# Patient Record
Sex: FEMALE | Race: WHITE | NOT HISPANIC OR LATINO | Employment: FULL TIME | URBAN - METROPOLITAN AREA
[De-identification: names, ages, dates, MRNs, and addresses within clinical notes are randomized per-mention and may not be internally consistent; named-entity substitution may affect disease eponyms.]

---

## 2017-02-10 ENCOUNTER — TRANSCRIBE ORDERS (OUTPATIENT)
Dept: ADMINISTRATIVE | Facility: HOSPITAL | Age: 62
End: 2017-02-10

## 2017-02-10 DIAGNOSIS — Z12.31 SCREENING MAMMOGRAM FOR HIGH-RISK PATIENT: Primary | ICD-10-CM

## 2017-03-01 ENCOUNTER — ALLSCRIPTS OFFICE VISIT (OUTPATIENT)
Dept: OTHER | Facility: OTHER | Age: 62
End: 2017-03-01

## 2017-03-01 DIAGNOSIS — E78.5 HYPERLIPIDEMIA: ICD-10-CM

## 2017-03-01 DIAGNOSIS — R73.01 IMPAIRED FASTING GLUCOSE: ICD-10-CM

## 2017-03-01 DIAGNOSIS — Z12.31 ENCOUNTER FOR SCREENING MAMMOGRAM FOR MALIGNANT NEOPLASM OF BREAST: ICD-10-CM

## 2017-03-01 DIAGNOSIS — R14.0 ABDOMINAL DISTENSION (GASEOUS): ICD-10-CM

## 2017-03-01 DIAGNOSIS — D50.9 IRON DEFICIENCY ANEMIA: ICD-10-CM

## 2017-03-03 LAB
HPV 18 (HISTORICAL): NOT DETECTED
HPV HIGH RISK 16/18 (HISTORICAL): NOT DETECTED
HPV16 (HISTORICAL): NOT DETECTED
PAP (HISTORICAL): NORMAL

## 2017-03-08 ENCOUNTER — ALLSCRIPTS OFFICE VISIT (OUTPATIENT)
Dept: OTHER | Facility: OTHER | Age: 62
End: 2017-03-08

## 2017-03-08 ENCOUNTER — GENERIC CONVERSION - ENCOUNTER (OUTPATIENT)
Dept: OTHER | Facility: OTHER | Age: 62
End: 2017-03-08

## 2017-03-27 ENCOUNTER — TRANSCRIBE ORDERS (OUTPATIENT)
Dept: MRI IMAGING | Facility: CLINIC | Age: 62
End: 2017-03-27

## 2017-03-30 ENCOUNTER — HOSPITAL ENCOUNTER (OUTPATIENT)
Dept: ULTRASOUND IMAGING | Facility: CLINIC | Age: 62
Discharge: HOME | End: 2017-03-30
Payer: COMMERCIAL

## 2017-03-30 DIAGNOSIS — R14.0 ABDOMINAL DISTENSION (GASEOUS): ICD-10-CM

## 2017-03-30 PROCEDURE — 76856 US EXAM PELVIC COMPLETE: CPT

## 2017-03-30 PROCEDURE — 76830 TRANSVAGINAL US NON-OB: CPT

## 2017-04-03 ENCOUNTER — ALLSCRIPTS OFFICE VISIT (OUTPATIENT)
Dept: OTHER | Facility: OTHER | Age: 62
End: 2017-04-03

## 2017-04-03 ENCOUNTER — HOSPITAL ENCOUNTER (OUTPATIENT)
Dept: MAMMOGRAPHY | Facility: CLINIC | Age: 62
Discharge: HOME | End: 2017-04-03
Payer: COMMERCIAL

## 2017-04-03 DIAGNOSIS — Z12.31 ENCOUNTER FOR SCREENING MAMMOGRAM FOR MALIGNANT NEOPLASM OF BREAST: ICD-10-CM

## 2017-04-03 PROCEDURE — G0143 SCR C/V CYTO,THINLAYER,RESCR: HCPCS | Performed by: OBSTETRICS & GYNECOLOGY

## 2017-04-03 PROCEDURE — G0202 SCR MAMMO BI INCL CAD: HCPCS

## 2017-04-05 ENCOUNTER — LAB REQUISITION (OUTPATIENT)
Dept: LAB | Facility: HOSPITAL | Age: 62
End: 2017-04-05
Payer: COMMERCIAL

## 2017-04-05 DIAGNOSIS — Z01.411 ENCOUNTER FOR GYNECOLOGICAL EXAMINATION WITH ABNORMAL FINDING: ICD-10-CM

## 2017-04-08 ENCOUNTER — LAB CONVERSION - ENCOUNTER (OUTPATIENT)
Dept: OTHER | Facility: OTHER | Age: 62
End: 2017-04-08

## 2017-04-08 LAB
A/G RATIO (HISTORICAL): 2.1 (CALC) (ref 1–2.5)
ALBUMIN SERPL BCP-MCNC: 4.1 G/DL (ref 3.6–5.1)
ALP SERPL-CCNC: 69 U/L (ref 33–130)
ALT SERPL W P-5'-P-CCNC: 14 U/L (ref 6–29)
AST SERPL W P-5'-P-CCNC: 16 U/L (ref 10–35)
BASOPHILS # BLD AUTO: 0.8 %
BASOPHILS # BLD AUTO: 27 CELLS/UL (ref 0–200)
BILIRUB SERPL-MCNC: 0.6 MG/DL (ref 0.2–1.2)
BUN SERPL-MCNC: 17 MG/DL (ref 7–25)
BUN/CREA RATIO (HISTORICAL): NORMAL (CALC) (ref 6–22)
CALCIUM SERPL-MCNC: 9 MG/DL (ref 8.6–10.4)
CHLORIDE SERPL-SCNC: 108 MMOL/L (ref 98–110)
CHOLEST SERPL-MCNC: 247 MG/DL (ref 125–200)
CHOLEST/HDLC SERPL: 4 (CALC)
CO2 SERPL-SCNC: 27 MMOL/L (ref 20–31)
CREAT SERPL-MCNC: 0.65 MG/DL (ref 0.5–0.99)
DEPRECATED RDW RBC AUTO: 12.8 % (ref 11–15)
EGFR AFRICAN AMERICAN (HISTORICAL): 112 ML/MIN/1.73M2
EGFR-AMERICAN CALC (HISTORICAL): 96 ML/MIN/1.73M2
EOSINOPHIL # BLD AUTO: 102 CELLS/UL (ref 15–500)
EOSINOPHIL # BLD AUTO: 3 %
GAMMA GLOBULIN (HISTORICAL): 2 G/DL (CALC) (ref 1.9–3.7)
GLUCOSE (HISTORICAL): 76 MG/DL (ref 65–99)
HBA1C MFR BLD HPLC: 5.1 % OF TOTAL HGB
HCT VFR BLD AUTO: 41.5 % (ref 35–45)
HDLC SERPL-MCNC: 61 MG/DL
HGB BLD-MCNC: 13.8 G/DL (ref 11.7–15.5)
LDL CHOLESTEROL (HISTORICAL): 166 MG/DL (CALC)
LYMPHOCYTES # BLD AUTO: 1394 CELLS/UL (ref 850–3900)
LYMPHOCYTES # BLD AUTO: 41 %
MCH RBC QN AUTO: 30.1 PG (ref 27–33)
MCHC RBC AUTO-ENTMCNC: 33.2 G/DL (ref 32–36)
MCV RBC AUTO: 90.5 FL (ref 80–100)
MONOCYTES # BLD AUTO: 286 CELLS/UL (ref 200–950)
MONOCYTES (HISTORICAL): 8.4 %
NEUTROPHILS # BLD AUTO: 1591 CELLS/UL (ref 1500–7800)
NEUTROPHILS # BLD AUTO: 46.8 %
NON-HDL-CHOL (CHOL-HDL) (HISTORICAL): 186 MG/DL (CALC)
PLATELET # BLD AUTO: 223 THOUSAND/UL (ref 140–400)
PMV BLD AUTO: 8.6 FL (ref 7.5–12.5)
POTASSIUM SERPL-SCNC: 4.1 MMOL/L (ref 3.5–5.3)
RBC # BLD AUTO: 4.58 MILLION/UL (ref 3.8–5.1)
SODIUM SERPL-SCNC: 142 MMOL/L (ref 135–146)
TOTAL PROTEIN (HISTORICAL): 6.1 G/DL (ref 6.1–8.1)
TRIGL SERPL-MCNC: 99 MG/DL
TSH SERPL DL<=0.05 MIU/L-ACNC: 2.21 MIU/L (ref 0.4–4.5)
WBC # BLD AUTO: 3.4 THOUSAND/UL (ref 3.8–10.8)

## 2017-04-10 LAB
LAB AP GYN PRIMARY INTERPRETATION: NORMAL
Lab: NORMAL

## 2017-04-14 ENCOUNTER — ALLSCRIPTS OFFICE VISIT (OUTPATIENT)
Dept: OTHER | Facility: OTHER | Age: 62
End: 2017-04-14

## 2017-11-20 ENCOUNTER — GENERIC CONVERSION - ENCOUNTER (OUTPATIENT)
Dept: OTHER | Facility: OTHER | Age: 62
End: 2017-11-20

## 2017-11-20 DIAGNOSIS — J45.40 MODERATE PERSISTENT ASTHMA, UNCOMPLICATED: ICD-10-CM

## 2017-12-11 ENCOUNTER — LAB CONVERSION - ENCOUNTER (OUTPATIENT)
Dept: OTHER | Facility: OTHER | Age: 62
End: 2017-12-11

## 2017-12-11 LAB
ALLERGEN CAT EPITHELIUM-DANDER (HISTORICAL): <0.1 KU/L
ALLERGEN LAMB'S QUARTER IGE (HISTORICAL): <0.1 KU/L
ALLERGEN, OAK (HISTORICAL): <0.1 KU/L
ALTERNARIA ALTERNATA (HISTORICAL): <0.1 KU/L
CLADOSPORIUM HERBARUM (HISTORICAL): <0.1 KU/L
CLASS (HISTORICAL): 0
COMMON RAGWEED (HISTORICAL): <0.1 KU/L
D. FARINAE (HISTORICAL): <0.1 KU/L
DOG DANDER (HISTORICAL): <0.1 KU/L
KENTUCKY BLUE GRASS (HISTORICAL): <0.1 KU/L
TIMOTHY GRASS (HISTORICAL): <0.1 KU/L

## 2017-12-19 ENCOUNTER — ALLSCRIPTS OFFICE VISIT (OUTPATIENT)
Dept: OTHER | Facility: OTHER | Age: 62
End: 2017-12-19

## 2017-12-20 NOTE — PROGRESS NOTES
Assessment  1  Seborrheic dermatitis of scalp (690 18) (L21 9)   2  Dermatitis (692 9) (L30 9)    Plan  Dermatitis    · Triamcinolone Acetonide 0 5 % External Cream; Use 1 part with 3 parts Eucerin toaffected areas on arm and legs twice daily  Seborrheic dermatitis of scalp    · Betamethasone Valerate 0 12 % External Foam; APPLY TO AFFECTED AREAS onscalp qd-bid prn    Discussion/Summary    Seborrheic dermatitis of the scalp-this can be exacerbated by stress, try the betamethasone foam once or twice daily, if symptoms fail to improve can treat for tinea capitisRash on arm may be dermatitis similar to axilla behind knees and thigh though also could be ringworm given its rounded appearance, treat with the triamcinolone as discussed, if this fails to improve then tried Terbinafine 1% available over-the-counter, it containing up to 3 weeks for ringworm to improve  Dermatitis is likely related to dry skin and scratching, can be exacerbated by stress, get the home humidifier as discussed goal 45% humidity in the bedroom and sitting room  Copious hypoallergenic moisturize in lotion 2-3 times daily such as Eucerin calming cream, spot treat the areas of dermatitis with triamcinolone 2-3 times daily as this will improve the H and decrease the inflammation, then follow with the lotion  Chief Complaint  rash      History of Present Illness  HPI: notes rashes under arms, behind knees, back of head and R forearm  concerned w/ ringworm      Active Problems  1  Abnormal fasting glucose (790 29) (R73 01)   2  Acute bronchitis (466 0) (J20 9)   3  Acute laryngitis (464 00) (J04 0)   4  Allergic rhinitis (477 9) (J30 9)   5  Asthma (493 90) (J45 909)   6  Benign essential hypertension (401 1) (I10)   7  Bloating (787 3) (R14 0)   8  Bronchitis, asthmatic (493 90) (J45 909)   9  Chronic obstructive pulmonary disease (496) (J44 9)   10  Colon cancer screening (V76 51) (Z12 11)   11  Common cold (460) (J00)   12   Depression (311) (F32 9)   13  Depression with anxiety (300 4) (F41 8)   14  Encounter for genetic counseling (V26 33)   15  Encounter for gynecological examination with abnormal finding (V72 31) (Z01 411)   16  Encounter for routine gynecological examination with Papanicolaou smear of cervix  (V72 31,V76 2) (Z01 419)   17  Encounter for screening mammogram for malignant neoplasm of breast (V76 12)  (Z12 31)   18  Extrinsic asthma (493 00) (J45 909)   19  Fatigue (780 79) (R53 83)   20  Herpes Simplex Generalized (054 9)   21  Hoarseness (784 42) (R49 0)   22  Hyperlipidemia (272 4) (E78 5)   23  Insomnia (780 52) (G47 00)   24  Iron deficiency anemia (280 9) (D50 9)   25  Moderate persistent asthma without complication (435 96) (Z61 16)   26  Pain in joint, lower leg (719 46) (M25 569)   27  Screening for HPV (human papillomavirus) (V73 81) (Z11 51)   28  Temporomandibular dysfunction syndrome (524 60) (M26 609)   29  Unsatisfactory cervical Papanicolaou smear (795 08) (R87 615)   30  Urgency of urination (788 63) (R39 15)   31  Urinary frequency (788 41) (R35 0)    Past Medical History  Active Problems And Past Medical History Reviewed: The active problems and past medical history were reviewed and updated today  Surgical History  Surgical History Reviewed: The surgical history was reviewed and updated today  Social History   · Alcohol Use (History)   · RARELY   · Drinks coffee   · Exercise habits   · Former smoker   · Four children   · Four children   · Marital History -  (V61 03)   · Never Used Drugs  The social history was reviewed and updated today  Family History  Family History Reviewed: The family history was reviewed and updated today  Current Meds   1  Montelukast Sodium 10 MG Oral Tablet; take 1 tablet every day; Therapy: 94Bte4840 to (Evaluate:42Ckj0384)  Requested for: 34Fze6708; Last Rx:60Ywo2600 Ordered   2   Proventil  (90 Base) MCG/ACT Inhalation Aerosol Solution; INHALE 2 PUFFS EVERY 6 HOURS AS NEEDED; Therapy: 68ANA1296 to (Last Rx:20Nov2017)  Requested for: 20Nov2017 Ordered   3  Symbicort 160-4 5 MCG/ACT Inhalation Aerosol; inhale 2 puffs by mouth twice a day Rinse mouth after use; Therapy: 88QFZ2798 to (Junita Pali)  Requested for: 30KNA6575; Last Rx:30Oct2017 Ordered   4  ValACYclovir HCl - 1 GM Oral Tablet; take 2 tablets NOW AND THEN 2 TABLETS IN 12 HOURS; Therapy: 39Ngh3477 to (Evaluate:25Nov2017)  Requested for: 52PQE0322; Last Rx:20Nov2017 Ordered    Allergies  1  Pravastatin Sodium TABS   2  Simvastatin TABS    Vitals   Recorded: 62YLS8320 02:24PM   Heart Rate 68   Respiration 12   Systolic 855   Diastolic 80   Height 5 ft 9 in   Weight 155 lb 2 oz   BMI Calculated 22 91   BSA Calculated 1 85     Physical Exam   Skin  Skin and subcutaneous tissue: Abnormal  -- Sharply circumscribed erythematous macular papular rash behind both knees and under axilla, erythematous scaly rash on back of scalp that is also well-circumscribed scaly throughout the lesion, crescent shaped rash on right forearm with no appreciable scaling only mildly erythematous can be consistent with an early ringworm, dry scaly erythematous rash on predominantly right inner thigh          Future Appointments    Date/Time Provider Specialty Site   02/15/2018 03:30 PM Anthony [facility] Pulmonary Medicine [address] [facility] [place]   03/09/2018 03:00 PM Qian Dobson MD Obstetrics/Gynecology [facility]     Signatures   Electronically signed by : HARSHA Fischer ; Dec 19 2017  2:57PM EST                       (Author)

## 2018-01-11 NOTE — PROGRESS NOTES
Assessment    1  Acute bronchitis (466 0) (J20 9)   2  Asthma (493 90) (J45 909)   3  Chronic obstructive pulmonary disease (496) (J44 9)   4  Hyperlipidemia (272 4) (E78 5)   5  Pain in joint, lower leg (719 46) (M25 569)   6  Iron deficiency anemia (280 9) (D50 9)    Plan  Abnormal fasting glucose, Hyperlipidemia    · (1) HEMOGLOBIN A1C; Status:Active; Requested for:08Mar2017;    · Follow-up as previously scheduled Evaluation and Treatment  Follow-up  Status: Complete  Done:  08DQF3302  Acute bronchitis    · Azithromycin 250 MG Oral Tablet; TAKE 2 TABLETS ON DAY 1 THEN TAKE 1 TABLET A DAY  FOR 4 DAYS   · PredniSONE 20 MG Oral Tablet; 2 TAB FOR 3 DAYS AND ONE TAB FOR 3 DAYS AND THEN  1/2 TAB FOR 3 DAYS  Colon cancer screening    · COLONOSCOPY ; every 5 years; Last 37UUS3926; Next 66LNH5959; Status:Active  Hyperlipidemia    · (1) COMPREHENSIVE METABOLIC PANEL; Status:Active; Requested for:08Mar2017;    · (1) LIPID PANEL, FASTING; Status:Active; Requested for:08Mar2017;    · (1) TSH; Status:Active; Requested for:08Mar2017;    · (1) URINALYSIS w URINE C/S REFLEX (will reflex a microscopy if leukocytes, occult blood, or  nitrites are not within normal limits); Status:Active; Requested for:08Mar2017;   Iron deficiency anemia    · (1) CBC/PLT/DIFF; Status:Active; Requested for:08Mar2017;     Discussion/Summary  Discussion Summary:   Asthmatic bronchitis steroids antibiotics she will use Tylenol or ibuprofen for the cough she has a known history of recurrent bronchitis and asthma but no episodes for a year she will return if she worsens getting screening labs  Chief Complaint  Chief Complaint Free Text Note Form: Severe coughing      History of Present Illness  HPI: She has had severe coughing for a week   She's coughing up thick yellow phlegm no hemoptysis or chest pain no exertional shortness of breath no orthopnea or PND no obvious fever or chills somewhat of a runny nose normally active and wellShe works in a    Bronchitis, Acute, Adult (Brief): The patient is being seen for an initial evaluation of acute bronchitis  Symptoms:  productive cough, non-productive cough and wheezing, but no shortness of breath and no fatigue  The patient is currently experiencing symptoms  Iron Deficiency Anemia (Follow-Up): The patient is being seen for follow-up of iron deficiency anemia  The patient reports doing well  Hyperlipidemia (Follow-Up): The patient states her hyperlipidemia has been under good control since the last visit  She has no significant interval events  Symptoms: Associated symptoms include no focal neurologic deficits  Review of Systems  Complete-Female:   Constitutional: No fever, no chills, feels well, no tiredness, no recent weight gain or weight loss  Eyes: No complaints of eye pain, no red eyes, no eyesight problems, no discharge, no dry eyes, no itching of eyes  ENT: no complaints of earache, no loss of hearing, no nose bleeds, no nasal discharge, no sore throat, no hoarseness  Cardiovascular: No complaints of slow heart rate, no fast heart rate, no chest pain, no palpitations, no leg claudication, no lower extremity edema  Respiratory: as noted in HPI  Gastrointestinal: No complaints of abdominal pain, no constipation, no nausea or vomiting, no diarrhea, no bloody stools  Genitourinary: No complaints of dysuria, no incontinence, no pelvic pain, no dysmenorrhea, no vaginal discharge or bleeding  Musculoskeletal: No complaints of arthralgias, no myalgias, no joint swelling or stiffness, no limb pain or swelling  Integumentary: No complaints of skin rash or lesions, no itching, no skin wounds, no breast pain or lump  Neurological: No complaints of headache, no confusion, no convulsions, no numbness, no dizziness or fainting, no tingling, no limb weakness, no difficulty walking     Psychiatric: Not suicidal, no sleep disturbance, no anxiety or depression, no change in personality, no emotional problems  Endocrine: No complaints of proptosis, no hot flashes, no muscle weakness, no deepening of the voice, no feelings of weakness  Hematologic/Lymphatic: No complaints of swollen glands, no swollen glands in the neck, does not bleed easily, does not bruise easily  ROS Reviewed:   ROS reviewed  Active Problems    1  Acute bronchitis (466 0) (J20 9)   2  Acute laryngitis (464 00) (J04 0)   3  Allergic rhinitis (477 9) (J30 9)   4  Asthma (493 90) (J45 909)   5  Benign essential hypertension (401 1) (I10)   6  Bloating (787 3) (R14 0)   7  Bronchitis, asthmatic (493 90) (J45 909)   8  Chronic obstructive pulmonary disease (496) (J44 9)   9  Colon cancer screening (V76 51) (Z12 11)   10  Common cold (460) (J00)   11  Depression (311) (F32 9)   12  Encounter for genetic counseling (V26 33) (Z31 5)   13  Encounter for gynecological examination with abnormal finding (V72 31) (Z01 411)   14  Encounter for routine gynecological examination with Papanicolaou smear of cervix (V72 31,V76 2)    (Z01 419)   15  Encounter for screening mammogram for malignant neoplasm of breast (V76 12) (Z12 31)   16  Extrinsic asthma (493 00) (J45 909)   17  Herpes Simplex Generalized (054 9)   18  Hyperlipidemia (272 4) (E78 5)   19  Iron deficiency anemia (280 9) (D50 9)   20  Moderate persistent asthma without complication (461 06) (B00 64)   21  Pain in joint, lower leg (719 46) (M25 569)   22  Screening for HPV (human papillomavirus) (V73 81) (Z11 51)   23  Temporomandibular dysfunction syndrome (524 60) (M26 609)   24  Urgency of urination (788 63) (R39 15)   25  Urinary frequency (788 41) (R35 0)    Past Medical History    1  History of A Fall Due To Slipping, Tripping, Or Stumbling (E885 9)   2  Acute sinusitis (461 9) (J01 90)   3  History of Black tarry stools (578 1) (K92 1)   4  Chronic obstructive pulmonary disease (496) (J44 9)   5  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)   6   History of Concussion With Prolonged LOC (Over 24 Hr) Without Return To Pre-existing Conscious   Level (850 4)   7  History of Encounter for counseling (V65 40) (Z71 9)   8  History of Encounter for screening mammogram for malignant neoplasm of breast (V76 12) (Z12 31)   9  History of Facial Injury (959 09)   10  History of Generalized osteoarthritis (715 00) (M15 9)   11  History of  10 (V22 2) (Z33 1)   12  History of allergic rhinitis (V12 69) (Z87 09)   13  History of gastroesophageal reflux (GERD) (V12 79) (Z87 19)   14  History of herpes simplex infection (V12 09) (Z86 19)   15  History of pneumonia (V12 61) (Z87 01)   16  History of Menopausal disorder (627 9) (N95 9)   17  History of Missed abortions (69 849 69 22) (O02 1)   25  History of Morbid or severe obesity due to excess calories (278 01) (E66 01)   19  History of Multiple gestation (651 90) (O30 90)   20  History of Neck Injury (959 09)   21  History of Neuroma (215 9) (D36 10)   22  Normal delivery (650) (O80,Z37 9)   23  History of Pharyngeal disorder (478 20) (J39 2)   24  History of Pneumonia (V12 61)   25  History of Post-menopausal bleeding (627 1) (N95 0)   26  History of Postmenopausal disorder (627 9) (N95 9)   27  History of Pre-operative cardiovascular examination (V72 81) (Z01 810)  Active Problems And Past Medical History Reviewed: The active problems and past medical history were reviewed and updated today  Surgical History    1  History of Biopsy Endometrial, Without Cervical Dilation   2  History of  Section   3  History of Complete Colonoscopy   4  History of Dilation And Curettage   5  History of Knee Arthroscopy (Therapeutic)   6  History of Knee Surgery   7  History of Surgical Treatment Of Missed  In First Trimester   8  History of Surgically Induced  - By Dilation And Evacuation  Surgical History Reviewed: The surgical history was reviewed and updated today  Family History  Mother    1   Family history of Diabetes Mellitus (V18 0)   2  Family history of Head ache   3  Family history of High blood pressure   4  Family history of Hypertension   5  Family history of Migraine   6  Family history of Ovarian cancer   7  Family history of Ovarian cancer   8  Family history of Pancreas carcinoma   9  Family history of Skin disorder   10  Family history of Urinary incontinence   11  Family history of Urinary incontinence  Father    15  Family history of Coronary Artery Disease (V17 49)   13  Family history of Family Health Status Of Father -    15  Family history of Heart disease   15  Family history of High blood pressure  Sister    12  Family history of High blood pressure  Brother    17  Family history of Heart disease   18  Family history of Jaundice   19  Family history of Rectal cancer  Aunt    21  Family history of Osteoporosis  Paternal Aunt    24  Family history of Breast cancer  Family History Reviewed: The family history was reviewed and updated today  Social History    · Alcohol Use (History)   · Drinks coffee   · Exercise habits   · Former smoker   · Four children   · Four children   · Marital History -  (V61 03)   · Never Used Drugs  Social History Reviewed: The social history was reviewed and updated today  Current Meds   1  Montelukast Sodium 10 MG Oral Tablet; take 1 tablet by mouth once daily; Therapy: 62Vrs6063 to (Evaluate:2017)  Requested for: 22CSH1916; Last Rx:42Xsr0964   Ordered   2  Symbicort 160-4 5 MCG/ACT Inhalation Aerosol; inhale 2 puffs by mouth twice a day Rinse mouth   after use; Therapy: 32OBQ9578 to (Priscila Nair)  Requested for: 29Uey7421; Last Rx:20Aro2068   Ordered   3  ValACYclovir HCl - 1 GM Oral Tablet; take 2 tablets NOW AND THEN 2 TABLETS IN 12 HOURS; Therapy: 05Fmt1885 to (Evaluate:79Tor1393)  Requested for: 57YGE9562; Last Rx:83Qbh1286   Ordered   4  Vitamin D 1000 UNIT Oral Tablet Recorded  Medication List Reviewed:    The medication list was reviewed and updated today  Allergies    1  Pravastatin Sodium TABS   2  Simvastatin TABS    Vitals  Vital Signs    Recorded: 34JEQ6394 04:22PM   Temperature 97 8 F   Heart Rate 78   Systolic 298, LUE, Sitting   Diastolic 70, LUE, Sitting   Weight 159 lb 2 0 oz   BMI Calculated 23 5   BSA Calculated 1 87   O2 Saturation 96     Physical Exam    Constitutional   General appearance: No acute distress, well appearing and well nourished  Eyes   Conjunctiva and lids: No swelling, erythema or discharge  Pupils and irises: Equal, round and reactive to light  Ears, Nose, Mouth, and Throat   External inspection of ears and nose: Normal     Otoscopic examination: Tympanic membranes translucent with normal light reflex  Canals patent without erythema  Nasal mucosa, septum, and turbinates: Normal without edema or erythema  Oropharynx: Normal with no erythema, edema, exudate or lesions  Pulmonary   Respiratory effort: No increased work of breathing or signs of respiratory distress  Auscultation of lungs: Abnormal   rhonchi over both bases  wheezing over both bases  Cardiovascular   Palpation of heart: Normal PMI, no thrills  Auscultation of heart: Normal rate and rhythm, normal S1 and S2, without murmurs  Examination of extremities for edema and/or varicosities: Normal     Carotid pulses: Normal     Abdomen   Abdomen: Non-tender, no masses  Liver and spleen: No hepatomegaly or splenomegaly  Lymphatic   Palpation of lymph nodes in neck: No lymphadenopathy  Musculoskeletal   Gait and station: Normal     Digits and nails: Normal without clubbing or cyanosis  Inspection/palpation of joints, bones, and muscles: Normal     Skin   Skin and subcutaneous tissue: Normal without rashes or lesions  Neurologic   Cranial nerves: Cranial nerves 2-12 intact  Reflexes: 2+ and symmetric  Sensation: No sensory loss      Psychiatric   Orientation to person, place, and time: Normal     Mood and affect: Normal          Results/Data  Health Maintenance Flow Sheet 10QUM5929 04:26PM      Test Name Result Flag Reference   Mammography      "going next month in April" per pt       Health Management  Colon cancer screening   COLONOSCOPY; every 5 years; Last 25EIW8432; Next Due: 93DYJ2526; Overdue    Future Appointments    Date/Time Provider Specialty Site   04/14/2017 03:30 PM [facility]   03/09/2018 03:00 PM Quang Diop MD Obstetrics/Gynecology [facility]     Signatures   Electronically signed by : Shahida New, [facility]; Mar  8 2017  7:30PM EST                       (Author)    Electronically signed by : HARSHA Su ; Mar  8 2017  8:28PM EST

## 2018-01-12 VITALS
WEIGHT: 159.13 LBS | DIASTOLIC BLOOD PRESSURE: 70 MMHG | BODY MASS INDEX: 23.5 KG/M2 | OXYGEN SATURATION: 96 % | TEMPERATURE: 97.8 F | HEART RATE: 78 BPM | SYSTOLIC BLOOD PRESSURE: 118 MMHG

## 2018-01-14 VITALS
RESPIRATION RATE: 12 BRPM | HEART RATE: 82 BPM | BODY MASS INDEX: 23.14 KG/M2 | WEIGHT: 156.25 LBS | HEIGHT: 69 IN | DIASTOLIC BLOOD PRESSURE: 74 MMHG | SYSTOLIC BLOOD PRESSURE: 116 MMHG

## 2018-01-14 VITALS
BODY MASS INDEX: 1.63 KG/M2 | WEIGHT: 11 LBS | HEIGHT: 69 IN | DIASTOLIC BLOOD PRESSURE: 64 MMHG | SYSTOLIC BLOOD PRESSURE: 120 MMHG

## 2018-01-14 VITALS
WEIGHT: 159 LBS | BODY MASS INDEX: 23.55 KG/M2 | SYSTOLIC BLOOD PRESSURE: 140 MMHG | HEIGHT: 69 IN | DIASTOLIC BLOOD PRESSURE: 82 MMHG

## 2018-01-16 NOTE — CONSULTS
Assessment    1  Colon cancer screening (V76 51) (Z12 11)    Plan  Colon cancer screening    · ENDOSCOPY - PROCEDURE, RISKS, AND BENEFITS; Status:Complete;   Done:  55NOG4526   Ordered; For:Colon cancer screening; Ordered By:Justin Fraser;   · We recommend a colonoscopy ; Status:Complete;   Done: 94ROF4826   Ordered; For:Colon cancer screening; Ordered By:Justin Fraser;    Discussion/Summary  Discussion Summary:   Patient scheduled for colonoscopy- last colonoscopy was in 2009    [facility]- brother with rectal cancer  Chief Complaint  Chief Complaint Free Text Note Form: screening colonoscopy      History of Present Illness  HPI: 62year old female whose brother is currently being treated for rectal cancer  she reports no UGI or LGI symptoms  Last colonoscopy was in 2009 and normal  Ex-smoker  PMH includes asthma- currently being treated for an URI    History Reviewed: The history was obtained today from the patient and I agree with the documented history  Review of Systems  Complete-Female GI Adult:   Constitutional: not feeling poorly and not feeling tired  ENT: no sore throat and no hoarseness  Cardiovascular: no chest pain  Respiratory: as noted in HPI  Gastrointestinal: as noted in HPI  Hematologic/Lymphatic: no tendency for easy bleeding  Active Problems    1  Acute bronchitis (466 0) (J20 9)   2  Allergic rhinitis (477 9) (J30 9)   3  Arthralgia Of The Knee / Patella / Tibia / Fibula (719 46)   4  Asthma (493 90) (J45 909)   5  Benign essential hypertension (401 1) (I10)   6  Chronic obstructive pulmonary disease (496) (J44 9)   7  Colon cancer screening (V76 51) (Z12 11)   8  Common cold (460) (J00)   9  Depression (311) (F32 9)   10  Encounter for genetic counseling (V26 33) (Z31 5)   11  Encounter for routine gynecological examination with Papanicolaou smear of cervix    (V72 31,V76 2) (Z01 419,Z12 4)   12  Extrinsic asthma (493 00) (J45 909)   13   Herpes Simplex Generalized (054 9)   14  Hyperlipidemia (272 4) (E78 5)   15  Iron deficiency anemia (280 9) (D50 9)   16  Screening for HPV (human papillomavirus) (V73 81) (Z11 51)   17  Temporomandibular dysfunction syndrome (524 60) (M26 60)   18  Urinary frequency (788 41) (R35 0)    Past Medical History    1  History of A Fall Due To Slipping, Tripping, Or Stumbling (E885 9)   2  History of Black tarry stools (578 1) (K92 1)   3  Chronic obstructive pulmonary disease (496) (J44 9)   4  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)   5  History of Concussion With Prolonged LOC (Over 24 Hr) Without Return To Pre-existing   Conscious Level (850 4)   6  History of Encounter for counseling (V65 40) (Z71 9)   7  History of Encounter for screening mammogram for malignant neoplasm of breast   (V76 12) (Z12 31)   8  History of Facial Injury (959 09)   9  History of Generalized osteoarthritis (715 00) (M15 9)   10  History of  10 (V22 2) (Z33 1)   11  History of gastroesophageal reflux (GERD) (V12 79) (Z87 19)   12  History of herpes simplex infection (V12 09) (Z86 19)   13  History of pneumonia (V12 61) (Z87 01)   14  History of Menopausal disorder (627 9) (N95 9)   15  History of Missed abortions (26) (O02 1)   16  History of Morbid or severe obesity due to excess calories (278 01) (E66 01)   17  History of Multiple gestation (651 90) (O30 90)   18  History of Neck Injury (959 09)   19  History of Neuroma (215 9) (D36 10)   20  Normal delivery (650) (O80,Z37 9)   21  History of Pharyngeal disorder (478 20) (J39 2)   22  History of Pneumonia (V12 61)   23  History of Post-menopausal bleeding (627 1) (N95 0)   24  History of Postmenopausal disorder (627 9) (N95 9)   25  History of Pre-operative cardiovascular examination (V72 81) (Z01 810)  Active Problems And Past Medical History Reviewed: The active problems and past medical history were reviewed and updated today  Surgical History    1   History of Biopsy Endometrial, Without Cervical Dilation   2  History of  Section   3  History of Complete Colonoscopy   4  History of Dilation And Curettage   5  History of Knee Arthroscopy   6  History of Knee Surgery   7  History of Surgical Treatment Of Missed  In First Trimester   8  History of Surgically Induced  - By Dilation And Evacuation  Surgical History Reviewed: The surgical history was reviewed and updated today  Family History    1  Family history of Diabetes Mellitus (V18 0)   2  Family history of Head ache   3  Family history of High blood pressure   4  Family history of Hypertension   5  Family history of Migraine   6  Family history of Ovarian cancer   7  Family history of Ovarian cancer   8  Family history of Pancreas carcinoma   9  Family history of Skin disorder   10  Family history of Urinary incontinence   11  Family history of Urinary incontinence    12  Family history of Coronary Artery Disease (V17 49)   13  Family history of Family Health Status Of Father -    15  Family history of Heart disease   15  Family history of High blood pressure    16  Family history of High blood pressure    17  Family history of Heart disease   18  Family history of Jaundice   19  Family history of Rectal cancer    20  Family history of Osteoporosis    21  Family history of Breast cancer  Family History Reviewed: The family history was reviewed and updated today  Social History    · Alcohol Use (History)   · Drinks coffee   · Exercise habits   · Former smoker   · Four children   · Four children   · Marital History -  (V61 03)   · Never Used Drugs  Social History Reviewed: The social history was reviewed and updated today  Current Meds   1  Budesonide 32 MCG/ACT Nasal Suspension; USE 2 SPRAYS IN EACH NOSTRIL ONCE   DAILY; Therapy: 49OUX5441 to (Last Rx:2016)  Requested for: 65CSC7860 Ordered   2   Proventil  (90 Base) MCG/ACT Inhalation Aerosol Solution; inhale 2 puffs by   mouth every 6 hours if needed; Therapy: 32Gxu6777 to (Jordyris Dial)  Requested for: 54Ult6017; Last   Rx:[id number] Ordered   3  Symbicort 160-4 5 MCG/ACT Inhalation Aerosol; inhale 2 puffs by mouth twice a day   Rinse mouth after use; Therapy: 64JYO1193 to (Evaluate:26Jan2016)  Requested for: 69ZCI3683; Last   Rx:00Ixv6772 Ordered   4  Triamcinolone Acetonide 0 5 % External Ointment; APPLY 2-3 TIMES DAILY TO   AFFECTED AREA(S)  Requested for: 47HJY7221; Last NQ:27PLW0961 Ordered   5  ValACYclovir HCl - 1 GM Oral Tablet; take 2 tablets NOW AND THEN 2 TABLETS IN 12   HOURS; Therapy: 62Mxz4810 to (Evaluate:72Rsw0273)  Requested for: 62YJC4265; Last   Rx:97Eqp2523 Ordered  Medication List Reviewed: The medication list was reviewed and updated today  Allergies    1  Pravastatin Sodium TABS   2  Simvastatin TABS    Vitals  Vital Signs [Data Includes: Current Encounter]    Recorded: 90QOT0728 66:16MX   Systolic 413   Diastolic 82   Height 5 ft 9 in   Weight 160 lb    BMI Calculated 23 63   BSA Calculated 1 88     Physical Exam    Constitutional   General appearance: No acute distress, well appearing and well nourished  Eyes anicteric  Pupils and irises: Equal, round and reactive to light  Ears, Nose, Mouth, and Throat   External inspection of ears and nose: Normal     Nasal mucosa, septum, and turbinates: Normal without edema or erythema  Oropharynx: Normal with no erythema, edema, exudate or lesions  Pulmonary   Respiratory effort: No increased work of breathing or signs of respiratory distress  Auscultation of lungs: Clear to auscultation  Cardiovascular   Auscultation of heart: Normal rate and rhythm, normal S1 and S2, without murmurs  Examination of extremities for edema and/or varicosities: Normal     Carotid pulses: Normal     Abdomen   Abdomen: Non-tender, no masses  Liver and spleen: No hepatomegaly or splenomegaly      Lymphatic   Palpation of lymph nodes in neck: No lymphadenopathy  Musculoskeletal   Digits and nails: Normal without clubbing or cyanosis  Inspection/palpation of joints, bones, and muscles: Normal     Skin   Skin and subcutaneous tissue: Normal without rashes or lesions  Neurologic no nystagmus or asterixis  Reflexes: 2+ and symmetric  Sensation: No sensory loss  Psychiatric   Orientation to person, place, and time: Normal     Mood and affect: Normal          Future Appointments    Date/Time Provider Specialty Site   02/05/2016 04:00 PM HARSHA Gilliland   Pulmonary Medicine [facility] [place]     Signatures   Electronically signed by : Rosa Maria Rockwell [address] [place] [address]; Feb 2 2016  4:43PM EST                       (Author)    Electronically signed by : HARSHA Marinelli ; Feb 2 2016  5:05PM EST                       (Author)

## 2018-01-22 VITALS
DIASTOLIC BLOOD PRESSURE: 80 MMHG | BODY MASS INDEX: 22.96 KG/M2 | HEIGHT: 69 IN | WEIGHT: 155 LBS | SYSTOLIC BLOOD PRESSURE: 130 MMHG | HEART RATE: 74 BPM | OXYGEN SATURATION: 98 %

## 2018-01-23 VITALS
BODY MASS INDEX: 22.98 KG/M2 | HEIGHT: 69 IN | RESPIRATION RATE: 12 BRPM | HEART RATE: 68 BPM | DIASTOLIC BLOOD PRESSURE: 80 MMHG | SYSTOLIC BLOOD PRESSURE: 134 MMHG | WEIGHT: 155.13 LBS

## 2018-02-15 ENCOUNTER — OFFICE VISIT (OUTPATIENT)
Dept: PULMONOLOGY | Facility: CLINIC | Age: 63
End: 2018-02-15
Payer: COMMERCIAL

## 2018-02-15 VITALS
SYSTOLIC BLOOD PRESSURE: 140 MMHG | DIASTOLIC BLOOD PRESSURE: 82 MMHG | HEART RATE: 72 BPM | HEIGHT: 69 IN | OXYGEN SATURATION: 99 % | BODY MASS INDEX: 22.96 KG/M2 | WEIGHT: 155 LBS

## 2018-02-15 DIAGNOSIS — J45.40 MODERATE PERSISTENT ASTHMATIC BRONCHITIS WITHOUT COMPLICATION: Primary | ICD-10-CM

## 2018-02-15 PROCEDURE — 99213 OFFICE O/P EST LOW 20 MIN: CPT | Performed by: PHYSICIAN ASSISTANT

## 2018-02-15 RX ORDER — ALBUTEROL SULFATE 90 MCG
2 HFA AEROSOL WITH ADAPTER (GRAM) INHALATION EVERY 6 HOURS PRN
Refills: 0 | COMMUNITY
Start: 2017-11-20 | End: 2020-04-07 | Stop reason: SDUPTHER

## 2018-02-15 RX ORDER — BUDESONIDE AND FORMOTEROL FUMARATE DIHYDRATE 160; 4.5 UG/1; UG/1
AEROSOL RESPIRATORY (INHALATION)
Refills: 0 | COMMUNITY
Start: 2018-01-26 | End: 2018-06-05 | Stop reason: SDUPTHER

## 2018-02-15 RX ORDER — TRIAMCINOLONE ACETONIDE 5 MG/G
CREAM TOPICAL
Refills: 0 | COMMUNITY
Start: 2017-12-19 | End: 2018-07-09

## 2018-02-15 RX ORDER — LORATADINE 10 MG/1
10 TABLET ORAL DAILY
COMMUNITY

## 2018-02-15 RX ORDER — BETAMETHASONE VALERATE 1.2 MG/G
1 AEROSOL, FOAM TOPICAL AS NEEDED
Refills: 0 | COMMUNITY
Start: 2017-12-20 | End: 2020-01-16

## 2018-02-15 RX ORDER — VALACYCLOVIR HYDROCHLORIDE 1 G/1
1000 TABLET, FILM COATED ORAL SEE ADMIN INSTRUCTIONS
COMMUNITY
Start: 2014-02-20 | End: 2018-07-19 | Stop reason: SDUPTHER

## 2018-02-15 RX ORDER — MONTELUKAST SODIUM 10 MG/1
10 TABLET ORAL DAILY
Refills: 3 | COMMUNITY
Start: 2017-12-26 | End: 2018-09-23 | Stop reason: SDUPTHER

## 2018-02-15 NOTE — PROGRESS NOTES
Assessment:    1  Moderate persistent asthmatic bronchitis without complication           Plan:     Patient has been doing well, no episodes of bronchitis since her last visit in November  She has rarely had the need to use her rescue medication  She will continue with symbicort BID, singulair and claritin  Proventil 4 times per day as needed  Follow up with us in 6 months or sooner if necessary  Subjective:     Patient ID: Sonia Menchaca is a 65 y o  female  Chief Complaint:  Patient is a 65year old female, nonsmoker, with history of asthma, recurrent bronchitis  She is currently on symbicort  PFTs in 2013 showed moderate obstructive ventilatory limitation with appreciable response to the bronchodilator  She is here today for follow up  She has been doing well this winter  No episodes of bronchitis since her last visit in November  She is doing well with her breathing, has rarely had the need to use her rescue inhaler  Her hoarseness has resolved  Review of Systems   Constitutional: Negative  HENT: Negative  Respiratory: Negative  Cardiovascular: Negative  Gastrointestinal: Negative  Genitourinary: Negative  Musculoskeletal: Negative  Skin: Negative  Allergic/Immunologic: Negative  Neurological: Negative  Psychiatric/Behavioral: Negative  Objective:    Physical Exam   Constitutional: She is oriented to person, place, and time  She appears well-developed and well-nourished  No distress  HENT:   Mouth/Throat: Oropharynx is clear and moist    Eyes: Pupils are equal, round, and reactive to light  Cardiovascular: Normal rate, regular rhythm and normal heart sounds  No murmur heard  Pulmonary/Chest: Effort normal and breath sounds normal  No respiratory distress  She has no wheezes  She has no rales  Musculoskeletal: Normal range of motion  Neurological: She is alert and oriented to person, place, and time  Skin: Skin is warm and dry  Psychiatric: She has a normal mood and affect   Her behavior is normal  Judgment and thought content normal

## 2018-04-01 DIAGNOSIS — E78.5 HYPERLIPIDEMIA: ICD-10-CM

## 2018-06-05 DIAGNOSIS — J45.909 UNCOMPLICATED ASTHMA, UNSPECIFIED ASTHMA SEVERITY, UNSPECIFIED WHETHER PERSISTENT: Primary | ICD-10-CM

## 2018-06-05 RX ORDER — BUDESONIDE AND FORMOTEROL FUMARATE DIHYDRATE 160; 4.5 UG/1; UG/1
AEROSOL RESPIRATORY (INHALATION)
Qty: 10.2 G | Refills: 3 | Status: SHIPPED | OUTPATIENT
Start: 2018-06-05 | End: 2018-12-29 | Stop reason: SDUPTHER

## 2018-06-15 ENCOUNTER — TELEPHONE (OUTPATIENT)
Dept: INTERNAL MEDICINE CLINIC | Facility: CLINIC | Age: 63
End: 2018-06-15

## 2018-06-15 ENCOUNTER — OFFICE VISIT (OUTPATIENT)
Dept: INTERNAL MEDICINE CLINIC | Facility: CLINIC | Age: 63
End: 2018-06-15
Payer: COMMERCIAL

## 2018-06-15 VITALS
BODY MASS INDEX: 22.73 KG/M2 | SYSTOLIC BLOOD PRESSURE: 104 MMHG | DIASTOLIC BLOOD PRESSURE: 74 MMHG | HEART RATE: 92 BPM | TEMPERATURE: 98.3 F | OXYGEN SATURATION: 98 % | HEIGHT: 68 IN | WEIGHT: 150 LBS

## 2018-06-15 DIAGNOSIS — J32.9 SINUSITIS, UNSPECIFIED CHRONICITY, UNSPECIFIED LOCATION: Primary | ICD-10-CM

## 2018-06-15 PROCEDURE — 99214 OFFICE O/P EST MOD 30 MIN: CPT | Performed by: PHYSICIAN ASSISTANT

## 2018-06-15 RX ORDER — AMOXICILLIN AND CLAVULANATE POTASSIUM 875; 125 MG/1; MG/1
1 TABLET, FILM COATED ORAL EVERY 12 HOURS SCHEDULED
Qty: 20 TABLET | Refills: 0 | Status: SHIPPED | OUTPATIENT
Start: 2018-06-15 | End: 2018-06-15 | Stop reason: SDUPTHER

## 2018-06-15 RX ORDER — PROMETHAZINE HYDROCHLORIDE AND CODEINE PHOSPHATE 6.25; 1 MG/5ML; MG/5ML
5 SYRUP ORAL EVERY 4 HOURS PRN
Qty: 240 ML | Refills: 0 | Status: SHIPPED | OUTPATIENT
Start: 2018-06-15 | End: 2018-06-15 | Stop reason: SDUPTHER

## 2018-06-15 RX ORDER — AMOXICILLIN AND CLAVULANATE POTASSIUM 875; 125 MG/1; MG/1
1 TABLET, FILM COATED ORAL EVERY 12 HOURS SCHEDULED
Qty: 20 TABLET | Refills: 0 | Status: SHIPPED | OUTPATIENT
Start: 2018-06-15 | End: 2018-06-25

## 2018-06-15 RX ORDER — PROMETHAZINE HYDROCHLORIDE AND CODEINE PHOSPHATE 6.25; 1 MG/5ML; MG/5ML
5 SYRUP ORAL EVERY 4 HOURS PRN
Qty: 240 ML | Refills: 0 | Status: SHIPPED | OUTPATIENT
Start: 2018-06-15 | End: 2018-07-09

## 2018-06-15 NOTE — PROGRESS NOTES
Assessment/Plan:     sinusitis -Augmentin, Phenergan with codeine, patient declined offer for Tessalon Perles  She will use over-the-counter cough medications  Allergic rhinitis -the patient has been on Claritin for over a year  She will switch to a new antihistamine for better efficacy  No problem-specific Assessment & Plan notes found for this encounter  Diagnoses and all orders for this visit:    Sinusitis, unspecified chronicity, unspecified location  -     Discontinue: amoxicillin-clavulanate (AUGMENTIN) 875-125 mg per tablet; Take 1 tablet by mouth every 12 (twelve) hours for 10 days  -     Discontinue: promethazine-codeine (PHENERGAN WITH CODEINE) 6 25-10 mg/5 mL syrup; Take 5 mL by mouth every 4 (four) hours as needed for cough  -     amoxicillin-clavulanate (AUGMENTIN) 875-125 mg per tablet; Take 1 tablet by mouth every 12 (twelve) hours for 10 days  -     promethazine-codeine (PHENERGAN WITH CODEINE) 6 25-10 mg/5 mL syrup; Take 5 mL by mouth every 4 (four) hours as needed for cough          Subjective:      Patient ID: Cindy Dominguez is a 65 y o  female  Patient has been having symptoms for 3 weeks  It started as allergy symptoms with a runny nose runny eyes and sneezing  She then developed a terrible cough that was only resolve by using her rescue inhaler  She had bright yellow thick nasal mucus and coughing up green mucus  She does not feel very much better since the symptoms began  She did not take her temperature but she has felt feverish  Some chills  No shortness of breath  No chest pain, sore throat or ear pain     she tried a Neti pot without relief        The following portions of the patient's history were reviewed and updated as appropriate: allergies, current medications, past family history, past medical history, past social history, past surgical history and problem list     Review of Systems   Constitutional: Positive for chills, fatigue and fever     HENT: Positive for congestion, postnasal drip, rhinorrhea and sinus pressure  Negative for ear pain and sore throat  Respiratory: Positive for cough  Negative for shortness of breath and wheezing  Cardiovascular: Negative for chest pain and palpitations  Gastrointestinal: Negative for abdominal pain, diarrhea and nausea  Objective:      /74 (BP Location: Left arm, Patient Position: Sitting)   Pulse 92   Temp 98 3 °F (36 8 °C)   Ht 5' 8" (1 727 m)   Wt 68 kg (150 lb)   SpO2 98%   BMI 22 81 kg/m²          Physical Exam   Constitutional: She appears well-developed and well-nourished  HENT:   Head: Normocephalic and atraumatic  Right Ear: External ear normal    Left Ear: External ear normal    Mouth/Throat: Oropharynx is clear and moist  No oropharyngeal exudate  Neck: Normal range of motion  Cardiovascular: Normal rate, regular rhythm and normal heart sounds  Pulmonary/Chest: Effort normal and breath sounds normal  No respiratory distress  She has no wheezes  Abdominal: Soft  Bowel sounds are normal  There is no tenderness

## 2018-06-29 ENCOUNTER — APPOINTMENT (EMERGENCY)
Dept: RADIOLOGY | Facility: HOSPITAL | Age: 63
End: 2018-06-29
Payer: COMMERCIAL

## 2018-06-29 ENCOUNTER — HOSPITAL ENCOUNTER (EMERGENCY)
Facility: HOSPITAL | Age: 63
Discharge: HOME | End: 2018-06-30
Attending: EMERGENCY MEDICINE
Payer: COMMERCIAL

## 2018-06-29 DIAGNOSIS — S42.433A: Primary | ICD-10-CM

## 2018-06-29 PROCEDURE — 73080 X-RAY EXAM OF ELBOW: CPT

## 2018-06-29 RX ORDER — OXYCODONE HYDROCHLORIDE AND ACETAMINOPHEN 5; 325 MG/1; MG/1
1 TABLET ORAL ONCE
Status: COMPLETED | OUTPATIENT
Start: 2018-06-29 | End: 2018-06-29

## 2018-06-29 RX ADMIN — OXYCODONE HYDROCHLORIDE AND ACETAMINOPHEN 1 TABLET: 5; 325 TABLET ORAL at 23:15

## 2018-06-30 ENCOUNTER — APPOINTMENT (EMERGENCY)
Dept: RADIOLOGY | Facility: HOSPITAL | Age: 63
End: 2018-06-30
Payer: COMMERCIAL

## 2018-06-30 VITALS
HEART RATE: 80 BPM | TEMPERATURE: 98.9 F | DIASTOLIC BLOOD PRESSURE: 77 MMHG | HEIGHT: 68 IN | RESPIRATION RATE: 20 BRPM | WEIGHT: 164.9 LBS | SYSTOLIC BLOOD PRESSURE: 150 MMHG | OXYGEN SATURATION: 99 % | BODY MASS INDEX: 24.99 KG/M2

## 2018-06-30 PROCEDURE — 99284 EMERGENCY DEPT VISIT MOD MDM: CPT

## 2018-06-30 PROCEDURE — 73070 X-RAY EXAM OF ELBOW: CPT

## 2018-06-30 RX ORDER — OXYCODONE HYDROCHLORIDE AND ACETAMINOPHEN 5; 325 MG/1; MG/1
1 TABLET ORAL EVERY 4 HOURS PRN
Qty: 20 TABLET | Refills: 0 | Status: SHIPPED | OUTPATIENT
Start: 2018-06-30 | End: 2018-07-05 | Stop reason: SDUPTHER

## 2018-06-30 RX ORDER — OXYCODONE HYDROCHLORIDE AND ACETAMINOPHEN 5; 325 MG/1; MG/1
1 TABLET ORAL ONCE
Status: COMPLETED | OUTPATIENT
Start: 2018-06-30 | End: 2018-06-30

## 2018-06-30 RX ADMIN — OXYCODONE HYDROCHLORIDE AND ACETAMINOPHEN 1 TABLET: 5; 325 TABLET ORAL at 00:45

## 2018-06-30 NOTE — ED PROVIDER NOTES
History  Chief Complaint   Patient presents with    Elbow Injury - Major     matient comes to ed for left elbbow injusry after mechanical fall  Pt was walking og slipped and fell  Pt denies loc or head injury      45-year-old female presents after a fall while walking her dog  She stated that the whole fall having quite fast so she is unsure exactly how she fell  She believes that she may fall onto outstretched left arm  She did not hit her head or neck, did not lose consciousness  Presenting with isolated left elbow pain  No other injuries from the fall  She believes her tetanus shot is updated because 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 Taking?    PROVENTIL  (90 Base) MCG/ACT inhaler   Yes No   Sig: Inhale 2 puffs every 6 (six) hours as needed   SYMBICORT 160-4 5 MCG/ACT inhaler   No No   Sig: inhale 2 puffs by mouth twice a day Rinse mouth after use   betamethasone valerate (LUXIQ) 0 12 % foam   Yes No   loratadine (CLARITIN) 10 mg tablet   Yes No   Sig: Take 10 mg by mouth daily   montelukast (SINGULAIR) 10 mg tablet   Yes No   Sig: Take 10 mg by mouth daily   promethazine-codeine (PHENERGAN WITH CODEINE) 6 25-10 mg/5 mL syrup   No No   Sig: Take 5 mL by mouth every 4 (four) hours as needed for cough   triamcinolone (KENALOG) 0 5 % cream   Yes No   Sig: USE 1 PART WITH 3 PARTS EUCERIN to affected area ON ARM AND LEGS twice a day   valACYclovir (VALTREX) 1,000 mg tablet   Yes No   Sig: Take by mouth      Facility-Administered Medications: None       Past Medical History:   Diagnosis Date    Allergic rhinitis     Last Assessed: 6/9/2016    Black tarry stools     Concussion with prolonged loss of consciousness without return to pre-existing conscious level     COPD (chronic obstructive pulmonary disease) ([facility])     Last Assessed: 3/8/2017    Fall     slipping, tripping or stumbling    Generalized osteoarthritis     GERD (gastroesophageal reflux disease)     Herpes simplex infection     Menopausal disorder     Morbid obesity due to excess calories (HCC)     Neuroma     right foot    Pharyngeal disorder     Pneumonia     Last Assessed: 5/15/2014    Post-menopausal bleeding     Last Assessed: 2015    Postmenopausal disorder        Past Surgical History:   Procedure Laterality Date     SECTION      COLONOSCOPY      Complete    DILATION AND CURETTAGE OF UTERUS      3 times    DILATION AND EVACUATION      Surgical treatment of missed  in first trimester - x 5    DILATION AND EVACUATION  ,     Surgically Induced     ENDOMETRIAL BIOPSY  2015    PMB/EB    KNEE ARTHROSCOPY Right 2012    KNEE SURGERY Right     74261 for right fractured patella       Family History   Problem Relation Age of Onset    Diabetes Mother    [employer] Mother     Hypertension Mother     Ovarian cancer Mother     Pancreatic cancer Mother     Other Mother         Skin disorder, Urinary Incontinence    Coronary artery disease Father     Heart disease Father     Hypertension Father     Hypertension Sister     Heart disease Brother     Jaundice Brother     Rectal cancer Brother     Osteoporosis Other     Breast cancer Paternal Aunt      I have reviewed and agree with the history as documented  Social History   Substance Use Topics    Smoking status: Former Smoker     Packs/day: 1 00     Types: Cigarettes     Quit date: 6/15/1984    Smokeless tobacco: Never Used    Alcohol use No      Comment: rarely        Review of Systems   Constitutional: Negative for chills, fatigue and fever  Eyes: Negative for photophobia and visual disturbance  Respiratory: Negative for cough, chest tightness and shortness of breath  Cardiovascular: Negative for chest pain and palpitations     Gastrointestinal: Negative for abdominal pain, nausea and vomiting  Musculoskeletal: Negative for back pain and neck pain  Isolated left elbow pain and swelling   Skin: Positive for wound (Abrasion to the elbow, from earlier in the day)  Negative for color change ( no ecchymosis to the affected elbow ) and rash  Allergic/Immunologic: Negative for immunocompromised state  Neurological: Negative for dizziness, syncope, weakness and headaches  Physical Exam  Physical Exam   Constitutional: She is oriented to person, place, and time  She appears well-developed and well-nourished  She appears distressed (Secondary to pain in elbow )  HENT:   Head: Normocephalic and atraumatic  Mouth/Throat: Oropharynx is clear and moist    Eyes: Conjunctivae and EOM are normal    Neck: Normal range of motion  Pulmonary/Chest: Effort normal  No respiratory distress  Musculoskeletal: She exhibits tenderness ( Left elbow) and deformity ( swelling and tenderness to left elbow)  Decreased range of motion left elbow secondary to pain   Neurological: She is alert and oriented to person, place, and time  No cranial nerve deficit or sensory deficit  Pulse, motor, sensation are intact distal to the injury  Skin: Skin is warm and dry  Capillary refill takes less than 2 seconds  No rash noted  She is not diaphoretic  No pallor  No ecchymosis or open wound to the affected area  Abrasion to the contralateral elbow  Psychiatric: She has a normal mood and affect  Her behavior is normal    Vitals reviewed        Vital Signs  ED Triage Vitals   Temperature Pulse Respirations Blood Pressure SpO2   06/29/18 2240 06/29/18 2234 06/29/18 2234 06/29/18 2234 06/29/18 2234   98 9 °F (37 2 °C) 77 18 (!) 193/93 100 %      Temp Source Heart Rate Source Patient Position - Orthostatic VS BP Location FiO2 (%)   06/29/18 2240 06/29/18 2234 06/29/18 2234 06/29/18 2234 --   Oral Monitor Sitting Right arm       Pain Score       06/29/18 2315       8           Vitals:    06/29/18 2234 06/30/18 0057   BP: (!) 193/93 150/77   Pulse: 77 80   Patient Position - Orthostatic VS: Sitting Sitting       Visual Acuity      ED Medications  Medications   oxyCODONE-acetaminophen (PERCOCET) 5-325 mg per tablet 1 tablet (1 tablet Oral Given 6/29/18 2315)   oxyCODONE-acetaminophen (PERCOCET) 5-325 mg per tablet 1 tablet (1 tablet Oral Given 6/30/18 0045)       Diagnostic Studies  Results Reviewed     None                 XR elbow 2 vw left   ED Interpretation by Luis Eduardo Price DO (06/30 0102)   Splinted in appropriate position  XR elbow 3+ views LEFT   ED Interpretation by Luis Eduardo Price DO (06/29 2323)   Humerus fracture, nondisplaced  Procedures  Orthopedic Injury  Date/Time: 6/30/2018 12:20 AM  Performed by: Anival Chisholm  Authorized by: Anival Chisholm     Patient Location:  ED  Verbal consent obtained?: Yes    Risks and benefits: Risks, benefits and alternatives were discussed    Consent given by:  Patient  Patient states understanding of procedure being performed: Yes    Relevant documents present and verified: Yes    Radiology Images displayed and confirmed  If images not available, report reviewed: Yes    Patient identity confirmed:  Verbally with patient  Injury location:  Elbow  Location details:  Left elbow  Injury type:  Fracture (Distal humerus)  Neurovascular status: Neurovascularly intact    Distal perfusion: normal    Neurological function: normal    Range of motion: reduced    Immobilization:  Splint  Splint type: Long arm    Supplies used:  Cotton padding and Ortho-Glass  Neurovascular status: Neurovascularly intact    Distal perfusion: normal    Neurological function: normal    Range of motion: normal    Range of motion comment:  Splinted  Patient tolerance:  Patient tolerated the procedure well with no immediate complications           Phone Contacts  ED Phone Contact    ED Course  ED Course as of Jun 30 0128 Fri Jun 29, 2018   2321 Discussed with patient her humerus fracture and that she will likely require surgery  Will splint her, recheck circulation, send her to follow up with Orthopedics  Advised her of signs of neurovascular compromise and advised immediate return to ED if this occurs    Sat Jun 30, 2018   0034 Patient feels improved but still having some pain  She will be given Percocet for pain control  Repeating x-ray for evaluation of post splint placement  0100 Post splint x-ray is confirming that it continues to be in good position, no displacement  Patient will be discharged to follow up outpatient with Orthopedics  Given good return precautions  MDM  Number of Diagnoses or Management Options  Diagnosis management comments: Elbow injury with fracture of distal humerus  Patient will be splinted in position of function and advised outpatient follow-up with Orthopedics on Monday  Neurovascularly intact after the splint is applied  She will be given pain control  Advised to return if any evidence of neurovascular compromise       Amount and/or Complexity of Data Reviewed  Tests in the radiology section of CPT®: ordered and reviewed      CritCare Time    Disposition  Final diagnoses:   Closed fracture distal humerus, lateral epicondyle     Time reflects when diagnosis was documented in both MDM as applicable and the Disposition within this note     Time User Action Codes Description Comment    6/30/2018 12:22 AM Chau Juarez Add [[id number]] Closed fracture distal humerus, lateral epicondyle       ED Disposition     ED Disposition Condition Comment    Discharge  Cris Ni discharge to home/self care      Condition at discharge: Good        Follow-up Information     Follow up With Specialties Details Why Contact Info Additional [address] [facility] Emergency Medicine  If symptoms worsen:  Color change, loss of sensation, severe pain, loss of pulse, etc  [address] [facility]  [phone] [place] ED, [address] [facility], [place], [place], [address] Allyson Caruso MD Orthopedic Surgery Schedule an appointment as soon as possible for a visit For follow-up after elbow fracture [address] [facility]  [address]  [facility] [address] [facility]       Nickolas Valentin MD Orthopedic Surgery Schedule an appointment as soon as possible for a visit for ortho follow up if you prefer this ortho doctor Jacob NICK Heath Chavira [address] [facility]  [phone]             Discharge Medication List as of 6/30/2018 12:26 AM      START taking these medications    Details   oxyCODONE-acetaminophen (PERCOCET) 5-325 mg per tablet Take 1 tablet by mouth every 4 (four) hours as needed for severe pain for up to 5 days Max Daily Amount: 6 tablets, Starting Sat 6/30/2018, Until Thu 7/5/2018, Print         CONTINUE these medications which have NOT CHANGED    Details   betamethasone valerate (LUXIQ) 0 12 % foam Starting Wed 12/20/2017, Historical Med      loratadine (CLARITIN) 10 mg tablet Take 10 mg by mouth daily, Historical Med      montelukast (SINGULAIR) 10 mg tablet Take 10 mg by mouth daily, Starting Tue 12/26/2017, Historical Med      promethazine-codeine (PHENERGAN WITH CODEINE) 6 25-10 mg/5 mL syrup Take 5 mL by mouth every 4 (four) hours as needed for cough, Starting Fri 6/15/2018, Normal      PROVENTIL  (90 Base) MCG/ACT inhaler Inhale 2 puffs every 6 (six) hours as needed, Starting Mon 11/20/2017, Historical Med      SYMBICORT 160-4 5 MCG/ACT inhaler inhale 2 puffs by mouth twice a day Rinse mouth after use, Normal      triamcinolone (KENALOG) 0 5 % cream USE 1 PART WITH 3 PARTS EUCERIN to affected area ON ARM AND LEGS twice a day, Historical Med      valACYclovir (VALTREX) 1,000 mg tablet Take by mouth, Starting Thu 2/20/2014, Historical Med           No discharge procedures on file      ED Provider  Electronically Signed by Machelle Macias, DO  06/30/18 0128

## 2018-06-30 NOTE — DISCHARGE INSTRUCTIONS
Arm Fracture in Adults   WHAT YOU NEED TO KNOW:   What is an arm fracture? An arm fracture is a crack or break in one or more of the bones in your arm  An arm fracture may be caused by a fall onto an outstretched hand  It may also be caused by trauma from a car accident or a sports injury  Osteoporosis (brittle bones) can increase your risk for a fracture  What are the different types of arm fractures? · Nondisplaced  means the bone cracked or broke but stayed in place  · Displaced  means the 2 ends of the broken bone   · Comminuted  means the bone cracked or broke into several pieces  · Open  means the broken bone went through your skin  What are the signs and symptoms of an arm fracture? · Arm and shoulder pain    · Swelling and bruising    · Abnormal arm position or shape    · Severe pain when you move your arm    · Weakness or numbness in your arm, hand, or fingers  How is an arm fracture diagnosed? Your healthcare provider will ask about your injury and examine you  An x-ray may show the type of fracture you have  You may need more than 1 x-ray, or another x-ray after several days have passed  How is an arm fracture treated? Treatment will depend on what kind of fracture you have, and how bad it is  You may need any of the following:  · A support device , such as a brace, cast, or splint may be needed to hold your broken bones in place  It will decrease your arm movement and allow the bones to heal      · NSAIDs , such as ibuprofen, help decrease swelling and pain  This medicine is available with or without a doctor's order  NSAIDs can cause stomach bleeding or kidney problems in certain people  If you take blood thinner medicine, always ask your healthcare provider if NSAIDs are safe for you  Always read the medicine label and follow directions  · Acetaminophen  decreases pain  It is available without a doctor's order  Ask how much to take and how often to take it   Follow directions  Acetaminophen can cause liver damage if not taken correctly  · Prescription pain medicine  may be given  Ask how to take this medicine safely  · Closed reduction  may be done to put your bones back into the correct position without surgery  · Open reduction surgery  may be needed to put your bones back into the correct position  An incision is made and the bones are put back in the correct position  This may include the use of special wires, pins, plates or screws  How can I manage my symptoms? · Elevate  your arm above the level of your heart as often as you can  This will help decrease swelling and pain  Prop your arm on pillows or blankets to keep it elevated comfortably  · Rest   You should rest your arm as much as possible  Ask your healthcare provider when you can put pressure or weight on your arm  Also ask when you can return to sports or vigorous exercises  · Apply ice  on your arm for 15 to 20 minutes every hour or as directed  Use an ice pack, or put crushed ice in a plastic bag  Cover it with a towel  Ice helps prevent tissue damage and decreases swelling and pain  · Go to physical therapy as directed  A physical therapist teaches you exercises to help improve movement and strength, and to decrease pain  When should I seek immediate care? · The pain in your injured arm does not get better or gets worse, even after you rest and take medicine  · Your injured arm, hand, or fingers feel numb  · Your arm is swollen, red, and feels warm  · Your skin over the arm fracture is swollen, cold, or pale  · You cannot move your arm, hand, or fingers  When should I contact my healthcare provider? · You have a fever  · Your brace or splint becomes wet, damaged, or falls off  · You have questions or concerns about your injury, treatment, or care  CARE AGREEMENT:   You have the right to help plan your care   Learn about your health condition and how it may be treated  Discuss treatment options with your caregivers to decide what care you want to receive  You always have the right to refuse treatment  The above information is an  only  It is not intended as medical advice for individual conditions or treatments  Talk to your doctor, nurse or pharmacist before following any medical regimen to see if it is safe and effective for you  © 2017 [address] [employer] Information is for End User's use only and may not be sold, redistributed or otherwise used for commercial purposes  All illustrations and images included in CareNotes® are the copyrighted property of [employer]  or [employer]  Elbow Fracture   WHAT YOU NEED TO KNOW:   What is an elbow fracture? An elbow fracture is a break in one or more of the 3 bones that form your elbow joint  An elbow fracture is often caused by an injury  An example is a fall onto an outstretched hand with a bent elbow  Osteoporosis (brittle bones) can increase your risk for an elbow fracture  What are the types of elbow fracture? · Nondisplaced  means the bone cracked or broke but stayed in place  · Displaced  means the 2 ends of the broken bone   · Comminuted  means the bone cracked or broke into many pieces  · Open  means the broken bone went through your skin  What are the signs and symptoms of an elbow fracture? · Pain and tenderness    · Swelling and bruising    · Trouble moving your arm or not being able to move your arm at all    · Weakness or numbness in your elbow, arm, or hand    · Deformity (your arm is shaped differently than normal)  How is an elbow fracture diagnosed? Your healthcare provider will examine your injured elbow and arm  Your provider may check for areas where you have less feeling or problems moving your arm  You may need any of the following:  · X-rays  are used to check for broken bones      · A CT scan or MRI  may show where the bone is broken and if other tissues are involved  You may be given contrast liquid to help healthcare providers see the bones better  Tell the healthcare provider if you have ever had an allergic reaction to contrast liquid  Do not enter the MRI room with anything metal  Metal can cause serious injury  Tell the healthcare provider if you have any metal in or on your body  How is an elbow fracture treated? · Prescription pain medicine  may be given  Ask your healthcare provider how to take this medicine safely  Some prescription pain medicines contain acetaminophen  Do not take other medicines that contain acetaminophen without talking to your healthcare provider  Too much acetaminophen may cause liver damage  Prescription pain medicine may cause constipation  Ask your healthcare provider how to prevent or treat constipation  · NSAIDs , such as ibuprofen, help decrease swelling, pain, and fever  This medicine is available with or without a doctor's order  NSAIDs can cause stomach bleeding or kidney problems in certain people  If you take blood thinner medicine, always ask your healthcare provider if NSAIDs are safe for you  Always read the medicine label and follow directions  · A device  such as a brace, cast, sling, or splint may be put on your elbow to limit your arm movement  The device will hold the broken bones in place while they heal, help decrease pain, and prevent more damage  · Ultrasound therapy  directs sound waves into your elbow  The sound waves help the bones heal     · Surgery  may be needed to hold bones in their normal position with pins, wires, or screws  Surgery may also be done if you have other injuries, such as nerve or blood vessel damage  What can I do to manage my symptoms? · Elevate your elbow  above the level of your heart as often as you can  This will help decrease swelling and pain  Prop your elbow on pillows or blankets to keep it elevated comfortably   While your elbow is elevated, wiggle your fingers and open and close them to prevent hand stiffness  · Apply ice  on your elbow on your elbow for 15 to 20 minutes every hour or as directed  Use an ice pack, or put crushed ice in a plastic bag  Cover it with a towel  Ice helps prevent tissue damage and decreases swelling and pain  · Go to physical therapy as directed  A physical therapist can teach you exercises to help improve movement and strength and to decrease pain  When should I seek immediate care? · Your elbow, arm, or fingers are numb  · Your skin is swollen, cold, or pale  When should I contact my healthcare provider? · You have a fever  · The pain gets worse, even after you rest and take your pain medicine  · You have new or more trouble moving your arm  · You have new sores around the area of your brace, splint, or cast     · Your brace, splint, or cast becomes damaged  · You have questions or concerns about your condition or care  CARE AGREEMENT:   You have the right to help plan your care  Learn about your health condition and how it may be treated  Discuss treatment options with your caregivers to decide what care you want to receive  You always have the right to refuse treatment  The above information is an  only  It is not intended as medical advice for individual conditions or treatments  Talk to your doctor, nurse or pharmacist before following any medical regimen to see if it is safe and effective for you  © 2017 [address] [employer] Information is for End User's use only and may not be sold, redistributed or otherwise used for commercial purposes  All illustrations and images included in CareNotes® are the copyrighted property of [employer]  or Donovan Negrete  Splint Care   WHAT YOU NEED TO KNOW:   Splint care is important to help protect your splint until it comes off   Some splints are made of fiberglass or plaster that will need to dry and harden  Splint care will help the splint dry and harden correctly  Even after your splint hardens, it can be damaged  DISCHARGE INSTRUCTIONS:   Seek care immediately if:   · You have increased pain  · Your fingers or toes are numb or tingling  · You feel burning or stinging around your injury  · Your nails, fingers, or toes turn pale, blue, or gray, and feel cold  · You have new or increased trouble moving your fingers or toes  · Your swelling gets worse  · The skin under your splint is bleeding or leaking pus  Contact your healthcare provider if:   · Your hard splint gets wet or is damaged  · You have a fever  · Your splint feels tighter  · You have itchy, dry skin under your splint that is getting worse  · The skin under your splint is red, or you have a new sore  · You notice a bad smell coming from your splint  · You have questions or concerns about your condition or care  How to care for your splint:   · Wait for your hard splint to harden completely  You may have to wait up to 3 days before you can walk on a plaster splint  · Check your splint and the skin around it each day  Check your splint for damage, such as cracks and breaks  Check your skin for redness, increased swelling, and sores  Loosen the elastic bandage around your splint if it feels too tight  · Keep your splint clean and dry  Keep dirt out of your splint  Before you bathe, wrap your hard splint with 2 layers of plastic  Then put a plastic bag over it  Keep the plastic bag tightly sealed  You can also ask your healthcare provider about waterproof shields  Do not put your hard splint in the water , even with a plastic bag over it  A wet splint can make your skin itchy, and may lead to infection  · Do not put powders or deodorants inside your splint  These can dry your skin and increase itching  · Do not try to scratch the skin inside your hard splint with sharp objects    Sharp objects can break off inside your splint or hurt your skin  · Do not pull the padding out of your splint  The padding inside your splint protects your skin  You may develop a sore on your skin if you take out the padding  Follow up with your healthcare provider as directed:  Write down your questions so you remember to ask them during your visits  © 2017 [address] [employer]  Information is for End User's use only and may not be sold, redistributed or otherwise used for commercial purposes  All illustrations and images included in CareNotes® are the copyrighted property of [employer]  or Donovan Negrete  The above information is an  only  It is not intended as medical advice for individual conditions or treatments  Talk to your doctor, nurse or pharmacist before following any medical regimen to see if it is safe and effective for you

## 2018-07-01 ENCOUNTER — HOSPITAL ENCOUNTER (EMERGENCY)
Facility: HOSPITAL | Age: 63
Discharge: HOME | End: 2018-07-01
Attending: EMERGENCY MEDICINE | Admitting: EMERGENCY MEDICINE
Payer: COMMERCIAL

## 2018-07-01 VITALS
SYSTOLIC BLOOD PRESSURE: 146 MMHG | WEIGHT: 159.83 LBS | HEART RATE: 78 BPM | HEIGHT: 67 IN | BODY MASS INDEX: 25.09 KG/M2 | RESPIRATION RATE: 16 BRPM | TEMPERATURE: 99.3 F | DIASTOLIC BLOOD PRESSURE: 67 MMHG | OXYGEN SATURATION: 96 %

## 2018-07-01 DIAGNOSIS — S42.402A ELBOW FRACTURE, LEFT: ICD-10-CM

## 2018-07-01 DIAGNOSIS — M79.89 SWELLING OF UPPER EXTREMITY: Primary | ICD-10-CM

## 2018-07-01 PROCEDURE — 99283 EMERGENCY DEPT VISIT LOW MDM: CPT

## 2018-07-01 NOTE — DISCHARGE INSTRUCTIONS
Elbow Fracture   WHAT YOU NEED TO KNOW:   An elbow fracture is a break in one or more of the 3 bones that form your elbow joint  An elbow fracture is often caused by an injury  An example is a fall onto an outstretched hand with a bent elbow  Osteoporosis (brittle bones) can increase your risk for an elbow fracture  DISCHARGE INSTRUCTIONS:   Return to the emergency department if:   · Your skin becomes swollen, cold, or pale  · Your elbow, hand, or fingers are numb  Contact your healthcare provider if:   · You have a fever  · The pain gets worse, even after you rest and take your medicine  · You have new or more trouble moving your arm  · You have new sores around the area of your brace, splint, or cast     · Your brace, splint, or cast becomes damaged  · You have questions or concerns about your condition or care  Medicines: You may need any of the following:  · Prescription pain medicine  may be given  Ask your healthcare provider how to take this medicine safely  Some prescription pain medicines contain acetaminophen  Do not take other medicines that contain acetaminophen without talking to your healthcare provider  Too much acetaminophen may cause liver damage  Prescription pain medicine may cause constipation  Ask your healthcare provider how to prevent or treat constipation  · NSAIDs , such as ibuprofen, help decrease swelling, pain, and fever  This medicine is available with or without a doctor's order  NSAIDs can cause stomach bleeding or kidney problems in certain people  If you take blood thinner medicine, always ask your healthcare provider if NSAIDs are safe for you  Always read the medicine label and follow directions  · Take your medicine as directed  Contact your healthcare provider if you think your medicine is not helping or if you have side effects  Tell him or her if you are allergic to any medicine  Keep a list of the medicines, vitamins, and herbs you take   Include the amounts, and when and why you take them  Bring the list or the pill bottles to follow-up visits  Carry your medicine list with you in case of an emergency  Self-care:   · Elevate your elbow  above the level of your heart as often as you can  This will help decrease swelling and pain  Prop your elbow on pillows or blankets to keep it elevated comfortably  While your elbow is elevated, wiggle your fingers and open and close them to prevent hand stiffness  · Apply ice  on your elbow on your elbow for 15 to 20 minutes every hour or as directed  Use an ice pack, or put crushed ice in a plastic bag  Cover it with a towel  Ice helps prevent tissue damage and decreases swelling and pain  · Go to physical therapy as directed  A physical therapist can teach you exercises to help improve movement and strength and to decrease pain  Care for your brace, cast, or splint:  Follow instructions about when you may take a bath or shower  It is important not to get your brace, cast, or splint wet  Cover your device with a plastic bag before you bathe  Tape the bag to your skin above the device to help keep out water  Hold your elbow away from the water in case the bag breaks  · Check the skin around your cast, brace, or splint daily for any redness or open skin  · Do not use a sharp or pointed object to scratch your skin under the cast, brace, or splint  · Do not remove your brace or splint unless directed  Follow up with your healthcare provider as directed: You may need to see a specialist  Emeli Henriquez may need more x-rays and treatment  Write down your questions so you remember to ask them during your visits  © 2017 [address] [employer] Information is for End User's use only and may not be sold, redistributed or otherwise used for commercial purposes  All illustrations and images included in CareNotes® are the copyrighted property of [employer]  or [address]    The above information is an  only  It is not intended as medical advice for individual conditions or treatments  Talk to your doctor, nurse or pharmacist before following any medical regimen to see if it is safe and effective for you  Splint Care   AMBULATORY CARE:   Splint care  is important to help protect your splint until it comes off  Some splints are made of fiberglass or plaster that will need to dry and harden  Splint care will help the splint dry and harden correctly  Even after your splint hardens, it can be damaged  Seek care immediately if:   · You have increased pain  · Your fingers or toes are numb or tingling  · You feel burning or stinging around your injury  · Your nails, fingers, or toes turn pale, blue, or gray, and feel cold  · You have new or increased trouble moving your fingers or toes  · Your swelling gets worse  · The skin under your splint is bleeding or leaking pus  Contact your healthcare provider if:   · Your hard splint gets wet or is damaged  · You have a fever  · Your splint feels tighter  · You have itchy, dry skin under your splint that is getting worse  · The skin under your splint is red, or you have a new sore  · You notice a bad smell coming from your splint  · You have questions or concerns about your condition or care  How to care for your splint:   · Wait for your hard splint to harden completely  You may have to wait up to 3 days before you can walk on a plaster splint  · Check your splint and the skin around it each day  Check your splint for damage, such as cracks and breaks  Check your skin for redness, increased swelling, and sores  Loosen the elastic bandage around your splint if it feels too tight  · Keep your splint clean and dry  Keep dirt out of your splint  Before you bathe, wrap your hard splint with 2 layers of plastic  Then put a plastic bag over it  Keep the plastic bag tightly sealed   You can also ask your healthcare provider about waterproof shields  Do not put your hard splint in the water , even with a plastic bag over it  A wet splint can make your skin itchy, and may lead to infection  · Do not put powders or deodorants inside your splint  These can dry your skin and increase itching  · Do not try to scratch the skin inside your hard splint with sharp objects  Sharp objects can break off inside your splint or hurt your skin  · Do not pull the padding out of your splint  The padding inside your splint protects your skin  You may develop a sore on your skin if you take out the padding  Follow up with your healthcare provider as directed within 1 to 2 weeks:  Write down your questions so you remember to ask them during your visits  © 2017 [address] Information is for End User's use only and may not be sold, redistributed or otherwise used for commercial purposes  All illustrations and images included in CareNotes® are the copyrighted property of A [employer]  or Donovan Negrete  The above information is an  only  It is not intended as medical advice for individual conditions or treatments  Talk to your doctor, nurse or pharmacist before following any medical regimen to see if it is safe and effective for you

## 2018-07-01 NOTE — ED PROVIDER NOTES
History  Chief Complaint   Patient presents with    Hand Swelling     Pt c/o left hand swelling noticed x 1 hour ago  Patient has splint on left arm from fall on Friday night  Patient instructed to come back with any swelling  [de-identified] year female coming in for re-evaluation of her left arm for swelling  Patient had a fall 2 days ago and sustained a left elbow fracture  She was seen here and treated and had a splint placed  She has been taking Percocet intermittently for pain which has been controlling it  They came in mostly for evaluation because she started getting some swelling down into her hand  She has no actual numbness or tingling to her hand  Sometimes with certain positions her finger seemed to be a little blue but they are not at present  The swelling seems to be worse when she is standing for period of time  It seems to have improved or resolved she elevates her arm and hand while she is lying down  They wanted to make sure that she did not have any compartment syndrome  Patient now has good brisk cap refill to her hand with some mild swelling to the hand and fingers, motor and sensation are intact to the fingers  Splint appears in good position and intact so would not replace at present discussed with him following up with Orthopedics  They originally given the number for Orthopedics and neuro, but she is now currently staying with her daughter down in the [place] area and her family is taking her to and from the appointments so may actually prefer follow-up in the [facility] or [place]          History provided by:  Patient  Arm Pain   Location:  Left arm  Quality:  Swelling  Severity:  Mild  Onset quality:  Gradual  Duration:  1 day  Timing:  Constant  Progression:  Waxing and waning  Chronicity:  New  Context:  Pt here 2 days ago for an elbow fracture and splinted  Relieved by:  Elevating her arm  Worsened by:  Position  Ineffective treatments:  Nothing  Associated symptoms: no abdominal pain, no chest pain, no congestion, no fatigue, no fever, no headaches, no loss of consciousness, no rash, no rhinorrhea, no shortness of breath and no vomiting        Prior to Admission Medications   Prescriptions Last Dose Informant Patient Reported? Taking?    PROVENTIL  (90 Base) MCG/ACT inhaler   Yes No   Sig: Inhale 2 puffs every 6 (six) hours as needed   SYMBICORT 160-4 5 MCG/ACT inhaler   No No   Sig: inhale 2 puffs by mouth twice a day Rinse mouth after use   betamethasone valerate (LUXIQ) 0 12 % foam   Yes No   loratadine (CLARITIN) 10 mg tablet   Yes No   Sig: Take 10 mg by mouth daily   montelukast (SINGULAIR) 10 mg tablet   Yes No   Sig: Take 10 mg by mouth daily   oxyCODONE-acetaminophen (PERCOCET) 5-325 mg per tablet   No No   Sig: Take 1 tablet by mouth every 4 (four) hours as needed for severe pain for up to 5 days Max Daily Amount: 6 tablets   promethazine-codeine (PHENERGAN WITH CODEINE) 6 25-10 mg/5 mL syrup   No No   Sig: Take 5 mL by mouth every 4 (four) hours as needed for cough   triamcinolone (KENALOG) 0 5 % cream   Yes No   Sig: USE 1 PART WITH 3 PARTS EUCERIN to affected area ON ARM AND LEGS twice a day   valACYclovir (VALTREX) 1,000 mg tablet   Yes No   Sig: Take by mouth      Facility-Administered Medications: None       Past Medical History:   Diagnosis Date    Allergic rhinitis     Last Assessed: 6/9/2016    Black tarry stools     Concussion with prolonged loss of consciousness without return to pre-existing conscious level     COPD (chronic obstructive pulmonary disease) (HCC)     Last Assessed: 3/8/2017    Fall     slipping, tripping or stumbling    Generalized osteoarthritis     GERD (gastroesophageal reflux disease)     Herpes simplex infection     Menopausal disorder     Morbid obesity due to excess calories ([facility] [address] )     Neuroma 1975    right foot    Pharyngeal disorder     Pneumonia     Last Assessed: 5/15/2014    Post-menopausal bleeding     Last Assessed: 2015    Postmenopausal disorder        Past Surgical History:   Procedure Laterality Date     SECTION      COLONOSCOPY      Complete    DILATION AND CURETTAGE OF UTERUS      3 times    DILATION AND EVACUATION      Surgical treatment of missed  in first trimester - x 5    DILATION AND EVACUATION  ,     Surgically Induced     ENDOMETRIAL BIOPSY  2015    PMB/EB    [facility]ROSCOPY [facility] 2012    [facility]NEE SURGERY Right     [address] for right fractured patella       Family History   Problem Relation Age of Onset    Diabetes Mother    Levora Walker Migraines Mother     Hypertension Mother     Ovarian cancer Mother     Pancreatic cancer Mother     Other Mother         Skin disorder, Urinary Incontinence    Coronary artery disease Father     Heart disease Father     Hypertension Father     Hypertension Sister     Heart disease Brother     Jaundice Brother     Rectal cancer Brother     Osteoporosis Other     Breast cancer Paternal Aunt      I have reviewed and agree with the history as documented  Social History   Substance Use Topics    Smoking status: Former Smoker     Packs/day: 1 00     Types: Cigarettes     Quit date: 6/15/1984    Smokeless tobacco: Never Used    Alcohol use No      Comment: rarely        Review of Systems   Constitutional: Negative for fatigue and fever  HENT: Negative for congestion and rhinorrhea  Respiratory: Negative for shortness of breath  Cardiovascular: Negative for chest pain  Gastrointestinal: Negative for abdominal pain and vomiting  Skin: Negative for rash  Neurological: Negative for loss of consciousness and headaches  All other systems reviewed and are negative  Physical Exam  Physical Exam   Constitutional: She appears well-developed and well-nourished  Cardiovascular: Normal rate  Pulmonary/Chest: Effort normal    Musculoskeletal:        Left elbow: She exhibits decreased range of motion  Left hand: She exhibits swelling  She exhibits normal capillary refill and no deformity  Normal sensation noted  Normal strength noted  Left elbow and arm with splint from humerus to hand, in sling, wrapped with padding and ace, pt has sensation and motor intact to distal fingers, has brisk cap refill, no pain or swelling to shoulder   Neurological: She is alert  Vitals reviewed  Vital Signs  ED Triage Vitals [07/01/18 1444]   Temperature Pulse Respirations Blood Pressure SpO2   99 3 °F (37 4 °C) 78 16 146/67 96 %      Temp Source Heart Rate Source Patient Position - Orthostatic VS BP Location FiO2 (%)   Oral Monitor Lying Right arm --      Pain Score       --           Vitals:    07/01/18 1444   BP: 146/67   Pulse: 78   Patient Position - Orthostatic VS: Lying       Visual Acuity      ED Medications  Medications - No data to display    Diagnostic Studies  Results Reviewed     None                 No orders to display              Procedures  Procedures       Phone Contacts  ED Phone Contact    ED Course                               MDM  Number of Diagnoses or Management Options  Elbow fracture, left: established and improving  Swelling of upper extremity: established and improving    CritCare Time    Disposition  Final diagnoses:   Swelling of upper extremity   Elbow fracture, left     Time reflects when diagnosis was documented in both MDM as applicable and the Disposition within this note     Time User Action Codes Description Comment    7/1/2018  3:08 PM Melody Mensah [[id number]] Swelling of upper extremity     7/1/2018  3:08 PM Maxwell Mcginnis 83 Elbow fracture, left       ED Disposition     ED Disposition Condition Comment    Discharge  Estanislado Nissen discharge to home/self care      Condition at discharge: Good        Follow-up Information     Follow up With Specialties Details Why Kimberlee Acharya MD Internal Medicine, [address] [place] [address] Level, Suite [facility] [id number] [place] [address] Specialists [facility]opedic Surgery Call in 1 day  [facility] [address]  Gustavo Fontana [place] [address] Orthopedic Surgery   [address] [id number]  [phone]          Patient's Medications   Discharge Prescriptions    No medications on file     No discharge procedures on file      ED Provider  Electronically Signed by           Saleem Champion MD  07/01/18 3479

## 2018-07-02 ENCOUNTER — OFFICE VISIT (OUTPATIENT)
Dept: OBGYN CLINIC | Facility: CLINIC | Age: 63
End: 2018-07-02
Payer: COMMERCIAL

## 2018-07-02 VITALS
DIASTOLIC BLOOD PRESSURE: 71 MMHG | BODY MASS INDEX: 23.19 KG/M2 | HEIGHT: 68 IN | HEART RATE: 91 BPM | WEIGHT: 153 LBS | SYSTOLIC BLOOD PRESSURE: 139 MMHG

## 2018-07-02 DIAGNOSIS — S42.402A ELBOW FRACTURE, LEFT, CLOSED, INITIAL ENCOUNTER: Primary | ICD-10-CM

## 2018-07-02 PROCEDURE — 99204 OFFICE O/P NEW MOD 45 MIN: CPT | Performed by: ORTHOPAEDIC SURGERY

## 2018-07-02 NOTE — PROGRESS NOTES
Assessment:  1  Elbow fracture, left, closed, initial encounter       Patient Active Problem List   Diagnosis    Moderate persistent asthma without complication    Bronchitis, asthmatic    Elbow fracture, left, closed, initial encounter           Plan    Kevyn with in one week  patient has 12 locks ease from the ER left  She will call in if she needs more before she sees Dr Bagley            Subjective:     Patient ID:    Chief Larry Ruff 65 y o  female      HPI    Patient comes in today with regards to Left elbow  The patient reports that the pain is in the  Left elbow and has been going on for  3 days  The pain is rated at2 at its best and8 at its worst   The pain is described as  Throbbing burning tight  It is worsened with  movement, and is made better with  Splint oxycodone  The patient has taken  oxycodone for treatment  Patient was attempting to walk the dog when the dog went crazy and spun her around subsequently having a fall  Joseph Loge on outstretched hand and had pain medially in the left elbow  She went to the ER where x-rays were taken she was placed in a long-arm splint and told to follow   Up with Orthopedics      The following portions of the patient's history were reviewed and updated as appropriate: allergies, current medications, past family history, past social history, past surgical history and problem list         Social History     Social History    Marital status:      Spouse name: N/A    Number of children: 4    Years of education: N/A     Occupational History    Not on file       Social History Main Topics    Smoking status: Former Smoker     Packs/day: 1 00     Types: Cigarettes     Quit date: 6/15/1984    Smokeless tobacco: Never Used    Alcohol use No      Comment: rarely    Drug use: Yes     Types: MDMA (ecstacy)      Comment: never used drugs per [employer]    Sexual activity: Not on file     Other Topics Concern    Not on file     Social History Narrative    Drinks coffee    Exercise habits         Past Medical History:   Diagnosis Date    Allergic rhinitis     Last Assessed: 2016    Black tarry stools     Concussion with prolonged loss of consciousness without return to pre-existing conscious level     COPD (chronic obstructive pulmonary disease) ([facility])     Last Assessed: 3/8/2017    Fall     slipping, tripping or stumbling    Generalized osteoarthritis     GERD (gastroesophageal reflux disease)     Herpes simplex infection     Menopausal disorder     Morbid obesity due to excess calories ([facility] [address] )     Neuroma 1975    right foot    Pharyngeal disorder     Pneumonia     Last Assessed: 5/15/2014    Post-menopausal bleeding     Last Assessed: 2015    Postmenopausal disorder      Past Surgical History:   Procedure Laterality Date     SECTION      COLONOSCOPY      Complete    DILATION AND CURETTAGE OF UTERUS      3 times    DILATION AND EVACUATION      Surgical treatment of missed  in first trimester - x 5   [facility], [facility]    Surgically Induced     ENDOMETRIAL BIOPSY  2015    PMB/EB    [facility]     [address] for right fractured patella     Allergies   Allergen Reactions    Pravastatin Myalgia    Simvastatin Myalgia     Current Outpatient Prescriptions on File Prior to Visit   Medication Sig Dispense Refill    betamethasone valerate (LUXIQ) 0 12 % foam   0    loratadine (CLARITIN) 10 mg tablet Take 10 mg by mouth daily      montelukast (SINGULAIR) 10 mg tablet Take 10 mg by mouth daily  3    oxyCODONE-acetaminophen (PERCOCET) 5-325 mg per tablet Take 1 tablet by mouth every 4 (four) hours as needed for severe pain for up to 5 days Max Daily Amount: 6 tablets 20 tablet 0    promethazine-codeine (PHENERGAN WITH CODEINE) 6 25-10 mg/5 mL syrup Take 5 mL by mouth every 4 (four) hours as needed for cough 240 mL 0    PROVENTIL  (90 Base) MCG/ACT inhaler Inhale 2 puffs every 6 (six) hours as needed  0    SYMBICORT 160-4 5 MCG/ACT inhaler inhale 2 puffs by mouth twice a day Rinse mouth after use 10 2 g 3    triamcinolone (KENALOG) 0 5 % cream USE 1 PART WITH 3 PARTS EUCERIN to affected area ON ARM AND LEGS twice a day  0    valACYclovir (VALTREX) 1,000 mg tablet Take by mouth       No current facility-administered medications on file prior to visit  Objective:    Review of Systems   Constitutional: Negative  HENT: Negative  Eyes: Negative  Respiratory: Negative  Cardiovascular: Negative  Gastrointestinal: Negative  Endocrine: Positive for polyuria  Genitourinary: Negative  Musculoskeletal:        See HPI   Skin: Negative  Allergic/Immunologic: Negative  Neurological: Negative  Hematological: Negative  Psychiatric/Behavioral: Negative  Right Elbow Exam   Right elbow exam is normal     Range of Motion   The patient has normal right elbow ROM  Left Elbow Exam     Range of Motion   Extension: abnormal   Flexion: abnormal   Pronation: abnormal   Supination: abnormal     Other   Erythema: absent  Sensation: normal  Pulse: present    Comments:   I range of motion strength and special tests were not assessed due to the fact that she has a fracture of the capitellum  Patient is able to wiggle all fingers sensations intact in all nerve distributions  Physical Exam   Constitutional: She is oriented to person, place, and time  She appears well-developed  HENT:   Head: Normocephalic  Eyes: Pupils are equal, round, and reactive to light  Neck: Normal range of motion  Cardiovascular: Normal rate  Pulmonary/Chest: Effort normal    Abdominal: She exhibits no distension  Neurological: She is alert and oriented to person, place, and time  Skin: Skin is warm  Psychiatric: She has a normal mood and affect  Nursing note and vitals reviewed        Procedures  No Procedures performed today    I have personally reviewed pertinent films in PACS  x-ray shows comminuted intra-articular distal humerus of back fracture involving the capitellum      Portions of the record may have been created with voice recognition software   Occasional wrong word or "sound a like" substitutions may have occurred due to the inherent limitations of voice recognition software   Read the chart carefully and recognize, using context, where substitutions have occurred

## 2018-07-03 ENCOUNTER — HOSPITAL ENCOUNTER (OUTPATIENT)
Dept: CT IMAGING | Facility: HOSPITAL | Age: 63
Discharge: HOME | End: 2018-07-03
Attending: ORTHOPAEDIC SURGERY
Payer: COMMERCIAL

## 2018-07-03 DIAGNOSIS — S42.402A ELBOW FRACTURE, LEFT, CLOSED, INITIAL ENCOUNTER: ICD-10-CM

## 2018-07-03 PROCEDURE — 73200 CT UPPER EXTREMITY W/O DYE: CPT

## 2018-07-05 ENCOUNTER — TELEPHONE (OUTPATIENT)
Dept: OBGYN CLINIC | Facility: HOSPITAL | Age: 63
End: 2018-07-05

## 2018-07-05 DIAGNOSIS — S42.402D LEFT ELBOW FRACTURE, WITH ROUTINE HEALING, SUBSEQUENT ENCOUNTER: ICD-10-CM

## 2018-07-05 DIAGNOSIS — S42.402D: Primary | ICD-10-CM

## 2018-07-05 DIAGNOSIS — S42.402A ELBOW FRACTURE, LEFT, CLOSED, INITIAL ENCOUNTER: Primary | ICD-10-CM

## 2018-07-05 RX ORDER — OXYCODONE HYDROCHLORIDE AND ACETAMINOPHEN 5; 325 MG/1; MG/1
1 TABLET ORAL EVERY 4 HOURS PRN
Qty: 10 TABLET | Refills: 0 | Status: SHIPPED | OUTPATIENT
Start: 2018-07-05 | End: 2018-07-05 | Stop reason: SDUPTHER

## 2018-07-05 RX ORDER — OXYCODONE HYDROCHLORIDE AND ACETAMINOPHEN 5; 325 MG/1; MG/1
1 TABLET ORAL EVERY 4 HOURS PRN
Qty: 10 TABLET | Refills: 0 | Status: SHIPPED | OUTPATIENT
Start: 2018-07-05 | End: 2018-07-11 | Stop reason: HOSPADM

## 2018-07-05 RX ORDER — OXYCODONE HYDROCHLORIDE AND ACETAMINOPHEN 5; 325 MG/1; MG/1
1 TABLET ORAL EVERY 4 HOURS PRN
Qty: 10 TABLET | Refills: 0 | Status: SHIPPED | OUTPATIENT
Start: 2018-07-05 | End: 2018-07-05

## 2018-07-05 NOTE — TELEPHONE ENCOUNTER
Please contact pt and let her know a refill was sent to her [facility] pharmacy on file in [facility]  Thanks

## 2018-07-05 NOTE — TELEPHONE ENCOUNTER
Caller: patient  Call back number: [phone]  Patient's doctor: Dr Yulia Bergeron    Patient is asking for a refill of oxycodone  She takes 1 pill every 4 hours as needed  [facility] uses [facility] [address] in [place]

## 2018-07-05 NOTE — TELEPHONE ENCOUNTER
Prescription was originally sent to the preferred pharmacy listed in the patient's chart, and now has just been resent to the requested [facility] in [facility] [place]

## 2018-07-05 NOTE — TELEPHONE ENCOUNTER
Pt needs her med sent to [facility] [address] in [facility] because she is staying with her daughter   [facility]  The original encounter [facility] the [facility]

## 2018-07-06 ENCOUNTER — OFFICE VISIT (OUTPATIENT)
Dept: OBGYN CLINIC | Facility: HOSPITAL | Age: 63
End: 2018-07-06
Payer: COMMERCIAL

## 2018-07-06 VITALS
HEART RATE: 71 BPM | DIASTOLIC BLOOD PRESSURE: 81 MMHG | SYSTOLIC BLOOD PRESSURE: 144 MMHG | HEIGHT: 68 IN | WEIGHT: 153.4 LBS | BODY MASS INDEX: 23.25 KG/M2

## 2018-07-06 DIAGNOSIS — S42.402A ELBOW FRACTURE, LEFT, CLOSED, INITIAL ENCOUNTER: ICD-10-CM

## 2018-07-06 PROCEDURE — 99243 OFF/OP CNSLTJ NEW/EST LOW 30: CPT | Performed by: ORTHOPAEDIC SURGERY

## 2018-07-06 NOTE — LETTER
July 6, 2018     Patient: Alexa Velazquez   YOB: 1955   Date of Visit: 7/6/2018       To Whom it May Concern:    Zo Hargrove is under my professional care  She was seen in my office on 7/6/2018  She may not return to work  Until next office visit   If you have any questions or concerns, please don't hesitate to call           Sincerely,          Eric Pate MD        CC: No Recipients

## 2018-07-06 NOTE — PROGRESS NOTES
ASSESSMENT/PLAN:    Diagnoses and all orders for this visit:    Elbow fracture, left, closed, initial encounter  -     Ambulatory referral to Orthopedic Surgery        Assessment:   Left elbow capitellar shear fracture with significant displacement and angulation    Plan:   Plan for open reduction internal fixation left elbow capitellar shear fracture  Patient aware of significant risk of stiffness localized into the eft elbow he may require advanced therapy or secondary surgical procedures  Follow Up: After Surgery    To Do Next Visit:   X-rays  And sutures/staples removed    General Discussions:      Operative Discussions:     Bony Consent: The risks and benefits of the procedure were explained to the patient, which include, but are not limited to: Bleeding, infection, recurrence, pain, scar, malunion, nonunion, damage to tendons, damage to nerves, and damage to blood vessels, and complications related to anesthesia, failure to give desire result, need for more surgery  These risks, along with alternative conservative treatment options, and postoperative protocols were voiced back and understood by the patient  All questions were answered to the patient's satisfaction  The patient agrees to comply with a standard postoperative protocol, and is willing to proceed  Education was provided via written and auditory forms  There were no barriers to learning  Written handouts regarding wound care, incision and scar care, and general preoperative information was provided to the patient  Prior to surgery, the patient may be requested to stop all anti-inflammatory medications  Prophylactic aspirin, Plavix, and Coumadin may be allowed to be continued  Medications including vitamin E , ginkgo, and fish oil are requested to be stopped approximately one week prior to surgery  Hypertensive medications and beta blockers, if taken, should be continued        _____________________________________________________  CHIEF COMPLAINT:  Chief Complaint   Patient presents with    Left Elbow - Fracture         SUBJECTIVE:  Mar Georges is a 65y o  year old female who presents with Stiffness/LROM to the   [employer] This started  4 day(s) ago as Due to a personal injury was walking her  Dog and was pulled forward and fell on her Left elbow  She notes she went ER and was placed in splint    Radiation: Perviousus Treatments: bracing with only partial relief  Associated symptoms: Stiffness/LROM    PAST MEDICAL HISTORY:  Past Medical History:   Diagnosis Date    Allergic rhinitis     Last Assessed: 2016    Black tarry stools     Concussion with prolonged loss of consciousness without return to pre-existing conscious level     COPD [facility]ch[facility] disease[facility])     Last Assessed: 3/8/2017    Fall     slipping, tripping or stumbling    Generalized osteoarthritis     GERD (gastroesophageal reflux disease)     Herpes simplex infection     Menopausal disorder     Morbid obesity due to excess calories ([facility] [address] )     Neuroma     right foot    Pharyngeal disorder     Pneumonia     Last Assessed: 5/15/2014    Post-menopausal bleeding     Last Assessed: 2015    Postmenopausal disorder        PAST SURGICAL HISTORY:  Past Surgical History:   Procedure Laterality Date     SECTION      COLONOSCOPY      Complete    DILATION AND CURETTAGE OF UTERUS      3 times    DILATION AND EVACUATION      Surgical treatment of missed  in first trimester - x 5    DILATION AND EVACUATION  ,     Surgically Induced     ENDOMETRIAL BIOPSY  2015    PMB/EB    KNEE ARTHROSCOPY Right     KNEE SURGERY Right     21704 for right fractured patella       FAMILY HISTORY:  Family History   Problem Relation Age of Onset    Diabetes Mother    [facility] Migraines Mother     Hypertension Mother     Ovarian cancer Mother     Pancreatic cancer Mother     Other Mother         Skin disorder, Urinary Incontinence    Coronary artery disease Father     Heart disease Father     Hypertension Father     Hypertension Sister     Heart disease Brother     Jaundice Brother     Rectal cancer Brother     Osteoporosis Other     Breast cancer Paternal Aunt        SOCIAL HISTORY:  Social History   Substance Use Topics    Smoking status: Former Smoker     Packs/day: 1 00     Types: Cigarettes     Quit date: 6/15/1984    Smokeless tobacco: Never Used    Alcohol use No      Comment: rarely       MEDICATIONS:    Current Outpatient Prescriptions:     loratadine (CLARITIN) 10 mg tablet, Take 10 mg by mouth daily, Disp: , Rfl:     montelukast (SINGULAIR) 10 mg tablet, Take 10 mg by mouth daily, Disp: , Rfl: 3    oxyCODONE-acetaminophen (PERCOCET) 5-325 mg per tablet, Take 1 tablet by mouth every 4 (four) hours as needed for moderate pain Max Daily Amount: 6 tablets, Disp: 10 tablet, Rfl: 0    promethazine-codeine (PHENERGAN WITH CODEINE) 6 25-10 mg/5 mL syrup, Take 5 mL by mouth every 4 (four) hours as needed for cough, Disp: 240 mL, Rfl: 0    PROVENTIL  (90 Base) MCG/ACT inhaler, Inhale 2 puffs every 6 (six) hours as needed, Disp: , Rfl: 0    SYMBICORT 160-4 5 MCG/ACT inhaler, inhale 2 puffs by mouth twice a day Rinse mouth after use, Disp: 10 2 g, Rfl: 3    triamcinolone (KENALOG) 0 5 % cream, USE 1 PART WITH 3 PARTS EUCERIN to affected area ON ARM AND LEGS twice a day, Disp: , Rfl: 0    valACYclovir (VALTREX) 1,000 mg tablet, Take by mouth, Disp: , Rfl:     betamethasone valerate (LUXIQ) 0 12 % foam, , Disp: , Rfl: 0    ALLERGIES:  Allergies   Allergen Reactions    Pravastatin Myalgia    Simvastatin Myalgia       REVIEW OF SYSTEMS:  Pertinent items are noted in HPI  A comprehensive review of systems was negative      LABS:  HgA1c:   Lab Results   Component Value Date    HGBA1C 5 1 04/07/2017     BMP:   Lab Results   Component Value Date    CALCIUM 9 0 04/07/2017     04/07/2017    K 4 1 04/07/2017    CO2 27 04/07/2017     04/07/2017    BUN 17 04/07/2017    CREATININE 0 65 04/07/2017         _____________________________________________________  PHYSICAL EXAMINATION:  General: well developed and well nourished, alert, oriented times 3 and appears comfortable  Psychiatric: Normal  HEENT: Trachea Midline, No torticollis  Cardiovascular: No discernable arrhythmia  Pulmonary: No wheezing or stridor  Skin: No masses, erthema, lacerations, fluctation, ulcerations, positive swelling noted circumferentially with positive ecchymosis  Neurovascular: Sensation Intact to the Median, Ulnar, Radial Nerve, Motor Intact to the Median, Ulnar, Radial Nerve and Pulses Intact    MUSCULOSKELETAL EXAMINATION:  LEFT SIDE:  Elbow:  Neurological intact, good pulse, swelling elbow down to fingers, bursing down to the fingers  Tenderness to palpation of the left elbow  No tenderness to the left shoulder, wrist, anatomic snuffbox remaining of the fingers  _____________________________________________________  STUDIES REVIEWED: Studies to review and Images were reviewd including a CT scan as well as radiographs of the elbow by myself today which demonstrates a capitellar shear fracture with extension into the trochlea with rotatory and superior displacement      PROCEDURES PERFORMED:  Procedures  No Procedures performed today   Scribe Attestation    I,:   Graham Garcia am acting as a scribe while in the presence of the attending physician :        I,:   Brynn Rust MD personally performed the services described in this documentation    as scribed in my presence :

## 2018-07-06 NOTE — H&P
ASSESSMENT/PLAN:    Diagnoses and all orders for this visit:    Elbow fracture, left, closed, initial encounter  -     Ambulatory referral to Orthopedic Surgery        Assessment:   Left elbow capitellar shear fracture with significant displacement and angulation    Plan:   Plan for open reduction internal fixation left elbow capitellar shear fracture  Patient aware of significant risk of stiffness localized into the eft elbow he may require advanced therapy or secondary surgical procedures  Follow Up: After Surgery    To Do Next Visit:   X-rays  And sutures/staples removed    General Discussions:      Operative Discussions:     Bony Consent: The risks and benefits of the procedure were explained to the patient, which include, but are not limited to: Bleeding, infection, recurrence, pain, scar, malunion, nonunion, damage to tendons, damage to nerves, and damage to blood vessels, and complications related to anesthesia, failure to give desire result, need for more surgery  These risks, along with alternative conservative treatment options, and postoperative protocols were voiced back and understood by the patient  All questions were answered to the patient's satisfaction  The patient agrees to comply with a standard postoperative protocol, and is willing to proceed  Education was provided via written and auditory forms  There were no barriers to learning  Written handouts regarding wound care, incision and scar care, and general preoperative information was provided to the patient  Prior to surgery, the patient may be requested to stop all anti-inflammatory medications  Prophylactic aspirin, Plavix, and Coumadin may be allowed to be continued  Medications including vitamin E , ginkgo, and fish oil are requested to be stopped approximately one week prior to surgery  Hypertensive medications and beta blockers, if taken, should be continued        _____________________________________________________  CHIEF COMPLAINT:  Chief Complaint   Patient presents with    Left Elbow - Fracture         SUBJECTIVE:  Fransisco Purdy is a 65y o  year old female who [facility]   [facility] This started  4 day(s) ago as Due to a personal injury was walking her  Dog and was pulled forward and fell on her Left elbow  She notes she went ER and was placed in splint    Radiation: Perviousus Treatments: bracing with only partial relief  Associated symptoms: Stiffness/LROM    PAST MEDICAL HISTORY:  Past Medical History:   Diagnosis Date    Allergic rhinitis     Last Assessed: 2016    Black tarry stools     Concussion with prolonged loss of consciousness without return to pre-existing conscious level     COPD [facility]ch[facility] obstructive [facility] disease) ([facility])     Last Assessed: 3/8/2017    Fall     slipping, tripping or stumbling    Generalized osteoarthritis     GERD (gastroesophageal reflux disease)     Herpes simplex infection     Menopausal disorder     Morbid obesity due to excess calories ([facility] [address] )     Neuroma     right foot    Pharyngeal disorder     Pneumonia     Last Assessed: 5/15/2014    Post-menopausal bleeding     Last Assessed: 2015    Postmenopausal disorder        PAST SURGICAL HISTORY:  Past Surgical History:   Procedure Laterality Date     SECTION      COLONOSCOPY      Complete    DILATION AND CURETTAGE OF UTERUS      3 times    DILATION AND EVACUATION      Surgical treatment of missed  in first trimester - x 5    DILATION AND EVACUATION  ,     Surgically Induced     ENDOMETRIAL BIOPSY  2015    PMB/EB    KNEE ARTHROSCOPY Right     KNEE SURGERY Right     57191 for right fractured patella       FAMILY HISTORY:  Family History   Problem Relation Age of Onset    Diabetes Mother    [facility] Migraines Mother     Hypertension Mother     Ovarian cancer Mother     Pancreatic cancer Mother     Other Mother         Skin disorder, Urinary Incontinence    Coronary artery disease Father     Heart disease Father     Hypertension Father     Hypertension Sister     Heart disease Brother     Jaundice Brother     Rectal cancer Brother     Osteoporosis Other     Breast cancer Paternal Aunt        SOCIAL HISTORY:  Social History   Substance Use Topics    Smoking status: Former Smoker     Packs/day: 1 00     Types: Cigarettes     Quit date: 6/15/1984    Smokeless tobacco: Never Used    Alcohol use No      Comment: rarely       MEDICATIONS:    Current Outpatient Prescriptions:     loratadine (CLARITIN) 10 mg tablet, Take 10 mg by mouth daily, Disp: , Rfl:     montelukast (SINGULAIR) 10 mg tablet, Take 10 mg by mouth daily, Disp: , Rfl: 3    oxyCODONE-acetaminophen (PERCOCET) 5-325 mg per tablet, Take 1 tablet by mouth every 4 (four) hours as needed for moderate pain Max Daily Amount: 6 tablets, Disp: 10 tablet, Rfl: 0    promethazine-codeine (PHENERGAN WITH CODEINE) 6 25-10 mg/5 mL syrup, Take 5 mL by mouth every 4 (four) hours as needed for cough, Disp: 240 mL, Rfl: 0    PROVENTIL  (90 Base) MCG/ACT inhaler, Inhale 2 puffs every 6 (six) hours as needed, Disp: , Rfl: 0    SYMBICORT 160-4 5 MCG/ACT inhaler, inhale 2 puffs by mouth twice a day Rinse mouth after use, Disp: 10 2 g, Rfl: 3    triamcinolone (KENALOG) 0 5 % cream, USE 1 PART WITH 3 PARTS EUCERIN to affected area ON ARM AND LEGS twice a day, Disp: , Rfl: 0    valACYclovir (VALTREX) 1,000 mg tablet, Take by mouth, Disp: , Rfl:     betamethasone valerate (LUXIQ) 0 12 % foam, , Disp: , Rfl: 0    ALLERGIES:  Allergies   Allergen Reactions    Pravastatin Myalgia    Simvastatin Myalgia       REVIEW OF SYSTEMS:  Pertinent items are noted in HPI  A comprehensive review of systems was negative      LABS:  HgA1c:   Lab Results   Component Value Date    HGBA1C 5 1 04/07/2017     BMP:   Lab Results   Component Value Date    CALCIUM 9 0 04/07/2017     04/07/2017    K 4 1 04/07/2017    CO2 27 04/07/2017     04/07/2017    BUN 17 04/07/2017    CREATININE 0 65 04/07/2017         _____________________________________________________  PHYSICAL EXAMINATION:  General: well developed and well nourished, alert, oriented times 3 and appears comfortable  Psychiatric: Normal  HEENT: Trachea Midline, No torticollis  Cardiovascular: No discernable arrhythmia  Pulmonary: No wheezing or stridor  Skin: No masses, erthema, lacerations, fluctation, ulcerations, positive swelling noted circumferentially with positive ecchymosis  Neurovascular: Sensation Intact to the Median, Ulnar, Radial Nerve, Motor Intact to the Median, Ulnar, Radial Nerve and Pulses Intact    MUSCULOSKELETAL EXAMINATION:  LEFT SIDE:  Elbow:  Neurological intact, good pulse, swelling elbow down to fingers, bursing down to the fingers  Tenderness to palpation of the left elbow  No tenderness to the left shoulder, wrist, anatomic snuffbox remaining of the fingers  _____________________________________________________  STUDIES REVIEWED: Studies to review and Images were reviewd including a CT scan as well as radiographs of the elbow by myself today which demonstrates a capitellar shear fracture with extension into the trochlea with rotatory and superior displacement      PROCEDURES PERFORMED:  Procedures  No Procedures performed today   Scribe Attestation    I,:   Damari Finn am acting as a scribe while in the presence of the attending physician :        I,:   Bony Matias MD personally performed the services described in this documentation    as scribed in my presence :

## 2018-07-09 ENCOUNTER — ANESTHESIA EVENT (OUTPATIENT)
Dept: PERIOP | Facility: HOSPITAL | Age: 63
End: 2018-07-09
Payer: COMMERCIAL

## 2018-07-09 NOTE — PRE-PROCEDURE INSTRUCTIONS
Pre-Surgery Instructions:   Medication Instructions    loratadine (CLARITIN) 10 mg tablet Instructed patient per Anesthesia Guidelines   Misc Natural Products (OSTEO BI-FLEX JOINT SHIELD PO) Instructed patient per Anesthesia Guidelines   montelukast (SINGULAIR) 10 mg tablet Instructed patient per Anesthesia Guidelines   oxyCODONE-acetaminophen (PERCOCET) 5-325 mg per tablet Instructed patient per Anesthesia Guidelines   PROVENTIL  (90 Base) MCG/ACT inhaler Instructed patient per Anesthesia Guidelines  Pre op instructions reviewed; verbalized understanding  Acetaminophen Med Class     Continue to take this medication on your normal schedule  If this is an oral medication and you take it in the morning, then you may take this medicine with a sip of water  Acyclovir Med Class     Continue to take this medication on your normal schedule  If this is an oral medication and you take it in the morning, then you may take this medicine with a sip of water  Inhalational Med Class     Continue to take these inhaler medications on your normal schedule up to and including the day of surgery  Leukotriene Inhibitor Med Class     Continue to take this medication on your normal schedule  If this is an oral medication and you take it in the morning, then you may take this medicine with a sip of water  Opioid Med Class     Continue to take this medication on your normal schedule  If this is an oral medication and you take it in the morning, then you may take this medicine with a sip of water

## 2018-07-10 ENCOUNTER — ANESTHESIA (OUTPATIENT)
Dept: PERIOP | Facility: HOSPITAL | Age: 63
End: 2018-07-10
Payer: COMMERCIAL

## 2018-07-10 ENCOUNTER — HOSPITAL ENCOUNTER (OUTPATIENT)
Dept: RADIOLOGY | Facility: HOSPITAL | Age: 63
Setting detail: OUTPATIENT SURGERY
Discharge: HOME | End: 2018-07-10
Payer: COMMERCIAL

## 2018-07-10 ENCOUNTER — HOSPITAL ENCOUNTER (OUTPATIENT)
Facility: HOSPITAL | Age: 63
Setting detail: OUTPATIENT SURGERY
Discharge: HOME | End: 2018-07-11
Attending: ORTHOPAEDIC SURGERY | Admitting: ORTHOPAEDIC SURGERY
Payer: COMMERCIAL

## 2018-07-10 DIAGNOSIS — S42.402A ELBOW FRACTURE, LEFT, CLOSED, INITIAL ENCOUNTER: ICD-10-CM

## 2018-07-10 DIAGNOSIS — S42.402A ELBOW FRACTURE, LEFT, CLOSED, INITIAL ENCOUNTER: Primary | ICD-10-CM

## 2018-07-10 PROCEDURE — C1769 GUIDE WIRE: HCPCS | Performed by: ORTHOPAEDIC SURGERY

## 2018-07-10 PROCEDURE — 24579 OPTX HUMRL CNDYLR FRACTURE: CPT | Performed by: ORTHOPAEDIC SURGERY

## 2018-07-10 PROCEDURE — C1713 ANCHOR/SCREW BN/BN,TIS/BN: HCPCS | Performed by: ORTHOPAEDIC SURGERY

## 2018-07-10 PROCEDURE — 73080 X-RAY EXAM OF ELBOW: CPT

## 2018-07-10 PROCEDURE — 24343 REPR ELBOW LAT LIGMNT W/TISS: CPT | Performed by: ORTHOPAEDIC SURGERY

## 2018-07-10 DEVICE — 3.0MM HEADLESS COMPRESSION SCREW-LONG THREAD 30MM: Type: IMPLANTABLE DEVICE | Site: ELBOW | Status: FUNCTIONAL

## 2018-07-10 DEVICE — 3.0MM HEADLESS COMPRESSION SCREW-SHORT THREAD 18MM: Type: IMPLANTABLE DEVICE | Site: ELBOW | Status: FUNCTIONAL

## 2018-07-10 DEVICE — 3.0MM HEADLESS COMPRESSION SCREW-SHORT THREAD 32MM: Type: IMPLANTABLE DEVICE | Site: ELBOW | Status: FUNCTIONAL

## 2018-07-10 RX ORDER — MAGNESIUM HYDROXIDE 1200 MG/15ML
LIQUID ORAL AS NEEDED
Status: DISCONTINUED | OUTPATIENT
Start: 2018-07-10 | End: 2018-07-10 | Stop reason: HOSPADM

## 2018-07-10 RX ORDER — FENTANYL CITRATE 50 UG/ML
INJECTION, SOLUTION INTRAMUSCULAR; INTRAVENOUS AS NEEDED
Status: DISCONTINUED | OUTPATIENT
Start: 2018-07-10 | End: 2018-07-10 | Stop reason: SURG

## 2018-07-10 RX ORDER — ONDANSETRON 2 MG/ML
INJECTION INTRAMUSCULAR; INTRAVENOUS AS NEEDED
Status: DISCONTINUED | OUTPATIENT
Start: 2018-07-10 | End: 2018-07-10 | Stop reason: SURG

## 2018-07-10 RX ORDER — OXYCODONE HYDROCHLORIDE 5 MG/1
5 TABLET ORAL EVERY 4 HOURS PRN
Status: DISCONTINUED | OUTPATIENT
Start: 2018-07-10 | End: 2018-07-11 | Stop reason: HOSPADM

## 2018-07-10 RX ORDER — HYDROCODONE BITARTRATE AND ACETAMINOPHEN 5; 325 MG/1; MG/1
2 TABLET ORAL EVERY 6 HOURS PRN
Status: DISCONTINUED | OUTPATIENT
Start: 2018-07-10 | End: 2018-07-10

## 2018-07-10 RX ORDER — ACETAMINOPHEN 325 MG/1
975 TABLET ORAL EVERY 8 HOURS SCHEDULED
Status: DISCONTINUED | OUTPATIENT
Start: 2018-07-10 | End: 2018-07-11 | Stop reason: HOSPADM

## 2018-07-10 RX ORDER — ONDANSETRON 2 MG/ML
4 INJECTION INTRAMUSCULAR; INTRAVENOUS EVERY 6 HOURS PRN
Status: DISCONTINUED | OUTPATIENT
Start: 2018-07-10 | End: 2018-07-11 | Stop reason: HOSPADM

## 2018-07-10 RX ORDER — SENNOSIDES 8.6 MG
650 CAPSULE ORAL EVERY 8 HOURS PRN
Qty: 15 TABLET | Refills: 0 | Status: SHIPPED | OUTPATIENT
Start: 2018-07-10 | End: 2018-07-15

## 2018-07-10 RX ORDER — SODIUM CHLORIDE, SODIUM LACTATE, POTASSIUM CHLORIDE, CALCIUM CHLORIDE 600; 310; 30; 20 MG/100ML; MG/100ML; MG/100ML; MG/100ML
125 INJECTION, SOLUTION INTRAVENOUS CONTINUOUS
Status: DISCONTINUED | OUTPATIENT
Start: 2018-07-10 | End: 2018-07-11 | Stop reason: HOSPADM

## 2018-07-10 RX ORDER — PROPOFOL 10 MG/ML
INJECTION, EMULSION INTRAVENOUS AS NEEDED
Status: DISCONTINUED | OUTPATIENT
Start: 2018-07-10 | End: 2018-07-10 | Stop reason: SURG

## 2018-07-10 RX ORDER — ONDANSETRON 2 MG/ML
4 INJECTION INTRAMUSCULAR; INTRAVENOUS ONCE AS NEEDED
Status: DISCONTINUED | OUTPATIENT
Start: 2018-07-10 | End: 2018-07-10 | Stop reason: HOSPADM

## 2018-07-10 RX ORDER — KETOROLAC TROMETHAMINE 30 MG/ML
INJECTION, SOLUTION INTRAMUSCULAR; INTRAVENOUS AS NEEDED
Status: DISCONTINUED | OUTPATIENT
Start: 2018-07-10 | End: 2018-07-10 | Stop reason: SURG

## 2018-07-10 RX ORDER — OXYCODONE HYDROCHLORIDE AND ACETAMINOPHEN 5; 325 MG/1; MG/1
1 TABLET ORAL ONCE
Status: COMPLETED | OUTPATIENT
Start: 2018-07-10 | End: 2018-07-10

## 2018-07-10 RX ORDER — BUPIVACAINE HYDROCHLORIDE 5 MG/ML
INJECTION, SOLUTION PERINEURAL AS NEEDED
Status: DISCONTINUED | OUTPATIENT
Start: 2018-07-10 | End: 2018-07-10 | Stop reason: HOSPADM

## 2018-07-10 RX ORDER — HYDROCODONE BITARTRATE AND ACETAMINOPHEN 5; 325 MG/1; MG/1
1 TABLET ORAL EVERY 6 HOURS PRN
Qty: 20 TABLET | Refills: 0 | Status: SHIPPED | OUTPATIENT
Start: 2018-07-10 | End: 2018-09-17 | Stop reason: ALTCHOICE

## 2018-07-10 RX ORDER — LIDOCAINE HYDROCHLORIDE 10 MG/ML
INJECTION, SOLUTION INFILTRATION; PERINEURAL AS NEEDED
Status: DISCONTINUED | OUTPATIENT
Start: 2018-07-10 | End: 2018-07-10 | Stop reason: SURG

## 2018-07-10 RX ORDER — OXYCODONE HYDROCHLORIDE 10 MG/1
10 TABLET ORAL EVERY 4 HOURS PRN
Status: DISCONTINUED | OUTPATIENT
Start: 2018-07-10 | End: 2018-07-11 | Stop reason: HOSPADM

## 2018-07-10 RX ORDER — NAPROXEN 500 MG/1
500 TABLET ORAL 2 TIMES DAILY WITH MEALS
Qty: 10 TABLET | Refills: 0 | Status: SHIPPED | OUTPATIENT
Start: 2018-07-10 | End: 2018-07-23 | Stop reason: SDUPTHER

## 2018-07-10 RX ORDER — FENTANYL CITRATE/PF 50 MCG/ML
25 SYRINGE (ML) INJECTION
Status: DISCONTINUED | OUTPATIENT
Start: 2018-07-10 | End: 2018-07-10 | Stop reason: HOSPADM

## 2018-07-10 RX ORDER — METOPROLOL TARTRATE 5 MG/5ML
INJECTION INTRAVENOUS AS NEEDED
Status: DISCONTINUED | OUTPATIENT
Start: 2018-07-10 | End: 2018-07-10 | Stop reason: SURG

## 2018-07-10 RX ORDER — MIDAZOLAM HYDROCHLORIDE 1 MG/ML
INJECTION INTRAMUSCULAR; INTRAVENOUS AS NEEDED
Status: DISCONTINUED | OUTPATIENT
Start: 2018-07-10 | End: 2018-07-10 | Stop reason: SURG

## 2018-07-10 RX ADMIN — ONDANSETRON 4 MG: 2 INJECTION, SOLUTION INTRAMUSCULAR; INTRAVENOUS at 21:59

## 2018-07-10 RX ADMIN — FENTANYL CITRATE 50 MCG: 50 INJECTION, SOLUTION INTRAMUSCULAR; INTRAVENOUS at 15:55

## 2018-07-10 RX ADMIN — METOPROLOL TARTRATE 2 MG: 1 INJECTION, SOLUTION INTRAVENOUS at 17:05

## 2018-07-10 RX ADMIN — FENTANYL CITRATE 100 MCG: 50 INJECTION, SOLUTION INTRAMUSCULAR; INTRAVENOUS at 15:21

## 2018-07-10 RX ADMIN — METOPROLOL TARTRATE 1 MG: 1 INJECTION, SOLUTION INTRAVENOUS at 17:09

## 2018-07-10 RX ADMIN — HYDROMORPHONE HYDROCHLORIDE 0.4 MG: 1 INJECTION, SOLUTION INTRAMUSCULAR; INTRAVENOUS; SUBCUTANEOUS at 19:35

## 2018-07-10 RX ADMIN — HYDROMORPHONE HYDROCHLORIDE 0.4 MG: 1 INJECTION, SOLUTION INTRAMUSCULAR; INTRAVENOUS; SUBCUTANEOUS at 19:11

## 2018-07-10 RX ADMIN — LIDOCAINE HYDROCHLORIDE 50 MG: 10 INJECTION, SOLUTION INFILTRATION; PERINEURAL at 15:19

## 2018-07-10 RX ADMIN — ONDANSETRON 4 MG: 2 INJECTION INTRAMUSCULAR; INTRAVENOUS at 15:30

## 2018-07-10 RX ADMIN — PROPOFOL 200 MG: 10 INJECTION, EMULSION INTRAVENOUS at 15:19

## 2018-07-10 RX ADMIN — FENTANYL CITRATE 25 MCG: 50 INJECTION, SOLUTION INTRAMUSCULAR; INTRAVENOUS at 18:45

## 2018-07-10 RX ADMIN — HYDROMORPHONE HYDROCHLORIDE 0.4 MG: 1 INJECTION, SOLUTION INTRAMUSCULAR; INTRAVENOUS; SUBCUTANEOUS at 19:17

## 2018-07-10 RX ADMIN — MIDAZOLAM 2 MG: 1 INJECTION INTRAMUSCULAR; INTRAVENOUS at 15:15

## 2018-07-10 RX ADMIN — KETOROLAC TROMETHAMINE 30 MG: 30 INJECTION, SOLUTION INTRAMUSCULAR at 18:08

## 2018-07-10 RX ADMIN — FENTANYL CITRATE 25 MCG: 50 INJECTION, SOLUTION INTRAMUSCULAR; INTRAVENOUS at 18:54

## 2018-07-10 RX ADMIN — FENTANYL CITRATE 50 MCG: 50 INJECTION, SOLUTION INTRAMUSCULAR; INTRAVENOUS at 15:39

## 2018-07-10 RX ADMIN — DEXAMETHASONE SODIUM PHOSPHATE 10 MG: 10 INJECTION INTRAMUSCULAR; INTRAVENOUS at 15:30

## 2018-07-10 RX ADMIN — CEFAZOLIN SODIUM 2000 MG: 2 SOLUTION INTRAVENOUS at 15:20

## 2018-07-10 RX ADMIN — HYDROMORPHONE HYDROCHLORIDE 0.5 MG: 1 INJECTION, SOLUTION INTRAMUSCULAR; INTRAVENOUS; SUBCUTANEOUS at 16:00

## 2018-07-10 RX ADMIN — SODIUM CHLORIDE, SODIUM LACTATE, POTASSIUM CHLORIDE, AND CALCIUM CHLORIDE: .6; .31; .03; .02 INJECTION, SOLUTION INTRAVENOUS at 14:00

## 2018-07-10 RX ADMIN — OXYCODONE HYDROCHLORIDE AND ACETAMINOPHEN 1 TABLET: 5; 325 TABLET ORAL at 13:53

## 2018-07-10 RX ADMIN — HYDROMORPHONE HYDROCHLORIDE 0.5 MG: 1 INJECTION, SOLUTION INTRAMUSCULAR; INTRAVENOUS; SUBCUTANEOUS at 16:35

## 2018-07-10 RX ADMIN — HYDROMORPHONE HYDROCHLORIDE 0.4 MG: 1 INJECTION, SOLUTION INTRAMUSCULAR; INTRAVENOUS; SUBCUTANEOUS at 19:26

## 2018-07-10 RX ADMIN — SODIUM CHLORIDE, SODIUM LACTATE, POTASSIUM CHLORIDE, AND CALCIUM CHLORIDE: .6; .31; .03; .02 INJECTION, SOLUTION INTRAVENOUS at 17:15

## 2018-07-10 RX ADMIN — HYDROMORPHONE HYDROCHLORIDE 0.4 MG: 1 INJECTION, SOLUTION INTRAMUSCULAR; INTRAVENOUS; SUBCUTANEOUS at 19:02

## 2018-07-10 RX ADMIN — HYDROMORPHONE HYDROCHLORIDE 0.4 MG: 1 INJECTION, SOLUTION INTRAMUSCULAR; INTRAVENOUS; SUBCUTANEOUS at 18:56

## 2018-07-10 RX ADMIN — FENTANYL CITRATE 25 MCG: 50 INJECTION, SOLUTION INTRAMUSCULAR; INTRAVENOUS at 18:50

## 2018-07-10 NOTE — ANESTHESIA PREPROCEDURE EVALUATION
Review of Systems/Medical History  Patient summary reviewed  Chart reviewed  History of anesthetic complications (PONV with colonoscopy) PONV    Cardiovascular  Negative cardio ROS Exercise tolerance (METS): >4,     Pulmonary  COPD (chronic cough) ,        GI/Hepatic    No GERD ,        Negative  ROS        Endo/Other  Negative endo/other ROS      GYN  Negative gynecology ROS          Hematology  Negative hematology ROS      Musculoskeletal    Comment: Left distal humerus fracture Arthritis     Neurology  Negative neurology ROS      Psychology   Negative psychology ROS            Physical Exam    Airway    Mallampati score: II  TM Distance: >3 FB  Neck ROM: full     Dental   No notable dental hx     Cardiovascular  Comment: Negative ROS,     Pulmonary      Other Findings      No recent labs    Anesthesia Plan  ASA Score- 2     Anesthesia Type- general with ASA Monitors  Additional Monitors:   Airway Plan: LMA  Comment: PO percocet given preop for pain since significant delay anticipated before surgery  Possible ETT if required for positioning  Plan Factors-    Induction-     Postoperative Plan-     Informed Consent- Anesthetic plan and risks discussed with patient and daughter  I personally reviewed this patient with the CRNA  Discussed and agreed on the Anesthesia Plan with the CRNA  Lenny Montejo

## 2018-07-10 NOTE — H&P (VIEW-ONLY)
ASSESSMENT/PLAN:    Diagnoses and all orders for this visit:    Elbow fracture, left, closed, initial encounter  -     Ambulatory referral to Orthopedic Surgery        Assessment:   Left elbow capitellar shear fracture with significant displacement and angulation    Plan:   Plan for open reduction internal fixation left elbow capitellar shear fracture  Patient aware of significant risk of stiffness localized into the eft elbow he may require advanced therapy or secondary surgical procedures  Follow Up: After Surgery    To Do Next Visit:   X-rays  And sutures/staples removed    General Discussions:      Operative Discussions:     Bony Consent: The risks and benefits of the procedure were explained to the patient, which include, but are not limited to: Bleeding, infection, recurrence, pain, scar, malunion, nonunion, damage to tendons, damage to nerves, and damage to blood vessels, and complications related to anesthesia, failure to give desire result, need for more surgery  These risks, along with alternative conservative treatment options, and postoperative protocols were voiced back and understood by the patient  All questions were answered to the patient's satisfaction  The patient agrees to comply with a standard postoperative protocol, and is willing to proceed  Education was provided via written and auditory forms  There were no barriers to learning  Written handouts regarding wound care, incision and scar care, and general preoperative information was provided to the patient  Prior to surgery, the patient may be requested to stop all anti-inflammatory medications  Prophylactic aspirin, Plavix, and Coumadin may be allowed to be continued  Medications including vitamin E , ginkgo, and fish oil are requested to be stopped approximately one week prior to surgery  Hypertensive medications and beta blockers, if taken, should be continued        _____________________________________________________  CHIEF COMPLAINT:  Chief Complaint   Patient presents with    Left Elbow - Fracture         SUBJECTIVE:  Disha Davis is a 65y o  year old female who presents with Stiffness/LROM to the   [employer] This started  4 day(s) ago as Due to a personal injury was walking her  Dog and was pulled forward and fell on her Left elbow  She notes she went ER and was placed in splint    Radiation: Perviousus Treatments: bracing with only partial relief  Associated symptoms: Stiffness/LROM    PAST MEDICAL HISTORY:  Past Medical History:   Diagnosis Date    Allergic rhinitis     Last Assessed: 2016    Black tarry stools     Concussion with prolonged loss of consciousness without return to pre-existing conscious level     COPD (chronic obstructive pulmonary disease) ([facility])     Last Assessed: 3/8/2017    Fall     slipping, tripping or stumbling    Generalized osteoarthritis     GERD (gastroesophageal reflux disease)     Herpes simplex infection     Menopausal disorder     Morbid obesity due to excess calories ([facility] [address] )     Neuroma     right foot    Pharyngeal disorder     Pneumonia     Last Assessed: 5/15/2014    Post-menopausal bleeding     Last Assessed: 2015    Postmenopausal disorder        PAST SURGICAL HISTORY:  Past Surgical History:   Procedure Laterality Date     SECTION      COLONOSCOPY      Complete    DILATION AND CURETTAGE OF UTERUS      3 times    DILATION AND EVACUATION      Surgical treatment of missed  in first trimester - x 5    DILATION AND EVACUATION  ,     Surgically Induced     ENDOMETRIAL BIOPSY  2015    PMB/EB    KNEE ARTHROSCOPY Right 2012    KNEE SURGERY Right     [id number] for right fractured patella       FAMILY HISTORY:  Family History   Problem Relation Age of Onset    Diabetes Mother    Edie Villafuerte Migraines Mother     Hypertension Mother     Ovarian cancer Mother     Pancreatic cancer Mother     Other Mother         Skin disorder, Urinary Incontinence    Coronary artery disease Father     Heart disease Father     Hypertension Father     Hypertension Sister     Heart disease Brother     Jaundice Brother     Rectal cancer Brother     Osteoporosis Other     Breast cancer Paternal Aunt        SOCIAL HISTORY:  Social History   Substance Use Topics    Smoking status: Former Smoker     Packs/day: 1 00     Types: Cigarettes     Quit date: 6/15/1984    Smokeless tobacco: Never Used    Alcohol use No      Comment: rarely       MEDICATIONS:    Current Outpatient Prescriptions:     loratadine (CLARITIN) 10 mg tablet, Take 10 mg by mouth daily, Disp: , Rfl:     montelukast (SINGULAIR) 10 mg tablet, Take 10 mg by mouth daily, Disp: , Rfl: 3    oxyCODONE-acetaminophen (PERCOCET) 5-325 mg per tablet, Take 1 tablet by mouth every 4 (four) hours as needed for moderate pain Max Daily Amount: 6 tablets, Disp: 10 tablet, Rfl: 0    promethazine-codeine (PHENERGAN WITH CODEINE) 6 25-10 mg/5 mL syrup, Take 5 mL by mouth every 4 (four) hours as needed for cough, Disp: 240 mL, Rfl: 0    PROVENTIL  (90 Base) MCG/ACT inhaler, Inhale 2 puffs every 6 (six) hours as needed, Disp: , Rfl: 0    SYMBICORT 160-4 5 MCG/ACT inhaler, inhale 2 puffs by mouth twice a day Rinse mouth after use, Disp: 10 2 g, Rfl: 3    triamcinolone (KENALOG) 0 5 % cream, USE 1 PART WITH 3 PARTS EUCERIN to affected area ON ARM AND LEGS twice a day, Disp: , Rfl: 0    valACYclovir (VALTREX) 1,000 mg tablet, Take by mouth, Disp: , Rfl:     betamethasone valerate (LUXIQ) 0 12 % foam, , Disp: , Rfl: 0    ALLERGIES:  Allergies   Allergen Reactions    Pravastatin Myalgia    Simvastatin Myalgia       REVIEW OF SYSTEMS:  Pertinent items are noted in HPI  A comprehensive review of systems was negative      LABS:  HgA1c:   Lab Results   Component Value Date    HGBA1C 5 1 04/07/2017     BMP:   Lab Results   Component Value Date    CALCIUM 9 0 04/07/2017     04/07/2017    K 4 1 04/07/2017    CO2 27 04/07/2017     04/07/2017    BUN 17 04/07/2017    CREATININE 0 65 04/07/2017         _____________________________________________________  PHYSICAL EXAMINATION:  General: well developed and well nourished, alert, oriented times 3 and appears comfortable  Psychiatric: Normal  HEENT: Trachea Midline, No torticollis  Cardiovascular: No discernable arrhythmia  Pulmonary: No wheezing or stridor  Skin: No masses, erthema, lacerations, fluctation, ulcerations, positive swelling noted circumferentially with positive ecchymosis  Neurovascular: Sensation Intact to the Median, Ulnar, Radial Nerve, Motor Intact to the Median, Ulnar, Radial Nerve and Pulses Intact    MUSCULOSKELETAL EXAMINATION:  LEFT SIDE:  Elbow:  Neurological intact, good pulse, swelling elbow down to fingers, bursing down to the fingers  Tenderness to palpation of the left elbow  No tenderness to the left shoulder, wrist, anatomic snuffbox remaining of the fingers  _____________________________________________________  STUDIES REVIEWED: Studies to review and Images were reviewd including a CT scan as well as radiographs of the elbow by myself today which demonstrates a capitellar shear fracture with extension into the trochlea with rotatory and superior displacement      PROCEDURES PERFORMED:  Procedures  No Procedures performed today   Scribe Attestation    I,:   Alexis Medley am acting as a scribe while in the presence of the attending physician :        I,:   Mulu Choe MD personally performed the services described in this documentation    as scribed in my presence :

## 2018-07-10 NOTE — ANESTHESIA POSTPROCEDURE EVALUATION
Post-Op Assessment Note      CV Status:  Stable    Mental Status:  Awake    Hydration Status:  Stable    PONV Controlled:  Controlled    Airway Patency:  Patent    Post Op Vitals Reviewed: Yes          Staff: AnesthesiologistKAYLEIGH           BP   134/72   Temp   97 6   Pulse  67   Resp   14   SpO2   100

## 2018-07-10 NOTE — OP NOTE
OPERATIVE REPORT  PATIENT NAME: Sunshine Harrison  :  1955  MRN: [id number]  Pt Location: [facility]    SURGERY DATE: 07/10/18    Surgeon(s) and Role:     * Dora St MD - Primary     * Rodney Saeed MD - Assisting    Pre-Op Diagnosis:  Elbow fracture, left, closed, initial encounter [S42 402A]    Post-Op Diagnosis Codes:     * Elbow fracture, left, closed, initial encounter [S42 402A]    Procedure(s):  OPEN REDUCTION INTERNAL FIXATION LEFT DISTAL HUMERUS CAPITELLAR AND TROCHLEAR SHEAR FRACTURE WITH LATERAL EPICONDYLE FRACTURE; REPAIR LATERAL ULNAR COLLATERAL LIGAMENT COMPLEX OF THE ELBOW; SPLINT APPLICATION (Left)    Specimen(s):  * No orders in the log *    Estimated Blood Loss:   Minimal      Anesthesia Type:   General    Operative Indications: The patient has a history of A LEFT CAPITELLAR AND TROCHLEAR SHEAR FRACTURE WITH SUBLUXATION OF THE ELBOW AND EPICONDYLAR FRACTURE ON THE LATERAL SIDE that was Closed, Angulated, Displaced, Comminuted and Intra-articular  The decision was made to bring the patient to the operating room for open reduction and internal fixation  Risks of the procedure were explained which include, but are not limited to bleeding; infection; damage to nerves, arteries,veins, tendons; scar; pain; need for reoperation; failure to give desired result; delayed union, malunion, nonunion; and risks of anaesthesia  All questions were answered to satisfaction and they were willing to proceed  Operative Findings:  Comminuted intra-articular fracture of the left distal humerus with capitellar and trochlear shear component, left epicondylar fracture, disruption of the lateral ulnar collateral ligament complex  Complications:   None    Procedure and Technique:  After the patient, site, and procedure were identified, the patient was brought into the operating room in a supine position  General anaesthesia and local medication were provided    A well padded tourniquet was applied to the extremity, set at 250 mmHg  The  left upper extremity was then prepped and drapped in a normal, sterile, orthopedic fashion  After the patient, site, and procedure were identified attention was turned towards the left elbow  An Esmarch bandage was used to exsanguinate the limb and the tourniquet was inflated to 250 mm of mercury  Curvilinear incision along the posterior lateral aspect of the elbow was then made  We dissected down through the skin and subcutaneous tissues while maintaining hemostasis  We immediately discovered subluxation of the elbow at this point time with disruption of both the medial and lateral ulnar collateral ligament complexes  We opened up the interval on the lateral aspect of the humerus releasing the muscles off the anterior supracondylar ridge  We then opened up a [facility] approach and extended this  We identified 2 epicondylar fractures 1 was significantly comminuted and could not be repaired, 1 involve the insertion portion of the lateral ulnar collateral ligament however, this piece was not large enough to hold a screw  The origin of the lateral collateral ligament complex was completely disrupted  There was a large intra-articular shear component of the capitellum extending partially through the trochlea  There was also comminuted pieces along the posterior aspect number ring approximately 6 that were too small to allow for screw repair reconstruction and there were free-floating each measuring approximately 2-3 mm x 2-3 mm  This involved the posterior aspect of the central portion of the spool of the elbow  These were removed, and the elbow was meticulously debrided  This was once again irrigated, and the capitellar and trochlear component was position  There was some centralized joint impaction which need to be free to allow for proper articular fitting  Intraoperative fluoroscopic imaging was utilized and confirmed good overall alignment    K-wire fixation was then placed and 3 headless compression screws were then placed to hold the capitellum and trochlear into a reduced position  At this point time, we were satisfied with the overall reduction  As we are unable to primarily repair the lateral ulnar collateral ligament complex with the bony avulsion, drill tunnels were then made to allow for bony attachment  [place] sutures were then placed to the lateral collateral ligament complex to secure this to the lateral condylar ridge to allow for repair of the process  We were unable to provide anchors within the capitellum as this was the location of the shear fracture  At this point we were able to hold the elbow reduced  The elbow was held in a reduced position in a supinated position with the elbow in approximately 45-90 degrees of flexion  We were overall satisfied with the alignment and the stability at this point  The decision was made not to place external fixation  This point  Wounds were copiously irrigated with sterile saline solution the capsular layer was then closed in an interrupted fashion utilizing 2-0 Ethibond  Once again the elbow was brought through range of motion confirming good stability to from 45-90 degrees and full supination  Fluoroscopic images confirmed this  The tourniquet was then deflated  The wounds were copiously irrigated with sterile saline solution and closed in a layered an interrupted fashion  Sterile dressings were applied including a posterior elbow splint in supination and 90° of flexion  Fluoroscopic images were taken which confirmed good reduction  Patient was then woken from anesthesia without complication and transferred to recovery in stable condition having tolerated the procedure well  Please note that all sponge, needle, and instrument counts were correct prior to closure  The entire procedure was done under 3 5 times loupe magnification    As the attending physician was present and performed the entirety of the procedure  Postoperatively, we did discuss with the daughter the concerns about stability of the elbow in the potential need for external fixation of the stability is not maintain as well as the potential for reconstruction of the medial collateral ligament complex of the stability is not maintain  We also discussed the significant nature of the intra-articular fracture as well as the fracture components and the articular small pieces that needed to be removed  She voiced understanding         I was present for the entire procedure    Patient Disposition:  PACU , hemodynamically stable and extubated and stable    SIGNATURE: Mulu Choe MD  DATE: 07/10/18  TIME: 6:06 PM

## 2018-07-10 NOTE — DISCHARGE INSTRUCTIONS
Post Operative Instructions    You have had surgery on your arm today, please read and follow the information below:  · Elevate your hand above your elbow during the next 24-48 hours to help with swelling  · Place your hand and arm over your head with motion at your shoulder three times a day  · Do not apply any cream/ointment/oil to your incisions including antibiotics  · Do not soak your hands in standing water (dishwater, tubs, Jacuzzi's, pools, etc ) until given permission (typically 2-3 weeks after injury)    Call the office if you notice any:  · Increased numbness or tingling of your hand or fingers that is not relieved with elevation  · Increasing pain that is not controlled with medication  · Difficulty chewing, breathing, swallowing  · Chest pains or shortness of breath  · Fever over 101 4 degrees  Bandage: Do NOT remove bandage until follow-up appointment  Motion: Move fingers into a fist 5 times a day, DO NOT move any splinted fingers  Weight bearing status: The operated extremity should be non-weight bearing until further notice  Ice: Ice for 10 minutes every hour as needed for swelling x 24 hours  Sling: Sling at all times  Encouraged elevation with fingertips above elbow and wrist above heart level  Encourage finger motion to prevent swelling and stiffness    Pain medication:   Naproxen 500 mg two times a day   Tylenol Extended Release 650 mg every 8 hours  Norco/Hydrocodone one tab every 6 hours AS NEEDED for pain     Follow-up Appointment: 7-10 days  Please call the office if you have any questions or concerns regarding your post-operative care

## 2018-07-11 VITALS
TEMPERATURE: 98.9 F | BODY MASS INDEX: 22.73 KG/M2 | WEIGHT: 150 LBS | DIASTOLIC BLOOD PRESSURE: 62 MMHG | HEIGHT: 68 IN | HEART RATE: 71 BPM | SYSTOLIC BLOOD PRESSURE: 115 MMHG | OXYGEN SATURATION: 98 % | RESPIRATION RATE: 18 BRPM

## 2018-07-11 PROCEDURE — 99024 POSTOP FOLLOW-UP VISIT: CPT | Performed by: PHYSICIAN ASSISTANT

## 2018-07-11 RX ADMIN — ACETAMINOPHEN 975 MG: 325 TABLET, FILM COATED ORAL at 05:23

## 2018-07-11 RX ADMIN — OXYCODONE HYDROCHLORIDE 10 MG: 10 TABLET ORAL at 01:18

## 2018-07-11 NOTE — PROGRESS NOTES
Progress Note - Orthopedics   Guerline Penaloza 65 y o  female MRN: [id number]  Unit/Bed#: [facility] 913-01      Subjective:    65 y  o female POD #1 ORIF Left distal humerus capitellar and trochlear shear fx with lateral epicondyle fx, repair lateral ulnar collateral ligament complex  Patient admitted secondary to not feeling well after surgery yesterday (nausea/weakness)  Patient states she feels much better today  Denies pain currently in the arm  Denies n/t  Labs:    0  Lab Value Date/Time   HCT 41 5 04/07/2017 0721   HGB 13 8 04/07/2017 0721   WBC 3 4 (L) 04/07/2017 0721       Meds:    Current Facility-Administered Medications:     acetaminophen (TYLENOL) tablet 975 mg, 975 mg, Oral, Q8H [facility], Marta Brito MD, 975 mg at 07/11/18 0523    enoxaparin (LOVENOX) subcutaneous injection 40 mg, 40 mg, Subcutaneous, Daily, Marta Brito MD    lactated ringers infusion, 125 mL/hr, Intravenous, Continuous, Kishan Riojas MD    ondansetron [facility]) injection 4 mg, 4 mg, Intravenous, Q6H PRN, Fransisca Jaeger PA-C, 4 mg at 07/10/18 2159    oxyCODONE (ROXICODONE) immediate release tablet 10 mg, 10 mg, Oral, Q4H PRN, Marta Brito MD, 10 mg at 07/11/18 0118    oxyCODONE (ROXICODONE) IR tablet 5 mg, 5 mg, Oral, Q4H PRN, Marta Brito MD    Blood Culture:   No results found for: BLOODCX    Wound Culture:   No results found for: WOUNDCULT    Ins and Outs:  I/O last 24 hours: In: 1520 [P O :120; I V :1400]  Out: 5 [Blood:5]          Physical:  Vitals:    07/11/18 0310   BP: 111/57   Pulse: 77   Resp: 18   Temp: 98 °F (36 7 °C)   SpO2: 98%     Musculoskeletal: left Upper Extremity  · Skin with some ecchymosis upper arm  · Dressing/Splint c/d/i, patient in sling  · SILT m/r/u    · Motor intact ain/pin/m/r/u   · 2+ rad pulse    _*_*_*_*_*_*_*_*_*_*_*_*_*_*_*_*_*_*_*_*_*_*_*_*_*_*_*_*_*_*_*_*_*_*_*_*_*_*_*_*_*    Assessment:    65 y  o female POD #1 ORIF Left distal humerus capitellar and trochlear shear fx with lateral epicondyle fx, repair lateral ulnar collateral ligament complex  Doing well      Plan:  · NWB LUE in sling  · Proper shoulder ROM and elevation techniques discussed with patient again today  · Patient encouraged to move fingers  · Pain control  · Dispo: Sarahi Villar for discharge from ortho perspective as long as patient continues to feel well    Chip Aguillon PA-C

## 2018-07-11 NOTE — DISCHARGE SUMMARY
Discharge Summary - Reji Bryan 65 y o  female MRN: [id number]  Unit/Bed#: [facility] 913-01    Attending Physician: Lola Tay    Admitting diagnosis: Elbow fracture, left, closed, initial encounter [S42 402A]    Discharge diagnosis: Elbow fracture, left, closed, initial encounter [S42 402A]    Date of admission: 7/10/2018    Date of discharge: 07/11/18    Procedure: ORIF L distal humerus capitellar and trochlear shear fx with lateral epicondyle fx, repair of ulnar collateral ligament complex     HPI: 65 y o  female with a history of  L elbow fracture who has been seen by Dr Lola Tay in clinic  Pt has failed previous non operative therapies and was scheduled for ORIF L distal humerus capitellar and trochlear shear fx with lateral epicondyle fx, repair of ulnar collateral ligament complex  Prior to surgery the risks and benefits of surgery were explained and informed consent was obtained  Hospital course: Pt was taken to the OR on 7/10/2018  Surgery went without complications and pt was discharged to the PACU in a stable condition and was transferred to the floor  On discharge date pt was cleared by PT and the medicine team and determined to be safe for discharge  Daily discussion was had with the patient, nursing staff, orthopaedic team, and family members if present  All questions were answered to the patients satisifaction  0  Lab Value Date/Time   HGB 13 8 04/07/2017 0721       Vital signs remained stable and pt was resuscitated with IVF as needed  Discharge Instructions:   · NWB L UE  · Keep dressings clean and dry at all times   · Complete DVT prophylaxis as prescribed   · Physical therapy  · Follow-up as scheduled, otherwise call for appt  Discharge Medications: For the complete list of discharge medications, please refer to the patient's medication reconciliation

## 2018-07-19 DIAGNOSIS — B00.9 HERPES: Primary | ICD-10-CM

## 2018-07-19 RX ORDER — VALACYCLOVIR HYDROCHLORIDE 1 G/1
TABLET, FILM COATED ORAL
Qty: 4 TABLET | Refills: 4 | Status: SHIPPED | OUTPATIENT
Start: 2018-07-19 | End: 2019-06-04 | Stop reason: SDUPTHER

## 2018-07-20 ENCOUNTER — HOSPITAL ENCOUNTER (OUTPATIENT)
Dept: RADIOLOGY | Facility: HOSPITAL | Age: 63
Discharge: HOME | End: 2018-07-20
Attending: ORTHOPAEDIC SURGERY
Payer: COMMERCIAL

## 2018-07-20 ENCOUNTER — OFFICE VISIT (OUTPATIENT)
Dept: OBGYN CLINIC | Facility: HOSPITAL | Age: 63
End: 2018-07-20

## 2018-07-20 VITALS
DIASTOLIC BLOOD PRESSURE: 69 MMHG | BODY MASS INDEX: 22.76 KG/M2 | WEIGHT: 150.2 LBS | HEIGHT: 68 IN | SYSTOLIC BLOOD PRESSURE: 125 MMHG | HEART RATE: 78 BPM

## 2018-07-20 DIAGNOSIS — Z48.89 AFTERCARE FOLLOWING SURGERY: ICD-10-CM

## 2018-07-20 DIAGNOSIS — S42.402A ELBOW FRACTURE, LEFT, CLOSED, INITIAL ENCOUNTER: ICD-10-CM

## 2018-07-20 DIAGNOSIS — Z48.89 AFTERCARE FOLLOWING SURGERY: Primary | ICD-10-CM

## 2018-07-20 PROCEDURE — 73080 X-RAY EXAM OF ELBOW: CPT

## 2018-07-20 PROCEDURE — 99024 POSTOP FOLLOW-UP VISIT: CPT | Performed by: ORTHOPAEDIC SURGERY

## 2018-07-20 NOTE — PROGRESS NOTES
ASSESSMENT/PLAN:    Assessment:    78-year-old female 10 days since ORIF left capitellum fracture    Plan:   -NWB LUE in Hinged elbow brace locked at 90deg  -f/u 2 wks    To Do Next Visit:  xrays L elbow AP+Lateral  _____________________________________________________  CHIEF COMPLAINT:  Chief Complaint   Patient presents with    Left Elbow - Post-op         SUBJECTIVE:  Falguni Wilkinson is a 65y o  year old female who presents  10 days postoperatively from ORIF left capitellum fracture  Patient has been splinted, has been compliant with her splint, denies numbness and tingling, does note some pain in her elbow but this has been resolving since the operation       PAST MEDICAL HISTORY:  Past Medical History:   Diagnosis Date    Allergic rhinitis     Last Assessed: 2016    Black tarry stools     Concussion with prolonged loss of consciousness without return to pre-existing conscious level     COPD (chronic obstructive pulmonary disease) ([facility])     Last Assessed: 3/8/2017    Fall     slipping, tripping or stumbling    Generalized osteoarthritis     GERD (gastroesophageal reflux disease)     Herpes simplex infection     Menopausal disorder     Morbid obesity due to excess calories ([facility] [address] )     Neuroma 1975    right foot    Pharyngeal disorder     Pneumonia     Last Assessed: 5/15/2014    Post-menopausal bleeding     Last Assessed: 2015    Postmenopausal disorder        PAST SURGICAL HISTORY:  Past Surgical History:   Procedure Laterality Date     SECTION      COLONOSCOPY      Complete    DILATION AND CURETTAGE OF UTERUS      3 times    DILATION AND EVACUATION      Surgical treatment of missed  in first trimester - x 5   [facility]    Surgically Induced     ENDOMETRIAL BIOPSY  2015    PMB/EB    KNEE ARTHROSCOPY Right     KNEE SURGERY Right 1994 for right fractured patella    CO OPEN TX RADIAL HEAD/NECK FRACTURE Left 7/10/2018 Procedure: OPEN REDUCTION INTERNAL FIXATION LEFT DISTAL HUMERUS CAPITELLAR AND TROCHLEAR SHEAR FRACTURE WITH LATERAL EPICONDYLE FRACTURE; REPAIR LATERAL ULNAR COLLATERAL LIGAMENT COMPLEX OF THE ELBOW; SPLINT APPLICATION;  Surgeon: Jt Sanchez MD;  Location: [facility];  Service: Orthopedics       FAMILY HISTORY:  Family History   Problem Relation Age of Onset    Diabetes Mother    [place] Migraines Mother     Hypertension Mother     Ovarian cancer Mother     Pancreatic cancer Mother     Other Mother         Skin disorder, Urinary Incontinence    Coronary artery disease Father     Heart disease Father     Hypertension Father     Hypertension Sister     Heart disease Brother     Jaundice Brother     Rectal cancer Brother     Osteoporosis Other     Breast cancer Paternal Aunt        SOCIAL HISTORY:  Social History   Substance Use Topics    Smoking status: Former Smoker     Packs/day: 1 00     Types: Cigarettes     Quit date: 6/15/1984    Smokeless tobacco: Never Used    Alcohol use No      Comment: rarely       MEDICATIONS:    Current Outpatient Prescriptions:     betamethasone valerate (LUXIQ) 0 12 % foam, Apply 1 application topically as needed  , Disp: , Rfl: 0    HYDROcodone-acetaminophen (NORCO) 5-325 mg per tablet, Take 1 tablet by mouth every 6 (six) hours as needed for pain for up to 20 doses Max Daily Amount: 4 tablets, Disp: 20 tablet, Rfl: 0    loratadine (CLARITIN) 10 mg tablet, Take 10 mg by mouth daily, Disp: , Rfl:     Misc Natural Products (OSTEO BI-FLEX JOINT SHIELD PO), Take 1 tablet by mouth every morning, Disp: , Rfl:     montelukast (SINGULAIR) 10 mg tablet, Take 10 mg by mouth daily, Disp: , Rfl: 3    PROVENTIL  (90 Base) MCG/ACT inhaler, Inhale 2 puffs every 6 (six) hours as needed, Disp: , Rfl: 0    SYMBICORT 160-4 5 MCG/ACT inhaler, inhale 2 puffs by mouth twice a day Rinse mouth after use, Disp: 10 2 g, Rfl: 3    valACYclovir (VALTREX) 1,000 mg tablet, take 2 tablets NOW AND THEN 2 TABLETS IN 12 HOURS, Disp: 4 tablet, Rfl: 4    naproxen (NAPROSYN) 500 mg tablet, Take 1 tablet (500 mg total) by mouth 2 (two) times a day with meals for 5 days, Disp: 10 tablet, Rfl: 0    ALLERGIES:  Allergies   Allergen Reactions    Pravastatin Myalgia    Simvastatin Myalgia       REVIEW OF SYSTEMS:  Pertinent items are noted in HPI  A comprehensive review of systems was negative  LABS:  HgA1c:   Lab Results   Component Value Date    HGBA1C 5 1 04/07/2017     BMP:   Lab Results   Component Value Date    CALCIUM 9 0 04/07/2017     04/07/2017    K 4 1 04/07/2017    CO2 27 04/07/2017     04/07/2017    BUN 17 04/07/2017    CREATININE 0 65 04/07/2017       _____________________________________________________  PHYSICAL EXAMINATION:  General: well developed and well nourished, alert, oriented times 3 and appears comfortable  Psychiatric: Normal  HEENT: Trachea Midline, No torticollis  Cardiovascular: No discernable arrhythmia  Pulmonary: No wheezing or stridor  Skin: No masses, erthema, lacerations, fluctation, ulcerations  Neurovascular: Sensation Intact to the Median, Ulnar, Radial Nerve, Motor Intact to the Median, Ulnar, Radial Nerve and Pulses Intact    MUSCULOSKELETAL EXAMINATION:  Extremities:    Incisions clean dry and intact, sensation intact to light touch radial ulnar and median distributions, ain /PIN/ulnar motor intact distally    Elbow motion as expected      _____________________________________________________  STUDIES REVIEWED:  L elbow today:  Stable fixation of capitellar headless compression screws compared with intraoperative x-rays      PROCEDURES PERFORMED:  Procedures   sutures removed from left elbow, patient tolerated procedure well and was given a soft dressing    I interviewed, took the history and examined the patient  I discuss the case with the resident and reviewed the resident's note    I supervised the resident and I agree with the resident management plan as it was presented to me  I was present in the clinic and examined the patient

## 2018-07-20 NOTE — PATIENT INSTRUCTIONS
Nonweightbearing left upper extremity in hinged elbow brace locked at 90 degrees     OK to move wrist and shoulder     1800 mg of calcium with vitamin-D daily

## 2018-07-23 DIAGNOSIS — S42.402A ELBOW FRACTURE, LEFT, CLOSED, INITIAL ENCOUNTER: ICD-10-CM

## 2018-07-23 RX ORDER — NAPROXEN 500 MG/1
500 TABLET ORAL 2 TIMES DAILY WITH MEALS
Qty: 30 TABLET | Refills: 0 | Status: SHIPPED | OUTPATIENT
Start: 2018-07-23 | End: 2019-01-09

## 2018-07-23 RX ORDER — NAPROXEN 500 MG/1
500 TABLET ORAL 2 TIMES DAILY WITH MEALS
Qty: 30 TABLET | Refills: 0 | Status: CANCELLED | OUTPATIENT
Start: 2018-07-23 | End: 2018-08-07

## 2018-07-23 NOTE — TELEPHONE ENCOUNTER
Spoke to pt and she stated that she is still taking the Naproxen and it is helping with the discomfort she is having  She stated that she took her last one last night  She would like a refill please

## 2018-07-23 NOTE — TELEPHONE ENCOUNTER
Getting an auto refill for patient's post op naproxen - this only needs to be refilled at this point if she is still taking it  Can we see if she is?  Thanks

## 2018-07-30 ENCOUNTER — TELEPHONE (OUTPATIENT)
Dept: OBGYN CLINIC | Facility: HOSPITAL | Age: 63
End: 2018-07-30

## 2018-07-30 NOTE — TELEPHONE ENCOUNTER
Patient is calling stating that her hands are swelling and that she is elevating and icing but it is not bringing all of the swelling down   Patient is wondering if that is normal

## 2018-07-30 NOTE — TELEPHONE ENCOUNTER
Patient was advised to be vigilant with the ice 20 min on 20 min off, elevate extremity above the heart  Flex and extend the fingers several times per hour, and take naproxen 500mg BID with food for help with swelling  Patient instructed to check cap refill and this was WNL  Patient should call office if above remedies do not help with the swelling

## 2018-08-06 ENCOUNTER — OFFICE VISIT (OUTPATIENT)
Dept: OBGYN CLINIC | Facility: CLINIC | Age: 63
End: 2018-08-06

## 2018-08-06 ENCOUNTER — APPOINTMENT (OUTPATIENT)
Dept: RADIOLOGY | Facility: CLINIC | Age: 63
End: 2018-08-06
Payer: COMMERCIAL

## 2018-08-06 VITALS
BODY MASS INDEX: 23.31 KG/M2 | WEIGHT: 153.8 LBS | HEIGHT: 68 IN | HEART RATE: 79 BPM | SYSTOLIC BLOOD PRESSURE: 134 MMHG | DIASTOLIC BLOOD PRESSURE: 85 MMHG

## 2018-08-06 DIAGNOSIS — Z48.89 AFTERCARE FOLLOWING SURGERY: ICD-10-CM

## 2018-08-06 DIAGNOSIS — S42.402D CLOSED FRACTURE OF LEFT ELBOW WITH ROUTINE HEALING, SUBSEQUENT ENCOUNTER: Primary | ICD-10-CM

## 2018-08-06 PROCEDURE — 73070 X-RAY EXAM OF ELBOW: CPT

## 2018-08-06 PROCEDURE — 99024 POSTOP FOLLOW-UP VISIT: CPT | Performed by: ORTHOPAEDIC SURGERY

## 2018-08-06 NOTE — PROGRESS NOTES
Assessment:   S/p ORIF L elbow capitellum fx on 7/11/18    Plan: We will adjust the patient hinged elbow brace to ROM 15-full flexion  Pt may remove her brace to shower at this point in time  We will give the patient a script for therapy for left elbow, wrist and finger ROM as well as edema control  Follow Up:  6  week(s)    To Do Next Visit:  X-rays of the  left  elbow      CHIEF COMPLAINT:  Chief Complaint   Patient presents with    Left Elbow - Post-op         SUBJECTIVE:  Aimee Lincoln is a 65y o  year old female who presents for follow up after S/p ORIF L elbow capitellum fx on 7/11/18   Today patient has Stiffness/LROM and swelling into her LUE  Pt states that she is taking naproxen to manage her discomfort prn  Pt c/o numbness and tingling to her left hand and forearm  Pt has been wearing her hinged elbow braces continuously which has been locked at 90  PHYSICAL EXAMINATION:  General: well developed and well nourished, alert, oriented times 3 and appears comfortable  Psychiatric: Normal    MUSCULOSKELETAL EXAMINATION:  Incision: scabbing present  no signs of infection     Range of Motion: limitied finger flexion, full extension, supination 60 degrees, pronation 20 degrees, extension 60 degrees, flexion 90 degrees  Neurovascular status: radial pulse 2+  Activity Restrictions: Cast/splint restrictions         STUDIES REVIEWED:  Images were reviewd in PACS: stable fixation compared to previous xray      PROCEDURES PERFORMED:  Procedures  No Procedures performed today     Scribe Attestation    I,:   Tigist Zeng am acting as a scribe while in the presence of the attending physician :        I,:   Richardson Smith MD personally performed the services described in this documentation    as scribed in my presence :            Scribe Attestation    I,:   Tigist Zeng am acting as a scribe while in the presence of the attending physician :        I,:   Richardson Smith MD personally performed the services described in this documentation    as scribed in my presence :

## 2018-08-15 ENCOUNTER — EVALUATION (OUTPATIENT)
Dept: OCCUPATIONAL THERAPY | Facility: CLINIC | Age: 63
End: 2018-08-15
Payer: COMMERCIAL

## 2018-08-15 DIAGNOSIS — S42.402D CLOSED FRACTURE OF LEFT ELBOW WITH ROUTINE HEALING, SUBSEQUENT ENCOUNTER: ICD-10-CM

## 2018-08-15 PROCEDURE — G8984 CARRY CURRENT STATUS: HCPCS | Performed by: OCCUPATIONAL THERAPIST

## 2018-08-15 PROCEDURE — G8985 CARRY GOAL STATUS: HCPCS | Performed by: OCCUPATIONAL THERAPIST

## 2018-08-15 PROCEDURE — 97010 HOT OR COLD PACKS THERAPY: CPT | Performed by: OCCUPATIONAL THERAPIST

## 2018-08-15 PROCEDURE — 97166 OT EVAL MOD COMPLEX 45 MIN: CPT | Performed by: OCCUPATIONAL THERAPIST

## 2018-08-15 PROCEDURE — 97110 THERAPEUTIC EXERCISES: CPT | Performed by: OCCUPATIONAL THERAPIST

## 2018-08-15 NOTE — PROGRESS NOTES
OT Evaluation     Today's date: 8/15/2018  Patient name: Dwight Sellers  : 1955  MRN: [id number]  Referring provider: Loli Bear MD  Dx:   Encounter Diagnosis     ICD-10-CM    1  Closed fracture of left elbow with routine healing, subsequent encounter S42 402D Ambulatory referral to PT/OT hand therapy                  Assessment  Impairments: abnormal or restricted ROM, activity intolerance, impaired physical strength and pain with function  Patient presents with symptom irritability yes  Assessment details: S/p L ORIF  distal humerus capitellar and trochlear fx with lateral epicondyle fx, and repair of ulnar collateral ligament complex on 7/10/18  Compliant with hinged elbow brace, locked to -20 extension  She presents with decreased elbow, forearm, and wrist ROM due to soft tissue tightness, edema, and joint stiffness  Her shoulder is also moderately restricted, and painful through active motion in abduction and ER  Edema is widespread in the UE, and pitting surrounding the elbow incision  Incision is moderately raised and adhered to the surrounding tissue  No complaints of numbness or tingling, but does complain of some hyposensitivity in the area of the elbow most affected by edema  See below for further assessment  Understanding of Dx/Px/POC: good   Prognosis: good    Goals  STG: Patient will be compliant with post operative precautions (if applicable) and home exercise program in 2 weeks  STG: Pain will be reduced by two subjective levels in 2 weeks  STG: Inflammation/edema/joint effusion will be reduced by 25% and patient will be independent with self management techniques in 4 weeks  STG: Range of motion of wrist will be improved by 50% in 2 weeks  STG: Range of motion of forearm and elbow will be improved by 25% in 4 weeks  STG: Range of motion of hand will be improved by 75% in 2 weeks  LTG: Pain will be reduced by 4 subjective levels, or resolved in 6-8 weeks     LTG: Performance in ADLs and IADLS will be improved to prior level of function with the affected extremity within 8 weeks  LTG: Performance in work/school activity will be improved to prior level of function with the affected extremity within 8 weeks  LTG: FOTO score increase by 20 points within 8 weeks  Plan  Patient would benefit from: skilled OT, OT eval and custom splinting  Planned modality interventions: thermotherapy: hydrocollator packs, fluidotherapy and unattended electrical stimulation  Planned therapy interventions: functional ROM exercises, home exercise program, joint mobilization, manual therapy, therapeutic exercise, patient education, massage and muscle pump exercises  Frequency: 2x week  Duration in weeks: 6  Treatment plan discussed with: patient  Plan details: Treatment to include modalities, manual therapy, PRE's, HEP, and orthotics as appropriate  Subjective Evaluation    History of Present Illness  Date of surgery: 7/10/2018  Mechanism of injury: surgery  Mechanism of injury: S/p L ORIF  distal humerus capitellar and trochlear fx with lateral epicondyle fx, and repair of ulnar collateral ligament complex on 7/10/18  Injury from a [facility] one week prior  She is currently wearing a hinged elbow brace allowing full flexion and extension to -20 degrees  Not a recurrent problem   Quality of life: good    Pain  Current pain ratin  At best pain ratin  At worst pain ratin  Quality: discomfort  Relieving factors: support and rest    Social Support    Employment status: working ( for infants)  Hand dominance: right    Treatments  Previous treatment: immobilization  Current treatment: occupational therapy  Patient Goals  Patient goals for therapy: decreased edema, decreased pain, increased motion, increased strength, independence with ADLs/IADLs and return to work          Objective     Observations     Left Elbow   Postive for adhesive scar and edema       Additional Observation Details  Moderate scar tissue adhesion to surrounding tissue  Pitting edema  Tenderness     Left Elbow   Tenderness in the lateral epicondyle  Left Wrist/Hand   Tenderness in the lateral epicondyle  Active Range of Motion     Left Elbow   Flexion: 105 degrees with pain  Extension: 40 degrees with pain  Forearm supination: 55 degrees   Forearm pronation: 20 degrees with pain    Right Elbow   Flexion: 140 degrees   Extension: 0 degrees     Left Wrist   Wrist flexion: 45 degrees   Wrist extension: 40 degrees   Radial deviation: 12 degrees   Ulnar deviation: 20 degrees     Additional Active Range of Motion Details  75% composite fist    Tests     Left Wrist/Hand   Positive extrinsic extensor tightness, extrinsic flexor tightness and intrinsic muscle tightness       Swelling   Left Elbow Girth Measurements   Joint line: 28 cm    Right Elbow Girth Measurements   Joint line: 25 cm    Left Wrist/Hand   Circumference wrist: 17 3 cm    Right Wrist/Hand   Circumference wrist: 16 cm

## 2018-08-15 NOTE — PROGRESS NOTES
Daily Note     Today's date: 8/15/2018  Patient name: Pooja Wilks  : 1955  MRN: [id number]  Referring provider: Linda Alexandre MD  Dx:   Encounter Diagnosis     ICD-10-CM    1  Closed fracture of left elbow with routine healing, subsequent encounter S42 402D Ambulatory referral to PT/OT hand therapy     OT Plan of Care Cert/Re-cert                  Subjective: See eval      Objective: See treatment diary below  Assessment: Tolerated treatment well  Patient would benefit from continued OT  Able to form composite fist post heat  Plan: Progress treament per protocol  Focus on wrist ROM, Elbow flexion  Precautions: Surgical   DX: L ORIF  distal humerus capitellar and trochlear fx with lateral epicondyle fx, and repair of ulnar collateral ligament complex     DOS: 7/10/18  Next MD visit: 18    Specialty Daily Treatment Diary     Manual         MEM        Wrist/Hand ROM        Elbow AAROM        Scar mgt                    Exercise Diary  8/15       Shoulder pulleys        Shoulder ladder        Wrist maze                                                                                                                                        HEP: AROM (wrist, forearm, elbow, shoulder), TG Completed           Modalities 8/15       [facility] 10'

## 2018-08-16 ENCOUNTER — OFFICE VISIT (OUTPATIENT)
Dept: OCCUPATIONAL THERAPY | Facility: CLINIC | Age: 63
End: 2018-08-16
Payer: COMMERCIAL

## 2018-08-16 DIAGNOSIS — S42.402D CLOSED FRACTURE OF LEFT ELBOW WITH ROUTINE HEALING, SUBSEQUENT ENCOUNTER: Primary | ICD-10-CM

## 2018-08-16 PROCEDURE — 97010 HOT OR COLD PACKS THERAPY: CPT | Performed by: OCCUPATIONAL THERAPIST

## 2018-08-16 PROCEDURE — 97110 THERAPEUTIC EXERCISES: CPT | Performed by: OCCUPATIONAL THERAPIST

## 2018-08-16 NOTE — PROGRESS NOTES
Daily Note     Today's date: 2018  Patient name: Alexa Velazquez  : 1955  MRN: [id number]  Referring provider: Marcial Bustillos MD  Dx:   Encounter Diagnosis     ICD-10-CM    1  Closed fracture of left elbow with routine healing, subsequent encounter S42 402D                   Subjective: "It's sore"    Objective: See treatment diary below  Assessment: Did well overall, pushing AROM to tolerance, 3/10 pain  Plan: Progress treament per protocol  Focus on wrist ROM, Elbow flexion  Precautions: Surgical   DX: L ORIF  distal humerus capitellar and trochlear fx with lateral epicondyle fx, and repair of ulnar collateral ligament complex     DOS: 7/10/18  Next MD visit: 18    Specialty Daily Treatment Diary     Manual         MEM        Wrist/Hand ROM        Elbow AAROM        Scar mgt                    Exercise Diary         Shoulder pulleys 5 min       Shoulder ladder        Wrist maze        Seated table slide X 50 x2       Supine AROM elbow X 50                                                                                                                       HEP: AROM (wrist, forearm, elbow, shoulder), TG Completed           Modalities 8/15 8/16      P 10'  15

## 2018-08-20 ENCOUNTER — OFFICE VISIT (OUTPATIENT)
Dept: OCCUPATIONAL THERAPY | Facility: CLINIC | Age: 63
End: 2018-08-20
Payer: COMMERCIAL

## 2018-08-20 DIAGNOSIS — S42.402D CLOSED FRACTURE OF LEFT ELBOW WITH ROUTINE HEALING, SUBSEQUENT ENCOUNTER: Primary | ICD-10-CM

## 2018-08-20 PROCEDURE — 97140 MANUAL THERAPY 1/> REGIONS: CPT | Performed by: OCCUPATIONAL THERAPIST

## 2018-08-20 PROCEDURE — 97110 THERAPEUTIC EXERCISES: CPT | Performed by: OCCUPATIONAL THERAPIST

## 2018-08-20 NOTE — PROGRESS NOTES
Daily Note     Today's date: 2018  Patient name: Guerline Penaloza  : 1955  MRN: [id number]  Referring provider: Myrlene Bumpers, MD  Dx:   Encounter Diagnosis     ICD-10-CM    1  Closed fracture of left elbow with routine healing, subsequent encounter S42 402D                   Subjective: "It's sore"    Objective: See treatment diary below  Assessment: Digits to palm, wrist motion progressing well  Elbow is very impaired, especially in flexion  95 degrees today  EE improved after bicep stretching  -039 by Peg Cancer  Plan: Progress treament per protocol  Focus on wrist ROM, Elbow flexion  Precautions: Surgical   DX: L ORIF  distal humerus capitellar and trochlear fx with lateral epicondyle fx, and repair of ulnar collateral ligament complex     DOS: 7/10/18  Next MD visit: 18    Specialty Daily Treatment Diary     Manual         MEM        Wrist/Hand ROM 10'       Elbow AAROM 15'       Scar mgt                    Exercise Diary        Shoulder pulleys 5 min 5 min      Shoulder ladder        Wrist maze  10x      Seated table slide X 50 x2       Supine AROM elbow X 50 x50 with shoulder flexion       Counter bicep stretch  30 sec x 2                                                                                                              HEP: AROM (wrist, forearm, elbow, shoulder), TG Completed           Modalities 8/15 8/16 8/20     [facility] 10'  15 10'

## 2018-08-22 ENCOUNTER — OFFICE VISIT (OUTPATIENT)
Dept: OCCUPATIONAL THERAPY | Facility: CLINIC | Age: 63
End: 2018-08-22
Payer: COMMERCIAL

## 2018-08-22 DIAGNOSIS — S42.402D CLOSED FRACTURE OF LEFT ELBOW WITH ROUTINE HEALING, SUBSEQUENT ENCOUNTER: Primary | ICD-10-CM

## 2018-08-22 PROCEDURE — 97140 MANUAL THERAPY 1/> REGIONS: CPT | Performed by: OCCUPATIONAL THERAPIST

## 2018-08-22 PROCEDURE — 97010 HOT OR COLD PACKS THERAPY: CPT | Performed by: OCCUPATIONAL THERAPIST

## 2018-08-22 NOTE — PROGRESS NOTES
Daily Note     Today's date: 2018  Patient name: Earnestine Schaumann  : 1955  MRN: [id number]  Referring provider: Nahun Murry MD  Dx:   Encounter Diagnosis     ICD-10-CM    1  Closed fracture of left elbow with routine healing, subsequent encounter S42 402D                   Subjective: "It's sore"    Objective: See treatment diary below  Assessment: Fibrotic edema in the elbow  Likely contributing to EF limitations  Applied KT over scar and to address localized swelling  Good response to contract/relax  Plan: Progress treament per protocol  Focus on wrist ROM, Elbow flexion  Precautions: Surgical   DX: L ORIF  distal humerus capitellar and trochlear fx with lateral epicondyle fx, and repair of ulnar collateral ligament complex     DOS: 7/10/18  Next MD visit: 18    Specialty Daily Treatment Diary     Manual        MEM        Wrist/Hand ROM 10' 10'      Elbow AAROM 15' 15'      Scar mgt        Contract/relax elbow flexion   5'          Exercise Diary       Shoulder pulleys 5 min 5 min 6 mins     Shoulder ladder        Wrist maze  10x 10x     Seated table slide X 50 x2       Supine AROM elbow X 50 x50 with shoulder flexion       Counter bicep stretch  30 sec x 2 30 sec x3     Keypegs   1x                                                                                                     HEP: AROM (wrist, forearm, elbow, shoulder), TG Completed           Modalities 8/15 8/16 8/20 8/22    [facility] 10'  15 10' 10'

## 2018-08-24 ENCOUNTER — OFFICE VISIT (OUTPATIENT)
Dept: OCCUPATIONAL THERAPY | Facility: CLINIC | Age: 63
End: 2018-08-24
Payer: COMMERCIAL

## 2018-08-24 DIAGNOSIS — S42.402D CLOSED FRACTURE OF LEFT ELBOW WITH ROUTINE HEALING, SUBSEQUENT ENCOUNTER: Primary | ICD-10-CM

## 2018-08-24 PROCEDURE — 97140 MANUAL THERAPY 1/> REGIONS: CPT | Performed by: OCCUPATIONAL THERAPIST

## 2018-08-24 PROCEDURE — 97110 THERAPEUTIC EXERCISES: CPT | Performed by: OCCUPATIONAL THERAPIST

## 2018-08-24 NOTE — PROGRESS NOTES
Daily Note     Today's date: 2018  Patient name: Falguni Wilkinson  : 1955  MRN: [id number]  Referring provider: Aviva Gordon MD  Dx:   Encounter Diagnosis     ICD-10-CM    1  Closed fracture of left elbow with routine healing, subsequent encounter S42 402D                   Subjective: "I am getting lazy"    Objective: See treatment diary below  Assessment: Demonstrating good gains for EE  Supination responds well to PROM  EF continues with capsular restriction  Plan: Progress treament per protocol  Focus on wrist ROM, Elbow flexion  Static progressive EF orthotic  Precautions: Surgical   DX: L ORIF  distal humerus capitellar and trochlear fx with lateral epicondyle fx, and repair of ulnar collateral ligament complex     DOS: 7/10/18  Next MD visit: 18    Specialty Daily Treatment Diary     Manual       MEM        Wrist/Hand ROM 10' 10' 10'     Elbow AAROM 15' 15' 10'     Scar mgt        Contract/relax elbow flexion   5' 5'     Elbow joint mobs   5'                 Exercise Diary      Shoulder pulleys 5 min 5 min 6 mins     Shoulder ladder        Wrist maze  10x 10x     Seated table slide X 50 x2       Supine AROM elbow X 50 x50 with shoulder flexion       Counter bicep stretch  30 sec x 2 30 sec x3     Keypegs   1x     EF and SF jewels into container    1x                                                                                            HEP: AROM (wrist, forearm, elbow, shoulder), TG Completed           Modalities 8/15 8/16 8/20 8/22 8/24   [facility] 10'  15 10' 10' 15'

## 2018-08-27 ENCOUNTER — OFFICE VISIT (OUTPATIENT)
Dept: OCCUPATIONAL THERAPY | Facility: CLINIC | Age: 63
End: 2018-08-27
Payer: COMMERCIAL

## 2018-08-27 DIAGNOSIS — S42.402D CLOSED FRACTURE OF LEFT ELBOW WITH ROUTINE HEALING, SUBSEQUENT ENCOUNTER: Primary | ICD-10-CM

## 2018-08-27 PROCEDURE — 97110 THERAPEUTIC EXERCISES: CPT | Performed by: OCCUPATIONAL THERAPIST

## 2018-08-27 PROCEDURE — 97140 MANUAL THERAPY 1/> REGIONS: CPT | Performed by: OCCUPATIONAL THERAPIST

## 2018-08-27 NOTE — PROGRESS NOTES
Daily Note     Today's date: 2018  Patient name: Dwight Sellers  : 1955  MRN: [id number]  Referring provider: Loli Bear MD  Dx:   Encounter Diagnosis     ICD-10-CM    1  Closed fracture of left elbow with routine healing, subsequent encounter S42 402D                   Subjective: "how long do I have to wear that?"    Objective: See treatment diary below  Assessment: Complains of shoulder pain during most PRE's  Added ER/IR stretching today  Also fabricated a static progressive EF orthotic to be worn 30 mins 3x a day  Wrist and digit stiffness is resolving, as is elbow fibrotic edema, since using the tubigrip  Plan: Progress treament per protocol  Focus on wrist ROM, Elbow flexion  Precautions: Surgical   DX: L ORIF  distal humerus capitellar and trochlear fx with lateral epicondyle fx, and repair of ulnar collateral ligament complex     DOS: 7/10/18  Next MD visit: 18    Specialty Daily Treatment Diary     Manual      MEM        Wrist/Hand ROM 10' 10' 10'     Elbow AAROM 15' 15' 10' 10'    Scar mgt        Contract/relax elbow flexion   5' 5' 5'    Elbow joint mobs   5' 5'    Orthotic     15'        Exercise Diary     Shoulder pulleys 5 min 5 min 6 mins     Shoulder ladder        Wrist maze  10x 10x  x10   Seated table slide X 50 x2       Supine AROM elbow X 50 x50 with shoulder flexion       Counter bicep stretch  30 sec x 2 30 sec x3  30 sec, x3   Keypegs   1x     EF and SF jewels into container    1x    IR stretch with strap, ER door stretch     30 sec, 3 times    Shoulder ladder                                                                                HEP: AROM (wrist, forearm, elbow, shoulder), TG Completed           Modalities    [facility] 10'  15 10' 10' 15'

## 2018-08-29 ENCOUNTER — OFFICE VISIT (OUTPATIENT)
Dept: OCCUPATIONAL THERAPY | Facility: CLINIC | Age: 63
End: 2018-08-29
Payer: COMMERCIAL

## 2018-08-29 DIAGNOSIS — S42.402D CLOSED FRACTURE OF LEFT ELBOW WITH ROUTINE HEALING, SUBSEQUENT ENCOUNTER: Primary | ICD-10-CM

## 2018-08-29 PROCEDURE — 97140 MANUAL THERAPY 1/> REGIONS: CPT | Performed by: OCCUPATIONAL THERAPIST

## 2018-08-29 PROCEDURE — 97110 THERAPEUTIC EXERCISES: CPT | Performed by: OCCUPATIONAL THERAPIST

## 2018-08-29 NOTE — PROGRESS NOTES
Daily Note     Today's date: 2018  Patient name: Cindy Dominguez  : 1955  MRN: [id number]  Referring provider: Fabrice Anthony MD  Dx:   Encounter Diagnosis     ICD-10-CM    1  Closed fracture of left elbow with routine healing, subsequent encounter S42 402D                   Subjective: States 4/10 pain with EF overpressure     Objective: See treatment diary below  AROM EF=94, PROM EF=98, AROM EE=-47  Assessment: Performed EF in supine with shoulder flexed to 90 today, which was a better position to isolate bicep and tricep for active extension/flexion of the elbow, and to minimize shoulder compensation  She is having difficulty recruiting the prime elbow movers in addition to the capsular restriction that she is experiencing  Plan: Progress treament per protocol  Focus on wrist ROM, Elbow flexion  Precautions: Surgical   DX: L ORIF  distal humerus capitellar and trochlear fx with lateral epicondyle fx, and repair of lateral collateral ligament complex     DOS: 7/10/18  Next MD visit: 18    Specialty Daily Treatment Diary     Manual     MEM        Wrist/Hand ROM 10' 10' 10'     Elbow AAROM 15' 15' 10' 10' 15'   Scar mgt        Contract/relax elbow flexion   5' 5' 5'    Elbow joint mobs   5' 5' 10'   Orthotic     15'    KT for digit edema     X                       Exercise Diary     Shoulder pulleys  5 min 6 mins     Shoulder ladder        Wrist maze 10x 10x 10x  x10   Seated table slide        Supine AROM elbow  x50 with shoulder flexion       Counter bicep stretch  30 sec x 2 30 sec x3  30 sec, x3   Keypegs   1x     EF and SF jewels into container    1x    IR stretch with strap, ER door stretch     30 sec, 3 times    Shoulder ladder 5x with shoulder abduction        Pro/sup at side  20x       Wall slides  15x                                                               HEP: AROM (wrist, forearm, elbow, shoulder), TG Modalities 8/27 8/29 8/20 8/22 8/24   MHP 10'   10' 10' 15'   MHP with elbow in flexion with RTB  10'

## 2018-08-31 ENCOUNTER — OFFICE VISIT (OUTPATIENT)
Dept: OCCUPATIONAL THERAPY | Facility: CLINIC | Age: 63
End: 2018-08-31
Payer: COMMERCIAL

## 2018-08-31 DIAGNOSIS — S42.402D CLOSED FRACTURE OF LEFT ELBOW WITH ROUTINE HEALING, SUBSEQUENT ENCOUNTER: Primary | ICD-10-CM

## 2018-08-31 PROCEDURE — 97110 THERAPEUTIC EXERCISES: CPT

## 2018-08-31 PROCEDURE — 97010 HOT OR COLD PACKS THERAPY: CPT

## 2018-08-31 PROCEDURE — 97140 MANUAL THERAPY 1/> REGIONS: CPT

## 2018-08-31 NOTE — PROGRESS NOTES
Daily Note     Today's date: 2018  Patient name: Earnestine Schaumann  : 1955  MRN: [id number]  Referring provider: Nahun Murry MD  Dx:   Encounter Diagnosis     ICD-10-CM    1  Closed fracture of left elbow with routine healing, subsequent encounter S42 402D                   Subjective: "It hurts "     Objective: See treatment diary below  AROM JD=239, PROM MV=547, AROM EE=-43  Assessment: Patient requires frequent verbal and tactile cues to avoid shoulder compensation during elbow A/AAROM  Capsular tightness in left shoulder limits AROM also  Plan: Progress treament per protocol  Focus on wrist ROM, Elbow flexion  Precautions: Surgical   DX: L ORIF  distal humerus capitellar and trochlear fx with lateral epicondyle fx, and repair of lateral collateral ligament complex     DOS: 7/10/18  Next MD visit: 18    Specialty Daily Treatment Diary     Manual     MEM         Wrist/Hand ROM 10' 10' 10'   10'   Elbow AAROM 15' 15' 10' 10' 15' 15'   Scar mgt         Contract/relax elbow flexion   5' 5' 5'     Elbow joint mobs   5' 5' 10' 5'   Orthotic     15'     KT for digit edema     X x                         Exercise Diary     Shoulder pulleys   5 min 6 mins     Shoulder ladder         Wrist maze 10x 10x 10x 10x  x10   Seated table slide         Supine AROM elbow   x50 with shoulder flexion       Counter bicep stretch   30 sec x 2 30 sec x3  30 sec, x3   Keypegs    1x     EF and SF jewels into container     1x    IR stretch with strap, ER door stretch      30 sec, 3 times    Shoulder ladder 5x with shoulder abduction  10x       Pro/sup at side  20x 20x       Wall slides  15x 15x       Seated scap retraction  15x                                                             HEP: AROM (wrist, forearm, elbow, shoulder), TG             Modalities    MHP 10'   10' elbow & wrist 10' 10' 15'   MHP with elbow in flexion with RTB  10'

## 2018-09-04 ENCOUNTER — APPOINTMENT (OUTPATIENT)
Dept: OCCUPATIONAL THERAPY | Facility: CLINIC | Age: 63
End: 2018-09-04
Payer: COMMERCIAL

## 2018-09-06 ENCOUNTER — OFFICE VISIT (OUTPATIENT)
Dept: OCCUPATIONAL THERAPY | Facility: CLINIC | Age: 63
End: 2018-09-06
Payer: COMMERCIAL

## 2018-09-06 DIAGNOSIS — S42.402D CLOSED FRACTURE OF LEFT ELBOW WITH ROUTINE HEALING, SUBSEQUENT ENCOUNTER: Primary | ICD-10-CM

## 2018-09-06 PROCEDURE — 97140 MANUAL THERAPY 1/> REGIONS: CPT

## 2018-09-06 PROCEDURE — 97010 HOT OR COLD PACKS THERAPY: CPT

## 2018-09-06 PROCEDURE — 97110 THERAPEUTIC EXERCISES: CPT

## 2018-09-06 NOTE — PROGRESS NOTES
Daily Note     Today's date: 2018  Patient name: aFlguni Wilkinson  : 1955  MRN: [id number]  Referring provider: Aviva Gordon MD  Dx:   Encounter Diagnosis     ICD-10-CM    1  Closed fracture of left elbow with routine healing, subsequent encounter S42 402D                   Subjective: "I haven't done my exercises today "     Objective: See treatment diary below  AROM YT=858, PROM MP=212, AROM EE=-40  Assessment: Patient tolerated tx well  Continues to c/o pain at end range of elbow and wrist flexion/extension  Plan: Progress treament per protocol  Focus on wrist ROM, Elbow flexion  Precautions: Surgical   DX: L ORIF  distal humerus capitellar and trochlear fx with lateral epicondyle fx, and repair of lateral collateral ligament complex     DOS: 7/10/18  Next MD visit: 18    Specialty Daily Treatment Diary     Manual     MEM          Wrist/Hand ROM 10' 10' 10'   10' 10'   Elbow AAROM 15' 15' 10' 10' 15' 15' 10'   Scar mgt          Contract/relax elbow flexion   5' 5' 5'      Elbow joint mobs   5' 5' 10' 5' 5'   Orthotic     15'      KT for digit edema     X x applied   Supine Shoulder PROM       5'                 Exercise Diary   9   Shoulder pulleys    5 min 6 mins     Shoulder ladder          Wrist maze 10x 10x 10x 10x 10x  x10   Seated table slide          Supine AROM elbow   AAROM for EF/EE 2x10 with dowel x50 with shoulder flexion       Counter bicep stretch    30 sec x 2 30 sec x3  30 sec, x3   Keypegs     1x     EF and SF jewels into container      1x    IR stretch with strap, ER door stretch       30 sec, 3 times    Shoulder ladder 5x with shoulder abduction  10x 10x (step 19)       Pro/sup at side  20x 20x 20x       Wall slides  15x 15x        Seated scap retraction  15x 15x                                                                   HEP: AROM (wrist, forearm, elbow, shoulder), TG              Modalities 8/27 8/29 8/31 9/6 8/20 8/22 8/24   MHP 10'   10' elbow & wrist 10' elbow/wrist 10' 10' 15'   MHP with elbow in flexion with RTB  10'

## 2018-09-07 ENCOUNTER — OFFICE VISIT (OUTPATIENT)
Dept: OCCUPATIONAL THERAPY | Facility: CLINIC | Age: 63
End: 2018-09-07
Payer: COMMERCIAL

## 2018-09-07 DIAGNOSIS — S42.402D CLOSED FRACTURE OF LEFT ELBOW WITH ROUTINE HEALING, SUBSEQUENT ENCOUNTER: Primary | ICD-10-CM

## 2018-09-07 PROCEDURE — 97140 MANUAL THERAPY 1/> REGIONS: CPT

## 2018-09-07 PROCEDURE — 97110 THERAPEUTIC EXERCISES: CPT

## 2018-09-07 NOTE — PROGRESS NOTES
Daily Note     Today's date: 2018  Patient name: Venita Paulson  : 1955  MRN: [id number]  Referring provider: Meri Pena MD  Dx:   Encounter Diagnosis   Name Primary?  Closed fracture of left elbow with routine healing, subsequent encounter Yes                  Subjective: "I'm so tight in the shoulder "      Objective: See treatment diary below     Manual     MEM                 Wrist/Hand ROM  10' 10'     10' 10'   Elbow AAROM 15' 15' 10' 10' 15' 15' 10'   Scar mgt                 Contract/relax elbow flexion    5' 5' 5'         Elbow joint mobs     5' 5' 10' 5' 5'   Orthotic        15'         KT for digit edema Applied         X x applied   Supine Shoulder PROM  10'           5'                           Exercise Diary     Shoulder pulleys       5 min 6 mins       Shoulder ladder                 Wrist maze  10x 10x 10x 10x   x10   Seated table slide                 Supine AROM elbow     AAROM for EF/EE 2x10 with dowel x50 with shoulder flexion          Counter bicep stretch       30 sec x 2 30 sec x3   30 sec, x3   Keypegs         1x       EF and SF jewels into container           1x     IR stretch with strap, ER door stretch             30 sec, 3 times    Shoulder ladder 5x with shoulder abduction  10x 10x (step 19)           Pro/sup at side  20x 20x 20x           Wall slides  15x 15x             Seated scap retraction   15x 15x                                                                                                                       HEP: AROM (wrist, forearm, elbow, shoulder), TG                       Modalities    MHP 10' elbow/wrist   10' elbow & wrist 10' elbow/wrist 10' 10' 15'   MHP with elbow in flexion with RTB   10'                                     Assessment: Tolerated treatment well   Patient noted with compensation during wall slides and LAZO noted patient to present with tightness and tone in left upper trap and scapular boarder  In sidelying  Provided manual depression of trap and scapular retraction, as well as manual soft tissue massageto increase pts rom and decrease compensation  Plan: Continued skilled OT per POC      INTERVENTION COMMENTS:  Diagnosis: Closed fracture of left elbow with routine healing, subsequent encounter [S42 402D]  Precautions: Surgical   FOTO:  Visit 10

## 2018-09-10 ENCOUNTER — OFFICE VISIT (OUTPATIENT)
Dept: OCCUPATIONAL THERAPY | Facility: CLINIC | Age: 63
End: 2018-09-10
Payer: COMMERCIAL

## 2018-09-10 DIAGNOSIS — S42.402D CLOSED FRACTURE OF LEFT ELBOW WITH ROUTINE HEALING, SUBSEQUENT ENCOUNTER: Primary | ICD-10-CM

## 2018-09-10 PROCEDURE — 97110 THERAPEUTIC EXERCISES: CPT | Performed by: OCCUPATIONAL THERAPIST

## 2018-09-10 PROCEDURE — 97140 MANUAL THERAPY 1/> REGIONS: CPT | Performed by: OCCUPATIONAL THERAPIST

## 2018-09-10 NOTE — PROGRESS NOTES
Daily Note     Today's date: 9/10/2018  Patient name: Melissa Velazquez  : 1955  MRN: [id number]  Referring provider: Gerrianne Crigler, MD  Dx:   Encounter Diagnosis   Name Primary?  Closed fracture of left elbow with routine healing, subsequent encounter Yes                  Subjective: "I'm so tight in the shoulder "      Objective: See treatment diary below     Manual  9/7 9/10 8/24 8/27 8/29 8/31 9/6   MEM                 Wrist/Hand ROM   10'     10' 10'   Elbow AAROM 15' 5' 10' 10' 15' 15' 10'   Scar mgt                 Contract/relax elbow flexion     5' 5'         Elbow joint mobs    10' 5' 5' 10' 5' 5'   Orthotic        15'         KT for digit edema Applied         X x applied   Supine Shoulder PROM  10'  5'         5'                           Exercise Diary  9/7 8/31 9/6 9/10 8/22 8/24 8/27   Shoulder pulleys        6 mins       Shoulder ladder                 Wrist maze  10x 10x  10x   x10   Seated table slide                 Supine AROM elbow     AAROM for EF/EE 2x10 with dowel          Counter bicep stretch        30 sec x3   30 sec, x3   Keypegs         1x       EF and SF jewels into container           1x     IR stretch with strap, ER door stretch             30 sec, 3 times    Shoulder ladder 5x with shoulder abduction  10x 10x (step 19)           Pro/sup at side  20x 20x 20x  Blue 20x, hammer 20x         Wall slides  15x 15x             Seated scap retraction   15x 15x            IR/ER stretch         3 each, 30 sec         [place] stretch green ball        20 each           Wall walking       5x maze                                                               HEP: AROM (wrist, forearm, elbow, shoulder), TG                       Modalities 9/7 9/10 8/31 9/6 8/20 8/22 8/24   MHP 10' elbow/wrist  10' 10' elbow & wrist 10' elbow/wrist 10' 10' 15'   MHP with elbow in flexion with RTB                                        Assessment: Tolerated treatment well   Focusing on elbow as well as shoulder restriction  Encouraged to perform ER/IR stretching at home  Discontinuing the manual therapy performed on the hand and wrist unless indicated in the future  Plan: Continued skilled OT per POC  Re-eval next session

## 2018-09-12 ENCOUNTER — OFFICE VISIT (OUTPATIENT)
Dept: OCCUPATIONAL THERAPY | Facility: CLINIC | Age: 63
End: 2018-09-12
Payer: COMMERCIAL

## 2018-09-12 DIAGNOSIS — S42.402D CLOSED FRACTURE OF LEFT ELBOW WITH ROUTINE HEALING, SUBSEQUENT ENCOUNTER: Primary | ICD-10-CM

## 2018-09-12 PROCEDURE — G8984 CARRY CURRENT STATUS: HCPCS | Performed by: OCCUPATIONAL THERAPIST

## 2018-09-12 PROCEDURE — 97110 THERAPEUTIC EXERCISES: CPT | Performed by: OCCUPATIONAL THERAPIST

## 2018-09-12 PROCEDURE — 97140 MANUAL THERAPY 1/> REGIONS: CPT | Performed by: OCCUPATIONAL THERAPIST

## 2018-09-12 PROCEDURE — G8985 CARRY GOAL STATUS: HCPCS | Performed by: OCCUPATIONAL THERAPIST

## 2018-09-12 NOTE — PROGRESS NOTES
OT Re-Evaluation     Today's date: 2018  Patient name: Pooja Wilks  : 1955  MRN: [id number]  Referring provider: Linda Alexandre MD  Dx:   Encounter Diagnosis     ICD-10-CM    1  Closed fracture of left elbow with routine healing, subsequent encounter S42 402D                   Assessment  Impairments: abnormal or restricted ROM, activity intolerance, impaired physical strength and pain with function  Patient presents with symptom irritability yes  Assessment details: S/p L ORIF  distal humerus capitellar and trochlear fx with lateral epicondyle fx, and repair of lateral collateral ligament complex on 7/10/18  Debbie Hedrick continues with multiple impairments relating to her injury  The LUE is affected globally due to non-use  Elbow flexion has not improved since the initial evaluation despite concentrated efforts during therapy and the use of a static progressive elbow flexion orthotic at home  There is a hard end feel at the joint and she also complains of medial sided pain during stretching  Elbow extension has improved modestly, and has a greater likelihood for further improvement  She has a tight bicep as well as capsular adhesion restricting this joint  Wrist ROM and digit ROM are less impaired, althugh require consistent stretching to maintain gains  Edema is greatly resolved, and is no longer pitting in the elbow  The shoulder continues with soft tissue restriction, mostly in ER and IR, and she has been addressing this at home  See below for a detailed assessment as well as progress towards goals  Understanding of Dx/Px/POC: good   Prognosis: good    Goals  STG: Patient will be compliant with post operative precautions (if applicable) and home exercise program in 2 weeks  - MET  STG: Pain will be reduced by two subjective levels in 2 weeks  - PARTIALLY MET  STG: Inflammation/edema/joint effusion will be reduced by 25% and patient will be independent with self management techniques in 4 weeks  - MET  STG: Range of motion of wrist will be improved by 50% in 2 weeks  - PARTIALLY MET  STG: Range of motion of forearm and elbow will be improved by 25% in 4 weeks  - PARTIALLY MET  STG: Range of motion of hand will be improved by 75% in 2 weeks  - MET    LTG: Pain will be reduced by 4 subjective levels, or resolved in 6-8 weeks  - PARTIALLY MET  LTG: Performance in ADLs and IADLS will be improved to prior level of function with the affected extremity within 8 weeks  - IN PROGRESS  LTG: Performance in work/school activity will be improved to prior level of function with the affected extremity within 8 weeks  - IN PROGRESS  LTG: FOTO score increase by 20 points within 8 weeks  - PARTIALLY MET    NEW GOALS:  Strength of UE will improve to 4/5 in 4 weeks  EF will improve to 115 in 4 weeks  EE will improve to 15 in 4 weeks  Plan  Patient would benefit from: skilled OT, OT eval and custom splinting  Planned modality interventions: thermotherapy: hydrocollator packs and unattended electrical stimulation  Planned therapy interventions: functional ROM exercises, home exercise program, joint mobilization, manual therapy, therapeutic exercise, patient education and graded activity  Frequency: 3x week  Duration in weeks: 6  Treatment plan discussed with: patient  Plan details: Treatment to include modalities, manual therapy, PRE's, HEP, and orthotics as appropriate  Subjective Evaluation    History of Present Illness  Date of surgery: 7/10/2018  Mechanism of injury: surgery  Mechanism of injury: S/p L ORIF  distal humerus capitellar and trochlear fx with lateral epicondyle fx, and repair of ulnar collateral ligament complex on 7/10/18  Injury from a [address] one week prior  She is currently wearing a hinged elbow brace allowing full flexion and extension but is removing it for exercising and therapy     Not a recurrent problem   Quality of life: good    Pain  Current pain ratin  At best pain ratin  At worst pain rating: 3  Quality: discomfort  Relieving factors: support and rest    Social Support    Employment status: working ( for infants)  Hand dominance: right    Treatments  Previous treatment: immobilization  Current treatment: occupational therapy  Patient Goals  Patient goals for therapy: decreased edema, decreased pain, increased motion, increased strength, independence with ADLs/IADLs and return to work          Objective     Observations     Left Elbow   Postive for adhesive scar and edema  Additional Observation Details  Moderate scar tissue adhesion to surrounding tissue  Palpation     Additional Palpation Details  Bicep and tricep atrophy  Active Range of Motion     Left Elbow   Flexion: 105 degrees with pain  Extension: -33 degrees   Forearm supination: 55 degrees   Forearm pronation: 45 degrees with pain    Right Elbow   Flexion: 140 degrees   Extension: 0 degrees     Left Wrist   Wrist flexion: 57 degrees   Wrist extension: 55 degrees     Passive Range of Motion     Left Elbow   Flexion: 110 degrees     Joint Play     Left Elbow   Hypomobile in the humeroulnar joint, humeroradial joint, proximal radioulnar joint and distal radioulnar joint  Strength/Myotome Testing     Left Wrist/Hand      (2nd hand position)     Trial 1: 22    Right Wrist/Hand      (2nd hand position)     Trial 1: 65    Tests     Left Wrist/Hand   Positive extrinsic extensor tightness, extrinsic flexor tightness and intrinsic muscle tightness       Swelling   Left Elbow Girth Measurements   Joint line: 26 cm    Right Elbow Girth Measurements   Joint line: 25 cm    Left Wrist/Hand   Circumference wrist: 16 8 cm    Right Wrist/Hand   Circumference wrist: 16 cm

## 2018-09-12 NOTE — PROGRESS NOTES
Daily Note     Today's date: 2018  Patient name: Jen Tubbs  : 1955  MRN: [id number]  Referring provider: Yosef Snow MD  Dx:   Encounter Diagnosis   Name Primary?  Closed fracture of left elbow with routine healing, subsequent encounter Yes                  Subjective: "I'm very sedentary"      Objective: See treatment diary below     Manual  9/7 9/10 9/12 8/27 8/29 8/31 9/6   MEM                 Wrist/Hand ROM        10' 10'   Elbow AAROM 15' 5'  10' 15' 15' 10'   Scar mgt                 Contract/relax elbow flexion     5' 5'         Elbow joint mobs    10' 10' 5' 10' 5' 5'   Orthotic        15'         KT for digit edema Applied         X x applied   Supine Shoulder PROM  10'  5'  5'       5'                           Exercise Diary  9/7 8/31 9/6 9/10 9/12 8/24 8/27   Shoulder pulleys               Shoulder ladder                 Wrist maze  10x 10x     x10   Seated table slide                 Supine AROM elbow     AAROM for EF/EE 2x10 with dowel          Counter bicep stretch           30 sec, x3   Keypegs                EF and SF jewels into container           1x     IR stretch with strap, ER door stretch             30 sec, 3 times    Shoulder ladder 5x with shoulder abduction  10x 10x (step 19)           Pro/sup at side  20x 20x 20x  Blue 20x, hammer 20x         Wall slides  15x 15x             Seated scap retraction   15x 15x            IR/ER stretch         3 each, 30 sec         [place] stretch green ball        20 each           Wall walking       5x maze  5x maze green ball         UBE         No resistance, 5 min        RTB         Rows, lats 3x10                          HEP: AROM (wrist, forearm, elbow, shoulder), TG                       Modalities 9/7 9/10 9/12 9/6 8/20 8/22 8/24   MHP 10' elbow/wrist  10' 10' elbow 10' elbow/wrist 10' 10' 15'   MHP with elbow in flexion with RTB                                        Assessment: Tolerated treatment well   See re-eval  Plan: Continued skilled OT per POC  Re-eval next session

## 2018-09-14 ENCOUNTER — OFFICE VISIT (OUTPATIENT)
Dept: OCCUPATIONAL THERAPY | Facility: CLINIC | Age: 63
End: 2018-09-14
Payer: COMMERCIAL

## 2018-09-14 DIAGNOSIS — S42.402D CLOSED FRACTURE OF LEFT ELBOW WITH ROUTINE HEALING, SUBSEQUENT ENCOUNTER: Primary | ICD-10-CM

## 2018-09-14 PROCEDURE — 97110 THERAPEUTIC EXERCISES: CPT | Performed by: OCCUPATIONAL THERAPIST

## 2018-09-14 PROCEDURE — 97140 MANUAL THERAPY 1/> REGIONS: CPT | Performed by: OCCUPATIONAL THERAPIST

## 2018-09-14 PROCEDURE — 97010 HOT OR COLD PACKS THERAPY: CPT | Performed by: OCCUPATIONAL THERAPIST

## 2018-09-14 NOTE — PROGRESS NOTES
Daily Note     Today's date: 2018  Patient name: Darwin Frederick  : 1955  MRN: [id number]  Referring provider: Ghislaine Whittaker MD  Dx:   Encounter Diagnosis   Name Primary?     Closed fracture of left elbow with routine healing, subsequent encounter Yes                  Subjective: "It just hurts"      Objective: See treatment diary below     Manual  9/7 9/10 9/12 9/14 8/29 8/31 9/6   MEM                 Wrist/Hand ROM        10' 10'   Elbow AAROM 15' 5'  10' 15' 15' 10'   Scar mgt                 Contract/relax elbow flexion     5' 5'         Elbow joint mobs    10' 10' 5' 10' 5' 5'   Orthotic                 KT for digit edema Applied         X x applied   Supine Shoulder PROM  10'  5'  5'       5'                           Exercise Diary  9/7 8/31 9/6 9/10 9/12 9/14 8/27   Shoulder pulleys               Shoulder ladder                 Wrist maze  10x 10x     x10   Seated table slide                 Supine AROM elbow     AAROM for EF/EE 2x10 with dowel          Counter bicep stretch           30 sec, x3   Keypegs                EF and SF jewels into container                IR stretch with strap, ER door stretch             30 sec, 3 times    Shoulder ladder 5x with shoulder abduction  10x 10x (step 19)           Pro/sup at side  20x 20x 20x  Blue 20x, hammer 20x         Wall slides  15x 15x             Seated scap retraction   15x 15x            IR/ER stretch         3 each, 30 sec         [place] stretch green ball        20 each           Wall walking       5x maze  5x maze green ball   6x g Straight      UBE         No resistance, 5 min       RTB         Rows, lats 3x10   Rows  Lats 3x19                       HEP: AROM (wrist, forearm, elbow, shoulder), TG                       Modalities 9/7 9/10 9/12 9/6 8/20 8/22 8/24   MHP 10' elbow/wrist  10' 10' elbow 10' elbow/wrist 10' 10' 15'   MHP with elbow in flexion with RTB                                        Assessment: Still very stiff, ~100 degrees EF with THAI  Per primary therapist, awaiting report from MD regarding brace use and activity tolerance  Plan: Continued skilled OT per POC  Re-eval next session

## 2018-09-17 ENCOUNTER — OFFICE VISIT (OUTPATIENT)
Dept: OCCUPATIONAL THERAPY | Facility: CLINIC | Age: 63
End: 2018-09-17
Payer: COMMERCIAL

## 2018-09-17 ENCOUNTER — APPOINTMENT (OUTPATIENT)
Dept: RADIOLOGY | Facility: CLINIC | Age: 63
End: 2018-09-17
Payer: COMMERCIAL

## 2018-09-17 ENCOUNTER — OFFICE VISIT (OUTPATIENT)
Dept: OBGYN CLINIC | Facility: CLINIC | Age: 63
End: 2018-09-17

## 2018-09-17 VITALS
HEART RATE: 65 BPM | SYSTOLIC BLOOD PRESSURE: 136 MMHG | BODY MASS INDEX: 23.55 KG/M2 | WEIGHT: 155.4 LBS | DIASTOLIC BLOOD PRESSURE: 76 MMHG | HEIGHT: 68 IN

## 2018-09-17 DIAGNOSIS — Z48.89 AFTERCARE FOLLOWING SURGERY: Primary | ICD-10-CM

## 2018-09-17 DIAGNOSIS — Z48.89 AFTERCARE FOLLOWING SURGERY: ICD-10-CM

## 2018-09-17 DIAGNOSIS — S42.402D CLOSED FRACTURE OF LEFT ELBOW WITH ROUTINE HEALING, SUBSEQUENT ENCOUNTER: Primary | ICD-10-CM

## 2018-09-17 PROCEDURE — 73080 X-RAY EXAM OF ELBOW: CPT

## 2018-09-17 PROCEDURE — 99024 POSTOP FOLLOW-UP VISIT: CPT | Performed by: ORTHOPAEDIC SURGERY

## 2018-09-17 PROCEDURE — 97140 MANUAL THERAPY 1/> REGIONS: CPT | Performed by: OCCUPATIONAL THERAPIST

## 2018-09-17 PROCEDURE — 97110 THERAPEUTIC EXERCISES: CPT | Performed by: OCCUPATIONAL THERAPIST

## 2018-09-17 NOTE — LETTER
September 17, 2018     Patient: Ciro Mata   YOB: 1955   Date of Visit: 9/17/2018       To Whom it May Concern:    Florentino Alcaraz is under my professional care  She was seen in my office on 9/17/2018  She can return to work on October 4th with a 10lb lifting restriction  If you have any questions or concerns, please don't hesitate to call           Sincerely,          Desirae Chow MD        CC: Ciro Mata

## 2018-09-17 NOTE — PROGRESS NOTES
Daily Note     Today's date: 2018  Patient name: Dwight Sellers  : 1955  MRN: [id number]  Referring provider: Loli Bear MD  Dx:   Encounter Diagnosis   Name Primary?  Closed fracture of left elbow with routine healing, subsequent encounter Yes                  Subjective: States that she saw the MD and no longer needs to wear protected elbow brace  Also able to return to work light duty as able  Objective: See treatment diary below  SA=483, EE=-30       Precautions: 10# lifting limit, no motion restrictions     Manual  9/7 9/10 9/12 9/14 9/17 8/31 9/6                    Wrist/Hand ROM        10' 10'   Elbow AAROM 15' 5'  10'  15' 10'   Elbow PROM/stretching as tolerated         10'       Contract/relax elbow flexion     5' 5'         Elbow joint mobs    10' 10' 5'  5' 5'   PRUJ/DRUJ mobs for pronation         10'       KT for digit edema Applied          x applied   Supine Shoulder PROM  10'  5'  5'       5'    MWM PRUJ distraction with belt during EF          10'             Exercise Diary  9/7 8/31 9/6 9/10 9/12 9/14 9/17   Shoulder pulleys               Shoulder ladder                 Wrist maze  10x 10x        Seated table slide                 Supine AROM elbow     AAROM for EF/EE 2x10 with dowel          Counter bicep stretch              Keypegs                EF and SF jewels into container                IR stretch with strap, ER door stretch                Shoulder ladder 5x with shoulder abduction  10x 10x (step 19)           Pro/sup at side  20x 20x 20x  Blue 20x, hammer 20x         Wall slides  15x 15x             Seated scap retraction   15x 15x            IR/ER stretch         3 each, 30 sec         [place] stretch green ball        20 each           Wall walking       5x maze  5x maze green ball   6x g Straight      UBE         No resistance, 5 min   Backwards, 2 5 resistance, 5 mins    RTB         Rows, lats 3x10   Rows  Lats 3x19      Red theraband bicep curls, tricep extension             3x10 and issued for HEP   HEP: AROM (wrist, forearm, elbow, shoulder), TG                       Modalities 9/7 9/10 9/12 9/17 8/20 8/22 8/24   MHP 10' elbow/wrist  10' 10' elbow 10' elbow  10' 10' 15'   MHP with elbow in flexion with RTB                                        Assessment: Modest progression in elbow ROM  Compensatory shoulder flexion for tricep/elbow extension  Requires motor re-ed  Plan: Continued skilled OT per POC

## 2018-09-17 NOTE — PROGRESS NOTES
Assessment:   S/P: ORIF L  Capitellum and trochlear fracture    Plan:   -OT without restrictions with regards to the elbow  Work on pronation, extension, and flexion  -work note provided  10 lb lifting restriction localized to left hand  -f/u in 8 wks    To Do Next Visit:  L elbow xrays      CHIEF COMPLAINT:  Chief Complaint   Patient presents with    Left Elbow - Post-op         SUBJECTIVE:  Vickie Ruffin is a 65y o  year old female who presents for follow up after ORIF L capitellum and trochlear fracture  The patient is doing range-of-motion exercises with occupational therapy, has minimal pain in her left elbow, feels like her range of motion continues to improve    PHYSICAL EXAMINATION:  General: well developed and well nourished, alert, oriented times 3 and appears comfortable  Psychiatric: Normal    MUSCULOSKELETAL EXAMINATION:  Incision: healed  Range of Motion: As expected  Neurovascular status: Neuro intact, good cap refill      LUE:  Healed lateral incision, pronation 45, supination 75, flexion 100, extension 30  No tenderness to palpation, no instability to varus or valgus stress testing  STUDIES REVIEWED:   left elbow today:  Obscured fracture lines, stable fixation compared to previous x-rays      PROCEDURES PERFORMED:  Procedures  No Procedures performed today     I interviewed, took the history and examined the patient  I discuss the case with the resident and reviewed the resident's note  I supervised the resident and I agree with the resident management plan as it was presented to me  I was present in the clinic and examined the patient

## 2018-09-20 ENCOUNTER — OFFICE VISIT (OUTPATIENT)
Dept: OCCUPATIONAL THERAPY | Facility: CLINIC | Age: 63
End: 2018-09-20
Payer: COMMERCIAL

## 2018-09-20 DIAGNOSIS — S42.402D CLOSED FRACTURE OF LEFT ELBOW WITH ROUTINE HEALING, SUBSEQUENT ENCOUNTER: Primary | ICD-10-CM

## 2018-09-20 PROCEDURE — 97140 MANUAL THERAPY 1/> REGIONS: CPT | Performed by: OCCUPATIONAL THERAPIST

## 2018-09-20 PROCEDURE — 97110 THERAPEUTIC EXERCISES: CPT | Performed by: OCCUPATIONAL THERAPIST

## 2018-09-20 NOTE — PROGRESS NOTES
Daily Note     Today's date: 2018  Patient name: Carlin Hameed  : 1955  MRN: [id number]  Referring provider: Dionne Block MD  Dx:   Encounter Diagnosis   Name Primary?  Closed fracture of left elbow with routine healing, subsequent encounter Yes                  Subjective: "I can touch my mouth with my hand!"- able to achieve after elbow mobs today    Objective: See treatment diary below       Precautions: 10# lifting limit, no motion restrictions     Manual  9/7 9/10 9/12 9/14 9/17 9/20 9/6                    Wrist/Hand ROM         10'   Elbow AAROM 15' 5'  10'   10'   Elbow PROM/stretching as tolerated         10'       Contract/relax elbow flexion     5' 5'    5'     Elbow joint mobs    10' 10' 5'   5'   PRUJ/DRUJ mobs for pronation         10'  5'     KT for digit edema Applied           applied   Supine Shoulder PROM  10'  5'  5'       5'    MWM PRUJ distraction with belt during EF          10'  10'           Exercise Diary  9/20 8/31 9/6 9/10 9/12 9/14 9/17   Shoulder pulleys               Shoulder ladder                 Wrist maze  10x 10x        Seated table slide                 Supine AROM elbow     AAROM for EF/EE 2x10 with dowel          Counter bicep stretch              Keypegs                EF and SF jewels into container                IR stretch with strap, ER door stretch                Shoulder ladder abduction  10x (up to step 23) 10x 10x (step 19)           Pro/sup at side   20x 20x  Blue 20x, hammer 20x         Wall slides   15x             Seated scap retraction   15x 15x            IR/ER stretch         3 each, 30 sec         [place] stretch green ball        20 each           Wall walking       5x maze  5x maze green ball   6x g Straight      UBE         No resistance, 5 min   Backwards, 2 5 resistance, 5 mins    RTB         Rows, lats 3x10   Rows  Lats 3x19     Red theraband bicep curls, tricep extension  Now HEP           3x10 and issued for HEP   2# tricep extension 2x10               Weight bearing on wall through hands On side and forward, 5 each, 10 second holds                                   Modalities 9/7 9/10 9/12 9/17 9/20 8/22 8/24   MHP 10' elbow/wrist  10' 10' elbow 10' elbow  15' 10' 15'   MHP with elbow in flexion with RTB                                        Assessment:  Continues with a significant limitation  Pronation also impaired, but improving  She demonstrates considerable compensatory strategies during tricep recruitment, and relies on shoulder flexion to engage the tricep  Requires motor re-ed to correct this  Some DRSN numbness remains, but expect this to resolve with time now that the brace is discontinued         Plan: Continued skilled OT per POC (at separate facility)

## 2018-09-23 DIAGNOSIS — T78.40XS ALLERGIC DISORDER, SEQUELA: Primary | ICD-10-CM

## 2018-09-24 RX ORDER — MONTELUKAST SODIUM 10 MG/1
TABLET ORAL
Qty: 90 TABLET | Refills: 3 | Status: SHIPPED | OUTPATIENT
Start: 2018-09-24 | End: 2019-09-29 | Stop reason: SDUPTHER

## 2018-09-25 ENCOUNTER — OFFICE VISIT (OUTPATIENT)
Dept: OCCUPATIONAL THERAPY | Facility: CLINIC | Age: 63
End: 2018-09-25
Payer: COMMERCIAL

## 2018-09-25 ENCOUNTER — TELEPHONE (OUTPATIENT)
Dept: OBGYN CLINIC | Facility: HOSPITAL | Age: 63
End: 2018-09-25

## 2018-09-25 DIAGNOSIS — S42.402D CLOSED FRACTURE OF LEFT ELBOW WITH ROUTINE HEALING, SUBSEQUENT ENCOUNTER: Primary | ICD-10-CM

## 2018-09-25 PROCEDURE — 97140 MANUAL THERAPY 1/> REGIONS: CPT | Performed by: OCCUPATIONAL THERAPIST

## 2018-09-25 PROCEDURE — 97530 THERAPEUTIC ACTIVITIES: CPT | Performed by: OCCUPATIONAL THERAPIST

## 2018-09-25 NOTE — PROGRESS NOTES
Manual  9/7 9/10 9/12 9/14 9/17 9/20 9/6                    Wrist/Hand ROM         10'   Elbow AAROM 15' 5'  10'   10'   Elbow PROM/stretching as tolerated         10'       Contract/relax elbow flexion     5' 5'    5'     Elbow joint mobs    10' 10' 5'   5'   PRUJ/DRUJ mobs for pronation         10'  5'     KT for digit edema Applied           applied   Supine Shoulder PROM  10'  5'  5'       5'    MWM PRUJ distraction with belt during EF          10'  10'           Exercise Diary  9/20 8/31 9/6 9/10 9/12 9/14 9/17   Shoulder pulleys               Shoulder ladder                 Wrist maze  10x 10x        Seated table slide                 Supine AROM elbow     AAROM for EF/EE 2x10 with dowel          Counter bicep stretch              Keypegs                EF and SF jewels into container                IR stretch with strap, ER door stretch                Shoulder ladder abduction  10x (up to step 23) 10x 10x (step 19)           Pro/sup at side   20x 20x  Blue 20x, hammer 20x         Wall slides   15x             Seated scap retraction   15x 15x            IR/ER stretch         3 each, 30 sec         [place] stretch green ball        20 each           Wall walking       5x maze  5x maze green ball   6x g Straight      UBE         No resistance, 5 min   Backwards, 2 5 resistance, 5 mins    RTB         Rows, lats 3x10   Rows  Lats 3x19     Red theraband bicep curls, tricep extension  Now HEP           3x10 and issued for HEP   2# tricep extension  2x10               Weight bearing on wall through hands On side and forward, 5 each, 10 second holds                                   Modalities 9/7 9/10 9/12 9/17 9/20 8/22 8/24   MHP 10' elbow/wrist  10' 10' elbow 10' elbow  15' 10' 15'   MHP with elbow in flexion with RTB

## 2018-09-25 NOTE — TELEPHONE ENCOUNTER
Caller: patient  Callback# [phone]  Dr Sin Hatch          Patient is requesting a work note standing when she will be off lifting restriction  Patient will call back with fax#  [id number]

## 2018-09-25 NOTE — PROGRESS NOTES
Daily Note     Today's date: 2018  Patient name: Chandra Patel  : 1955  MRN: [id number]  Referring provider: Mi Garber MD  Dx:   Encounter Diagnosis     ICD-10-CM    1  Closed fracture of left elbow with routine healing, subsequent encounter S42 402D                   Subjective:   " I have been able to apply deoderant with some pressure applied when I do it "        Objective: See treatment diary below  Manual  9/7 9/10 9/12 9/14 9/17 9/20 9/25                    Wrist/Hand ROM            Elbow AAROM 15' 5'  10'      Elbow PROM/stretching as tolerated         10'       Contract/relax elbow flexion     5' 5'    5'  5'   Elbow joint mobs    10' 10' 5'      PRUJ/DRUJ mobs for pronation         10'  5'  10'   KT for digit edema Applied              Supine Shoulder PROM  10'  5'  5'           MWM PRUJ distraction with belt during EF          10'  10'           Exercise Diary  9/20 9/25 9/6 9/10 9/12 9/14 9/17   Shoulder pulleys               Shoulder ladder                 Wrist maze   10x        Seated table slide                 Supine AROM elbow     AAROM for EF/EE 2x10 with dowel          Counter bicep stretch              Keypegs                EF and SF jewels into container                IR stretch with strap, ER door stretch                Shoulder ladder abduction  10x (up to step 23) 10x 10x (step 19)           Pro/sup at side   GPB 1# weight  10 x 5 sec hold 20x  Blue 20x, hammer 20x         Wall slides                Seated scap retraction    15x            IR/ER stretch         3 each, 30 sec         [place] stretch green ball        20 each           Wall walking       5x maze  5x maze green ball   6x g Straight      UBE    Level4 3' f/b     No resistance, 5 min   Backwards, 2 5 resistance, 5 mins    RTB         Rows, lats 3x10   Rows  Lats 3x19     Red theraband bicep curls, tricep extension  Now HEP           3x10 and issued for HEP   2# tricep extension  2x10               Weight bearing on wall through hands On side and forward, 5 each, 10 second holds                                   Modalities 9/7 9/10 9/12 9/17 9/20 9/25 8/24   MHP 10' elbow/wrist  10' 10' elbow 10' elbow  15' 10' 15'   MHP with elbow in flexion with RTB                                      Assessment: Tolerated treatment well  Patient would benefit from continued OT  Added static  with tomato paste can for forearm strengthening as part of HEP  Patient notes she hasn't been dong HEP secondary to moving back home and daughter having surgery  Patient uses compensatory movement patterns with her shoulder and trunk when performing elbow ROM  Plan: Continue per plan of care

## 2018-09-27 ENCOUNTER — OFFICE VISIT (OUTPATIENT)
Dept: OCCUPATIONAL THERAPY | Facility: CLINIC | Age: 63
End: 2018-09-27
Payer: COMMERCIAL

## 2018-09-27 DIAGNOSIS — S42.402D CLOSED FRACTURE OF LEFT ELBOW WITH ROUTINE HEALING, SUBSEQUENT ENCOUNTER: Primary | ICD-10-CM

## 2018-09-27 PROCEDURE — G8985 CARRY GOAL STATUS: HCPCS | Performed by: OCCUPATIONAL THERAPIST

## 2018-09-27 PROCEDURE — 97530 THERAPEUTIC ACTIVITIES: CPT | Performed by: OCCUPATIONAL THERAPIST

## 2018-09-27 PROCEDURE — 97140 MANUAL THERAPY 1/> REGIONS: CPT | Performed by: OCCUPATIONAL THERAPIST

## 2018-09-27 PROCEDURE — G8984 CARRY CURRENT STATUS: HCPCS | Performed by: OCCUPATIONAL THERAPIST

## 2018-09-27 NOTE — PROGRESS NOTES
Daily Note     Today's date: 2018  Patient name: Leeann Kirk  : 1955  MRN: [id number]  Referring provider: Robin Chan MD  Dx:   Encounter Diagnosis     ICD-10-CM    1  Closed fracture of left elbow with routine healing, subsequent encounter S42 402D                   Subjective:  " I've been getting my house in order  It's been basically abandoned for 3 months and I haven't really been doing all my exercises     Today, I swept the leaves off of my deck "      Objective: See treatment diary below  Manual  9/27 9/10 9/12 9/14 9/17 9/20 9/25                    Wrist/Hand ROM 10x           Elbow AAROM 15' 5'  10'      Elbow PROM/stretching as tolerated         10'       Contract/relax elbow flexion     5' 5'    5'  5'   Elbow joint mobs    10' 10' 5'      PRUJ/DRUJ mobs for pronation         10'  5'  10'   KT for digit edema Applied              Supine Shoulder PROM  10'  5'  5'           MWM PRUJ distraction with belt during EF          10'  10'           Exercise Diary  9/20 9/25 9/27 9/10 9/12 9/14 9/17   Shoulder pulleys               Shoulder ladder                 Wrist maze           Seated table slide                 Supine AROM elbow              Counter bicep stretch              Keypegs                EF and SF jewels into container                IR stretch with strap, ER door stretch                Shoulder ladder abduction  10x (up to step 23) 10x 10xs up to step 23           Pro/sup at side   GPB 1# weight  10 x 5 sec hold GPB 1# weight  10 x 5 sec hold  Blue 20x, hammer 20x         Wall slides                Seated scap retraction                IR/ER stretch         3 each, 30 sec         [place] stretch green ball        20 each           Wall walking       5x maze  5x maze green ball   6x g Straight      UBE    Level4 3' f/b  level 2 3'   f/b   No resistance, 5 min   Backwards, 2 5 resistance, 5 mins    RTB         Rows, lats 3x10   Rows  Lats 3x19     Red theraband bicep curls, tricep extension  Now HEP           3x10 and issued for HEP   2# tricep extension  2x10               Weight bearing on wall through hands On side and forward, 5 each, 10 second holds                                    Modalities 9/7 9/10 9/12 9/17 9/20 9/25 8/24   MHP 10' elbow/wrist  10' 10' elbow 10' elbow  15' 10' 15'   MHP with elbow in flexion with RTB                                          Assessment: Tolerated treatment well  Patient would benefit from continued OT  Applied K tape to scar and will assess response next visit  Assessed splint toleration for LLPS for elbow flexion  Patient able to tolerate for 10' but did have a red line in her elbow crease   Educated to watch for increased redness following splint usage  Plan: Continue per plan of care

## 2018-10-02 ENCOUNTER — OFFICE VISIT (OUTPATIENT)
Dept: OCCUPATIONAL THERAPY | Facility: CLINIC | Age: 63
End: 2018-10-02
Payer: COMMERCIAL

## 2018-10-02 DIAGNOSIS — S42.402D CLOSED FRACTURE OF LEFT ELBOW WITH ROUTINE HEALING, SUBSEQUENT ENCOUNTER: Primary | ICD-10-CM

## 2018-10-02 PROCEDURE — 97530 THERAPEUTIC ACTIVITIES: CPT | Performed by: OCCUPATIONAL THERAPIST

## 2018-10-02 PROCEDURE — 97140 MANUAL THERAPY 1/> REGIONS: CPT | Performed by: OCCUPATIONAL THERAPIST

## 2018-10-02 NOTE — PROGRESS NOTES
Daily Note     Today's date: 10/2/2018  Patient name: Cuate Carter  : 1955  MRN: [id number]  Referring provider: Lurdes Swain MD  Dx:   Encounter Diagnosis     ICD-10-CM    1  Closed fracture of left elbow with routine healing, subsequent encounter S42 402D                   Subjective:   " I only did my exercises once this week end"  Objective: See treatment diary below    Manual  9/27 10/1 9/12 9/14 9/17 9/20 9/25                    Wrist/Hand ROM 10x           Elbow AAROM 15' 5'  10'      Elbow PROM/stretching as tolerated         10'       Contract/relax elbow flexion     5' 5'    5'  5'   Elbow joint mobs    10' 10' 5'      PRUJ/DRUJ mobs for pronation         10'  5'  10'   KT for digit edema Applied              Supine Shoulder PROM  10'   5'           MWM PRUJ distraction with belt during EF          10'  10'           Exercise Diary  9/20 9/25 9/27 9/10 9/12 9/14 9/17   Shoulder pulleys               Shoulder ladder                 Wrist maze           Seated table slide                 Supine AROM elbow              Counter bicep stretch              Keypegs                EF and SF jewels into container                IR stretch with strap, ER door stretch                Shoulder ladder abduction  10x (up to step 23) 10x 10xs up to step 23           Pro/sup at side   GPB 1# weight  10 x 5 sec hold GPB 1# weight  10 x 5 sec hold  Blue 20x, hammer 20x         Wall slides                Seated scap retraction                IR/ER stretch         3 each, 30 sec         [place] stretch green ball        20 each           Wall walking       5x maze  5x maze green ball   6x g Straight      UBE    Level 1 4' f/b  level 2 3'   f/b   No resistance, 5 min   Backwards, 2 5 resistance, 5 mins    RTB         Rows, lats 3x10   Rows  Lats 3x19     Red theraband bicep curls, tricep extension  Now HEP           3x10 and issued for HEP   2# tricep extension  2x10              Weight bearing on wall through hands On side and forward, 5 each, 10 second holds forward 3xs 30 seconds                                   Modalities 9/7 9/10 9/12 9/17 9/20 9/25 8/24   MHP 10' elbow/wrist  10' 10' elbow 10' elbow  15' 10' 15'   MHP with elbow in flexion with RTB                                            Assessment: Tolerated treatment well  Patient would benefit from continued OT  Trialed LLPS with 4# weight for EE with EE improving from -35 to -29  Continues to use compensatory movement patterns to perform TA  Pronation 60 degrees     Added AROM pronation /supination to HEP 10xs 3 sec hold      Plan: Continue per plan of care

## 2018-10-04 ENCOUNTER — OFFICE VISIT (OUTPATIENT)
Dept: OCCUPATIONAL THERAPY | Facility: CLINIC | Age: 63
End: 2018-10-04
Payer: COMMERCIAL

## 2018-10-04 DIAGNOSIS — S42.402D CLOSED FRACTURE OF LEFT ELBOW WITH ROUTINE HEALING, SUBSEQUENT ENCOUNTER: Primary | ICD-10-CM

## 2018-10-04 PROCEDURE — 97140 MANUAL THERAPY 1/> REGIONS: CPT | Performed by: OCCUPATIONAL THERAPIST

## 2018-10-04 PROCEDURE — 97530 THERAPEUTIC ACTIVITIES: CPT | Performed by: OCCUPATIONAL THERAPIST

## 2018-10-04 NOTE — PROGRESS NOTES
Daily Note     Today's date: 10/4/2018  Patient name: Keyshawn Allen  : 1955  MRN: [id number]  Referring provider: Jennifer Holt MD  Dx:   Encounter Diagnosis     ICD-10-CM    1  Closed fracture of left elbow with routine healing, subsequent encounter S42 402D                   Subjective:   " Driving my car is easier with my left arm and I was able to use my left arm to take off my bra "      Objective: See treatment diary below  Manual  9/27 10/1 9/12 9/14 9/17 9/20 9/25                    Wrist/Hand ROM 10x           Elbow AAROM 15' 5'  10'      Elbow PROM/stretching as tolerated         10'       Contract/relax elbow flexion     5' 5'    5'  5'   Elbow joint mobs    10' 10' 5'      PRUJ/DRUJ mobs for pronation         10'  5'  10'   KT for digit edema Applied              Supine Shoulder PROM  10'   5'           MWM PRUJ distraction with belt during EF          10'  10'           Exercise Diary  9/20 9/25 9/27 9/10 9/12 9/14 9/17   Shoulder pulleys               Shoulder ladder                 Wrist maze           Seated table slide                 Supine AROM elbow              Counter bicep stretch              Keypegs                EF and SF jewels into container                IR stretch with strap, ER door stretch                Shoulder ladder abduction  10x (up to step 23) 10x 10xs up to step 23           Pro/sup at side   GPB 1# weight  10 x 5 sec hold GPB 1# weight  10 x 5 sec hold  Blue 20x, hammer 20x         Wall slides                Seated scap retraction                IR/ER stretch         3 each, 30 sec         [place] stretch green ball        20 each           Wall walking       5x maze  5x maze green ball   6x g Straight      UBE    Level 1 4' f/b  level 2 3'   f/b   No resistance, 5 min   Backwards, 2 5 resistance, 5 mins    RTB         Rows, lats 3x10   Rows  Lats 3x19     Red theraband bicep curls, tricep extension  Now HEP           3x10 and issued for HEP   2# tricep extension  2x10              Weight bearing on wall through hands On side and forward, 5 each, 10 second holds forward 3xs 30 seconds                                   Modalities 9/7 9/10 9/12 9/17 9/20 9/25 8/24   MHP 10' elbow/wrist  10' 10' elbow 10' elbow  15' 10' 15'   MHP with elbow in flexion with RTB                                      Pronation improved from 60 to 65 degrees following extensive PROM this date  Increased HEP from 3xs per day to 7xs per day  EE increased from -40 to -30 after 5' LLPS with 5# weight  Educated to perform LLPS with 2#weight longer at home  Assessment: Tolerated treatment well  Patient would benefit from continued OT  Patient needs to be more diligent in her HEP in order to sustain her gains  Discussed with her and she verbalized understanding of same  Plan: Continue per plan of care

## 2018-10-09 ENCOUNTER — OFFICE VISIT (OUTPATIENT)
Dept: OCCUPATIONAL THERAPY | Facility: CLINIC | Age: 63
End: 2018-10-09
Payer: COMMERCIAL

## 2018-10-09 DIAGNOSIS — S42.402D CLOSED FRACTURE OF LEFT ELBOW WITH ROUTINE HEALING, SUBSEQUENT ENCOUNTER: Primary | ICD-10-CM

## 2018-10-09 PROCEDURE — 97140 MANUAL THERAPY 1/> REGIONS: CPT | Performed by: OCCUPATIONAL THERAPIST

## 2018-10-09 PROCEDURE — 97530 THERAPEUTIC ACTIVITIES: CPT | Performed by: OCCUPATIONAL THERAPIST

## 2018-10-09 NOTE — PROGRESS NOTES
Daily Note     Today's date: 10/9/2018  Patient name: Yvette Hi  : 1955  MRN: [id number]  Referring provider: Charlie Rolon MD  Dx:   Encounter Diagnosis     ICD-10-CM    1  Closed fracture of left elbow with routine healing, subsequent encounter S42 402D                   Subjective:  " I can open the car door  I can put my hand behind my head  I can't cut a bagel or push down with an apple slicer with my left hand  Patient also notes that she can now reach behind back and doff her bra        Objective: See treatment diary below    Manual  9/27 10/1 10/9 9/14 9/17 9/20 9/25                    Wrist/Hand ROM 10x           Elbow AAROM 15' 5'  10'      Elbow PROM/stretching as tolerated      15'   10'       Contract/relax elbow flexion      5'    5'  5'   Elbow joint mobs    10' 5' 5'      PRUJ/DRUJ mobs for pronation         10'  5'  10'   KT for digit edema Applied              Supine Shoulder PROM  10'             MWM PRUJ distraction with belt during EF          10'  10'           Exercise Diary  10/9 9/25 9/27 9/10 9/12 9/14 9/17   Green bar up/down 2 x 10             Static  5 sec x 10               tp push down  Yellow   Push down                           ube f/b Level 3  3'                                                           I                Shoulder ladder abduction  10x (up to step 23) 10x 10xs up to step 23              GPB 1# weight  10 x 5 sec hold GPB 1# weight  10 x 5 sec hold  Blue 20x, hammer 20x         Wall slides                                IR/ER stretch         3 each, 30 sec         [place] stretch green ball        20 each           Wall walking       5x maze  5x maze green ball   6x g Straight      UBE  level 4   4' f/b   Level 1 4' f/b  level 2 3'   f/b   No resistance, 5 min   Backwards, 2 5 resistance, 5 mins   LLPS 5' 5#  ee       Rows, lats 3x10   Rows  Lats 3x19     Red theraband bicep curls, tricep extension  Now HEP           3x10 and issued for HEP   2# tricep extension                Weight bearing on wall through hands   forward 3xs 30 seconds                                   Modalities 9/7 9/10 9/12 9/17 9/20 9/25 8/24   MHP 10' elbow/wrist  10' 10' elbow 10' elbow  15' 10' 15'   MHP with elbow in flexion with RTB                                      Pronation improved from 60 to 65 degrees following extensive PROM this date  Increased HEP from 3xs per day to 7xs per day  EE increased from -40 to -30 after 5' LLPS with 5# weight  Educated to perform LLPS with 2#weight longer at home  Assessment: Tolerated treatment well  Patient would benefit from continued OT  Patient making good gains functionally despite slowed progress in AROM of eblow f/e and pronation  Educated re:  Consistency regarding stretching and HEP and that it is normal for arm to stiffen up during the course of rehab  Plan: Continue per plan of care

## 2018-10-11 ENCOUNTER — OFFICE VISIT (OUTPATIENT)
Dept: OCCUPATIONAL THERAPY | Facility: CLINIC | Age: 63
End: 2018-10-11
Payer: COMMERCIAL

## 2018-10-11 DIAGNOSIS — S42.402D CLOSED FRACTURE OF LEFT ELBOW WITH ROUTINE HEALING, SUBSEQUENT ENCOUNTER: Primary | ICD-10-CM

## 2018-10-11 PROCEDURE — 97530 THERAPEUTIC ACTIVITIES: CPT | Performed by: OCCUPATIONAL THERAPIST

## 2018-10-11 PROCEDURE — 97140 MANUAL THERAPY 1/> REGIONS: CPT | Performed by: OCCUPATIONAL THERAPIST

## 2018-10-11 NOTE — PROGRESS NOTES
Daily Note     Today's date: 10/11/2018  Patient name: Lee Ribeiro  : 1955  MRN: [id number]  Referring provider: Mona Rodriguez MD  Dx:   Encounter Diagnosis     ICD-10-CM    1  Closed fracture of left elbow with routine healing, subsequent encounter S42 402D        Start Time: 1200  Stop Time: 1388  Total time in clinic (min): 45 minutes    Subjective:  " I'm going back to work tomorrow    I'm only doing 4 hours per day  "  I was able to hold onto the microwave dishes this am "  " I was able to flush the toilet with only my left hand and open my car door "  " I opened a snapple jar yesterday"      Objective: See treatment diary below  Manual  9/27 10/1 10/9 10/11 9/17 9/20 9/25                    Wrist/Hand ROM 10x           Elbow AAROM 15' 5'  10'      Elbow PROM/stretching as tolerated      15'   10'       Contract/relax elbow flexion      5'    5'  5'   Elbow joint mobs    10' 5' 5'      PRUJ/DRUJ mobs for pronation         10'  5'  10'   KT for digit edema Applied              Supine Shoulder PROM  10'             MWM PRUJ distraction with belt during EF          10'  10'           Exercise Diary  10/9 10/11 9/27 9/10 9/12 9/14 9/17   Green bar up/down 2 x 10             Static  5 sec x 10               tp push down  Yellow   Push down                           ube f/b Level 3  3'                                                           I                Shoulder ladder abduction  10x (up to step 23) 10x 10xs up to step 23              GPB 1# weight  10 x 5 sec hold GPB 1# weight  10 x 5 sec hold  Blue 20x, hammer 20x         Wall slides                                IR/ER stretch         3 each, 30 sec         [place] stretch green ball        20 each           Wall walking       5x maze  5x maze green ball   6x g Straight      UBE  level 4   4' f/b   Level 1 4' f/b  level 2 3'   f/b   No resistance, 5 min   Backwards, 2 5 resistance, 5 mins   LLPS 5' 5#  ee       Rows, lats 3x10   Rows  Lats 3x19     Red theraband bicep curls, tricep extension  Now HEP           3x10 and issued for HEP   2# tricep extension                Weight bearing on wall through hands   forward 3xs 30 seconds                                   Modalities 9/7 9/10 9/12 9/17 9/20 9/25 8/24   MHP 10' elbow/wrist  10' 10' elbow 10' elbow  15' 10' 15'   MHP with elbow in flexion with RTB                                          Assessment: Tolerated treatment well  Patient would benefit from continued OT  Patient is making good functional gains with her Left UE  Patient is more compliant with HEP which I believe is improving her functional gains  Plan: Continue per plan of care

## 2018-10-16 ENCOUNTER — TELEPHONE (OUTPATIENT)
Dept: OBGYN CLINIC | Facility: HOSPITAL | Age: 63
End: 2018-10-16

## 2018-10-16 ENCOUNTER — OFFICE VISIT (OUTPATIENT)
Dept: OCCUPATIONAL THERAPY | Facility: CLINIC | Age: 63
End: 2018-10-16
Payer: COMMERCIAL

## 2018-10-16 DIAGNOSIS — S42.402D CLOSED FRACTURE OF LEFT ELBOW WITH ROUTINE HEALING, SUBSEQUENT ENCOUNTER: Primary | ICD-10-CM

## 2018-10-16 PROCEDURE — G8985 CARRY GOAL STATUS: HCPCS

## 2018-10-16 PROCEDURE — 97140 MANUAL THERAPY 1/> REGIONS: CPT

## 2018-10-16 PROCEDURE — G8984 CARRY CURRENT STATUS: HCPCS

## 2018-10-16 PROCEDURE — 97110 THERAPEUTIC EXERCISES: CPT

## 2018-10-16 NOTE — TELEPHONE ENCOUNTER
Patient allowed to work up to 40 hours a week with 10 lb lifting restriction  However, patient must have time for therapy  5 minutes break should be provided every hour  An updated note was placed in the computer

## 2018-10-16 NOTE — PROGRESS NOTES
OT Re-Evaluation     Today's date: 10/16/2018  Patient name: Jorge Lee  : 1955  MRN: [id number]  Referring provider: Reyna Villa MD  Dx:   Encounter Diagnosis     ICD-10-CM    1  Closed fracture of left elbow with routine healing, subsequent encounter S42 402D                   Assessment  Impairments: abnormal or restricted ROM, activity intolerance, impaired physical strength, pain with function and weight-bearing intolerance  Functional limitations: 10 lb lifting restriction  Assessment details: S/p L ORIF  distal humerus capitellar and trochlear fx with lateral epicondyle fx, and repair of lateral collateral ligament complex on 7/10/18  Barrington Moran continues with multiple impairments relating to her injury  The LUE is affected globally due to non-use  Elbow flexion has not improved since the initial evaluation despite concentrated efforts during therapy and the use of a static progressive elbow flexion orthotic at home  There is a hard end feel at the joint and she also complains of medial sided pain during stretching  Elbow extension has improved modestly, and has a greater likelihood for further improvement  She has a tight bicep as well as capsular adhesion restricting this joint  Wrist ROM and digit ROM are less impaired, althugh require consistent stretching to maintain gains  Edema is greatly resolved, and is no longer pitting in the elbow  The shoulder continues with soft tissue restriction, mostly in ER and IR, and she has been addressing this at home  See below for a detailed assessment as well as progress towards goals  10/16/18:  Patient demonstrates improved passive elbow flexion and improved active forearm and wrist ROM  No change in active elbow ROM this date   strength improved  Patient is using the LUE more for ADL tasks and has returned to light duty work    Recommend we continue with OT x 4 more weeks  Understanding of Dx/Px/POC: good   Prognosis: good    Goals  STG: Patient will be compliant with post operative precautions (if applicable) and home exercise program in 2 weeks  - MET  STG: Pain will be reduced by two subjective levels in 2 weeks  - PARTIALLY MET  STG: Inflammation/edema/joint effusion will be reduced by 25% and patient will be independent with self management techniques in 4 weeks  - MET  STG: Range of motion of wrist will be improved by 50% in 2 weeks  - MET  STG: Range of motion of forearm and elbow will be improved by 25% in 4 weeks  - PARTIALLY MET  STG: Range of motion of hand will be improved by 75% in 2 weeks  - MET    LTG: Pain will be reduced by 4 subjective levels, or resolved in 6-8 weeks  - PARTIALLY MET  LTG: Performance in ADLs and IADLS will be improved to prior level of function with the affected extremity within 8 weeks  - IN PROGRESS  LTG: Performance in work/school activity will be improved to prior level of function with the affected extremity within 8 weeks  - IN PROGRESS  LTG: FOTO score increase by 20 points within 8 weeks  - PARTIALLY MET    NEW GOALS:  Strength of UE will improve to 4/5 in 4 weeks  EF will improve to 115 in 4 weeks  EE will improve to 15 in 4 weeks  Plan  Patient would benefit from: skilled OT, OT eval and custom splinting  Planned modality interventions: thermotherapy: hydrocollator packs and unattended electrical stimulation  Planned therapy interventions: functional ROM exercises, home exercise program, joint mobilization, manual therapy, therapeutic exercise, patient education, graded activity, therapeutic activities and strengthening  Frequency: 2x week  Duration in weeks: 6  Plan of Care beginning date: 10/16/2018  Plan of Care expiration date: 11/27/2018  Treatment plan discussed with: patient  Plan details: Treatment to include modalities, manual therapy, PRE's, HEP, and orthotics as appropriate           Subjective Evaluation    History of Present Illness  Date of surgery: 7/10/2018  Mechanism of injury: surgery  Mechanism of injury: S/p L ORIF  distal humerus capitellar and trochlear fx with lateral epicondyle fx, and repair of ulnar collateral ligament complex on 7/10/18  Injury from a [address] one week prior  She is currently wearing a hinged elbow brace allowing full flexion and extension but is removing it for exercising and therapy  10/16/18: I have trouble lifting some things  I can get dressed okay  I'm getting better doing my hair  Patient reports doing light house work  Has returned to light duty work at [employer]  Has a 10 lb lifting restriction  Patient reports difficulty getting up from the floor  Not a recurrent problem   Quality of life: good    Pain  Current pain ratin  At best pain ratin  At worst pain rating: 3  Location: Dorsal left forearm  Quality: dull ache and discomfort  Relieving factors: rest and medications  Progression: no change    Social Support    Employment status: working ( for infants)  Hand dominance: right    Treatments  Previous treatment: immobilization  Current treatment: occupational therapy  Patient Goals  Patient goals for therapy: decreased edema, decreased pain, increased motion, increased strength, independence with ADLs/IADLs and return to work  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        Objective     Observations     Left Elbow   Postive for adhesive scar and edema  Additional Observation Details  Mild scar tissue adhesion to surrounding tissue  Palpation     Additional Palpation Details  Bicep and tricep atrophy       Active Range of Motion   Left Shoulder   Flexion: 140 degrees   Abduction: 130 degrees     Left Elbow   Flexion: 105 degrees with pain  Extension: -35 degrees   Forearm supination: 80 degrees   Forearm pronation: 45 degrees with pain    Right Elbow   Flexion: 140 degrees   Extension: 0 degrees     Left Wrist   Wrist flexion: 65 degrees   Wrist extension: 70 degrees     Passive Range of Motion     Left Elbow   Flexion: 120 degrees     Joint Play     Left Elbow   Hypomobile in the humeroulnar joint, humeroradial joint, proximal radioulnar joint and distal radioulnar joint  Strength/Myotome Testing     Left Wrist/Hand      (2nd hand position)     Trial 1: 35    Right Wrist/Hand      (2nd hand position)     Trial 1: 60    Tests     Left Wrist/Hand   Positive extrinsic extensor tightness, extrinsic flexor tightness and intrinsic muscle tightness       Swelling   Left Elbow Girth Measurements   Joint line: 26 cm    Right Elbow Girth Measurements   Joint line: 25 cm    Left Wrist/Hand   Circumference wrist: 16 cm    Right Wrist/Hand   Circumference wrist: 16 cm       Objective: See treatment diary below  Manual  9/27 10/1 10/9 10/11 10/16 9/20 9/25                     Wrist/Hand ROM 10x               Elbow AAROM 15' 5'   10'  5'       Elbow PROM/stretching as tolerated      15'   10       Contract/relax elbow flexion        5'  5'  5'  5'   Elbow joint mobs    10' 5' 5'  3'       PRUJ/DRUJ mobs for pronation          3''  5'  10'   KT for digit edema Applied                 Supine Shoulder PROM  10'                MWM PRUJ distraction with belt during EF          10'  10'           Exercise Diary  10/9 10/11  10/16 9/10 9/12 9/14 9/17   Green bar up/down 2 x 10               Static  5 sec x 10               tp push down  Yellow   Push down                                 ube f/b Level 3  3'     Level3, 4'+-                                                                 I                 Shoulder ladder abduction  10x (up to step 23) 10x                GPB 1# weight  10 x 5 sec hold    Blue 20x, hammer 20x         Wall slides                                     IR/ER stretch         3 each, 30 sec         [place] stretch green ball        20 each           Wall walking      x5 5x maze  5x maze green ball   6x g Straight      UBE  level 4   4' f/b   Level 1 4' f/b  level 2 3'   f/b   No resistance, 5 min    Backwards, 2 5 resistance, 5 mins   LLPS 5' 5#  ee       Rows, lats 3x10   Rows  Lats 3x19     Red theraband bicep curls, tricep extension  Now HEP           3x10 and issued for HEP   2# tricep extension                  Weight bearing on wall through hands   forward 3xs 30 seconds                                                       Modalities 9/7 9/10 9/12 9/17 9/20 9/25 8/24   MHP 10' elbow/wrist  10' 10' elbow 10' elbow  15' 10' 15'   MHP with elbow in flexion with RTB

## 2018-10-16 NOTE — TELEPHONE ENCOUNTER
Caller: patient  Call back number: [phone]  Patient's doctor: Dr Dania Ascencio    Patient called asking how many hours a week she should work   Please advise

## 2018-10-18 ENCOUNTER — OFFICE VISIT (OUTPATIENT)
Dept: OCCUPATIONAL THERAPY | Facility: CLINIC | Age: 63
End: 2018-10-18
Payer: COMMERCIAL

## 2018-10-18 DIAGNOSIS — S42.402D CLOSED FRACTURE OF LEFT ELBOW WITH ROUTINE HEALING, SUBSEQUENT ENCOUNTER: Primary | ICD-10-CM

## 2018-10-18 PROCEDURE — 97140 MANUAL THERAPY 1/> REGIONS: CPT

## 2018-10-18 PROCEDURE — 97110 THERAPEUTIC EXERCISES: CPT

## 2018-10-18 NOTE — PROGRESS NOTES
Daily Note     Today's date: 10/18/2018  Patient name: Merary Mayo  : 1955  MRN: [id number]  Referring provider: Bi Lomeli MD  Dx:   Encounter Diagnosis     ICD-10-CM    1  Closed fracture of left elbow with routine healing, subsequent encounter S42 402D                   Subjective: "I'm very frustrated "      Objective: See treatment diary below    Manual  9/27 10/1 10/9 10/11 10/16 10/18 9/20 9/25                      Wrist/Hand ROM 10x                Elbow AAROM 15' 5'   10'  5' 5'       Elbow PROM/stretching as tolerated      15'   10 8''       Contract/relax elbow flexion        5'  5' 5'  5'  5'   Elbow joint mobs    10' 5' 5'  3' 3'       PRUJ/DRUJ mobs for pronation          3''   5'  10'   KT for digit edema Applied                  Supine Shoulder PROM  10'                 MWM PRUJ distraction with belt during EF          10'   10'           Exercise Diary  10/9 10/11  10/16 10/18 9/10 9/12 9/14 9/17   Green bar up/down 2 x 10     2x10           Static  5 sec x 10     10x           tp push down  Yellow   Push down                                   ube f/b Level 3  3'     Level3, 4'+- Level 3 4'/4'                                                                    I                  Shoulder ladder abduction  10x (up to step 23) 10x                  GPB 1# weight  10 x 5 sec hold     Blue 20x, hammer 20x         Wall slides                                       IR/ER stretch          3 each, 30 sec         [place] stretch green ball         20 each           Wall walking      x5 10x 5x maze  5x maze green ball   6x g Straight      UBE  level 4   4' f/b   Level 1 4' f/b  level 2 3'   f/b    No resistance, 5 min    Backwards, 2 5 resistance, 5 mins   LLPS 5' 5#  ee        Rows, lats 3x10   Rows  Lats 3x19     Red theraband bicep curls, tricep extension  Now HEP            3x10 and issued for HEP   2# tricep extension        Supine with dowel x10           Weight bearing on wall through hands   forward 3xs 30 seconds                                                          Modalities 9/7 9/10 9/12 9/17 9/20 9/25 8/24   MHP 10' elbow/wrist  10' 10' elbow 10' elbow  15' 10' 15'   MHP with elbow in flexion with RTB                                     PROM: elbow flexion/ext = 128/-30    Assessment: Tolerated treatment well  PROM>AROM  Pain and joint stiffness at end range  Patient would benefit from continued OT      Plan: Continue per plan of care

## 2018-10-23 ENCOUNTER — OFFICE VISIT (OUTPATIENT)
Dept: OCCUPATIONAL THERAPY | Facility: CLINIC | Age: 63
End: 2018-10-23
Payer: COMMERCIAL

## 2018-10-23 ENCOUNTER — APPOINTMENT (OUTPATIENT)
Dept: OCCUPATIONAL THERAPY | Facility: CLINIC | Age: 63
End: 2018-10-23
Payer: COMMERCIAL

## 2018-10-23 DIAGNOSIS — S42.402D CLOSED FRACTURE OF LEFT ELBOW WITH ROUTINE HEALING, SUBSEQUENT ENCOUNTER: Primary | ICD-10-CM

## 2018-10-23 PROCEDURE — 97530 THERAPEUTIC ACTIVITIES: CPT | Performed by: OCCUPATIONAL THERAPIST

## 2018-10-23 PROCEDURE — 97140 MANUAL THERAPY 1/> REGIONS: CPT | Performed by: OCCUPATIONAL THERAPIST

## 2018-10-23 NOTE — PROGRESS NOTES
Daily Note     Today's date: 10/23/2018  Patient name: Sal Shore  : 1955  MRN: [id number]  Referring provider: Pinkey Canavan, MD  Dx:   Encounter Diagnosis     ICD-10-CM    1  Closed fracture of left elbow with routine healing, subsequent encounter S42 402D                   Subjective:  " I'm at work and no real problems but I am tired, I didn't realize how tired I would be  "  Patient voiced no problems with light duty work with her left UE        Objective: See treatment diary below  Manual  9/27 10/1 10/9 10/11 10/16 10/18 10/22 9/25    IASTM            X 5'     Wrist/Hand ROM 10x                Elbow AAROM 15' 5'   10'  5' 5'       Elbow PROM/stretching as tolerated      15'   10 8''       Contract/relax elbow flexion        5'  5' 5'  5'  5'   Elbow joint mobs    10' 5' 5'  3' 3'       PRUJ/DRUJ mobs for pronation          3''   5'  10'   KT for digit edema Applied                  Supine Shoulder PROM  10'                 MWM PRUJ distraction with belt during EF          10'             Exercise Diary  10/9 10/11  10/16 10/18 10/22 9/12 9/14 9/17   Green bar up/down 2 x 10     2x10  2 x 10         Static  5 sec x 10     10x  w         tp push down  Yellow   Push down                                   ube f/b Level 3  3'     Level3, 4'+- Level 3 4'/4'  level 3 4/4                                                                  I                  Shoulder ladder abduction  10x (up to step 23) 10x                  GPB 1# weight  10 x 5 sec hold     Blue 20x, hammer 20x         Wall slides                                       IR/ER stretch          3 each, 30 sec         [place] stretch green ball                   Wall walking      x5 10x   5x maze green ball   6x g Straight      UBE  level 4   4' f/b   Level 1 4' f/b  level 2 3'   f/b    No resistance, 5 min    Backwards, 2 5 resistance, 5 mins   LLPS 5' 5#  ee       1' x 3 and 1' 30 sec x 2  10# kettle ball Rows, lats 3x10   Rows  Lats 3x19     Red theraband bicep curls, tricep extension  Now HEP            3x10 and issued for HEP   2# tricep extension        Supine with dowel x10           Weight bearing on wall through hands   forward 3xs 30 seconds     weigth bearing 2 sec x 30 reps through arms on plint          rebounder          red ball 30x                                  Modalities 9/7 9/10 9/12 9/17 9/20 9/25 8/24   MHP 10' elbow/wrist  10' 10' elbow 10' elbow  15' 10' 15'   MHP with elbow in flexion with RTB                                     PROM: elbow flexion/ext = 128/-30    Assessment: Tolerated treatment well  Patient would benefit from continued OT  -25 degrees following llps for ee; -20 ee with PROM by OT following LLPS  Patient had decreased pain with WB over elbow following wb actiivty  Advised to perform as part of HEP  Performed IASTM over elbow secondary to increased popping  OT felt increased fascia when performing IASTM but it decreased following IASTM  Plan: Continue per plan of care

## 2018-10-25 ENCOUNTER — APPOINTMENT (OUTPATIENT)
Dept: OCCUPATIONAL THERAPY | Facility: CLINIC | Age: 63
End: 2018-10-25
Payer: COMMERCIAL

## 2018-10-25 ENCOUNTER — OFFICE VISIT (OUTPATIENT)
Dept: OCCUPATIONAL THERAPY | Facility: CLINIC | Age: 63
End: 2018-10-25
Payer: COMMERCIAL

## 2018-10-25 DIAGNOSIS — S42.402D CLOSED FRACTURE OF LEFT ELBOW WITH ROUTINE HEALING, SUBSEQUENT ENCOUNTER: Primary | ICD-10-CM

## 2018-10-25 PROCEDURE — 97530 THERAPEUTIC ACTIVITIES: CPT | Performed by: OCCUPATIONAL THERAPIST

## 2018-10-25 PROCEDURE — 97140 MANUAL THERAPY 1/> REGIONS: CPT | Performed by: OCCUPATIONAL THERAPIST

## 2018-10-25 NOTE — PROGRESS NOTES
Daily Note     Today's date: 10/25/2018  Patient name: Ally Astorga  : 1955  MRN: [id number]  Referring provider: Alexa Lamar MD  Dx:   Encounter Diagnosis     ICD-10-CM    1  Closed fracture of left elbow with routine healing, subsequent encounter S42 402D                   Subjective:  " I didn't have a chance to wear my splint yesterday but I did do some stretches at home "  "Work is tiring but I'm doing ok      Objective: See treatment diary below  Manual  9/27 10/1 10/9 10/11 10/16 10/18 10/22 10/25    IASTM            X 5'     Wrist/Hand ROM 10x                Elbow AAROM 15' 5'   10'  5' 5'       Elbow PROM/stretching as tolerated      15'   10 8''       Contract/relax elbow flexion        5'  5' 5'  5'  5'/HEP against wall   Elbow joint mobs    10' 5' 5'  3' 3'       PRUJ/DRUJ mobs for pronation          3''   5' 5'   KT for digit edema Applied                  Supine Shoulder PROM  10'                 MWM PRUJ distraction with belt during EF          10'             Exercise Diary  10/9 10/11  10/16 10/18 10/22 10/24 9/14 9/17   Green bar up/down 2 x 10     2x10  2 x 10    2  x10     Static  5 sec x 10     10x  w         tp push down  Yellow   Push down                                   ube f/b Level 3  3'     Level3, 4'+- Level 3 4'/4'  level 3 4/4  level 3 4'/4'                                                                I                  Shoulder ladder abduction  10x (up to step 23) 10x                  GPB 1# weight  10 x 5 sec hold     Blue 20x, hammer 20x         Wall slides                                       IR/ER stretch          3 each, 30 sec         [place] stretch green ball                   Wall walking      x5 10x    6x g Straight      UBE  level 4   4' f/b   Level 1 4' f/b  level 2 3'   f/b        Backwards, 2 5 resistance, 5 mins   LLPS 5' 5#  ee       1' x 3 and 1' 30 sec x 2  10# kettle ball       Red theraband bicep curls, tricep extension  Now HEP            3x10 and issued for HEP   2# tricep extension        Supine with dowel x10           Weight bearing on wall through hands   forward 3xs 30 seconds     weigth bearing 2 sec x 30 reps through arms on plint  same        rebounder          red ball 30x  red ball 30x                                Modalities 9/7 9/10 9/12 9/17 9/20 9/25 8/24   MHP 10' elbow/wrist  10' 10' elbow 10' elbow  15' 10' 15'   MHP with elbow in flexion with RTB                                       Assessment: Tolerated treatment well  Patient would benefit from continued OT Patient reports some achiness following Tuesdays session but new activities were started  Plan: Continue per plan of care

## 2018-10-30 ENCOUNTER — OFFICE VISIT (OUTPATIENT)
Dept: OCCUPATIONAL THERAPY | Facility: CLINIC | Age: 63
End: 2018-10-30
Payer: COMMERCIAL

## 2018-10-30 ENCOUNTER — APPOINTMENT (OUTPATIENT)
Dept: OCCUPATIONAL THERAPY | Facility: CLINIC | Age: 63
End: 2018-10-30
Payer: COMMERCIAL

## 2018-10-30 DIAGNOSIS — S42.402D CLOSED FRACTURE OF LEFT ELBOW WITH ROUTINE HEALING, SUBSEQUENT ENCOUNTER: Primary | ICD-10-CM

## 2018-10-30 PROCEDURE — 97140 MANUAL THERAPY 1/> REGIONS: CPT | Performed by: OCCUPATIONAL THERAPIST

## 2018-10-30 PROCEDURE — 97530 THERAPEUTIC ACTIVITIES: CPT | Performed by: OCCUPATIONAL THERAPIST

## 2018-10-30 NOTE — PROGRESS NOTES
Daily Note     Today's date: 10/30/2018  Patient name: Clemente Colon  : 1955  MRN: [id number]  Referring provider: Tony Hand MD  Dx:   Encounter Diagnosis     ICD-10-CM    1  Closed fracture of left elbow with routine healing, subsequent encounter S42 402D                   Subjective:   " My arm moves better and it feels more normal"      Objective: See treatment diary below  Manual  10/30 10/1 10/9 10/11 10/16 10/18 10/22 10/25    IASTM            X 5'     Wrist/Hand ROM                 Elbow AAROM 15' 5'   10'  5' 5'       Elbow PROM/stretching as tolerated      15'   10 8''       Contract/relax elbow flexion        5'  5' 5'  5'  5'/HEP against wall   Elbow joint mobs 5'  10' 5' 5'  3' 3'       PRUJ/DRUJ mobs for pronation          3''   5' 5'   KT for digit edema                 Supine Shoulder PROM                   MWM PRUJ distraction with belt during EF          10'             Exercise Diary  10/30 10/11  10/16 10/18 10/22 10/24 9/14 9/17   Green bar up/down      2x10  2 x 10    2  x10     Static       10x  w         tp push down                                     ube f/b Level 4  3'     Level3, 4'+- Level 3 4'/4'  level 3 4/4  level 3 4'/4'        rebounder 40 tosses red ball                 weight bearing quadreped x5'                 seated on floor to stand  3 xs                I                  Shoulder ladder abduction   10x                  GPB 1# weight  10 x 5 sec hold     Blue 20x, hammer 20x         Wall slides                                       IR/ER stretch          3 each, 30 sec         [place] stretch green ball                   Wall walking      x5 10x    6x g Straight      UBE    Level 1 4' f/b  level 2 3'   f/b        Backwards, 2 5 resistance, 5 mins   LLPS 2' x 3 ' 10#  ee       1' x 3 and 1' 30 sec x 2  10# kettle ball       Red theraband bicep curls, tricep extension  Now HEP            3x10 and issued for HEP   2# tricep extension        Supine with dowel x10           Weight bearing on wall through hands   forward 3xs 30 seconds     weigth bearing 2 sec x 30 reps through arms on plint  same        rebounder          red ball 30x  red ball 30x                                Modalities 9/7 9/10 9/12 9/17 9/20 9/25 8/24   MHP 10' elbow/wrist  10' 10' elbow 10' elbow  15' 10' 15'   MHP with elbow in flexion with RTB                                         Assessment: Tolerated treatment well  Patient would benefit from continued OT  Plan is to focus on weightbearing through hand, practice getting off the ground and focus on ee per patient request    Patient did very well seated on the floor and going into standing, was given increased verbal praise  Discussed anxiety related to getting of the floor, weight bearing and falling  Plan: Continue per plan of care

## 2018-11-01 ENCOUNTER — OFFICE VISIT (OUTPATIENT)
Dept: OCCUPATIONAL THERAPY | Facility: CLINIC | Age: 63
End: 2018-11-01
Payer: COMMERCIAL

## 2018-11-01 DIAGNOSIS — S42.402D CLOSED FRACTURE OF LEFT ELBOW WITH ROUTINE HEALING, SUBSEQUENT ENCOUNTER: Primary | ICD-10-CM

## 2018-11-01 PROCEDURE — 97140 MANUAL THERAPY 1/> REGIONS: CPT | Performed by: OCCUPATIONAL THERAPIST

## 2018-11-01 PROCEDURE — 97530 THERAPEUTIC ACTIVITIES: CPT | Performed by: OCCUPATIONAL THERAPIST

## 2018-11-01 PROCEDURE — 97110 THERAPEUTIC EXERCISES: CPT | Performed by: OCCUPATIONAL THERAPIST

## 2018-11-01 NOTE — PROGRESS NOTES
Daily Note     Today's date: 2018  Patient name: Sonia Menchaca  : 1955  MRN: [id number]  Referring provider: Remington Moore MD  Dx:   Encounter Diagnosis     ICD-10-CM    1  Closed fracture of left elbow with routine healing, subsequent encounter S42 402D                   Subjective:  " I was able to hold and squeeze my toothpaste  When I'm in the shower I can wash my hair better         Objective: See treatment diary below  Manual  10/30 11/1 10/9 10/11 10/16 10/18 10/22 10/25    IASTM            X 5'     Wrist/Hand ROM                 Elbow AAROM 15' 5'   10'  5' 5'       Elbow PROM/stretching as tolerated      15'   10 8''       Contract/relax elbow flexion        5'  5' 5'  5'  5'/HEP against wall   Elbow joint mobs 5'  10' 5' 5'  3' 3'       PRUJ/DRUJ mobs for pronation          3''   5' 5'   KT for digit edema                 Supine Shoulder PROM                   MWM PRUJ distraction with belt during EF          10'             Exercise Diary  10/30 11/1  10/16 10/18 10/22 10/24 9/14 9/17   Green bar up/down      2x10  2 x 10    2  x10     Static       10x  w         tp push down                                     ube f/b Level 4  3'   level  3  4'  Level3, 4'+- Level 3 4'/4'  level 3 4/4  level 3 4'/4'        rebounder 40 tosses red ball  50 red ball tosses               weight bearing quadreped x5'                 seated on floor to stand  3 xs                I                  Shoulder ladder abduction   10x                  GPB 1# weight  10 x 5 sec hold     Blue 20x, hammer 20x         Wall slides                                       IR/ER stretch          3 each, 30 sec         [place] stretch green ball                   Wall walking      x5 10x    6x g Straight      UBE     level 2 3'   f/b        Backwards, 2 5 resistance, 5 mins   LLPS 2' x 3 ' 10#  ee  2' x 3 reps 10# ee     1' x 3 and 1' 30 sec x 2  10# kettle ball       Red theraband bicep curls, tricep extension  Now HEP            3x10 and issued for HEP   2# tricep extension        Supine with dowel x10           Weight bearing on wall through hands   quadreped and   getting off of the floor x 4 times     weigth bearing 2 sec x 30 reps through arms on plint  same        rebounder          red ball 30x  red ball 30x        wall push ups     2 x10x                    Modalities 9/7 9/10 9/12 9/17 9/20 9/25 8/24   MHP 10' elbow/wrist  10' 10' elbow 10' elbow  15' 10' 15'   MHP with elbow in flexion with RTB                                         Assessment: Tolerated treatment well  Patient would benefit from continued OT  Patient voiced belief that her elbow flexion/extension was more fluid  Patient was encouraged to trial wall push ups and quadreped at home  Plan: Continue per plan of care

## 2018-11-06 ENCOUNTER — OFFICE VISIT (OUTPATIENT)
Dept: OCCUPATIONAL THERAPY | Facility: CLINIC | Age: 63
End: 2018-11-06
Payer: COMMERCIAL

## 2018-11-06 DIAGNOSIS — S42.402D CLOSED FRACTURE OF LEFT ELBOW WITH ROUTINE HEALING, SUBSEQUENT ENCOUNTER: Primary | ICD-10-CM

## 2018-11-06 PROCEDURE — 97140 MANUAL THERAPY 1/> REGIONS: CPT | Performed by: OCCUPATIONAL THERAPIST

## 2018-11-06 PROCEDURE — 97530 THERAPEUTIC ACTIVITIES: CPT | Performed by: OCCUPATIONAL THERAPIST

## 2018-11-06 NOTE — PROGRESS NOTES
Daily Note     Today's date: 2018  Patient name: Izzy Harris  : 1955  MRN: [id number]  Referring provider: Javier Jordan MD  Dx:   Encounter Diagnosis     ICD-10-CM    1  Closed fracture of left elbow with routine healing, subsequent encounter S42 402D                   Subjective:  " My elbow got very achey last night but I'm not sure what I did that was differently "      Objective: See treatment diary below  Manual  10/30 11/1 11/6 10/11 10/16 10/18 10/22 10/25    IASTM            X 5'     Wrist/Hand ROM                 Elbow AAROM 15' 5'   10'  5' 5'       Elbow PROM/stretching as tolerated      10'   10 8''       Contract/relax elbow flexion        5'  5' 5'  5'  5'/HEP against wall   Elbow joint mobs 5'  10' 3' 5'  3' 3'       PRUJ/DRUJ mobs for pronation          3''   5' 5'   KT for digit edema                 Supine Shoulder PROM                   MWM PRUJ distraction with belt during EF          10'             Exercise Diary  10/30 11/1  11/6 10/18 10/22 10/24 9/14 9/17   Green bar up/down      2x10  2 x 10    2  x10     Static       10x  w         tp push down                                     ube f/b Level 4  3'   level  3  4'  Level  3 5, 4'/4- Level 3 4'/4'  level 3 4/4  level 3 4'/4'        rebounder 40 tosses red ball  50 red ball tosses  50 red ball tosess             weight bearing quadreped x5'    5 reps/  requested to stop             seated on floor to stand  3 xs                I                  Shoulder ladder abduction   10x                  GPB 1# weight  10 x 5 sec hold     Blue 20x, hammer 20x         Wall slides                                       IR/ER stretch          3 each, 30 sec         Paoli stretch green ball                   Wall walking      10x    6x g Straight      UBE            Backwards, 2 5 resistance, 5 mins   LLPS 2' x 3 ' 10#  ee  2' x 3 reps 10# ee  2   1' x 3 and 1' 30 sec x 2  10# kettle ball       Red theraband bicep curls, tricep extension  Now HEP            3x10 and issued for HEP   2# tricep extension        Supine with dowel x10           Weight bearing on wall through hands   quadreped and   getting off of the floor x 4 times     weigth bearing 2 sec x 30 reps through arms on plint  same        rebounder          red ball 30x  red ball 30x        wall push ups     2 x10x  2 x15                  Modalities 9/7 9/10 9/12 9/17 9/20 9/25 8/24   MHP 10' elbow/wrist  10' 10' elbow 10' elbow  15' 10' 15'   MHP with elbow in flexion with RTB                                             Assessment: Tolerated treatment fair  Patient would benefit from continued OT  Patient notes "pinching" sensation when performing quadraped  Advised to ask MD re:  quadraped weightbearing  Pt does not fully lean into weight when in quadraped  Plan: Continue per plan of care

## 2018-11-08 ENCOUNTER — OFFICE VISIT (OUTPATIENT)
Dept: OCCUPATIONAL THERAPY | Facility: CLINIC | Age: 63
End: 2018-11-08
Payer: COMMERCIAL

## 2018-11-08 DIAGNOSIS — S42.402D CLOSED FRACTURE OF LEFT ELBOW WITH ROUTINE HEALING, SUBSEQUENT ENCOUNTER: Primary | ICD-10-CM

## 2018-11-08 PROCEDURE — 97140 MANUAL THERAPY 1/> REGIONS: CPT | Performed by: OCCUPATIONAL THERAPIST

## 2018-11-08 PROCEDURE — G8984 CARRY CURRENT STATUS: HCPCS | Performed by: OCCUPATIONAL THERAPIST

## 2018-11-08 PROCEDURE — 97530 THERAPEUTIC ACTIVITIES: CPT | Performed by: OCCUPATIONAL THERAPIST

## 2018-11-08 PROCEDURE — G8985 CARRY GOAL STATUS: HCPCS | Performed by: OCCUPATIONAL THERAPIST

## 2018-11-08 NOTE — PROGRESS NOTES
OT Re-Evaluation     Today's date: 2018  Patient name: Sonia Menchaca  : 1955  MRN: [id number]  Referring provider: Remington Moore MD  Dx:   Encounter Diagnosis     ICD-10-CM    1  Closed fracture of left elbow with routine healing, subsequent encounter S42 402D        Start Time: 1400  Stop Time: 8279  Total time in clinic (min): 45 minutes    Assessment  Impairments: abnormal or restricted ROM, activity intolerance, impaired physical strength, pain with function and weight-bearing intolerance  Functional limitations: 10 lb lifting restriction  Assessment details: S/p L ORIF  distal humerus capitellar and trochlear fx with lateral epicondyle fx, and repair of lateral collateral ligament complex on 7/10/18  Ottoniel Meier continues with multiple impairments relating to her injury  The LUE is affected globally due to non-use  Elbow flexion has not improved since the initial evaluation despite concentrated efforts during therapy and the use of a static progressive elbow flexion orthotic at home  There is a hard end feel at the joint and she also complains of medial sided pain during stretching  Elbow extension has improved modestly, and has a greater likelihood for further improvement  She has a tight bicep as well as capsular adhesion restricting this joint  Wrist ROM and digit ROM are less impaired, althugh require consistent stretching to maintain gains  Edema is greatly resolved, and is no longer pitting in the elbow  The shoulder continues with soft tissue restriction, mostly in ER and IR, and she has been addressing this at home  See below for a detailed assessment as well as progress towards goals  10/16/18:  Patient demonstrates improved passive elbow flexion and improved active forearm and wrist ROM  No change in active elbow ROM this date   strength improved  Patient is using the LUE more for ADL tasks and has returned to light duty work    Recommend we continue with OT x 4 more weeks    11/8/18:  Patient demonstrates good increase in AROM  For elbow flexion/extension supination/pronation   strength is increased but does have decreased endurance  Initiated weight bearing over the past month  She is able to tolerate weight through right elbow in a standing position  In quadraped , she compensates and avoids bearing full weight through left UE  Pt is fearful that she will hurt elbow if she bears too much weight through her elbow  She is now working 4 hours per day with a 10# weight restriction  She is hopeful to increase work hours following next MD visit  Patient continues to make gains functionally and physically over the past month with more gains anticipated  Patient would benefit from skilled OT in order to attain goals and resume normal activities with left UE  Understanding of Dx/Px/POC: good   Prognosis: good    Goals  STG: Patient will be compliant with post operative precautions (if applicable) and home exercise program in 2 weeks  - MET  STG: Pain will be reduced by two subjective levels in 2 weeks  MET  STG: Inflammation/edema/joint effusion will be reduced by 25% and patient will be independent with self management techniques in 4 weeks  - MET  STG: Range of motion of wrist will be improved by 50% in 2 weeks  - MET  STG: Range of motion of forearm and elbow will be improved by 25% in 4 weeks  - MET  STG: Range of motion of hand will be improved by 75% in 2 weeks  - MET    LTG: Pain will be reduced by 4 subjective levels, or resolved in 6-8 weeks  - PARTIALLY MET  LTG: Performance in ADLs and IADLS will be improved to prior level of function with the affected extremity within 8 weeks  - IN PROGRESS  LTG: Performance in work/school activity will be improved to prior level of function with the affected extremity within 8 weeks  - IN PROGRESS  LTG: FOTO score increase by 20 points within 8 weeks  - PARTIALLY MET    NEW GOALS:  Strength of UE will improve to 4/5 in 4 weeks  Partially met  EF will improve to 115 in 4 weeks  EE will improve to 15 in 4 weeks  Patient will bear weight 75% through L UE in order to rise easily from the floor  Patient will work full time with min restricitons  Plan  Patient would benefit from: skilled OT, OT eval and custom splinting  Planned modality interventions: thermotherapy: hydrocollator packs and unattended electrical stimulation  Planned therapy interventions: functional ROM exercises, home exercise program, joint mobilization, manual therapy, therapeutic exercise, patient education, graded activity, therapeutic activities and strengthening  Frequency: 2x week  Duration in weeks: 6  Plan of Care beginning date: 10/16/2018  Plan of Care expiration date: 2018  Treatment plan discussed with: patient  Plan details: Treatment to include modalities, manual therapy, PRE's, HEP, and orthotics as appropriate  Subjective Evaluation    History of Present Illness  Date of surgery: 7/10/2018  Mechanism of injury: surgery  Mechanism of injury: S/p L ORIF  distal humerus capitellar and trochlear fx with lateral epicondyle fx, and repair of ulnar collateral ligament complex on 7/10/18  Injury from a [facility] one week prior  She is currently wearing a hinged elbow brace allowing full flexion and extension but is removing it for exercising and therapy  10/16/18: I have trouble lifting some things  I can get dressed okay  I'm getting better doing my hair  Patient reports doing light house work  Has returned to light duty work at [employer]  Has a 10 lb lifting restriction    Patient reports difficulty getting up from the floor  Not a recurrent problem   Quality of life: good    Pain  Current pain ratin  At best pain ratin  At worst pain rating: 3  Location: Dorsal left forearm  Quality: dull ache and discomfort  Relieving factors: rest and medications  Progression: no change    Social Support    Employment status: working ( for infants)  Hand dominance: right    Treatments  Previous treatment: immobilization  Current treatment: occupational therapy  Patient Goals  Patient goals for therapy: decreased edema, decreased pain, increased motion, increased strength, independence with ADLs/IADLs and return to work  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        Objective     Observations     Left Elbow   Postive for adhesive scar and edema  Additional Observation Details  Mild scar tissue adhesion to surrounding tissue  Palpation     Additional Palpation Details  Bicep and tricep atrophy  Active Range of Motion   Left Shoulder   Flexion: 140 degrees   Abduction: 130 degrees     Left Elbow   Flexion: 120 degrees   Extension: -25 degrees   Forearm supination: 80 degrees   Forearm pronation: 45 degrees     Right Elbow   Flexion: 140 degrees   Extension: 0 degrees     Left Wrist   Wrist flexion: 65 degrees   Wrist extension: 70 degrees     Additional Active Range of Motion Details  Increased from 105 elbow flexion     Increased from-35 to -25 following LLPS with a 10# kettle bell (  2 two minutes LLPS)    Passive Range of Motion     Left Elbow   Flexion: 135 degrees   Extension: -20 degrees   Forearm supination: 90 degrees   Forearm pronation: 65 degrees     Additional Passive Range of Motion Details  Increased from 120 PROM elbow flexion  Increased supination from 80 to 90 degrees and pronation increased from 45 to 65 degrees  Joint Play     Left Elbow   Hypomobile in the humeroulnar joint, humeroradial joint, proximal radioulnar joint and distal radioulnar joint      Strength/Myotome Testing     Left Wrist/Hand      (2nd hand position)     Trial 1: 40    Trial 2: 30    Trial 3: 30    Right Wrist/Hand      (2nd hand position)     Trial 1: 50    Trial 2: 51    Trial 3: 52    Tests     Left Wrist/Hand   Positive extrinsic extensor tightness, extrinsic flexor tightness and intrinsic muscle tightness  Swelling   Left Elbow Girth Measurements   Joint line: 26 cm    Right Elbow Girth Measurements   Joint line: 25 cm    Left Wrist/Hand   Circumference wrist: 16 cm    Right Wrist/Hand   Circumference wrist: 16 cm       Objective: See treatment diary below  Manual  10/30 11/1 11/6 10/11 10/16 10/18 10/22 10/25    IASTM            X 5'     Wrist/Hand ROM                 Elbow AAROM 15' 5'   10'  5' 5'       Elbow PROM/stretching as tolerated      10'   10 8''       Contract/relax elbow flexion        5'  5' 5'  5'  5'/HEP against wall   Elbow joint mobs 5'  10' 3' 5'  3' 3'       PRUJ/DRUJ mobs for pronation          3''   5' 5'   KT for digit edema                 Supine Shoulder PROM                   MWM PRUJ distraction with belt during EF          10'             Exercise Diary  10/30 11/1  11/6 11/8 10/22 10/24 9/14 9/17   Green bar up/down        2 x 10    2  x10     Static         w         tp push down                                     ube f/b Level 4  3'   level  3  4'  Level  3 5, 4'/4- Level 3 5 4'/4'  level 3 4/4  level 3 4'/4'        rebounder 40 tosses red ball  50 red ball tosses  50 red ball tosess 60 red ball tosses            weight bearing quadreped x5'    5 reps/  requested to stop deferred            seated on floor to stand  3 xs                I                  Shoulder ladder abduction   10x                  GPB 1# weight  10 x 5 sec hold     Blue 20x, hammer 20x         Wall slides                                       IR/ER stretch          3 each, 30 sec         [place] stretch green ball                   Wall walking      10x    6x g Straight      UBE            Backwards, 2 5 resistance, 5 mins   LLPS 2' x 3 ' 10#  ee  2' x 3 reps 10# ee  2 2' x 2 reps 10#  ee  1' x 3 and 1' 30 sec x 2  10# kettle ball       Red theraband bicep curls, tricep extension  Now HEP            3x10 and issued for HEP   2# tricep extension                   Weight bearing on wall through hands   quadreped and   getting off of the floor x 4 times     weigth bearing 2 sec x 30 reps through arms on plint  same        rebounder          red ball 30x  red ball 30x        wall push ups     2 x10x  2 x15 2 x 15                 Modalities 9/7 9/10 9/12 9/17 9/20 9/25 8/24   MHP 10' elbow/wrist  10' 10' elbow 10' elbow  15' 10' 15'   MHP with elbow in flexion with RTB

## 2018-11-13 ENCOUNTER — OFFICE VISIT (OUTPATIENT)
Dept: OCCUPATIONAL THERAPY | Facility: CLINIC | Age: 63
End: 2018-11-13
Payer: COMMERCIAL

## 2018-11-13 DIAGNOSIS — S42.402D CLOSED FRACTURE OF LEFT ELBOW WITH ROUTINE HEALING, SUBSEQUENT ENCOUNTER: Primary | ICD-10-CM

## 2018-11-13 PROCEDURE — 97110 THERAPEUTIC EXERCISES: CPT | Performed by: OCCUPATIONAL THERAPIST

## 2018-11-13 PROCEDURE — 97530 THERAPEUTIC ACTIVITIES: CPT | Performed by: OCCUPATIONAL THERAPIST

## 2018-11-13 PROCEDURE — 97140 MANUAL THERAPY 1/> REGIONS: CPT | Performed by: OCCUPATIONAL THERAPIST

## 2018-11-13 NOTE — PROGRESS NOTES
Daily Note     Today's date: 2018  Patient name: Spring Lopez  : 1955  MRN: [id number]  Referring provider: Darrion Riley MD  Dx:   Encounter Diagnosis     ICD-10-CM    1  Closed fracture of left elbow with routine healing, subsequent encounter S42 402D        Start Time: 7814  Stop Time: 1440  Total time in clinic (min): 45 minutes    Subjective:  " I feel like I'm at a plateau "   OT suggested trial of static progressive ee splint  Objective: See treatment diary below  Manual  10/30 11/1 11/6 10/11 10/16 10/18 10/22 10/25    IASTM            X 5'     Wrist/Hand ROM                 Elbow AAROM 15' 5'   10'  5' 5'       Elbow PROM/stretching as tolerated      10'   10 8''       Contract/relax elbow flexion        5'  5' 5'  5'  5'/HEP against wall   Elbow joint mobs 5'  10' 3' 5'  3' 3'       PRUJ/DRUJ mobs for pronation          3''   5' 5'   KT for digit edema                 Supine Shoulder PROM                   MWM PRUJ distraction with belt during EF          10'             Exercise Diary  10/30 11/1  11/6 11/8 10/22 10/24 9/14 9/17   Green bar up/down        2 x 10    2  x10     Static         w         tp push down                                     ube f/b Level 4  3'   level  3  4'  Level  3 5, 4'/4- Level 3 5 4'/4'  level 3 4/4  level 3 4'/4'        rebounder 40 tosses red ball  50 red ball tosses  50 red ball tosess 60 red ball tosses            weight bearing quadreped x5'    5 reps/  requested to stop deferred            seated on floor to stand  3 xs                I                  Shoulder ladder abduction   10x                  GPB 1# weight  10 x 5 sec hold     Blue 20x, hammer 20x         Wall slides                                       IR/ER stretch          3 each, 30 sec         Sublimity stretch green ball                   Wall walking      10x    6x g Straight      UBE            Backwards, 2 5 resistance, 5 mins   LLPS 2' x 3 ' 10#  ee  2' x 3 reps 10# ee  2 2' x 2 reps 10#  ee  1' x 3 and 1' 30 sec x 2  10# kettle ball       Red theraband bicep curls, tricep extension  Now HEP            3x10 and issued for HEP   2# tricep extension                   Weight bearing on wall through hands   quadreped and   getting off of the floor x 4 times     weigth bearing 2 sec x 30 reps through arms on plint  same        rebounder          red ball 30x  red ball 30x        wall push ups     2 x10x  2 x15 2 x 15                 Modalities 9/7 9/10 9/12 9/17 9/20 9/25 8/24   MHP 10' elbow/wrist  10' 10' elbow 10' elbow  15' 10' 15'   MHP with elbow in flexion with RTB                                       Fabricated EE splint with negative space at the anticubtial area   Patient to wear 10-15' 2-3 times per day and will measure next visit  Assessment: Tolerated treatment well  Patient would benefit from continued OT      Plan: Continue per plan of care

## 2018-11-14 ENCOUNTER — HOSPITAL ENCOUNTER (OUTPATIENT)
Dept: RADIOLOGY | Facility: HOSPITAL | Age: 63
Discharge: HOME | End: 2018-11-14
Payer: COMMERCIAL

## 2018-11-14 ENCOUNTER — OFFICE VISIT (OUTPATIENT)
Dept: OBGYN CLINIC | Facility: HOSPITAL | Age: 63
End: 2018-11-14
Payer: COMMERCIAL

## 2018-11-14 VITALS
HEIGHT: 68 IN | SYSTOLIC BLOOD PRESSURE: 154 MMHG | HEART RATE: 72 BPM | DIASTOLIC BLOOD PRESSURE: 70 MMHG | WEIGHT: 155 LBS | BODY MASS INDEX: 23.49 KG/M2

## 2018-11-14 DIAGNOSIS — Z48.89 AFTERCARE FOLLOWING SURGERY: Primary | ICD-10-CM

## 2018-11-14 DIAGNOSIS — Z48.89 AFTERCARE FOLLOWING SURGERY: ICD-10-CM

## 2018-11-14 PROCEDURE — 73080 X-RAY EXAM OF ELBOW: CPT

## 2018-11-14 PROCEDURE — 99213 OFFICE O/P EST LOW 20 MIN: CPT | Performed by: PHYSICIAN ASSISTANT

## 2018-11-14 NOTE — LETTER
November 14, 2018     Patient: Cherilyn Sever   YOB: 1955   Date of Visit: 11/14/2018       To Whom it May Concern:    Maria Del Carmen Hanson is under my professional care  She was seen in my office on 11/14/2018  She should continue with light duty until her follow up appointment in 6-8 weeks  If you have any questions or concerns, please don't hesitate to call           Sincerely,          Kathia Noe PA-C        CC: No Recipients

## 2018-11-14 NOTE — PROGRESS NOTES
ASSESSMENT/PLAN:    Assessment:   S/P ORIF Left trochlear shear fx and lateral epicondylar fx with UCL repair 7/10/18    Plan:   I will discuss patient's case with Dr Julisa Mmis tomorrow  At this time, we will have the patient increase activity with therapy to continue to work on ROM and strengthening  "Soup can" exercise discussed with patient today to help with elbow extension and "hammer" exercise for pronation  We will continue to have patient on light duty at work as she has a 40lb lifting requirement for full duty    Follow Up:  6  week(s)    _____________________________________________________  CHIEF COMPLAINT:  Chief Complaint   Patient presents with    Left Elbow - Follow-up         SUBJECTIVE:  Kurt Bernardo is a 59y o  year old female who presents for follow up S/P ORIF Left trcohlear shear fx and lateral epicondylar with UCL repair 7/10/18  Patient states she feels like she is hitting a plateau with therapy, but her therapist tells her she is still making gains  Elbow extension and pronation are her bigger limits  She also describes feeling uncertain when putting weight on this arm such as leaning on this elbow when getting up  She has decreased sensation around her incision only  She states she now can bend her fingers fully      PAST MEDICAL HISTORY:  Past Medical History:   Diagnosis Date    Allergic rhinitis     Last Assessed: 6/9/2016    Black tarry stools     Concussion with prolonged loss of consciousness without return to pre-existing conscious level     COPD (chronic obstructive pulmonary disease) ([facility])     Last Assessed: 3/8/2017    Fall     slipping, tripping or stumbling    Generalized osteoarthritis     GERD (gastroesophageal reflux disease)     Herpes simplex infection     Menopausal disorder     Morbid obesity due to excess calories ([facility] [address] )     Neuroma 1975    right foot    Pharyngeal disorder     Pneumonia     Last Assessed: 5/15/2014    Post-menopausal bleeding     Last Assessed: 2015    Postmenopausal disorder        PAST SURGICAL HISTORY:  Past Surgical History:   Procedure Laterality Date     SECTION      COLONOSCOPY      Complete    DILATION AND CURETTAGE OF UTERUS      3 times    DILATION AND EVACUATION      Surgical treatment of missed  in first trimester - x 5    DILATION AND EVACUATION  ,     Surgically Induced     ENDOMETRIAL BIOPSY  2015    PMB/EB    KNEE ARTHROSCOPY Right     KNEE SURGERY Right     82790 for right fractured patella    CO OPEN TX RADIAL HEAD/NECK FRACTURE Left 7/10/2018    Procedure: OPEN REDUCTION INTERNAL FIXATION LEFT DISTAL HUMERUS CAPITELLAR AND TROCHLEAR SHEAR FRACTURE WITH LATERAL EPICONDYLE FRACTURE; REPAIR LATERAL ULNAR COLLATERAL LIGAMENT COMPLEX OF THE ELBOW; SPLINT APPLICATION;  Surgeon: Ronald Frederick MD;  Location: [facility];  Service: Orthopedics       FAMILY HISTORY:  Family History   Problem Relation Age of Onset    Diabetes Mother    [facility] Migraines Mother     Hypertension Mother     Ovarian cancer Mother     Pancreatic cancer Mother     Other Mother         Skin disorder, Urinary Incontinence    Coronary artery disease Father     Heart disease Father     Hypertension Father     Hypertension Sister     Heart disease Brother     Jaundice Brother     Rectal cancer Brother     Osteoporosis Other     Breast cancer Paternal Aunt        SOCIAL HISTORY:  Social History   Substance Use Topics    Smoking status: Former Smoker     Packs/day: 1 00     Types: Cigarettes     Quit date: 6/15/1984    Smokeless tobacco: Never Used    Alcohol use No      Comment: rarely       MEDICATIONS:    Current Outpatient Prescriptions:     betamethasone valerate (LUXIQ) 0 12 % foam, Apply 1 application topically as needed  , Disp: , Rfl: 0    loratadine (CLARITIN) 10 mg tablet, Take 10 mg by mouth daily, Disp: , Rfl:     Misc Natural Products (OSTEO BI-FLEX JOINT SHIELD PO), Take 1 tablet by mouth every morning, Disp: , Rfl:     montelukast (SINGULAIR) 10 mg tablet, TAKE 1 TABLET EVERY DAY, Disp: 90 tablet, Rfl: 3    PROVENTIL  (90 Base) MCG/ACT inhaler, Inhale 2 puffs every 6 (six) hours as needed, Disp: , Rfl: 0    SYMBICORT 160-4 5 MCG/ACT inhaler, inhale 2 puffs by mouth twice a day Rinse mouth after use, Disp: 10 2 g, Rfl: 3    naproxen (NAPROSYN) 500 mg tablet, Take 1 tablet (500 mg total) by mouth 2 (two) times a day with meals for 30 doses, Disp: 30 tablet, Rfl: 0    valACYclovir (VALTREX) 1,000 mg tablet, take 2 tablets NOW AND THEN 2 TABLETS IN 12 HOURS, Disp: 4 tablet, Rfl: 4    ALLERGIES:  Allergies   Allergen Reactions    Pravastatin Myalgia    Simvastatin Myalgia       REVIEW OF SYSTEMS:  Pertinent items are noted in HPI  A comprehensive review of systems was negative  LABS:  HgA1c:   Lab Results   Component Value Date    HGBA1C 5 1 04/07/2017     BMP:   Lab Results   Component Value Date    CALCIUM 9 0 04/07/2017     04/07/2017    K 4 1 04/07/2017    CO2 27 04/07/2017     04/07/2017    BUN 17 04/07/2017    CREATININE 0 65 04/07/2017           _____________________________________________________  PHYSICAL EXAMINATION:  General: well developed and well nourished, alert, oriented times 3 and appears comfortable  Psychiatric: Normal  HEENT: Trachea Midline, No torticollis  Cardiovascular: No discernable arrhythmia  Pulmonary: No wheezing or stridor  Skin: No masses, erthema, lacerations, fluctation, ulcerations  Neurovascular: Sensation Intact to the Median, Ulnar, Radial Nerve, Motor Intact to the Median, Ulnar, Radial Nerve and Pulses Intact - decreased sensation around elbow incision only, SILT throughout hand    MUSCULOSKELETAL EXAMINATION:  LEFT SIDE:  Elbow:  Incision well-healed   Motion is as follows: Flexion 110, Extension 30, supination full, pronation 55    _____________________________________________________  STUDIES REVIEWED:  Images were reviewd in PACS: Images show continued healing s/p operative fixation  No evidence of hardware malfunction   Some heterotopic ossification present      PROCEDURES PERFORMED:  Procedures  No Procedures performed today

## 2018-11-15 ENCOUNTER — APPOINTMENT (OUTPATIENT)
Dept: OCCUPATIONAL THERAPY | Facility: CLINIC | Age: 63
End: 2018-11-15
Payer: COMMERCIAL

## 2018-11-16 DIAGNOSIS — Z47.89 AFTERCARE FOLLOWING SURGERY OF THE MUSCULOSKELETAL SYSTEM: Primary | ICD-10-CM

## 2018-11-20 ENCOUNTER — OFFICE VISIT (OUTPATIENT)
Dept: OCCUPATIONAL THERAPY | Facility: CLINIC | Age: 63
End: 2018-11-20
Payer: COMMERCIAL

## 2018-11-20 DIAGNOSIS — S42.402D CLOSED FRACTURE OF LEFT ELBOW WITH ROUTINE HEALING, SUBSEQUENT ENCOUNTER: Primary | ICD-10-CM

## 2018-11-20 PROCEDURE — 97140 MANUAL THERAPY 1/> REGIONS: CPT | Performed by: OCCUPATIONAL THERAPIST

## 2018-11-20 PROCEDURE — 97110 THERAPEUTIC EXERCISES: CPT | Performed by: OCCUPATIONAL THERAPIST

## 2018-11-20 PROCEDURE — 97530 THERAPEUTIC ACTIVITIES: CPT | Performed by: OCCUPATIONAL THERAPIST

## 2018-11-20 NOTE — PROGRESS NOTES
Daily Note     Today's date: 2018  Patient name: Fe Medrano  : 1955  MRN: [id number]  Referring provider: Vicente Mack MD  Dx:   Encounter Diagnosis     ICD-10-CM    1  Closed fracture of left elbow with routine healing, subsequent encounter S42 402D                   Subjective:   " I get a pain when I put weight through my arm  "      Objective: See treatment diary below  Manual  10/30 11/1 11/6 11/20 10/16 10/18 10/22 10/25    IASTM            X 5'     Wrist/Hand ROM                 Elbow AAROM 15' 5'   10'  5' 5'       Elbow PROM/stretching as tolerated      10'   10 8''       Contract/relax elbow flexion          5' 5'  5'  5'/HEP against wall   Elbow joint mobs 5'  10' 3' 2'  3' 3'       PRUJ/DRUJ mobs for pronation          3''   5' 5'   KT for digit edema                 Supine Shoulder PROM                   MWM PRUJ distraction with belt during EF          10'             Exercise Diary  10/30 11/1  11/6 11/8 11/20 10/24 9/14 9/17   Green bar up/down        2 x 10    2  x10     Static         w         tp push down                                     ube f/b Level 4  3'   level  3  4'  Level  3 5, 4'/4- Level 3 5 4'/4'  level 4 4/4  level 3 4'/4'        rebounder 40 tosses red ball  50 red ball tosses  50 red ball tosess 60 red ball tosses  yellow ball50 times          weight bearing quadreped x5'    5 reps/  requested to stop deferred  deferred          seated on floor to stand  3 xs       deferred         I                  Shoulder ladder abduction   10x                  GPB 1# weight  10 x 5 sec hold              Wall slides                                       IR/ER stretch                  [place] stretch green ball                   Wall walking      10x    6x g Straight      UBE            Backwards, 2 5 resistance, 5 mins   LLPS 2' x 3 ' 10#  ee  2' x 3 reps 10# ee  2 2' x 2 reps 10#  ee 2'  x 2  10# kettle ball       Red theraband bicep curls, tricep extension  Now HEP            3x10 and issued for HEP   2# tricep extension                   Weight bearing on wall through hands   quadreped and   getting off of the floor x 4 times     weigth bearing 2 sec x 30 reps through arms on plint  same        rebounder            red ball 30x        wall push ups     2 x10x  2 x15 2 x 15  3# bicep curl/hammer curl 20x               Modalities 9/7 9/10 9/12 9/17 9/20 9/25 8/24   MHP 10' elbow/wrist  10' 10' elbow 10' elbow  15' 10' 15'   MHP with elbow in flexion with RTB                                           Assessment: Tolerated treatment well  Patient would benefit from continued OT  Patient has increased pain with weight bearing  Trial of weight bearing through large ball with moderate success  Requested patient bring in elbow extension splint next visit  Plan: Continue per plan of care

## 2018-11-22 ENCOUNTER — APPOINTMENT (OUTPATIENT)
Dept: OCCUPATIONAL THERAPY | Facility: CLINIC | Age: 63
End: 2018-11-22
Payer: COMMERCIAL

## 2018-11-27 ENCOUNTER — OFFICE VISIT (OUTPATIENT)
Dept: OCCUPATIONAL THERAPY | Facility: CLINIC | Age: 63
End: 2018-11-27
Payer: COMMERCIAL

## 2018-11-27 DIAGNOSIS — S42.402D CLOSED FRACTURE OF LEFT ELBOW WITH ROUTINE HEALING, SUBSEQUENT ENCOUNTER: Primary | ICD-10-CM

## 2018-11-27 PROCEDURE — 97140 MANUAL THERAPY 1/> REGIONS: CPT | Performed by: OCCUPATIONAL THERAPIST

## 2018-11-27 PROCEDURE — 97110 THERAPEUTIC EXERCISES: CPT | Performed by: OCCUPATIONAL THERAPIST

## 2018-11-27 PROCEDURE — 97530 THERAPEUTIC ACTIVITIES: CPT | Performed by: OCCUPATIONAL THERAPIST

## 2018-11-27 NOTE — PROGRESS NOTES
Daily Note     Today's date: 2018  Patient name: Clemente Colon  : 1955  MRN: [id number]  Referring provider: Tony Hand MD  Dx:   Encounter Diagnosis     ICD-10-CM    1  Closed fracture of left elbow with routine healing, subsequent encounter S42 402D                   Subjective:  "  I wasn't that tired after thanksgiving dinner, there wasn't that much to do  "      Objective: See treatment diary below  Manual  10/30 11/1 11/6 11/20 11/27 10/18 10/22 10/25    IASTM            X 5'     Wrist/Hand ROM                 Elbow AAROM 15' 5'   10'  5' 5'       Elbow PROM/stretching as tolerated      10'   10 8''       Contract/relax elbow flexion          5' 5'  5'  5'/HEP against wall   Elbow joint mobs 5'  10' 3' 2'  3' 3'       PRUJ/DRUJ mobs for pronation          3''   5' 5'   KT for digit edema                 Supine Shoulder PROM                   MWM PRUJ distraction with belt during EF          10'             Exercise Diary  10/30 11/1  11/6 11/8 11/20 10/24 9/14 9/17   Green bar up/down        2 x 10    2  x10     Static         w         tp push down                                     ube f/b Level 4  3'   level  3  4'  Level  3 5, 4'/4- Level 3 5 4'/4'  level 4 4/4  level4  6/6'        rebounder 40 tosses red ball  50 red ball tosses  50 red ball tosess 60 red ball tosses  yellow ball50 times Yellow ball 50x        weight bearing quadreped x5'    5 reps/  requested to stop deferred  deferred          seated on floor to stand  3 xs       deferred         I                  Shoulder ladder abduction   10x                  GPB 1# weight  10 x 5 sec hold              Wall slides                                       IR/ER stretch                  [place] stretch green ball                   Wall walking      10x    6x g Straight      UBE            Backwards, 2 5 resistance, 5 mins   LLPS 2' x 3 ' 10#  ee  2' x 3 reps 10# ee  2 2' x 2 reps 10#  ee 2'  x 2  10# kettle ball       Red theraband bicep curls, tricep extension  Now HEP            3x10 and issued for HEP   2# tricep extension                   Weight bearing on wall through hands   quadreped and   getting off of the floor x 4 times     weigth bearing 2 sec x 30 reps through arms on plint  same        rebounder            red ball 30x        wall push ups     2 x10x  2 x15 2 x 15  3# bicep curl/hammer curl 20x               Modalities 9/7 9/10 9/12 9/17 9/20 9/25 8/24   MHP 10' elbow/wrist  10' 10' elbow 10' elbow  15' 10' 15'   MHP with elbow in flexion with RTB                                     EE -28 was -35 last visit  Assessment: Tolerated treatment well  Patient would benefit from continued OT      Plan: Continue per plan of care

## 2018-11-29 ENCOUNTER — APPOINTMENT (OUTPATIENT)
Dept: OCCUPATIONAL THERAPY | Facility: CLINIC | Age: 63
End: 2018-11-29
Payer: COMMERCIAL

## 2018-12-04 ENCOUNTER — OFFICE VISIT (OUTPATIENT)
Dept: OCCUPATIONAL THERAPY | Facility: CLINIC | Age: 63
End: 2018-12-04
Payer: COMMERCIAL

## 2018-12-04 DIAGNOSIS — S42.402D CLOSED FRACTURE OF LEFT ELBOW WITH ROUTINE HEALING, SUBSEQUENT ENCOUNTER: Primary | ICD-10-CM

## 2018-12-04 PROCEDURE — 97140 MANUAL THERAPY 1/> REGIONS: CPT | Performed by: OCCUPATIONAL THERAPIST

## 2018-12-04 PROCEDURE — 97110 THERAPEUTIC EXERCISES: CPT | Performed by: OCCUPATIONAL THERAPIST

## 2018-12-04 PROCEDURE — 97112 NEUROMUSCULAR REEDUCATION: CPT | Performed by: OCCUPATIONAL THERAPIST

## 2018-12-04 NOTE — PROGRESS NOTES
Daily Note     Today's date: 2018  Patient name: Ally Astorga  : 1955  MRN: [id number]  Referring provider: Alexa Lamar MD  Dx:   Encounter Diagnosis     ICD-10-CM    1  Closed fracture of left elbow with routine healing, subsequent encounter S42 402D                   Subjective:  " My elbow feels better, it feels more fluid  It feels like it bends a little more normally  "      Objective: See treatment diary below  Manual  10/30 11/1 11/6 11/20 11/27 12/3 10/22 10/25    IASTM            X 5'     Wrist/Hand ROM                 Elbow AAROM 15' 5'   10'  5' 5'       Elbow PROM/stretching as tolerated      10'   10 8''       Contract/relax elbow flexion          5' 5'  5'  5'/HEP against wall   Elbow joint mobs 5'  10' 3' 2'  3' 3'       PRUJ/DRUJ mobs for pronation          3''   5' 5'   KT for digit edema                 Supine Shoulder PROM                   MWM PRUJ distraction with belt during EF          10'             Exercise Diary  10/30 11/1  11/6 11/8 11/20 12/3 9/14 9/17   Green bar up/down        2 x 10    2  x10     Static         w         tp push down                                     ube f/b Level 4  3'   level  3  4'  Level  3 5, 4'/4- Level 3 5 4'/4'  level 4 4/4  level4  6/6'        rebounder 40 tosses red ball  50 red ball tosses  50 red ball tosess 60 red ball tosses  yellow ball50 times Yellow ball 50x        weight bearing quadreped x5'    5 reps/  requested to stop deferred  deferred          seated on floor to stand  3 xs       deferred         I                  Shoulder ladder abduction   10x                  GPB 1# weight  10 x 5 sec hold              Wall slides                                       IR/ER stretch                  [place] stretch green ball                   Wall walking      10x    6x g Straight      UBE            Backwards, 2 5 resistance, 5 mins   LLPS 2' x 3 ' 10#  ee  2' x 3 reps 10# ee  2 2' x 2 reps 10#  ee 2'  x 2  10# kettle ball     Red theraband bicep curls, tricep extension  Now HEP            3x10 and issued for HEP   2# tricep extension                   Weight bearing on wall through hands   quadreped and   getting off of the floor x 4 times     weigth bearing 2 sec x 30 reps through arms on plint  same        rebounder            yellow 60x        wall push ups     2 x10x  2 x15 2 x 15  3# bicep curl/hammer curl 20x  tricep/bicep stretch for HEP             Modalities 9/7 9/10 9/12 9/17 9/20 9/25 8/24   MHP 10' elbow/wrist  10' 10' elbow 10' elbow  15' 10' 15'   MHP with elbow in flexion with RTB                                         Assessment: Tolerated treatment well  Patient would benefit from continued OT  Added bicep/tricep stretch to HEP      Plan: Continue per plan of care

## 2018-12-06 ENCOUNTER — OFFICE VISIT (OUTPATIENT)
Dept: OCCUPATIONAL THERAPY | Facility: CLINIC | Age: 63
End: 2018-12-06
Payer: COMMERCIAL

## 2018-12-06 DIAGNOSIS — S42.402D CLOSED FRACTURE OF LEFT ELBOW WITH ROUTINE HEALING, SUBSEQUENT ENCOUNTER: Primary | ICD-10-CM

## 2018-12-06 PROCEDURE — 97140 MANUAL THERAPY 1/> REGIONS: CPT | Performed by: OCCUPATIONAL THERAPIST

## 2018-12-06 PROCEDURE — 97530 THERAPEUTIC ACTIVITIES: CPT | Performed by: OCCUPATIONAL THERAPIST

## 2018-12-06 PROCEDURE — 97110 THERAPEUTIC EXERCISES: CPT | Performed by: OCCUPATIONAL THERAPIST

## 2018-12-06 NOTE — PROGRESS NOTES
Daily Note     Today's date: 2018  Patient name: Luis Mishra  : 1955  MRN: [id number]  Referring provider: Tigist Turcios MD  Dx:   Encounter Diagnosis     ICD-10-CM    1  Closed fracture of left elbow with routine healing, subsequent encounter S42 402D                   Subjective:  " I was sore after our last session until last night"        Objective: See treatment diary below  Manual  10/30 11/1 11/6 11/20 11/27 12/3 12/6 10/25    IASTM                 Wrist/Hand ROM                 Elbow AAROM 15' 5'   10'  5' 5'  5'     Elbow PROM/stretching as tolerated      10'   10 8'' 6       Contract/relax elbow flexion          5' 5'   5'/HEP against wall   Elbow joint mobs 5'  10' 3' 2'  3' 3'       PRUJ/DRUJ mobs for pronation          3''  2' 5'   KT for digit edema                 Supine Shoulder PROM                   MWM PRUJ distraction with belt during EF          10'             Exercise Diary  10/30 11/1  11/6 11/8 11/20 12/3 12/6 9/17   Green bar up/down        2 x 10        Static         w         tp push down                                     ube f/b Level 4  3'   level  3  4'  Level  3 5, 4'/4- Level 3 5 4'/4'  level 4 4/4  level4  6/6'  level 4 5/5      rebounder 40 tosses red ball  50 red ball tosses  50 red ball tosess 60 red ball tosses  yellow ball50 times Yellow ball 50x  yellow ball 60      weight bearing quadreped x5'    5 reps/  requested to stop deferred  deferred          seated on floor to stand  3 xs       deferred         I                  Shoulder ladder abduction   10x                  GPB 1# weight  10 x 5 sec hold              Wall slides                                       IR/ER stretch                  [place] stretch green ball                   Wall walking      10x          UBE            Backwards, 2 5 resistance, 5 mins   LLPS 2' x 3 ' 10#  ee  2' x 3 reps 10# ee  2 2' x 2 reps 10#  ee 2'  x 2  10# kettle ball  3' and 2' 10#kettle ball     Red theraband bicep curls, tricep extension  Now HEP            3x10 and issued for HEP   2# tricep extension                   Weight bearing on wall through hands   quadreped and   getting off of the floor x 4 times     weigth bearing 2 sec x 30 reps through arms on plint  same        rebounder            yellow 60x        wall push ups     2 x10x  2 x15 2 x 15  3# bicep curl/hammer curl 20x  tricep/bicep stretch for HEP  tricep/bicep stretch  10 seconds three reps           Modalities 9/7 9/10 9/12 9/17 9/20 9/25 8/24   MHP 10' elbow/wrist  10' 10' elbow 10' elbow  15' 10' 15'   MHP with elbow in flexion with RTB                                             Assessment: Tolerated treatment well  Patient would benefit from continued OT   Patient  notes that she sore following last session  Advised that is was likely due to the bicep/tricep stretches that were initiated  Advised to decrease reps from 5 to 3 but to continue to peform   Will re-assess next visit  Plan: Continue per plan of care

## 2018-12-11 ENCOUNTER — OFFICE VISIT (OUTPATIENT)
Dept: OCCUPATIONAL THERAPY | Facility: CLINIC | Age: 63
End: 2018-12-11
Payer: COMMERCIAL

## 2018-12-11 DIAGNOSIS — S42.402D CLOSED FRACTURE OF LEFT ELBOW WITH ROUTINE HEALING, SUBSEQUENT ENCOUNTER: Primary | ICD-10-CM

## 2018-12-11 PROCEDURE — G8984 CARRY CURRENT STATUS: HCPCS | Performed by: OCCUPATIONAL THERAPIST

## 2018-12-11 PROCEDURE — 97110 THERAPEUTIC EXERCISES: CPT | Performed by: OCCUPATIONAL THERAPIST

## 2018-12-11 PROCEDURE — 97140 MANUAL THERAPY 1/> REGIONS: CPT | Performed by: OCCUPATIONAL THERAPIST

## 2018-12-11 PROCEDURE — 97530 THERAPEUTIC ACTIVITIES: CPT | Performed by: OCCUPATIONAL THERAPIST

## 2018-12-11 PROCEDURE — G8985 CARRY GOAL STATUS: HCPCS | Performed by: OCCUPATIONAL THERAPIST

## 2018-12-11 NOTE — PROGRESS NOTES
OT Re-Evaluation     Today's date: 2018  Patient name: Jorge Lee  : 1955  MRN: [id number]  Referring provider: Reyna Villa MD  Dx:   Encounter Diagnosis     ICD-10-CM    1  Closed fracture of left elbow with routine healing, subsequent encounter S42 402D        Start Time: 1400  Stop Time: 1440  Total time in clinic (min): 40 minutes    Assessment  Assessment details: S/p L ORIF  distal humerus capitellar and trochlear fx with lateral epicondyle fx, and repair of lateral collateral ligament complex on 7/10/18  Barrington Moran continues with multiple impairments relating to her injury  The LUE is affected globally due to non-use  Elbow flexion has not improved since the initial evaluation despite concentrated efforts during therapy and the use of a static progressive elbow flexion orthotic at home  There is a hard end feel at the joint and she also complains of medial sided pain during stretching  Elbow extension has improved modestly, and has a greater likelihood for further improvement  She has a tight bicep as well as capsular adhesion restricting this joint  Wrist ROM and digit ROM are less impaired, althugh require consistent stretching to maintain gains  Edema is greatly resolved, and is no longer pitting in the elbow  The shoulder continues with soft tissue restriction, mostly in ER and IR, and she has been addressing this at home  See below for a detailed assessment as well as progress towards goals  10/16/18:  Patient demonstrates improved passive elbow flexion and improved active forearm and wrist ROM  No change in active elbow ROM this date   strength improved  Patient is using the LUE more for ADL tasks and has returned to light duty work  Recommend we continue with OT x 4 more weeks    18:  Patient demonstrates good increase in AROM  For elbow flexion/extension supination/pronation   strength is increased but does have decreased endurance     Initiated weight bearing over the past month  She is able to tolerate weight through right elbow in a standing position  In quadraped , she compensates and avoids bearing full weight through left UE  Pt is fearful that she will hurt elbow if she bears too much weight through her elbow  She is now working 4 hours per day with a 10# weight restriction  She is hopeful to increase work hours following next MD visit  Patient continues to make gains functionally and physically over the past month with more gains anticipated  Patient would benefit from skilled OT in order to attain goals and resume normal activities with left UE     12/11/18:   Patient demonstrates good increase in elbow flexion and  strength  Elbow Extension has plateau'd at this time  She continues to use left elbow in all functional  Tasks but is right hand dominant   Patient would benefit from continued OT in order to increase functional status with elbow  Patient continues to work 20-24 hours per week with a 10# weight restriction  Impairments: abnormal or restricted ROM, activity intolerance, impaired physical strength, pain with function and weight-bearing intolerance  Functional limitations: 10 lb lifting restrictionUnderstanding of Dx/Px/POC: good   Prognosis: good    Goals  STG: Patient will be compliant with post operative precautions (if applicable) and home exercise program in 2 weeks  - MET  STG: Pain will be reduced by two subjective levels in 2 weeks  MET  STG: Inflammation/edema/joint effusion will be reduced by 25% and patient will be independent with self management techniques in 4 weeks  - MET  STG: Range of motion of wrist will be improved by 50% in 2 weeks  - MET  STG: Range of motion of forearm and elbow will be improved by 25% in 4 weeks  - MET  STG: Range of motion of hand will be improved by 75% in 2 weeks  - MET    LTG: Pain will be reduced by 4 subjective levels, or resolved in 6-8 weeks  -  MET  LTG: Performance in ADLs and IADLS will be improved to prior level of function with the affected extremity within 8 weeks  - IN PROGRESS  LTG: Performance in work/school activity will be improved to prior level of function with the affected extremity within 8 weeks  - IN PROGRESS  LTG: FOTO score increase by 20 points within 8 weeks  - PARTIALLY MET    NEW GOALS:  Strength of UE will improve to 4/5 in 4 weeks  Partially met  EF will improve to 115 in 4 weeks  MET  EE will improve to 15 in 4 weeks  Patient will bear weight 75% through L UE in order to rise easily from the floor  Patient will work full time with min Subject Companyitons  Plan  Plan details: Treatment to include modalities, manual therapy, PRE's, HEP, and orthotics as appropriate  Patient would benefit from: skilled OT, OT eval and custom splinting  Planned modality interventions: thermotherapy: hydrocollator packs and unattended electrical stimulation  Planned therapy interventions: functional ROM exercises, home exercise program, joint mobilization, manual therapy, therapeutic exercise, patient education, graded activity, therapeutic activities and strengthening  Frequency: 2x week  Duration in weeks: 6  Plan of Care beginning date: 10/16/2018  Plan of Care expiration date: 11/27/2018  Treatment plan discussed with: patient        Subjective Evaluation    History of Present Illness  Date of surgery: 7/10/2018  Mechanism of injury: surgery  Mechanism of injury: S/p L ORIF  distal humerus capitellar and trochlear fx with lateral epicondyle fx, and repair of ulnar collateral ligament complex on 7/10/18  Injury from a [address] one week prior  She is currently wearing a hinged elbow brace allowing full flexion and extension but is removing it for exercising and therapy  10/16/18: I have trouble lifting some things  I can get dressed okay  I'm getting better doing my hair  Patient reports doing light house work  Has returned to light duty work at [employer]    Has a 10 lb lifting restriction  Patient reports difficulty getting up from the floor  18: I have trouble lifting with my left hand like when I lifted the mattress  I feel that I'm 50% improved since start of OT  Patient notes that she continues to use her flexion/extension static splints  She does note ongoing intermittent shoulder pain  Not a recurrent problem   Quality of life: good    Pain  Current pain ratin  At best pain ratin  At worst pain rating: 3  Location: Dorsal left forearm  Quality: dull ache and discomfort  Relieving factors: rest and medications  Progression: no change    Social Support    Employment status: working ( for infants)  Hand dominance: right    Treatments  Previous treatment: immobilization  Current treatment: occupational therapy  Patient Goals  Patient goals for therapy: decreased edema, decreased pain, increased motion, increased strength, independence with ADLs/IADLs and return to work  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        Objective     Observations     Left Elbow   Postive for adhesive scar and edema  Additional Observation Details  Mild scar tissue adhesion to surrounding tissue  Palpation     Additional Palpation Details  Bicep and tricep atrophy       Active Range of Motion   Left Shoulder   Flexion: 140 degrees   Abduction: 130 degrees     Left Elbow   Flexion: 133 degrees   Extension: -25 degrees   Forearm supination: 80 degrees   Forearm pronation: 45 degrees     Right Elbow   Flexion: 140 degrees   Extension: 0 degrees     Left Wrist   Wrist flexion: 65 degrees   Wrist extension: 70 degrees     Additional Active Range of Motion Details  Increased from 105 elbow flexion     Increased from-35 to -25 following LLPS with a 10# kettle bell (  2 two minutes LLPS)    Passive Range of Motion     Left Elbow   Flexion: 135 degrees   Extension: -20 degrees   Forearm supination: 90 degrees   Forearm pronation: 65 degrees Additional Passive Range of Motion Details  Increased from 120 PROM elbow flexion  Increased supination from 80 to 90 degrees and pronation increased from 45 to 65 degrees  Joint Play     Left Elbow   Hypomobile in the humeroulnar joint, humeroradial joint, proximal radioulnar joint and distal radioulnar joint  Strength/Myotome Testing     Left Wrist/Hand      (2nd hand position)     Trial 1: 45    Trial 2: 41    Trial 3: 43    Right Wrist/Hand      (2nd hand position)     Trial 1: 50    Trial 2: 51    Trial 3: 52    Tests     Left Wrist/Hand   Positive extrinsic extensor tightness, extrinsic flexor tightness and intrinsic muscle tightness       Swelling   Left Elbow Girth Measurements   Joint line: 26 cm    Right Elbow Girth Measurements   Joint line: 25 cm    Left Wrist/Hand   Circumference wrist: 16 cm    Right Wrist/Hand   Circumference wrist: 16 cm      Flowsheet Rows      Most Recent Value   PT/OT G-Codes   Current Score  57   Projected Score  68   Assessment Type  Re-evaluation   G code set  Carrying, Moving & Handling Objects   Carrying, Moving and Handling Objects Current Status ()  CK   Carrying, Moving and Handling Objects Goal Status ()  CK       Objective: See treatment diary below  Manual  10/30 11/1 11/6 10/11 10/16 10/18 10/22 10/25    IASTM            X 5'     Wrist/Hand ROM                 Elbow AAROM 15' 5'   10'  5' 5'       Elbow PROM/stretching as tolerated      10'   10 8''       Contract/relax elbow flexion        5'  5' 5'  5'  5'/HEP against wall   Elbow joint mobs 5'  10' 3' 5'  3' 3'       PRUJ/DRUJ mobs for pronation          3''   5' 5'   KT for digit edema                 Supine Shoulder PROM                   MWM PRUJ distraction with belt during EF          10'             Exercise Diary  10/30 11/1  11/6 11/8 10/22 10/24 9/14 9/17   Green bar up/down        2 x 10    2  x10     Static         w         tp push down                                   ube f/b Level 4  3'   level  3  4'  Level  3 5, 4'/4- Level 3 5 4'/4'  level 3 4/4  level 3 4'/4'        rebounder 40 tosses red ball  50 red ball tosses  50 red ball tosess 60 red ball tosses            weight bearing quadreped x5'    5 reps/  requested to stop deferred            seated on floor to stand  3 xs                I                  Shoulder ladder abduction   10x                  GPB 1# weight  10 x 5 sec hold     Blue 20x, hammer 20x         Wall slides                                       IR/ER stretch          3 each, 30 sec         [place] stretch green ball                   Wall walking      10x    6x g Straight      UBE            Backwards, 2 5 resistance, 5 mins   LLPS 2' x 3 ' 10#  ee  2' x 3 reps 10# ee  2 2' x 2 reps 10#  ee  1' x 3 and 1' 30 sec x 2  10# kettle ball       Red theraband bicep curls, tricep extension  Now HEP            3x10 and issued for HEP   2# tricep extension                   Weight bearing on wall through hands   quadreped and   getting off of the floor x 4 times     weigth bearing 2 sec x 30 reps through arms on plint  same        rebounder          red ball 30x  red ball 30x        wall push ups     2 x10x  2 x15 2 x 15                 Modalities 9/7 9/10 9/12 9/17 9/20 9/25 8/24   MHP 10' elbow/wrist  10' 10' elbow 10' elbow  15' 10' 15'   MHP with elbow in flexion with RTB

## 2018-12-13 ENCOUNTER — OFFICE VISIT (OUTPATIENT)
Dept: OCCUPATIONAL THERAPY | Facility: CLINIC | Age: 63
End: 2018-12-13
Payer: COMMERCIAL

## 2018-12-13 DIAGNOSIS — S42.402D CLOSED FRACTURE OF LEFT ELBOW WITH ROUTINE HEALING, SUBSEQUENT ENCOUNTER: Primary | ICD-10-CM

## 2018-12-13 PROCEDURE — 97112 NEUROMUSCULAR REEDUCATION: CPT | Performed by: OCCUPATIONAL THERAPIST

## 2018-12-13 PROCEDURE — 97140 MANUAL THERAPY 1/> REGIONS: CPT | Performed by: OCCUPATIONAL THERAPIST

## 2018-12-13 PROCEDURE — 97110 THERAPEUTIC EXERCISES: CPT | Performed by: OCCUPATIONAL THERAPIST

## 2018-12-13 NOTE — PROGRESS NOTES
Daily Note     Today's date: 2018  Patient name: Yojana Lambert  : 1955  MRN: [id number]  Referring provider: Hector England MD  Dx: No diagnosis found  Subjective:  " I washed tables with my left hand to work it out  "      Objective: See treatment diary below  Manual  10/30 11/1 11/6 11/20 11/27 12/3 12/6 12/13    IASTM                 Wrist/Hand ROM                 Elbow AAROM 15' 5'   10'  5' 5'  5'     Elbow PROM/stretching as tolerated      10'   10 8'' 6    10'   Contract/relax elbow flexion          5' 5'     Elbow joint mobs 5'  10' 3' 2'  3' 3'       PRUJ/DRUJ mobs for pronation          3''  2'    KT for digit edema                 Supine Shoulder PROM                   MWM PRUJ distraction with belt during EF          10'             Exercise Diary  10/30 11/1  11/6 11/8 11/20 12/3 12/6 12/13   Green bar up/down        2 x 10        Static         w         tp push down                                     ube f/b Level 4  3'   level  3  4'  Level  3 5, 4'/4- Level 3 5 4'/4'  level 4 4/4  level4  6/6'  level 4 5/5  leve 4 5/5    rebounder 40 tosses red ball  50 red ball tosses  50 red ball tosess 60 red ball tosses  yellow ball50 times Yellow ball 50x  yellow ball 60 Yellow ball 60    weight bearing quadreped x5'    5 reps/  requested to stop deferred  deferred          seated on floor to stand  3 xs       deferred         Ibicep DB variety 3#/ tricep supine 1#               x10'   Shoulder ladder abduction   10x                  GPB 1# weight  10 x 5 sec hold              Wall slides                                       IR/ER stretch                  [place] stretch green ball                   Wall walking      10x          UBE            Backwards, 2 5 resistance, 5 mins   LLPS 2' x 3 ' 10#  ee  2' x 3 reps 10# ee  2 2' x 2 reps 10#  ee 2'  x 2  10# kettle ball  3' and 2' 10#kettle ball     Red theraband bicep curls, tricep extension  Now HEP               P/s 3 weights                2x 10   Weight bearing on wall through hands   quadreped and   getting off of the floor x 4 times     weigth bearing 2 sec x 30 reps through arms on plint  same        rebounder            yellow 60x        wall push ups     2 x10x  2 x15 2 x 15  3# bicep curl/hammer curl 20x  tricep/bicep stretch for HEP  tricep/bicep stretch  10 seconds three reps           Modalities 9/7 9/10 9/12 9/17 9/20 9/25 8/24   MHP 10' elbow/wrist  10' 10' elbow 10' elbow  15' 10' 15'   MHP with elbow in flexion with RTB                                            Assessment: Tolerated treatment well  Patient would benefit from continued OT      Plan: Continue per plan of care

## 2018-12-18 ENCOUNTER — OFFICE VISIT (OUTPATIENT)
Dept: OCCUPATIONAL THERAPY | Facility: CLINIC | Age: 63
End: 2018-12-18
Payer: COMMERCIAL

## 2018-12-18 DIAGNOSIS — S42.402D CLOSED FRACTURE OF LEFT ELBOW WITH ROUTINE HEALING, SUBSEQUENT ENCOUNTER: Primary | ICD-10-CM

## 2018-12-18 PROCEDURE — 97140 MANUAL THERAPY 1/> REGIONS: CPT | Performed by: OCCUPATIONAL THERAPIST

## 2018-12-18 PROCEDURE — 97110 THERAPEUTIC EXERCISES: CPT | Performed by: OCCUPATIONAL THERAPIST

## 2018-12-18 PROCEDURE — 97530 THERAPEUTIC ACTIVITIES: CPT | Performed by: OCCUPATIONAL THERAPIST

## 2018-12-18 NOTE — PROGRESS NOTES
Daily Note     Today's date: 2018  Patient name: Aster Shafer  : 1955  MRN: [id number]  Referring provider: Marylin Patel MD  Dx:   Encounter Diagnosis     ICD-10-CM    1  Closed fracture of left elbow with routine healing, subsequent encounter S42 402D                   Subjective: " I was shoveling for about 30-40 minutes last night   That makes my elbow sore  It was aching after that  "      Objective: See treatment diary below  Manual  10/30 11/1 11/6 11/20 11/27 12/3 12/6 12/13    IASTM                 Wrist/Hand ROM                 Elbow AAROM 15' 5'   10'  5' 5'  5'     Elbow PROM/stretching as tolerated      10'   10 8'' 6    10'   Contract/relax elbow flexion          5' 5'     Elbow joint mobs 5'  10' 3' 2'  3' 3'       PRUJ/DRUJ mobs for pronation          3''  2'    KT for digit edema                 Supine Shoulder PROM                   MWM PRUJ distraction with belt during EF          10'             Exercise Diary  10/30 11/1  11/6 11/8 11/20 12/3 12/6 12/13   Green bar up/down        2 x 10        Static         w         tp push down                                     ube f/b Level 4  3'   level  3  4'  Level  3 5, 4'/4- Level 3 5 4'/4'  level 4 4/4  level4  6/6'  level 4 5/5  leve 4 5/5    rebounder 40 tosses red ball  50 red ball tosses  50 red ball tosess 60 red ball tosses  yellow ball50 times Yellow ball 50x  yellow ball 60 Yellow ball 60    weight bearing quadreped x5'    5 reps/  requested to stop deferred  deferred          seated on floor to stand  3 xs       deferred         Ibicep DB variety 3#/ tricep supine 1#               x10'   Shoulder ladder abduction   10x                  GPB 1# weight  10 x 5 sec hold              Wall slides                                       IR/ER stretch                  [place] stretch green ball                   Wall walking      10x          UBE            Backwards, 2 5 resistance, 5 mins   LLPS 2' x 3 ' 10#  ee  2' x 3 reps 10# ee  2 2' x 2 reps 10#  ee 2'  x 2  10# kettle ball  3' and 2' 10#kettle ball     Red theraband bicep curls, tricep extension  Now HEP               P/s 3 weights                2x 10   Weight bearing on wall through hands   quadreped and   getting off of the floor x 4 times     weigth bearing 2 sec x 30 reps through arms on plint  same        rebounder            yellow 60x        wall push ups     2 x10x  2 x15 2 x 15  3# bicep curl/hammer curl 20x  tricep/bicep stretch for HEP  tricep/bicep stretch  10 seconds three reps           Modalities 9/7 9/10 9/12 9/17 9/20 9/25 8/24   MHP 10' elbow/wrist  10' 10' elbow 10' elbow  15' 10' 15'   MHP with elbow in flexion with RTB                                            Assessment: Tolerated treatment well  Patient would benefit from continued OT  Plan to progress patients bicep strengthening next visit  Plan: Continue per plan of care

## 2018-12-20 ENCOUNTER — OFFICE VISIT (OUTPATIENT)
Dept: OCCUPATIONAL THERAPY | Facility: CLINIC | Age: 63
End: 2018-12-20
Payer: COMMERCIAL

## 2018-12-20 DIAGNOSIS — S42.402D CLOSED FRACTURE OF LEFT ELBOW WITH ROUTINE HEALING, SUBSEQUENT ENCOUNTER: Primary | ICD-10-CM

## 2018-12-20 PROCEDURE — 97110 THERAPEUTIC EXERCISES: CPT | Performed by: OCCUPATIONAL THERAPIST

## 2018-12-20 PROCEDURE — 97530 THERAPEUTIC ACTIVITIES: CPT | Performed by: OCCUPATIONAL THERAPIST

## 2018-12-20 PROCEDURE — 97140 MANUAL THERAPY 1/> REGIONS: CPT | Performed by: OCCUPATIONAL THERAPIST

## 2018-12-20 NOTE — PROGRESS NOTES
Daily Note     Today's date: 2018  Patient name: Dash Lo  : 1955  MRN: [id number]  Referring provider: Edenilson Bae MD  Dx:   Encounter Diagnosis     ICD-10-CM    1  Closed fracture of left elbow with routine healing, subsequent encounter S42 402D                   Subjective:  "Nothing new is going on with my elbow  It's been ok "      Objective: See treatment diary below  Manual             IASTM                 Wrist/Hand ROM                 Elbow AAROM            Elbow PROM/stretching as tolerated             Contract/relax elbow flexion               Elbow joint mobs             PRUJ/DRUJ mobs for pronation                KT for digit edema                 Supine Shoulder PROM                   MWM PRUJ distraction with belt during EF                      Exercise Diary            UBE  Level 4 5/5/ f/b              rebounder  Yellow ball 7 tosses               Bicep curls/hammer /w's 3 x 10                 PROM by OT Simba Fiddler mobs  X 15'                P/s 3 weights  20xs           tricep w                                                                                                                                                                                                                                                                                                         Modalities          MHP           MHP with elbow in flexion with RTB                                                  Assessment: Tolerated treatment well  Patient would benefit from continued OT  Tolerated increases in strengthening without complaints of increased pain  Plan: Continue per plan of care

## 2018-12-27 ENCOUNTER — OFFICE VISIT (OUTPATIENT)
Dept: OCCUPATIONAL THERAPY | Facility: CLINIC | Age: 63
End: 2018-12-27
Payer: COMMERCIAL

## 2018-12-27 DIAGNOSIS — S42.402D CLOSED FRACTURE OF LEFT ELBOW WITH ROUTINE HEALING, SUBSEQUENT ENCOUNTER: Primary | ICD-10-CM

## 2018-12-27 PROCEDURE — 97110 THERAPEUTIC EXERCISES: CPT | Performed by: OCCUPATIONAL THERAPIST

## 2018-12-27 PROCEDURE — G8985 CARRY GOAL STATUS: HCPCS | Performed by: OCCUPATIONAL THERAPIST

## 2018-12-27 PROCEDURE — G8984 CARRY CURRENT STATUS: HCPCS | Performed by: OCCUPATIONAL THERAPIST

## 2018-12-27 NOTE — PROGRESS NOTES
OT Discharge     Today's date: 2018  Patient name: Jorge Lee  : 1955  MRN: [id number]  Referring provider: Reyna Villa MD  Dx:   Encounter Diagnosis     ICD-10-CM    1  Closed fracture of left elbow with routine healing, subsequent encounter S42 402D        Start Time: 1400  Stop Time: 0436  Total time in clinic (min): 45 minutes    Assessment  Assessment details: S/p L ORIF  distal humerus capitellar and trochlear fx with lateral epicondyle fx, and repair of lateral collateral ligament complex on 7/10/18  Barrington Moran continues with multiple impairments relating to her injury  The LUE is affected globally due to non-use  Elbow flexion has not improved since the initial evaluation despite concentrated efforts during therapy and the use of a static progressive elbow flexion orthotic at home  There is a hard end feel at the joint and she also complains of medial sided pain during stretching  Elbow extension has improved modestly, and has a greater likelihood for further improvement  She has a tight bicep as well as capsular adhesion restricting this joint  Wrist ROM and digit ROM are less impaired, althugh require consistent stretching to maintain gains  Edema is greatly resolved, and is no longer pitting in the elbow  The shoulder continues with soft tissue restriction, mostly in ER and IR, and she has been addressing this at home  See below for a detailed assessment as well as progress towards goals  10/16/18:  Patient demonstrates improved passive elbow flexion and improved active forearm and wrist ROM  No change in active elbow ROM this date   strength improved  Patient is using the LUE more for ADL tasks and has returned to light duty work  Recommend we continue with OT x 4 more weeks    18:  Patient demonstrates good increase in AROM  For elbow flexion/extension supination/pronation   strength is increased but does have decreased endurance     Initiated weight bearing over the past month  She is able to tolerate weight through right elbow in a standing position  In quadraped , she compensates and avoids bearing full weight through left UE  Pt is fearful that she will hurt elbow if she bears too much weight through her elbow  She is now working 4 hours per day with a 10# weight restriction  She is hopeful to increase work hours following next MD visit  Patient continues to make gains functionally and physically over the past month with more gains anticipated  Patient would benefit from skilled OT in order to attain goals and resume normal activities with left UE     12/27/18:  Patient has made gains in elbow flexion, pronation at this time  She has plateau'd in supination and elbow extension  She continues to be fearful of weight bearing through left elbow  Crunching, snapping noted with elbow extension which may be related to inflammation or possibly bone on bone  Advised to discuss with MD    She has made consistent gains in  strength overall UE strength  Patient has potential for further gains in strengthening and would benefit from continued OT  Impairments: abnormal or restricted ROM, activity intolerance, impaired physical strength, pain with function and weight-bearing intolerance  Functional limitations: 10 lb lifting restrictionUnderstanding of Dx/Px/POC: good   Prognosis: good    Goals  STG: Patient will be compliant with post operative precautions (if applicable) and home exercise program in 2 weeks  - MET  STG: Pain will be reduced by two subjective levels in 2 weeks  MET  STG: Inflammation/edema/joint effusion will be reduced by 25% and patient will be independent with self management techniques in 4 weeks  - MET  STG: Range of motion of wrist will be improved by 50% in 2 weeks  - MET  STG: Range of motion of forearm and elbow will be improved by 25% in 4 weeks  - MET  STG: Range of motion of hand will be improved by 75% in 2 weeks  - MET    LTG: Pain will be reduced by 4 subjective levels, or resolved in 6-8 weeks  - met  LTG: Performance in ADLs and IADLS will be improved to prior level of function with the affected extremity within 8 weeks  - met  LTG: Performance in work/school activity will be improved to prior level of function with the affected extremity within 8 weeks  - IN PROGRESS  LTG: FOTO score increase by 20 points within 8 weeks  - PARTIALLY MET    NEW GOALS:  Strength of UE will improve to 4/5 in 4 weeks  Partially met  EF will improve to 115 in 4 weeks  EE will improve to 15 in 4 weeks  Patient will bear weight 75% through L UE in order to rise easily from the floor  Patient will work full time with min restricitons  Plan  Plan details: Treatment to include modalities, manual therapy, PRE's, HEP, and orthotics as appropriate  Patient would benefit from: skilled OT, OT eval and custom splinting  Planned modality interventions: thermotherapy: hydrocollator packs and unattended electrical stimulation  Planned therapy interventions: functional ROM exercises, home exercise program, joint mobilization, manual therapy, therapeutic exercise, patient education, graded activity, therapeutic activities and strengthening  Frequency: 2x week  Duration in weeks: 6  Plan of Care beginning date: 12/27/2018  Plan of Care expiration date: 2/7/2019  Treatment plan discussed with: patient        Subjective Evaluation    History of Present Illness  Date of surgery: 7/10/2018  Mechanism of injury: surgery  Mechanism of injury: S/p L ORIF  distal humerus capitellar and trochlear fx with lateral epicondyle fx, and repair of ulnar collateral ligament complex on 7/10/18  Injury from a [address] one week prior  She is currently wearing a hinged elbow brace allowing full flexion and extension but is removing it for exercising and therapy  10/16/18: I have trouble lifting some things  I can get dressed okay  I'm getting better doing my hair    Patient reports doing light house work  Has returned to light duty work at [employer]  Has a 10 lb lifting restriction  Patient reports difficulty getting up from the floor    18  Patient voiced belief that she is 50% improved since start of OT  Opening a new jar is challenging and I struggle a little  Patient avoids putting weight through her elbow for example I had trouble crawling through the attic last week  I feel like my bicep muscle is weak  Not a recurrent problem   Quality of life: good    Pain  Current pain ratin  At best pain ratin  At worst pain ratin  Location: Dorsal left forearm  Quality: dull ache and discomfort  Relieving factors: rest and medications  Progression: no change    Social Support    Employment status: working ( for infants)  Hand dominance: right    Treatments  Previous treatment: immobilization  Current treatment: occupational therapy  Patient Goals  Patient goals for therapy: decreased edema, decreased pain, increased motion, increased strength, independence with ADLs/IADLs and return to work  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        Objective     Observations     Left Elbow   Postive for adhesive scar and edema  Additional Observation Details  Mild scar tissue adhesion to surrounding tissue  Palpation     Additional Palpation Details  Bicep and tricep atrophy       Active Range of Motion   Left Shoulder   Flexion: 140 degrees   Abduction: 130 degrees     Left Elbow   Flexion: 133 degrees   Extension: -25 degrees   Forearm supination: WFL  Forearm pronation: WFL    Right Elbow   Flexion: 140 degrees   Extension: 0 degrees     Left Wrist   Wrist flexion: 65 degrees   Wrist extension: 70 degrees     Additional Active Range of Motion Details  Increased from 105 elbow flexion     Increased from-35 to -25 following LLPS with a 10# kettle bell (  2 two minutes LLPS)        Passive Range of Motion     Left Elbow   Flexion: 135 degrees   Extension: -20 degrees   Forearm supination: 90 degrees   Forearm pronation: 65 degrees     Additional Passive Range of Motion Details  Increased from 120 PROM elbow flexion  Increased supination from 80 to 90 degrees and pronation increased from 45 to 65 degrees  Joint Play     Left Elbow   Hypomobile in the humeroulnar joint, humeroradial joint, proximal radioulnar joint and distal radioulnar joint  Strength/Myotome Testing     Left Elbow   Flexion: 4  Extension: 3+    Left Wrist/Hand      (2nd hand position)     Trial 1: 38    Trial 2: 40    Trial 3: 41    Right Wrist/Hand      (2nd hand position)     Trial 1: 57    Trial 2: 52    Trial 3: 55    Additional Strength Details  crunching    Tests     Left Wrist/Hand   Positive extrinsic extensor tightness, extrinsic flexor tightness and intrinsic muscle tightness       Swelling   Left Elbow Girth Measurements   Joint line: 26 cm    Right Elbow Girth Measurements   Joint line: 25 cm    Left Wrist/Hand   Circumference wrist: 16 cm    Right Wrist/Hand   Circumference wrist: 16 cm      Flowsheet Rows      Most Recent Value   PT/OT G-Codes   Current Score  59   Projected Score  68   Assessment Type  Re-evaluation   G code set  Carrying, Moving & Handling Objects   Carrying, Moving and Handling Objects Current Status ()  CJ   Carrying, Moving and Handling Objects Goal Status ()  CJ       Objective: See treatment diary below  Manual  10/30 11/1 11/6 10/11 10/16 10/18 10/22 10/25    IASTM            X 5'     Wrist/Hand ROM                 Elbow AAROM 15' 5'   10'  5' 5'       Elbow PROM/stretching as tolerated      10'   10 8''       Contract/relax elbow flexion        5'  5' 5'  5'  5'/HEP against wall   Elbow joint mobs 5'  10' 3' 5'  3' 3'       PRUJ/DRUJ mobs for pronation          3''   5' 5'   KT for digit edema                 Supine Shoulder PROM                   MWM PRUJ distraction with belt during EF          10'           Exercise Diary  10/30 11/1  11/6 11/8 10/22 10/24 9/14 9/17   Green bar up/down        2 x 10    2  x10     Static         w         tp push down                                     ube f/b Level 4  3'   level  3  4'  Level  3 5, 4'/4- Level 3 5 4'/4'  level 3 4/4  level 3 4'/4'        rebounder 40 tosses red ball  50 red ball tosses  50 red ball tosess 60 red ball tosses            weight bearing quadreped x5'    5 reps/  requested to stop deferred            seated on floor to stand  3 xs                I                  Shoulder ladder abduction   10x                  GPB 1# weight  10 x 5 sec hold     Blue 20x, hammer 20x         Wall slides                                       IR/ER stretch          3 each, 30 sec         [place] stretch green ball                   Wall walking      10x    6x g Straight      UBE            Backwards, 2 5 resistance, 5 mins   LLPS 2' x 3 ' 10#  ee  2' x 3 reps 10# ee  2 2' x 2 reps 10#  ee  1' x 3 and 1' 30 sec x 2  10# kettle ball       Red theraband bicep curls, tricep extension  Now HEP            3x10 and issued for HEP   2# tricep extension                   Weight bearing on wall through hands   quadreped and   getting off of the floor x 4 times     weigth bearing 2 sec x 30 reps through arms on plint  same        rebounder          red ball 30x  red ball 30x        wall push ups     2 x10x  2 x15 2 x 15                 Modalities 9/7 9/10 9/12 9/17 9/20 9/25 8/24   MHP 10' elbow/wrist  10' 10' elbow 10' elbow  15' 10' 15'   MHP with elbow in flexion with RTB

## 2018-12-29 DIAGNOSIS — J45.909 UNCOMPLICATED ASTHMA, UNSPECIFIED ASTHMA SEVERITY, UNSPECIFIED WHETHER PERSISTENT: ICD-10-CM

## 2019-01-02 RX ORDER — BUDESONIDE AND FORMOTEROL FUMARATE DIHYDRATE 160; 4.5 UG/1; UG/1
AEROSOL RESPIRATORY (INHALATION)
Qty: 10.2 G | Refills: 5 | Status: SHIPPED | OUTPATIENT
Start: 2019-01-02 | End: 2020-01-16 | Stop reason: SDUPTHER

## 2019-01-09 ENCOUNTER — OFFICE VISIT (OUTPATIENT)
Dept: OBGYN CLINIC | Facility: HOSPITAL | Age: 64
End: 2019-01-09
Payer: COMMERCIAL

## 2019-01-09 VITALS
WEIGHT: 160 LBS | DIASTOLIC BLOOD PRESSURE: 78 MMHG | HEIGHT: 68 IN | HEART RATE: 68 BPM | SYSTOLIC BLOOD PRESSURE: 128 MMHG | BODY MASS INDEX: 24.25 KG/M2

## 2019-01-09 DIAGNOSIS — M25.522 PAIN IN LEFT ELBOW: Primary | ICD-10-CM

## 2019-01-09 PROCEDURE — 99213 OFFICE O/P EST LOW 20 MIN: CPT | Performed by: ORTHOPAEDIC SURGERY

## 2019-01-09 NOTE — PROGRESS NOTES
ASSESSMENT/PLAN:    Assessment:   S/P ORIF Left trochlear shear fx and lateral epicondylar fx with UCL repair 7/10/18    Plan:   Wbat LUE  Work note written today to delineate no restrictions to LUE lifting/pushing/pulling  She is going to attempt to return to work at her  without restriction and if she is unable to perform full duties of the job, we would impose a 10-15 lb restriction to her LUE  She will call if there is any difficulty  Follow-up PRN  May transition to home exercise program from the OT perspective at this time  Follow Up: PRN; patient will call if she needs work note with updated LUE restrictions  _____________________________________________________  CHIEF COMPLAINT:  Chief Complaint   Patient presents with    Left Elbow - Follow-up         SUBJECTIVE:  Lee Ribeiro is a 59y o  year old female who presents for follow up S/P ORIF Left trcohlear shear fx and lateral epicondylar with UCL repair 7/10/18  She has been attending therapy twice a week and working on range of motion  She described some persistent decreased sensation over the proximal lateral forearm area but does not have full numbness  She no longer requires pain medications and occasional has stiffness of the elbow in the early morning and at the end of the day  She denies motor deficits      PAST MEDICAL HISTORY:  Past Medical History:   Diagnosis Date    Allergic rhinitis     Last Assessed: 6/9/2016    Black tarry stools     Concussion with prolonged loss of consciousness without return to pre-existing conscious level     COPD (chronic obstructive pulmonary disease) ([facility])     Last Assessed: 3/8/2017    Fall     slipping, tripping or stumbling    Generalized osteoarthritis     GERD (gastroesophageal reflux disease)     Herpes simplex infection     Menopausal disorder     Morbid obesity due to excess calories ([facility] [address] )     Neuroma 1975    right foot    Pharyngeal disorder     Pneumonia     Last Assessed: 5/15/2014    Post-menopausal bleeding     Last Assessed: 2015    Postmenopausal disorder        PAST SURGICAL HISTORY:  Past Surgical History:   Procedure Laterality Date     SECTION      COLONOSCOPY      Complete    DILATION AND CURETTAGE OF UTERUS      3 times    DILATION AND EVACUATION      Surgical treatment of missed  in first trimester - x 5   [facility], 1982    Surgically Induced     ENDOMETRIAL BIOPSY  2015    PMB/EB    KNEE ARTHROSCOPY Right     KNEE SURGERY Right     [id number] for right fractured patella    DE OPEN TX RADIAL HEAD/NECK FRACTURE Left 7/10/2018    Procedure: OPEN REDUCTION INTERNAL FIXATION LEFT DISTAL HUMERUS CAPITELLAR AND TROCHLEAR SHEAR FRACTURE WITH LATERAL EPICONDYLE FRACTURE; REPAIR LATERAL ULNAR COLLATERAL LIGAMENT COMPLEX OF THE ELBOW; SPLINT APPLICATION;  Surgeon: Jayne Meeks MD;  Location: [facility];  Service: Orthopedics       FAMILY HISTORY:  Family History   Problem Relation Age of Onset    Diabetes Mother    [employer] Mother     Hypertension Mother     Ovarian cancer Mother     Pancreatic cancer Mother     Other Mother         Skin disorder, Urinary Incontinence    Coronary artery disease Father     Heart disease Father     Hypertension Father     Hypertension Sister     Heart disease Brother     Jaundice Brother     Rectal cancer Brother     Osteoporosis Other     Breast cancer Paternal Aunt        SOCIAL HISTORY:  Social History   Substance Use Topics    Smoking status: Former Smoker     Packs/day: 1 00     Types: Cigarettes     Quit date: 6/15/1984    Smokeless tobacco: Never Used    Alcohol use No      Comment: rarely       MEDICATIONS:    Current Outpatient Prescriptions:     betamethasone valerate (LUXIQ) 0 12 % foam, Apply 1 application topically as needed  , Disp: , Rfl: 0    loratadine (CLARITIN) 10 mg tablet, Take 10 mg by mouth daily, Disp: , Rfl:     Misc Natural Products (OSTEO BI-FLEX JOINT SHIELD PO), Take 1 tablet by mouth every morning, Disp: , Rfl:     montelukast (SINGULAIR) 10 mg tablet, TAKE 1 TABLET EVERY DAY, Disp: 90 tablet, Rfl: 3    PROVENTIL  (90 Base) MCG/ACT inhaler, Inhale 2 puffs every 6 (six) hours as needed, Disp: , Rfl: 0    SYMBICORT 160-4 5 MCG/ACT inhaler, inhale 2 puffs by mouth twice a day RINSE MOUTH AFTER USE, Disp: 10 2 g, Rfl: 5    valACYclovir (VALTREX) 1,000 mg tablet, take 2 tablets NOW AND THEN 2 TABLETS IN 12 HOURS, Disp: 4 tablet, Rfl: 4    ALLERGIES:  Allergies   Allergen Reactions    Pravastatin Myalgia    Simvastatin Myalgia       REVIEW OF SYSTEMS:  Pertinent items are noted in HPI  A comprehensive review of systems was negative  LABS:  HgA1c:   Lab Results   Component Value Date    HGBA1C 5 1 04/07/2017     BMP:   Lab Results   Component Value Date    CALCIUM 9 0 04/07/2017     04/07/2017    K 4 1 04/07/2017    CO2 27 04/07/2017     04/07/2017    BUN 17 04/07/2017    CREATININE 0 65 04/07/2017           _____________________________________________________  PHYSICAL EXAMINATION:  General: well developed and well nourished, alert, oriented times 3 and appears comfortable  Psychiatric: Normal  HEENT: Trachea Midline, No torticollis  Cardiovascular: No discernable arrhythmia  Pulmonary: No wheezing or stridor  Skin: No masses, erthema, lacerations, fluctation, ulcerations  Neurovascular: Sensation Intact to the Median, Ulnar, Radial Nerve, Motor Intact to the Median, Ulnar, Radial Nerve and Pulses Intact - decreased sensation around elbow incision and proximal lateral forearm area, SILT throughout hand and distally    MUSCULOSKELETAL EXAMINATION:  LEFT SIDE:  Elbow:  Incision well-healed   Motion is as follows: Flexion 110, Extension 25, lack 15 deg full pronation, supination full (65-90)  Mild crepitus through range of motion    _____________________________________________________  STUDIES REVIEWED:  No new images obtained today      PROCEDURES PERFORMED:  Procedures  No Procedures performed today         I interviewed, took the history and examined the patient  I discuss the case with the resident and reviewed the resident's note  I supervised the resident and I agree with the resident management plan as it was presented to me  I was present in the clinic and examined the patient

## 2019-01-09 NOTE — LETTER
January 9, 2019     Patient: Pio Walton   YOB: 1955   Date of Visit: 1/9/2019       To Whom it May Concern:    Easton Fountain is under my professional care  She was seen in my office on 1/9/2019  She is now cleared for return to work full duties/no restrictions and no lifting/pushing/pulling limit  If you have any questions or concerns, please don't hesitate to call           Sincerely,          Mary Grace Savage MD        CC: Pio Walton

## 2019-06-04 ENCOUNTER — OFFICE VISIT (OUTPATIENT)
Dept: INTERNAL MEDICINE CLINIC | Facility: CLINIC | Age: 64
End: 2019-06-04
Payer: COMMERCIAL

## 2019-06-04 VITALS
BODY MASS INDEX: 23.73 KG/M2 | WEIGHT: 156.6 LBS | SYSTOLIC BLOOD PRESSURE: 130 MMHG | HEART RATE: 68 BPM | DIASTOLIC BLOOD PRESSURE: 90 MMHG | HEIGHT: 68 IN

## 2019-06-04 DIAGNOSIS — I10 BENIGN ESSENTIAL HYPERTENSION: ICD-10-CM

## 2019-06-04 DIAGNOSIS — J45.40 MODERATE PERSISTENT ASTHMA WITHOUT COMPLICATION: ICD-10-CM

## 2019-06-04 DIAGNOSIS — Z23 NEED FOR PNEUMOCOCCAL VACCINATION: ICD-10-CM

## 2019-06-04 DIAGNOSIS — Z78.0 MENOPAUSE: ICD-10-CM

## 2019-06-04 DIAGNOSIS — Z12.39 SCREENING FOR BREAST CANCER: ICD-10-CM

## 2019-06-04 DIAGNOSIS — R73.01 ABNORMAL FASTING GLUCOSE: Primary | ICD-10-CM

## 2019-06-04 DIAGNOSIS — F51.01 PRIMARY INSOMNIA: ICD-10-CM

## 2019-06-04 DIAGNOSIS — B00.9 HERPES: ICD-10-CM

## 2019-06-04 DIAGNOSIS — E78.2 MIXED HYPERLIPIDEMIA: ICD-10-CM

## 2019-06-04 PROBLEM — F41.8 DEPRESSION WITH ANXIETY: Status: ACTIVE | Noted: 2017-04-14

## 2019-06-04 PROBLEM — R39.15 URINARY URGENCY: Status: ACTIVE | Noted: 2017-03-01

## 2019-06-04 PROBLEM — L21.9 SEBORRHEIC DERMATITIS OF SCALP: Status: ACTIVE | Noted: 2017-12-19

## 2019-06-04 PROCEDURE — 90471 IMMUNIZATION ADMIN: CPT | Performed by: INTERNAL MEDICINE

## 2019-06-04 PROCEDURE — 3008F BODY MASS INDEX DOCD: CPT | Performed by: INTERNAL MEDICINE

## 2019-06-04 PROCEDURE — 90732 PPSV23 VACC 2 YRS+ SUBQ/IM: CPT | Performed by: INTERNAL MEDICINE

## 2019-06-04 PROCEDURE — 99214 OFFICE O/P EST MOD 30 MIN: CPT | Performed by: INTERNAL MEDICINE

## 2019-06-04 PROCEDURE — 1036F TOBACCO NON-USER: CPT | Performed by: INTERNAL MEDICINE

## 2019-06-04 RX ORDER — ZALEPLON 10 MG/1
10 CAPSULE ORAL
Qty: 30 CAPSULE | Refills: 0 | Status: SHIPPED | OUTPATIENT
Start: 2019-06-04 | End: 2020-01-16 | Stop reason: SDUPTHER

## 2019-06-06 RX ORDER — VALACYCLOVIR HYDROCHLORIDE 1 G/1
TABLET, FILM COATED ORAL
Qty: 4 TABLET | Refills: 5 | Status: SHIPPED | OUTPATIENT
Start: 2019-06-06 | End: 2020-07-16 | Stop reason: SDUPTHER

## 2019-06-25 LAB
ALBUMIN SERPL-MCNC: 4.1 G/DL (ref 3.6–5.1)
ALBUMIN/GLOB SERPL: 1.9 (CALC) (ref 1–2.5)
ALP SERPL-CCNC: 74 U/L (ref 33–130)
ALT SERPL-CCNC: 14 U/L (ref 6–29)
AST SERPL-CCNC: 16 U/L (ref 10–35)
BASOPHILS # BLD AUTO: 70 CELLS/UL (ref 0–200)
BASOPHILS NFR BLD AUTO: 2 %
BILIRUB SERPL-MCNC: 0.5 MG/DL (ref 0.2–1.2)
BUN SERPL-MCNC: 24 MG/DL (ref 7–25)
BUN/CREAT SERPL: ABNORMAL (CALC) (ref 6–22)
CALCIUM SERPL-MCNC: 9.3 MG/DL (ref 8.6–10.4)
CHLORIDE SERPL-SCNC: 108 MMOL/L (ref 98–110)
CHOLEST SERPL-MCNC: 209 MG/DL
CHOLEST/HDLC SERPL: 3.7 (CALC)
CO2 SERPL-SCNC: 25 MMOL/L (ref 20–32)
CREAT SERPL-MCNC: 0.7 MG/DL (ref 0.5–0.99)
EOSINOPHIL # BLD AUTO: 98 CELLS/UL (ref 15–500)
EOSINOPHIL NFR BLD AUTO: 2.8 %
ERYTHROCYTE [DISTWIDTH] IN BLOOD BY AUTOMATED COUNT: 12.3 % (ref 11–15)
GLOBULIN SER CALC-MCNC: 2.2 G/DL (CALC) (ref 1.9–3.7)
GLUCOSE SERPL-MCNC: 101 MG/DL (ref 65–99)
HBA1C MFR BLD: 5.2 % OF TOTAL HGB
HCT VFR BLD AUTO: 40.3 % (ref 35–45)
HDLC SERPL-MCNC: 56 MG/DL
HGB BLD-MCNC: 13.6 G/DL (ref 11.7–15.5)
LDLC SERPL CALC-MCNC: 128 MG/DL (CALC)
LYMPHOCYTES # BLD AUTO: 1435 CELLS/UL (ref 850–3900)
LYMPHOCYTES NFR BLD AUTO: 41 %
MCH RBC QN AUTO: 29.9 PG (ref 27–33)
MCHC RBC AUTO-ENTMCNC: 33.7 G/DL (ref 32–36)
MCV RBC AUTO: 88.6 FL (ref 80–100)
MONOCYTES # BLD AUTO: 340 CELLS/UL (ref 200–950)
MONOCYTES NFR BLD AUTO: 9.7 %
NEUTROPHILS # BLD AUTO: 1558 CELLS/UL (ref 1500–7800)
NEUTROPHILS NFR BLD AUTO: 44.5 %
NONHDLC SERPL-MCNC: 153 MG/DL (CALC)
PLATELET # BLD AUTO: 251 THOUSAND/UL (ref 140–400)
PMV BLD REES-ECKER: 10.6 FL (ref 7.5–12.5)
POTASSIUM SERPL-SCNC: 4.2 MMOL/L (ref 3.5–5.3)
PROT SERPL-MCNC: 6.3 G/DL (ref 6.1–8.1)
RBC # BLD AUTO: 4.55 MILLION/UL (ref 3.8–5.1)
SL AMB EGFR AFRICAN AMERICAN: 108 ML/MIN/1.73M2
SL AMB EGFR NON AFRICAN AMERICAN: 93 ML/MIN/1.73M2
SODIUM SERPL-SCNC: 142 MMOL/L (ref 135–146)
TRIGL SERPL-MCNC: 131 MG/DL
TSH SERPL-ACNC: 3.22 MIU/L (ref 0.4–4.5)
WBC # BLD AUTO: 3.5 THOUSAND/UL (ref 3.8–10.8)

## 2019-07-10 ENCOUNTER — OFFICE VISIT (OUTPATIENT)
Dept: INTERNAL MEDICINE CLINIC | Facility: CLINIC | Age: 64
End: 2019-07-10
Payer: COMMERCIAL

## 2019-07-10 VITALS
SYSTOLIC BLOOD PRESSURE: 130 MMHG | HEART RATE: 72 BPM | BODY MASS INDEX: 23.49 KG/M2 | RESPIRATION RATE: 12 BRPM | HEIGHT: 68 IN | WEIGHT: 155 LBS | DIASTOLIC BLOOD PRESSURE: 84 MMHG

## 2019-07-10 DIAGNOSIS — E78.2 MIXED HYPERLIPIDEMIA: ICD-10-CM

## 2019-07-10 DIAGNOSIS — J45.40 MODERATE PERSISTENT ASTHMA WITHOUT COMPLICATION: ICD-10-CM

## 2019-07-10 DIAGNOSIS — R73.01 ABNORMAL FASTING GLUCOSE: Primary | ICD-10-CM

## 2019-07-10 DIAGNOSIS — I10 BENIGN ESSENTIAL HYPERTENSION: ICD-10-CM

## 2019-07-10 DIAGNOSIS — F41.8 DEPRESSION WITH ANXIETY: ICD-10-CM

## 2019-07-10 DIAGNOSIS — F51.01 PRIMARY INSOMNIA: ICD-10-CM

## 2019-07-10 PROCEDURE — 3075F SYST BP GE 130 - 139MM HG: CPT | Performed by: INTERNAL MEDICINE

## 2019-07-10 PROCEDURE — 3008F BODY MASS INDEX DOCD: CPT | Performed by: INTERNAL MEDICINE

## 2019-07-10 PROCEDURE — 99214 OFFICE O/P EST MOD 30 MIN: CPT | Performed by: INTERNAL MEDICINE

## 2019-07-10 NOTE — PATIENT INSTRUCTIONS
Lab data reviewed in detail and compared prior    Hypertension-blood pressure borderline with repeat 142/78-no indication for medication  Patient encouraged to increase aerobic exercise with a goal of 30 minutes per day, 5 days per week, weight loss as able, follow low sodium diet, try to alleviate stress in life    Hyperlipidemia-dramatic improvement with dietary modification, continue with same no indication for medication    Asthma stable on present regimen    Anxiety and insomnia-patient managing symptoms well, no need for SSRI  Continue with p r n  Use of CANDDi maintenance-schedule your bone density and mammogram    Routine follow-up after labs in 6 months, sooner as needed

## 2019-07-10 NOTE — PROGRESS NOTES
Assessment/Plan:    Diagnoses and all orders for this visit:    Abnormal fasting glucose  -     Hemoglobin A1C; Future    Moderate persistent asthma without complication    Benign essential hypertension  -     CBC and differential; Future  -     Comprehensive metabolic panel; Future    Mixed hyperlipidemia  -     Lipid panel; Future    Depression with anxiety    Primary insomnia         Patient Instructions   Lab data reviewed in detail and compared prior    Hypertension-blood pressure borderline with repeat 142/78-no indication for medication  Patient encouraged to increase aerobic exercise with a goal of 30 minutes per day, 5 days per week, weight loss as able, follow low sodium diet, try to alleviate stress in life    Hyperlipidemia-dramatic improvement with dietary modification, continue with same no indication for medication    Asthma stable on present regimen    Anxiety and insomnia-patient managing symptoms well, no need for SSRI  Continue with p r n  Use of sonata  Health maintenance-schedule your bone density and mammogram    Routine follow-up after labs in 6 months, sooner as needed  Subjective:      Patient ID: Aimee Lincoln is a 59 y o  female    Feeling generally well  No home bp's or exercise outside of work, but very active at work  Lots of stress in life w/ son in long-term, other son accidentally shot in leg at shooting range  But she feels she is handling it  Sleep is fairly good  She used sonata about 5x w/ reasonable results  Asthma has been stable  Taking rx as directed            Current Outpatient Medications:     betamethasone valerate (LUXIQ) 0 12 % foam, Apply 1 application topically as needed  , Disp: , Rfl: 0    loratadine (CLARITIN) 10 mg tablet, Take 10 mg by mouth daily, Disp: , Rfl:     montelukast (SINGULAIR) 10 mg tablet, TAKE 1 TABLET EVERY DAY, Disp: 90 tablet, Rfl: 3    PROVENTIL  (90 Base) MCG/ACT inhaler, Inhale 2 puffs every 6 (six) hours as needed, Disp: , Rfl: 0    SYMBICORT 160-4 5 MCG/ACT inhaler, inhale 2 puffs by mouth twice a day RINSE MOUTH AFTER USE, Disp: 10 2 g, Rfl: 5    zaleplon (SONATA) 10 MG capsule, Take 1 capsule (10 mg total) by mouth daily at bedtime, Disp: 30 capsule, Rfl: 0    valACYclovir (VALTREX) 1,000 mg tablet, take 2 tablets STAT and then 2 tablets by mouth IN 12 HOURS, Disp: 4 tablet, Rfl: 5    Recent Results (from the past 1008 hour(s))   Lipid panel    Collection Time: 06/24/19  7:45 AM   Result Value Ref Range    Total Cholesterol 209 (H) <200 mg/dL    HDL 56 >50 mg/dL    Triglycerides 131 <150 mg/dL    LDL Direct 128 (H) mg/dL (calc)    Chol HDLC Ratio 3 7 <5 0 (calc)    Non-HDL Cholesterol 153 (H) <130 mg/dL (calc)   Comprehensive metabolic panel    Collection Time: 06/24/19  7:45 AM   Result Value Ref Range    Glucose, Random 101 (H) 65 - 99 mg/dL    BUN 24 7 - 25 mg/dL    Creatinine 0 70 0 50 - 0 99 mg/dL    eGFR Non  93 > OR = 60 mL/min/1 73m2    eGFR  108 > OR = 60 mL/min/1 73m2    SL AMB BUN/CREATININE RATIO NOT APPLICABLE 6 - 22 (calc)    Sodium 142 135 - 146 mmol/L    Potassium 4 2 3 5 - 5 3 mmol/L    Chloride 108 98 - 110 mmol/L    CO2 25 20 - 32 mmol/L    SL AMB CALCIUM 9 3 8 6 - 10 4 mg/dL    Protein, Total 6 3 6 1 - 8 1 g/dL    Albumin 4 1 3 6 - 5 1 g/dL    Globulin 2 2 1 9 - 3 7 g/dL (calc)    Albumin/Globulin Ratio 1 9 1 0 - 2 5 (calc)    TOTAL BILIRUBIN 0 5 0 2 - 1 2 mg/dL    Alkaline Phosphatase 74 33 - 130 U/L    AST 16 10 - 35 U/L    ALT 14 6 - 29 U/L   CBC and differential    Collection Time: 06/24/19  7:45 AM   Result Value Ref Range    White Blood Cell Count 3 5 (L) 3 8 - 10 8 Thousand/uL    Red Blood Cell Count 4 55 3 80 - 5 10 Million/uL    Hemoglobin 13 6 11 7 - 15 5 g/dL    HCT 40 3 35 0 - 45 0 %    MCV 88 6 80 0 - 100 0 fL    MCH 29 9 27 0 - 33 0 pg    MCHC 33 7 32 0 - 36 0 g/dL    RDW 12 3 11 0 - 15 0 %    Platelet Count 748 194 - 400 Thousand/uL    SL AMB MPV 10 6 7 5 - 12 5 fL    Neutrophils (Absolute) 1,558 1,500 - 7,800 cells/uL    Lymphocytes (Absolute) 1,435 850 - 3,900 cells/uL    Monocytes (Absolute) 340 200 - 950 cells/uL    Eosinophils (Absolute) 98 15 - 500 cells/uL    Basophils ABS 70 0 - 200 cells/uL    Neutrophils 44 5 %    Lymphocytes 41 0 %    Monocytes 9 7 %    Eosinophils 2 8 %    Basophils PCT 2 0 %   TSH, 3rd generation with Free T4 reflex    Collection Time: 06/24/19  7:45 AM   Result Value Ref Range    TSH W/RFX TO FREE T4 3 22 0 40 - 4 50 mIU/L   Hemoglobin A1c (w/out EAG)    Collection Time: 06/24/19  7:45 AM   Result Value Ref Range    Hemoglobin A1C 5 2 <5 7 % of total Hgb       The following portions of the patient's history were reviewed and updated as appropriate: allergies, current medications, past family history, past medical history, past social history, past surgical history and problem list      Review of Systems   Constitutional: Negative for appetite change, chills, diaphoresis, fatigue, fever and unexpected weight change  HENT: Negative for congestion, hearing loss and rhinorrhea  Eyes: Negative for visual disturbance  Respiratory: Negative for cough, chest tightness, shortness of breath and wheezing  Cardiovascular: Negative for chest pain, palpitations and leg swelling  Gastrointestinal: Negative for abdominal pain and blood in stool  Endocrine: Negative for cold intolerance, heat intolerance, polydipsia and polyuria  Genitourinary: Negative for difficulty urinating, dysuria, frequency and urgency  Musculoskeletal: Negative for arthralgias and myalgias  Skin: Negative for rash  Neurological: Negative for dizziness, weakness, light-headedness and headaches  Hematological: Does not bruise/bleed easily  Psychiatric/Behavioral: Negative for dysphoric mood and sleep disturbance           Objective:      Vitals:    07/10/19 1630   BP: 130/84   Pulse: 72   Resp: 12          Physical Exam   Constitutional: She is oriented to person, place, and time  She appears well-developed and well-nourished  HENT:   Head: Normocephalic and atraumatic  Nose: Nose normal    Mouth/Throat: Oropharynx is clear and moist    Eyes: Pupils are equal, round, and reactive to light  Conjunctivae and EOM are normal  No scleral icterus  Neck: Normal range of motion  Neck supple  No JVD present  No tracheal deviation present  No thyromegaly present  Cardiovascular: Normal rate, regular rhythm and intact distal pulses  Exam reveals no gallop and no friction rub  No murmur heard  Pulmonary/Chest: Effort normal and breath sounds normal  No respiratory distress  She has no wheezes  She has no rales  Musculoskeletal: She exhibits no edema or deformity  Lymphadenopathy:     She has no cervical adenopathy  Neurological: She is alert and oriented to person, place, and time  No cranial nerve deficit  Skin: Skin is warm and dry  No rash noted  No erythema  No pallor  Psychiatric: She has a normal mood and affect   Her behavior is normal  Judgment and thought content normal

## 2019-07-24 ENCOUNTER — HOSPITAL ENCOUNTER (OUTPATIENT)
Dept: MAMMOGRAPHY | Facility: CLINIC | Age: 64
Discharge: HOME | End: 2019-07-24
Payer: COMMERCIAL

## 2019-07-24 VITALS — HEIGHT: 68 IN | BODY MASS INDEX: 23.49 KG/M2 | WEIGHT: 155 LBS

## 2019-07-24 DIAGNOSIS — Z78.0 MENOPAUSE: ICD-10-CM

## 2019-07-24 DIAGNOSIS — Z12.39 SCREENING FOR BREAST CANCER: ICD-10-CM

## 2019-07-24 PROCEDURE — 77067 SCR MAMMO BI INCL CAD: CPT

## 2019-07-24 PROCEDURE — 77080 DXA BONE DENSITY AXIAL: CPT

## 2019-07-24 PROCEDURE — 77063 BREAST TOMOSYNTHESIS BI: CPT

## 2019-07-25 ENCOUNTER — TELEPHONE (OUTPATIENT)
Dept: INTERNAL MEDICINE CLINIC | Facility: CLINIC | Age: 64
End: 2019-07-25

## 2019-07-25 NOTE — TELEPHONE ENCOUNTER
----- Message from Brannon Archer MD sent at 7/25/2019 10:20 AM EDT -----  Notify -bone density is normal

## 2019-09-29 DIAGNOSIS — T78.40XS ALLERGIC DISORDER, SEQUELA: ICD-10-CM

## 2019-09-30 RX ORDER — MONTELUKAST SODIUM 10 MG/1
TABLET ORAL
Qty: 90 TABLET | Refills: 3 | Status: SHIPPED | OUTPATIENT
Start: 2019-09-30 | End: 2020-09-25

## 2020-01-11 ENCOUNTER — APPOINTMENT (OUTPATIENT)
Dept: LAB | Facility: HOSPITAL | Age: 65
End: 2020-01-11
Attending: INTERNAL MEDICINE
Payer: COMMERCIAL

## 2020-01-11 DIAGNOSIS — R73.01 ABNORMAL FASTING GLUCOSE: ICD-10-CM

## 2020-01-11 DIAGNOSIS — E78.2 MIXED HYPERLIPIDEMIA: ICD-10-CM

## 2020-01-11 DIAGNOSIS — I10 BENIGN ESSENTIAL HYPERTENSION: ICD-10-CM

## 2020-01-11 LAB
ALBUMIN SERPL BCP-MCNC: 3.7 G/DL (ref 3.5–5)
ALP SERPL-CCNC: 88 U/L (ref 46–116)
ALT SERPL W P-5'-P-CCNC: 26 U/L (ref 12–78)
ANION GAP SERPL CALCULATED.3IONS-SCNC: 5 MMOL/L (ref 4–13)
AST SERPL W P-5'-P-CCNC: 19 U/L (ref 5–45)
BASOPHILS # BLD AUTO: 0.07 THOUSANDS/ΜL (ref 0–0.1)
BASOPHILS NFR BLD AUTO: 2 % (ref 0–1)
BILIRUB SERPL-MCNC: 0.5 MG/DL (ref 0.2–1)
BUN SERPL-MCNC: 14 MG/DL (ref 5–25)
CALCIUM SERPL-MCNC: 8.7 MG/DL (ref 8.3–10.1)
CHLORIDE SERPL-SCNC: 107 MMOL/L (ref 100–108)
CHOLEST SERPL-MCNC: 255 MG/DL (ref 50–200)
CO2 SERPL-SCNC: 31 MMOL/L (ref 21–32)
CREAT SERPL-MCNC: 0.62 MG/DL (ref 0.6–1.3)
EOSINOPHIL # BLD AUTO: 0.14 THOUSAND/ΜL (ref 0–0.61)
EOSINOPHIL NFR BLD AUTO: 4 % (ref 0–6)
ERYTHROCYTE [DISTWIDTH] IN BLOOD BY AUTOMATED COUNT: 12.3 % (ref 11.6–15.1)
EST. AVERAGE GLUCOSE BLD GHB EST-MCNC: 111 MG/DL
GFR SERPL CREATININE-BSD FRML MDRD: 96 ML/MIN/1.73SQ M
GLUCOSE P FAST SERPL-MCNC: 89 MG/DL (ref 65–99)
HBA1C MFR BLD: 5.5 % (ref 4.2–6.3)
HCT VFR BLD AUTO: 42.8 % (ref 34.8–46.1)
HDLC SERPL-MCNC: 60 MG/DL
HGB BLD-MCNC: 13.9 G/DL (ref 11.5–15.4)
IMM GRANULOCYTES # BLD AUTO: 0.02 THOUSAND/UL (ref 0–0.2)
IMM GRANULOCYTES NFR BLD AUTO: 1 % (ref 0–2)
LDLC SERPL CALC-MCNC: 178 MG/DL (ref 0–100)
LYMPHOCYTES # BLD AUTO: 1.55 THOUSANDS/ΜL (ref 0.6–4.47)
LYMPHOCYTES NFR BLD AUTO: 41 % (ref 14–44)
MCH RBC QN AUTO: 29.7 PG (ref 26.8–34.3)
MCHC RBC AUTO-ENTMCNC: 32.5 G/DL (ref 31.4–37.4)
MCV RBC AUTO: 92 FL (ref 82–98)
MONOCYTES # BLD AUTO: 0.35 THOUSAND/ΜL (ref 0.17–1.22)
MONOCYTES NFR BLD AUTO: 9 % (ref 4–12)
NEUTROPHILS # BLD AUTO: 1.64 THOUSANDS/ΜL (ref 1.85–7.62)
NEUTS SEG NFR BLD AUTO: 43 % (ref 43–75)
NONHDLC SERPL-MCNC: 195 MG/DL
NRBC BLD AUTO-RTO: 0 /100 WBCS
PLATELET # BLD AUTO: 254 THOUSANDS/UL (ref 149–390)
PMV BLD AUTO: 10.4 FL (ref 8.9–12.7)
POTASSIUM SERPL-SCNC: 3.9 MMOL/L (ref 3.5–5.3)
PROT SERPL-MCNC: 7 G/DL (ref 6.4–8.2)
RBC # BLD AUTO: 4.68 MILLION/UL (ref 3.81–5.12)
SODIUM SERPL-SCNC: 143 MMOL/L (ref 136–145)
TRIGL SERPL-MCNC: 83 MG/DL
WBC # BLD AUTO: 3.77 THOUSAND/UL (ref 4.31–10.16)

## 2020-01-11 PROCEDURE — 85025 COMPLETE CBC W/AUTO DIFF WBC: CPT

## 2020-01-11 PROCEDURE — 83036 HEMOGLOBIN GLYCOSYLATED A1C: CPT

## 2020-01-11 PROCEDURE — 80061 LIPID PANEL: CPT

## 2020-01-11 PROCEDURE — 80053 COMPREHEN METABOLIC PANEL: CPT

## 2020-01-11 PROCEDURE — 36415 COLL VENOUS BLD VENIPUNCTURE: CPT

## 2020-01-16 ENCOUNTER — OFFICE VISIT (OUTPATIENT)
Dept: INTERNAL MEDICINE CLINIC | Facility: CLINIC | Age: 65
End: 2020-01-16
Payer: COMMERCIAL

## 2020-01-16 VITALS
RESPIRATION RATE: 12 BRPM | DIASTOLIC BLOOD PRESSURE: 88 MMHG | BODY MASS INDEX: 23.25 KG/M2 | HEART RATE: 64 BPM | WEIGHT: 153.4 LBS | HEIGHT: 68 IN | SYSTOLIC BLOOD PRESSURE: 170 MMHG

## 2020-01-16 DIAGNOSIS — R39.15 URINARY URGENCY: ICD-10-CM

## 2020-01-16 DIAGNOSIS — J45.40 MODERATE PERSISTENT ASTHMA WITHOUT COMPLICATION: Primary | ICD-10-CM

## 2020-01-16 DIAGNOSIS — J45.909 UNCOMPLICATED ASTHMA, UNSPECIFIED ASTHMA SEVERITY, UNSPECIFIED WHETHER PERSISTENT: ICD-10-CM

## 2020-01-16 DIAGNOSIS — E78.2 MIXED HYPERLIPIDEMIA: ICD-10-CM

## 2020-01-16 DIAGNOSIS — I10 BENIGN ESSENTIAL HYPERTENSION: ICD-10-CM

## 2020-01-16 DIAGNOSIS — R73.01 ABNORMAL FASTING GLUCOSE: ICD-10-CM

## 2020-01-16 DIAGNOSIS — F51.01 PRIMARY INSOMNIA: ICD-10-CM

## 2020-01-16 PROCEDURE — 3008F BODY MASS INDEX DOCD: CPT | Performed by: INTERNAL MEDICINE

## 2020-01-16 PROCEDURE — 99214 OFFICE O/P EST MOD 30 MIN: CPT | Performed by: INTERNAL MEDICINE

## 2020-01-16 PROCEDURE — 1036F TOBACCO NON-USER: CPT | Performed by: INTERNAL MEDICINE

## 2020-01-16 RX ORDER — BUDESONIDE AND FORMOTEROL FUMARATE DIHYDRATE 160; 4.5 UG/1; UG/1
2 AEROSOL RESPIRATORY (INHALATION) 2 TIMES DAILY
Qty: 1 INHALER | Refills: 5 | Status: SHIPPED | OUTPATIENT
Start: 2020-01-16 | End: 2020-12-16

## 2020-01-16 RX ORDER — ZALEPLON 10 MG/1
10 CAPSULE ORAL
Qty: 30 CAPSULE | Refills: 0 | Status: SHIPPED | OUTPATIENT
Start: 2020-01-16 | End: 2020-10-16

## 2020-01-16 RX ORDER — AMOXICILLIN 500 MG/1
CAPSULE ORAL
COMMUNITY
Start: 2020-01-14 | End: 2020-07-16

## 2020-01-16 NOTE — PATIENT INSTRUCTIONS
Lab data reviewed in detail and compared to prior     Hypertension -  May be contributed to by tooth pain  Monitor home blood pressures as discussed by averaging 3 blood pressure is 1 minute apart and follow-up in 1 month  Increase aerobic exercise and follow low-sodium diet as well  Hyperlipidemia- LDL has risen to 178-patient does not tolerate statins well  Will get more aggressive with dietary modification and exercise with goal 30 minutes per day, 5 days per week and recheck in 6 months  Asthma-symptoms have remained stable off of long-acting beta agonist/ inhaled corticosteroid -we discussed recent data stating it is okay to use as p r n  Ricky Navarro Patient advised to use Proventil as needed for breakthrough symptoms but start back on Symbicort if having nocturnal symptoms more than twice in a month or daytime symptoms more than 3 times in a week  Continue to treat allergies with over-the-counter antihistamine  and Singulair      Insomnia stable with sonata     blood pressure follow-up with home blood pressure monitor in 1 month, lab follow-up in 6 months

## 2020-01-16 NOTE — PROGRESS NOTES
Assessment/Plan:    Diagnoses and all orders for this visit:    Moderate persistent asthma without complication    Primary insomnia  -     zaleplon (SONATA) 10 MG capsule; Take 1 capsule (10 mg total) by mouth daily at bedtime    Uncomplicated asthma, unspecified asthma severity, unspecified whether persistent  -     budesonide-formoterol (SYMBICORT) 160-4 5 mcg/act inhaler; Inhale 2 puffs 2 (two) times a day Rinse mouth after use    Benign essential hypertension  -     CBC and differential; Future  -     Comprehensive metabolic panel; Future    Abnormal fasting glucose  -     Hemoglobin A1C; Future    Mixed hyperlipidemia  -     Lipid panel; Future  -     TSH, 3rd generation with Free T4 reflex; Future    Urinary urgency    Other orders  -     amoxicillin (AMOXIL) 500 mg capsule         Patient Instructions   Lab data reviewed in detail and compared to prior     Hypertension -  May be contributed to by tooth pain  Monitor home blood pressures as discussed by averaging 3 blood pressure is 1 minute apart and follow-up in 1 month  Increase aerobic exercise and follow low-sodium diet as well  Hyperlipidemia- LDL has risen to 178-patient does not tolerate statins well  Will get more aggressive with dietary modification and exercise with goal 30 minutes per day, 5 days per week and recheck in 6 months  Asthma-symptoms have remained stable off of long-acting beta agonist/ inhaled corticosteroid -we discussed recent data stating it is okay to use as p r n  [employer] Patient advised to use Proventil as needed for breakthrough symptoms but start back on Symbicort if having nocturnal symptoms more than twice in a month or daytime symptoms more than 3 times in a week  Continue to treat allergies with over-the-counter antihistamine  and Singulair      Insomnia stable with sonata     blood pressure follow-up with home blood pressure monitor in 1 month, lab follow-up in 6 months      Subjective:      Patient ID: Darlin Orta is a 62 y o  female    F/u mmp and review  Has toothache, started abx w/ some relief  Needs root canal and crown  Asthma has been stable, stopped symbicort last summer and couldn't afford, but now hasn't noticed a difference  No recent proventil  Allergies have been flaring a bit  HSV-controlled w/ Valtrex as needed  Had outbreak last week she thought r/t citrus fruit  Notes some urge incontinence if not careful, still not doing kegels  Hasn't needed pads  Insomnia-doing better, used about 28 sonata in past 6 mos  HPL-no regular exercise, not a lot of dairy fat or red meat           Current Outpatient Medications:     amoxicillin (AMOXIL) 500 mg capsule, , Disp: , Rfl:     budesonide-formoterol (SYMBICORT) 160-4 5 mcg/act inhaler, Inhale 2 puffs 2 (two) times a day Rinse mouth after use, Disp: 1 Inhaler, Rfl: 5    loratadine (CLARITIN) 10 mg tablet, Take 10 mg by mouth daily, Disp: , Rfl:     montelukast (SINGULAIR) 10 mg tablet, TAKE 1 TABLET EVERY DAY, Disp: 90 tablet, Rfl: 3    PROVENTIL  (90 Base) MCG/ACT inhaler, Inhale 2 puffs every 6 (six) hours as needed, Disp: , Rfl: 0    valACYclovir (VALTREX) 1,000 mg tablet, take 2 tablets STAT and then 2 tablets by mouth IN 12 HOURS, Disp: 4 tablet, Rfl: 5    zaleplon (SONATA) 10 MG capsule, Take 1 capsule (10 mg total) by mouth daily at bedtime, Disp: 30 capsule, Rfl: 0    Recent Results (from the past 1008 hour(s))   CBC and differential    Collection Time: 01/11/20  7:41 AM   Result Value Ref Range    WBC 3 77 (L) 4 31 - 10 16 Thousand/uL    RBC 4 68 3 81 - 5 12 Million/uL    Hemoglobin 13 9 11 5 - 15 4 g/dL    Hematocrit 42 8 34 8 - 46 1 %    MCV 92 82 - 98 fL    MCH 29 7 26 8 - 34 3 pg    MCHC 32 5 31 4 - 37 4 g/dL    RDW 12 3 11 6 - 15 1 %    MPV 10 4 8 9 - 12 7 fL    Platelets 634 181 - 759 Thousands/uL    nRBC 0 /100 WBCs    Neutrophils Relative 43 43 - 75 %    Immat GRANS % 1 0 - 2 %    Lymphocytes Relative 41 14 - 44 % Monocytes Relative 9 4 - 12 %    Eosinophils Relative 4 0 - 6 %    Basophils Relative 2 (H) 0 - 1 %    Neutrophils Absolute 1 64 (L) 1 85 - 7 62 Thousands/µL    Immature Grans Absolute 0 02 0 00 - 0 20 Thousand/uL    Lymphocytes Absolute 1 55 0 60 - 4 47 Thousands/µL    Monocytes Absolute 0 35 0 17 - 1 22 Thousand/µL    Eosinophils Absolute 0 14 0 00 - 0 61 Thousand/µL    Basophils Absolute 0 07 0 00 - 0 10 Thousands/µL   Comprehensive metabolic panel    Collection Time: 01/11/20  7:41 AM   Result Value Ref Range    Sodium 143 136 - 145 mmol/L    Potassium 3 9 3 5 - 5 3 mmol/L    Chloride 107 100 - 108 mmol/L    CO2 31 21 - 32 mmol/L    ANION GAP 5 4 - 13 mmol/L    BUN 14 5 - 25 mg/dL    Creatinine 0 62 0 60 - 1 30 mg/dL    Glucose, Fasting 89 65 - 99 mg/dL    Calcium 8 7 8 3 - 10 1 mg/dL    AST 19 5 - 45 U/L    ALT 26 12 - 78 U/L    Alkaline Phosphatase 88 46 - 116 U/L    Total Protein 7 0 6 4 - 8 2 g/dL    Albumin 3 7 3 5 - 5 0 g/dL    Total Bilirubin 0 50 0 20 - 1 00 mg/dL    eGFR 96 ml/min/1 73sq m   Lipid panel    Collection Time: 01/11/20  7:41 AM   Result Value Ref Range    Cholesterol 255 (H) 50 - 200 mg/dL    Triglycerides 83 <=150 mg/dL    HDL, Direct 60 >=40 mg/dL    LDL Calculated 178 (H) 0 - 100 mg/dL    Non-HDL-Chol (CHOL-HDL) 195 mg/dl   Hemoglobin A1C    Collection Time: 01/11/20  7:41 AM   Result Value Ref Range    Hemoglobin A1C 5 5 4 2 - 6 3 %     mg/dl       The following portions of the patient's history were reviewed and updated as appropriate: allergies, current medications, past family history, past medical history, past social history, past surgical history and problem list      Review of Systems   Constitutional: Negative for appetite change, chills, diaphoresis, fatigue, fever and unexpected weight change  HENT: Negative for congestion, hearing loss and rhinorrhea  Eyes: Negative for visual disturbance     Respiratory: Negative for cough, chest tightness, shortness of breath and wheezing  Cardiovascular: Negative for chest pain, palpitations and leg swelling  Gastrointestinal: Negative for abdominal pain and blood in stool  Endocrine: Negative for cold intolerance, heat intolerance, polydipsia and polyuria  Genitourinary: Negative for difficulty urinating, dysuria, frequency and urgency  Musculoskeletal: Negative for arthralgias and myalgias  Skin: Negative for rash  Neurological: Negative for dizziness, weakness, light-headedness and headaches  Hematological: Does not bruise/bleed easily  Psychiatric/Behavioral: Negative for dysphoric mood and sleep disturbance  Objective:      Vitals:    01/16/20 1516   BP: 170/88   Pulse:    Resp:           Physical Exam   Constitutional: She is oriented to person, place, and time  She appears well-developed and well-nourished  HENT:   Head: Normocephalic and atraumatic  Nose: Nose normal    Mouth/Throat: Oropharynx is clear and moist    Eyes: Pupils are equal, round, and reactive to light  Conjunctivae and EOM are normal  No scleral icterus  Neck: Normal range of motion  Neck supple  No JVD present  No tracheal deviation present  No thyromegaly present  Cardiovascular: Normal rate, regular rhythm and intact distal pulses  Exam reveals no gallop and no friction rub  No murmur heard  Pulmonary/Chest: Effort normal and breath sounds normal  No respiratory distress  She has no wheezes  She has no rales  Musculoskeletal: She exhibits no edema or deformity  Lymphadenopathy:     She has no cervical adenopathy  Neurological: She is alert and oriented to person, place, and time  No cranial nerve deficit  Skin: Skin is warm and dry  No rash noted  No erythema  No pallor  Psychiatric: She has a normal mood and affect   Her behavior is normal  Judgment and thought content normal

## 2020-02-18 ENCOUNTER — OFFICE VISIT (OUTPATIENT)
Dept: INTERNAL MEDICINE CLINIC | Facility: CLINIC | Age: 65
End: 2020-02-18
Payer: COMMERCIAL

## 2020-02-18 VITALS
BODY MASS INDEX: 23.34 KG/M2 | DIASTOLIC BLOOD PRESSURE: 86 MMHG | HEART RATE: 76 BPM | WEIGHT: 154 LBS | RESPIRATION RATE: 12 BRPM | HEIGHT: 68 IN | SYSTOLIC BLOOD PRESSURE: 158 MMHG

## 2020-02-18 DIAGNOSIS — I10 BENIGN ESSENTIAL HYPERTENSION: Primary | ICD-10-CM

## 2020-02-18 DIAGNOSIS — J06.9 VIRAL URI WITH COUGH: ICD-10-CM

## 2020-02-18 PROCEDURE — 3077F SYST BP >= 140 MM HG: CPT | Performed by: INTERNAL MEDICINE

## 2020-02-18 PROCEDURE — 3079F DIAST BP 80-89 MM HG: CPT | Performed by: INTERNAL MEDICINE

## 2020-02-18 PROCEDURE — 3008F BODY MASS INDEX DOCD: CPT | Performed by: INTERNAL MEDICINE

## 2020-02-18 PROCEDURE — 99213 OFFICE O/P EST LOW 20 MIN: CPT | Performed by: INTERNAL MEDICINE

## 2020-02-18 PROCEDURE — 1036F TOBACCO NON-USER: CPT | Performed by: INTERNAL MEDICINE

## 2020-02-18 RX ORDER — CHLORTHALIDONE 25 MG/1
25 TABLET ORAL DAILY
Qty: 30 TABLET | Refills: 5 | Status: SHIPPED | OUTPATIENT
Start: 2020-02-18 | End: 2021-02-13

## 2020-02-18 NOTE — PROGRESS NOTES
Assessment/Plan:    Diagnoses and all orders for this visit:    Benign essential hypertension  -     chlorthalidone 25 mg tablet; Take 1 tablet (25 mg total) by mouth daily  -     Basic metabolic panel; Future  -     Magnesium; Future  -     Uric acid; Future    Viral URI with cough         Patient Instructions   Blood pressure remains elevated-will start chlorthalidone 12 5 mg once daily  Follow low sodium and high potassium diet  Check lab work nonfasting in about 2 weeks  Follow-up in about 4 weeks with home blood pressures  Viral URI with cough-lungs are clear  I recommend home humidification, nasal saline irrigation and continue with bronchodilators as needed  Avoid decongestants due to blood pressure  Subjective:      Patient ID: Gucci Avina is a 62 y o  female    F/u htn  Not exercising regularly, can't find the time  Home bp's generally 130-150's/60-70's  Doesn't want rx            Current Outpatient Medications:     budesonide-formoterol (SYMBICORT) 160-4 5 mcg/act inhaler, Inhale 2 puffs 2 (two) times a day Rinse mouth after use, Disp: 1 Inhaler, Rfl: 5    loratadine (CLARITIN) 10 mg tablet, Take 10 mg by mouth daily, Disp: , Rfl:     montelukast (SINGULAIR) 10 mg tablet, TAKE 1 TABLET EVERY DAY, Disp: 90 tablet, Rfl: 3    PROVENTIL  (90 Base) MCG/ACT inhaler, Inhale 2 puffs every 6 (six) hours as needed, Disp: , Rfl: 0    valACYclovir (VALTREX) 1,000 mg tablet, take 2 tablets STAT and then 2 tablets by mouth IN 12 HOURS, Disp: 4 tablet, Rfl: 5    zaleplon (SONATA) 10 MG capsule, Take 1 capsule (10 mg total) by mouth daily at bedtime (Patient taking differently: Take 10 mg by mouth as needed ), Disp: 30 capsule, Rfl: 0    amoxicillin (AMOXIL) 500 mg capsule, , Disp: , Rfl:     chlorthalidone 25 mg tablet, Take 1 tablet (25 mg total) by mouth daily, Disp: 30 tablet, Rfl: 5    Recent Results (from the past 1008 hour(s))   CBC and differential    Collection Time: 01/11/20  7:41 AM   Result Value Ref Range    WBC 3 77 (L) 4 31 - 10 16 Thousand/uL    RBC 4 68 3 81 - 5 12 Million/uL    Hemoglobin 13 9 11 5 - 15 4 g/dL    Hematocrit 42 8 34 8 - 46 1 %    MCV 92 82 - 98 fL    MCH 29 7 26 8 - 34 3 pg    MCHC 32 5 31 4 - 37 4 g/dL    RDW 12 3 11 6 - 15 1 %    MPV 10 4 8 9 - 12 7 fL    Platelets 549 726 - 078 Thousands/uL    nRBC 0 /100 WBCs    Neutrophils Relative 43 43 - 75 %    Immat GRANS % 1 0 - 2 %    Lymphocytes Relative 41 14 - 44 %    Monocytes Relative 9 4 - 12 %    Eosinophils Relative 4 0 - 6 %    Basophils Relative 2 (H) 0 - 1 %    Neutrophils Absolute 1 64 (L) 1 85 - 7 62 Thousands/µL    Immature Grans Absolute 0 02 0 00 - 0 20 Thousand/uL    Lymphocytes Absolute 1 55 0 60 - 4 47 Thousands/µL    Monocytes Absolute 0 35 0 17 - 1 22 Thousand/µL    Eosinophils Absolute 0 14 0 00 - 0 61 Thousand/µL    Basophils Absolute 0 07 0 00 - 0 10 Thousands/µL   Comprehensive metabolic panel    Collection Time: 01/11/20  7:41 AM   Result Value Ref Range    Sodium 143 136 - 145 mmol/L    Potassium 3 9 3 5 - 5 3 mmol/L    Chloride 107 100 - 108 mmol/L    CO2 31 21 - 32 mmol/L    ANION GAP 5 4 - 13 mmol/L    BUN 14 5 - 25 mg/dL    Creatinine 0 62 0 60 - 1 30 mg/dL    Glucose, Fasting 89 65 - 99 mg/dL    Calcium 8 7 8 3 - 10 1 mg/dL    AST 19 5 - 45 U/L    ALT 26 12 - 78 U/L    Alkaline Phosphatase 88 46 - 116 U/L    Total Protein 7 0 6 4 - 8 2 g/dL    Albumin 3 7 3 5 - 5 0 g/dL    Total Bilirubin 0 50 0 20 - 1 00 mg/dL    eGFR 96 ml/min/1 73sq m   Lipid panel    Collection Time: 01/11/20  7:41 AM   Result Value Ref Range    Cholesterol 255 (H) 50 - 200 mg/dL    Triglycerides 83 <=150 mg/dL    HDL, Direct 60 >=40 mg/dL    LDL Calculated 178 (H) 0 - 100 mg/dL    Non-HDL-Chol (CHOL-HDL) 195 mg/dl   Hemoglobin A1C    Collection Time: 01/11/20  7:41 AM   Result Value Ref Range    Hemoglobin A1C 5 5 4 2 - 6 3 %     mg/dl       The following portions of the patient's history were reviewed and updated as appropriate: allergies, current medications, past family history, past medical history, past social history, past surgical history and problem list      Review of Systems   Constitutional: Negative for appetite change, chills, diaphoresis, fatigue, fever and unexpected weight change  HENT: Negative for congestion, hearing loss and rhinorrhea  Eyes: Negative for visual disturbance  Respiratory: Negative for cough, chest tightness, shortness of breath and wheezing  Cardiovascular: Negative for chest pain, palpitations and leg swelling  Gastrointestinal: Negative for abdominal pain and blood in stool  Endocrine: Negative for cold intolerance, heat intolerance, polydipsia and polyuria  Genitourinary: Negative for difficulty urinating, dysuria, frequency and urgency  Musculoskeletal: Negative for arthralgias and myalgias  Skin: Negative for rash  Neurological: Negative for dizziness, weakness, light-headedness and headaches  Hematological: Does not bruise/bleed easily  Psychiatric/Behavioral: Negative for dysphoric mood and sleep disturbance  Objective:      Vitals:    02/18/20 1453   BP: 158/86   Pulse:    Resp:           Physical Exam   Constitutional: She is oriented to person, place, and time  She appears well-developed and well-nourished  HENT:   Head: Normocephalic and atraumatic  Nose: Nose normal    Mouth/Throat: Oropharynx is clear and moist    Eyes: Pupils are equal, round, and reactive to light  Conjunctivae and EOM are normal  No scleral icterus  Neck: Normal range of motion  Neck supple  No JVD present  No tracheal deviation present  No thyromegaly present  Cardiovascular: Normal rate, regular rhythm and intact distal pulses  Exam reveals no gallop and no friction rub  No murmur heard  Pulmonary/Chest: Effort normal and breath sounds normal  No respiratory distress  She has no wheezes  She has no rales  Musculoskeletal: She exhibits no edema or deformity  Lymphadenopathy:     She has no cervical adenopathy  Neurological: She is alert and oriented to person, place, and time  No cranial nerve deficit  Skin: Skin is warm and dry  No rash noted  No erythema  No pallor  Psychiatric: She has a normal mood and affect   Her behavior is normal  Judgment and thought content normal

## 2020-02-18 NOTE — PATIENT INSTRUCTIONS
Blood pressure remains elevated-will start chlorthalidone 12 5 mg once daily  Follow low sodium and high potassium diet  Check lab work nonfasting in about 2 weeks  Follow-up in about 4 weeks with home blood pressures  Viral URI with cough-lungs are clear  I recommend home humidification, nasal saline irrigation and continue with bronchodilators as needed  Avoid decongestants due to blood pressure

## 2020-03-19 ENCOUNTER — TELEPHONE (OUTPATIENT)
Dept: INTERNAL MEDICINE CLINIC | Facility: CLINIC | Age: 65
End: 2020-03-19

## 2020-03-19 NOTE — TELEPHONE ENCOUNTER
Pt had an appt on 3/27 for recheck her BP  Cancelled because of [address]  Gave some readings       2/22 131/80  2/23 118/79  2/24 120/72  3/1 109/88  3/3 110/69  3/15 112/73   3/17 113/73    States she is currently taking chlorthalidone 25 mg tablet 1/2 tablet daily     Any questions call pt   [phone]

## 2020-03-24 ENCOUNTER — TELEPHONE (OUTPATIENT)
Dept: INTERNAL MEDICINE CLINIC | Facility: CLINIC | Age: 65
End: 2020-03-24

## 2020-03-24 NOTE — TELEPHONE ENCOUNTER
Pt called asking for a letter to keep her our of work until further notice  Her job has one confirmed case of [address] and are asking people who are high risk to stay home  She will call us back with a fax number to send letter       [phone]

## 2020-03-25 NOTE — TELEPHONE ENCOUNTER
Patient called back ck'ing status of the letter    She said it needs to state that she is at high risk due to her medical conditions    Chronic bronchitis & asthma    She also mentioned that at last appt she said she hasn't needed her inhaler    But recently she had to start using it again    She needs to have this letter faxed today the latest  Please

## 2020-03-25 NOTE — TELEPHONE ENCOUNTER
Patient would like this letter to be faxed to her employer today  This patient is trying to be placed on administrative leave since she is not 73 and older the patient needs to have an underline condition that the patient is at high risk and cannot work at this time  This patient has asthma and chronic bronchitis       Please call the patient when [phone]  Fax number to fax the letter [phone]

## 2020-03-26 NOTE — TELEPHONE ENCOUNTER
Patient is calling back wants to know if the letter was faxed yet     Her employer needs the letter today        please call her back when the letter is faxed  Dima Painter

## 2020-04-03 LAB
BUN SERPL-MCNC: 25 MG/DL (ref 8–27)
BUN/CREAT SERPL: 34 (ref 12–28)
CALCIUM SERPL-MCNC: 9.1 MG/DL (ref 8.7–10.3)
CHLORIDE SERPL-SCNC: 102 MMOL/L (ref 96–106)
CO2 SERPL-SCNC: 23 MMOL/L (ref 20–29)
CREAT SERPL-MCNC: 0.73 MG/DL (ref 0.57–1)
GLUCOSE SERPL-MCNC: 97 MG/DL (ref 65–99)
MAGNESIUM SERPL-MCNC: 2.1 MG/DL (ref 1.6–2.3)
POTASSIUM SERPL-SCNC: 3.5 MMOL/L (ref 3.5–5.2)
SL AMB EGFR AFRICAN AMERICAN: 101 ML/MIN/1.73
SL AMB EGFR NON AFRICAN AMERICAN: 88 ML/MIN/1.73
SODIUM SERPL-SCNC: 141 MMOL/L (ref 134–144)
URATE SERPL-MCNC: 5.4 MG/DL (ref 2.5–7.1)

## 2020-04-07 DIAGNOSIS — J45.909 UNCOMPLICATED ASTHMA, UNSPECIFIED ASTHMA SEVERITY, UNSPECIFIED WHETHER PERSISTENT: Primary | ICD-10-CM

## 2020-04-08 RX ORDER — ALBUTEROL SULFATE 90 MCG
2 HFA AEROSOL WITH ADAPTER (GRAM) INHALATION EVERY 6 HOURS PRN
Qty: 6.7 G | Refills: 2 | Status: SHIPPED | OUTPATIENT
Start: 2020-04-08 | End: 2022-04-05 | Stop reason: SDUPTHER

## 2020-07-08 LAB — HBA1C MFR BLD HPLC: 5.2 %

## 2020-07-09 LAB
ALBUMIN SERPL-MCNC: 4.4 G/DL (ref 3.8–4.8)
ALBUMIN/GLOB SERPL: 2.2 {RATIO} (ref 1.2–2.2)
ALP SERPL-CCNC: 72 IU/L (ref 39–117)
ALT SERPL-CCNC: 16 IU/L (ref 0–32)
AST SERPL-CCNC: 20 IU/L (ref 0–40)
BASOPHILS # BLD AUTO: 0.1 X10E3/UL (ref 0–0.2)
BASOPHILS NFR BLD AUTO: 2 %
BILIRUB SERPL-MCNC: 0.3 MG/DL (ref 0–1.2)
BUN SERPL-MCNC: 17 MG/DL (ref 8–27)
BUN/CREAT SERPL: 24 (ref 12–28)
CALCIUM SERPL-MCNC: 9.2 MG/DL (ref 8.7–10.3)
CHLORIDE SERPL-SCNC: 102 MMOL/L (ref 96–106)
CHOLEST SERPL-MCNC: 234 MG/DL (ref 100–199)
CO2 SERPL-SCNC: 27 MMOL/L (ref 20–29)
CREAT SERPL-MCNC: 0.71 MG/DL (ref 0.57–1)
EOSINOPHIL # BLD AUTO: 0.1 X10E3/UL (ref 0–0.4)
EOSINOPHIL NFR BLD AUTO: 2 %
ERYTHROCYTE [DISTWIDTH] IN BLOOD BY AUTOMATED COUNT: 12.4 % (ref 11.7–15.4)
GLOBULIN SER-MCNC: 2 G/DL (ref 1.5–4.5)
GLUCOSE SERPL-MCNC: 99 MG/DL (ref 65–99)
HBA1C MFR BLD: 5.2 % (ref 4.8–5.6)
HCT VFR BLD AUTO: 39.1 % (ref 34–46.6)
HDLC SERPL-MCNC: 52 MG/DL
HGB BLD-MCNC: 13 G/DL (ref 11.1–15.9)
IMM GRANULOCYTES # BLD: 0 X10E3/UL (ref 0–0.1)
IMM GRANULOCYTES NFR BLD: 1 %
LDLC SERPL CALC-MCNC: 156 MG/DL (ref 0–99)
LYMPHOCYTES # BLD AUTO: 1.4 X10E3/UL (ref 0.7–3.1)
LYMPHOCYTES NFR BLD AUTO: 37 %
MCH RBC QN AUTO: 29.7 PG (ref 26.6–33)
MCHC RBC AUTO-ENTMCNC: 33.2 G/DL (ref 31.5–35.7)
MCV RBC AUTO: 90 FL (ref 79–97)
MONOCYTES # BLD AUTO: 0.4 X10E3/UL (ref 0.1–0.9)
MONOCYTES NFR BLD AUTO: 9 %
NEUTROPHILS # BLD AUTO: 1.9 X10E3/UL (ref 1.4–7)
NEUTROPHILS NFR BLD AUTO: 49 %
PLATELET # BLD AUTO: 253 X10E3/UL (ref 150–450)
POTASSIUM SERPL-SCNC: 3.5 MMOL/L (ref 3.5–5.2)
PROT SERPL-MCNC: 6.4 G/DL (ref 6–8.5)
RBC # BLD AUTO: 4.37 X10E6/UL (ref 3.77–5.28)
SL AMB EGFR AFRICAN AMERICAN: 105 ML/MIN/1.73
SL AMB EGFR NON AFRICAN AMERICAN: 91 ML/MIN/1.73
SL AMB VLDL CHOLESTEROL CALC: 26 MG/DL (ref 5–40)
SODIUM SERPL-SCNC: 144 MMOL/L (ref 134–144)
TRIGL SERPL-MCNC: 130 MG/DL (ref 0–149)
TSH SERPL DL<=0.005 MIU/L-ACNC: 2.33 UIU/ML (ref 0.45–4.5)
WBC # BLD AUTO: 3.8 X10E3/UL (ref 3.4–10.8)

## 2020-07-15 ENCOUNTER — TELEPHONE (OUTPATIENT)
Dept: INTERNAL MEDICINE CLINIC | Facility: CLINIC | Age: 65
End: 2020-07-15

## 2020-07-15 NOTE — TELEPHONE ENCOUNTER
COVID Pre-Visit Screening     1  Is this a family member screening? No  2  Have you traveled outside of your state in the past 2 weeks? No  3  Do you presently have a fever or flu-like symptoms? No  4  Do you have symptoms of an upper respiratory infection like runny nose, sore throat, or cough? No  5  Are you suffering from new headache that you have not had in the past?  No  6  Do you have/have you experienced any new shortness of breath recently? No  7  Do you have any new diarrhea, nausea or vomiting? No  8  Have you been in contact with anyone who has been sick or diagnosed with COVID-19? Yes, works at a [employer]  Received an e-mail stating that someone in the building was positive  Did not tell her if it was a coworker or a parent  She does have daily temp checks, never had a fever  9  Do you have any new loss of taste or smell? No  10  Are you able to wear a mask without a valve for the entire visit?  Yes

## 2020-07-15 NOTE — TELEPHONE ENCOUNTER
CALLED PT   SATED SHE WAS TOLD  THEY WERE IN THE BUILDING ON 7/2/2020 AND STATED THEY  CALLED THEM ON 7/13/2020 TO LET THEM KNOW THEY TESTED +, SHE DOES NOT KNOW IF SHE WAS IN CONTACT WITH THIS PERSON OR NOT , SHOULD SHE HAVE A VIRTUAL VISIT

## 2020-07-15 NOTE — TELEPHONE ENCOUNTER
How long ago was her exposure  Depending on the exposure she should be quarantine Ng for 14 days and should not come to the office

## 2020-07-16 ENCOUNTER — OFFICE VISIT (OUTPATIENT)
Dept: INTERNAL MEDICINE CLINIC | Facility: CLINIC | Age: 65
End: 2020-07-16
Payer: COMMERCIAL

## 2020-07-16 VITALS
HEIGHT: 68 IN | RESPIRATION RATE: 12 BRPM | DIASTOLIC BLOOD PRESSURE: 76 MMHG | BODY MASS INDEX: 23.04 KG/M2 | TEMPERATURE: 97.5 F | WEIGHT: 152 LBS | SYSTOLIC BLOOD PRESSURE: 134 MMHG | HEART RATE: 60 BPM

## 2020-07-16 DIAGNOSIS — F51.01 PRIMARY INSOMNIA: ICD-10-CM

## 2020-07-16 DIAGNOSIS — J45.40 MODERATE PERSISTENT ASTHMA WITHOUT COMPLICATION: Primary | ICD-10-CM

## 2020-07-16 DIAGNOSIS — R73.01 ABNORMAL FASTING GLUCOSE: ICD-10-CM

## 2020-07-16 DIAGNOSIS — R55 VASOVAGAL EPISODE: ICD-10-CM

## 2020-07-16 DIAGNOSIS — E78.2 MIXED HYPERLIPIDEMIA: ICD-10-CM

## 2020-07-16 DIAGNOSIS — Z11.59 NEED FOR HEPATITIS C SCREENING TEST: ICD-10-CM

## 2020-07-16 DIAGNOSIS — I10 BENIGN ESSENTIAL HYPERTENSION: ICD-10-CM

## 2020-07-16 DIAGNOSIS — Z11.4 SCREENING FOR HIV (HUMAN IMMUNODEFICIENCY VIRUS): ICD-10-CM

## 2020-07-16 DIAGNOSIS — Z12.39 SCREENING FOR BREAST CANCER: ICD-10-CM

## 2020-07-16 DIAGNOSIS — B00.9 HERPES: ICD-10-CM

## 2020-07-16 PROBLEM — S42.402A ELBOW FRACTURE, LEFT, CLOSED, INITIAL ENCOUNTER: Status: RESOLVED | Noted: 2018-07-02 | Resolved: 2020-07-16

## 2020-07-16 PROBLEM — Z48.89 AFTERCARE FOLLOWING SURGERY: Status: RESOLVED | Noted: 2018-07-20 | Resolved: 2020-07-16

## 2020-07-16 PROBLEM — M25.522 PAIN IN LEFT ELBOW: Status: RESOLVED | Noted: 2019-01-09 | Resolved: 2020-07-16

## 2020-07-16 PROCEDURE — 3078F DIAST BP <80 MM HG: CPT | Performed by: INTERNAL MEDICINE

## 2020-07-16 PROCEDURE — 3008F BODY MASS INDEX DOCD: CPT | Performed by: INTERNAL MEDICINE

## 2020-07-16 PROCEDURE — 99214 OFFICE O/P EST MOD 30 MIN: CPT | Performed by: INTERNAL MEDICINE

## 2020-07-16 PROCEDURE — 1036F TOBACCO NON-USER: CPT | Performed by: INTERNAL MEDICINE

## 2020-07-16 PROCEDURE — 3075F SYST BP GE 130 - 139MM HG: CPT | Performed by: INTERNAL MEDICINE

## 2020-07-16 RX ORDER — VALACYCLOVIR HYDROCHLORIDE 1 G/1
1000 TABLET, FILM COATED ORAL DAILY
Qty: 4 TABLET | Refills: 5 | Status: SHIPPED | OUTPATIENT
Start: 2020-07-16 | End: 2021-09-29

## 2020-07-16 NOTE — PATIENT INSTRUCTIONS
Lab data reviewed in detail and compared prior    Hypertension stable on chlorthalidone, electrolytes remained stable    Asthma stable on current regimen    Hyperlipidemia-LDL improved  Patient advised to continue follow low cholesterol diet and increase aerobic exercise    Impaired fasting glucose stable with A1c 5 2    Vasovagal event-pathophysiology discussed  No subsequent events  Cardiovascular exam is normal   Monitor symptoms  Check blood pressure should this occur again  Health maintenance-HIV and hepatitis C screening discussed and ordered    Screening mammogram ordered    Routine follow-up after labs in 6 months, sooner as needed

## 2020-07-16 NOTE — PROGRESS NOTES
Assessment/Plan:    Diagnoses and all orders for this visit:    Moderate persistent asthma without complication    Herpes  -     valACYclovir (VALTREX) 1,000 mg tablet; Take 1 tablet (1,000 mg total) by mouth daily for 1 day    Benign essential hypertension    Abnormal fasting glucose  -     Hemoglobin A1C; Future    Mixed hyperlipidemia  -     CBC and differential; Future  -     Comprehensive metabolic panel; Future  -     Lipid panel; Future    Primary insomnia    Vasovagal episode    Screening for HIV (human immunodeficiency virus)  -     HIV 1/2 Antigen/Antibody (4th Generation) w Reflex [facility]; Future    Need for hepatitis C screening test  -     Hepatitis C antibody; Future    Screening for breast cancer  -     Mammo screening bilateral w 3d & cad; Future         Patient Instructions   Lab data reviewed in detail and compared prior    Hypertension stable on chlorthalidone, electrolytes remained stable    Asthma stable on current regimen    Hyperlipidemia-LDL improved  Patient advised to continue follow low cholesterol diet and increase aerobic exercise    Impaired fasting glucose stable with A1c 5 2    Vasovagal event-pathophysiology discussed  No subsequent events  Cardiovascular exam is normal   Monitor symptoms  Check blood pressure should this occur again  Health maintenance-HIV and hepatitis C screening discussed and ordered  Screening mammogram ordered    Routine follow-up after labs in 6 months, sooner as needed      Subjective:      Patient ID: Ally Astorga is a 62 y o  female    F/u mmp and review  Had an episode last month after urinating that she got lightheaded w/o palps or syncope  No recurrence  There was a covid + patient at her work 7/2, but she doesn't know who  No sx  Asthma has been stable, taking singulair daily, symbicort q am, no proventil lately  Allergies have been flaring a bit  HSV-controlled w/ Valtrex as needed    Had outbreak last week   Notes some urge incontinence if not careful, still not doing kegels  Hasn't needed pads  Insomnia-using sonata rarely, < 5 per mo  HPL-no regular exercise, not a lot of dairy fat or red meat           Current Outpatient Medications:     budesonide-formoterol (SYMBICORT) 160-4 5 mcg/act inhaler, Inhale 2 puffs 2 (two) times a day Rinse mouth after use, Disp: 1 Inhaler, Rfl: 5    chlorthalidone 25 mg tablet, Take 1 tablet (25 mg total) by mouth daily (Patient taking differently: Take 12 5 mg by mouth daily ), Disp: 30 tablet, Rfl: 5    loratadine (CLARITIN) 10 mg tablet, Take 10 mg by mouth daily, Disp: , Rfl:     montelukast (SINGULAIR) 10 mg tablet, TAKE 1 TABLET EVERY DAY, Disp: 90 tablet, Rfl: 3    PROVENTIL  (90 Base) MCG/ACT inhaler, Inhale 2 puffs every 6 (six) hours as needed for wheezing, Disp: 6 7 g, Rfl: 2    valACYclovir (VALTREX) 1,000 mg tablet, Take 1 tablet (1,000 mg total) by mouth daily for 1 day, Disp: 4 tablet, Rfl: 5    zaleplon (SONATA) 10 MG capsule, Take 1 capsule (10 mg total) by mouth daily at bedtime (Patient taking differently: Take 10 mg by mouth as needed ), Disp: 30 capsule, Rfl: 0    Recent Results (from the past 1008 hour(s))   Hemoglobin A1C    Collection Time: 07/08/20 12:00 AM   Result Value Ref Range    Hemoglobin A1C 5 2    CBC and differential    Collection Time: 07/08/20  7:15 AM   Result Value Ref Range    White Blood Cell Count 3 8 3 4 - 10 8 x10E3/uL    Red Blood Cell Count 4 37 3 77 - 5 28 x10E6/uL    Hemoglobin 13 0 11 1 - 15 9 g/dL    HCT 39 1 34 0 - 46 6 %    MCV 90 79 - 97 fL    MCH 29 7 26 6 - 33 0 pg    MCHC 33 2 31 5 - 35 7 g/dL    RDW 12 4 11 7 - 15 4 %    Platelet Count 560 570 - 450 x10E3/uL    Neutrophils 49 Not Estab  %    Lymphocytes 37 Not Estab  %    Monocytes 9 Not Estab  %    Eosinophils 2 Not Estab  %    Basophils PCT 2 Not Estab  %    Neutrophils (Absolute) 1 9 1 4 - 7 0 x10E3/uL    Lymphocytes (Absolute) 1 4 0 7 - 3 1 x10E3/uL    Monocytes (Absolute) 0 4 0 1 - 0 9 x10E3/uL    Eosinophils (Absolute) 0 1 0 0 - 0 4 x10E3/uL    Basophils ABS 0 1 0 0 - 0 2 x10E3/uL    Immature Granulocytes 1 Not Estab  %    Immature Granulocytes (Absolute) 0 0 0 0 - 0 1 x10E3/uL   Comprehensive metabolic panel    Collection Time: 07/08/20  7:15 AM   Result Value Ref Range    Glucose, Random 99 65 - 99 mg/dL    BUN 17 8 - 27 mg/dL    Creatinine 0 71 0 57 - 1 00 mg/dL    eGFR Non African American 91 >59 mL/min/1 73    eGFR  105 >59 mL/min/1 73    SL AMB BUN/CREATININE RATIO 24 12 - 28    Sodium 144 134 - 144 mmol/L    Potassium 3 5 3 5 - 5 2 mmol/L    Chloride 102 96 - 106 mmol/L    CO2 27 20 - 29 mmol/L    CALCIUM 9 2 8 7 - 10 3 mg/dL    Protein, Total 6 4 6 0 - 8 5 g/dL    Albumin 4 4 3 8 - 4 8 g/dL    Globulin, Total 2 0 1 5 - 4 5 g/dL    Albumin/Globulin Ratio 2 2 1 2 - 2 2    TOTAL BILIRUBIN 0 3 0 0 - 1 2 mg/dL    Alk Phos Isoenzymes 72 39 - 117 IU/L    AST 20 0 - 40 IU/L    ALT 16 0 - 32 IU/L   Lipid panel    Collection Time: 07/08/20  7:15 AM   Result Value Ref Range    Cholesterol, Total 234 (H) 100 - 199 mg/dL    Triglycerides 130 0 - 149 mg/dL    HDL 52 >39 mg/dL    VLDL Cholesterol Calculated 26 5 - 40 mg/dL    LDL Calculated 156 (H) 0 - 99 mg/dL   Hemoglobin A1c (w/out EAG)    Collection Time: 07/08/20  7:15 AM   Result Value Ref Range    Hemoglobin A1C 5 2 4 8 - 5 6 %   TSH, 3rd generation with Free T4 reflex    Collection Time: 07/08/20  7:15 AM   Result Value Ref Range    TSH 2 330 0 450 - 4 500 uIU/mL       The following portions of the patient's history were reviewed and updated as appropriate: allergies, current medications, past family history, past medical history, past social history, past surgical history and problem list      Review of Systems   Constitutional: Negative for appetite change, chills, diaphoresis, fatigue, fever and unexpected weight change  HENT: Negative for congestion, hearing loss and rhinorrhea  Eyes: Negative for visual disturbance  Respiratory: Negative for cough, chest tightness, shortness of breath and wheezing  Cardiovascular: Negative for chest pain, palpitations and leg swelling  Gastrointestinal: Negative for abdominal pain and blood in stool  Endocrine: Negative for cold intolerance, heat intolerance, polydipsia and polyuria  Genitourinary: Negative for difficulty urinating, dysuria, frequency and urgency  Musculoskeletal: Negative for arthralgias and myalgias  Skin: Negative for rash  Neurological: Negative for dizziness, weakness, light-headedness and headaches  Hematological: Does not bruise/bleed easily  Psychiatric/Behavioral: Negative for dysphoric mood and sleep disturbance  Objective:      Vitals:    07/16/20 1442   BP: 134/76   Pulse: 60   Resp: 12   Temp: 97 5 °F (36 4 °C)          Physical Exam   Constitutional: She is oriented to person, place, and time  She appears well-developed and well-nourished  HENT:   Head: Normocephalic and atraumatic  Nose: Nose normal    Mouth/Throat: Oropharynx is clear and moist    Eyes: Pupils are equal, round, and reactive to light  Conjunctivae and EOM are normal  No scleral icterus  Neck: Normal range of motion  Neck supple  No JVD present  No tracheal deviation present  No thyromegaly present  Cardiovascular: Normal rate, regular rhythm and intact distal pulses  Exam reveals no gallop and no friction rub  No murmur heard  Pulmonary/Chest: Effort normal and breath sounds normal  No respiratory distress  She has no wheezes  She has no rales  Musculoskeletal: She exhibits no edema or deformity  Lymphadenopathy:     She has no cervical adenopathy  Neurological: She is alert and oriented to person, place, and time  No cranial nerve deficit  Skin: Skin is warm and dry  No rash noted  No erythema  No pallor  Psychiatric: She has a normal mood and affect   Her behavior is normal  Judgment and thought content normal

## 2020-09-25 DIAGNOSIS — T78.40XS ALLERGIC DISORDER, SEQUELA: ICD-10-CM

## 2020-09-25 RX ORDER — MONTELUKAST SODIUM 10 MG/1
TABLET ORAL
Qty: 90 TABLET | Refills: 3 | Status: SHIPPED | OUTPATIENT
Start: 2020-09-25 | End: 2021-09-08

## 2020-10-13 ENCOUNTER — HOSPITAL ENCOUNTER (OUTPATIENT)
Dept: MAMMOGRAPHY | Facility: CLINIC | Age: 65
Discharge: HOME | End: 2020-10-13
Payer: COMMERCIAL

## 2020-10-13 VITALS — WEIGHT: 152 LBS | HEIGHT: 68 IN | BODY MASS INDEX: 23.04 KG/M2

## 2020-10-13 DIAGNOSIS — Z12.31 VISIT FOR SCREENING MAMMOGRAM: ICD-10-CM

## 2020-10-13 PROCEDURE — 77067 SCR MAMMO BI INCL CAD: CPT

## 2020-10-13 PROCEDURE — 77063 BREAST TOMOSYNTHESIS BI: CPT

## 2020-10-16 DIAGNOSIS — F51.01 PRIMARY INSOMNIA: ICD-10-CM

## 2020-10-16 RX ORDER — ZALEPLON 10 MG/1
10 CAPSULE ORAL
Qty: 30 CAPSULE | Refills: 3 | Status: SHIPPED | OUTPATIENT
Start: 2020-10-16 | End: 2021-03-10 | Stop reason: SDUPTHER

## 2020-11-05 ENCOUNTER — NURSE TRIAGE (OUTPATIENT)
Dept: OTHER | Facility: OTHER | Age: 65
End: 2020-11-05

## 2020-11-05 ENCOUNTER — TELEPHONE (OUTPATIENT)
Dept: INTERNAL MEDICINE CLINIC | Facility: CLINIC | Age: 65
End: 2020-11-05

## 2020-11-05 DIAGNOSIS — Z20.828 SARS-ASSOCIATED CORONAVIRUS EXPOSURE: Primary | ICD-10-CM

## 2020-11-06 ENCOUNTER — TELEMEDICINE (OUTPATIENT)
Dept: INTERNAL MEDICINE CLINIC | Facility: CLINIC | Age: 65
End: 2020-11-06
Payer: COMMERCIAL

## 2020-11-06 DIAGNOSIS — Z20.828 SARS-ASSOCIATED CORONAVIRUS EXPOSURE: ICD-10-CM

## 2020-11-06 DIAGNOSIS — Z20.822 CLOSE EXPOSURE TO COVID-19 VIRUS: Primary | ICD-10-CM

## 2020-11-06 PROCEDURE — 99213 OFFICE O/P EST LOW 20 MIN: CPT | Performed by: NURSE PRACTITIONER

## 2020-11-06 PROCEDURE — U0003 INFECTIOUS AGENT DETECTION BY NUCLEIC ACID (DNA OR RNA); SEVERE ACUTE RESPIRATORY SYNDROME CORONAVIRUS 2 (SARS-COV-2) (CORONAVIRUS DISEASE [COVID-19]), AMPLIFIED PROBE TECHNIQUE, MAKING USE OF HIGH THROUGHPUT TECHNOLOGIES AS DESCRIBED BY CMS-2020-01-R: HCPCS | Performed by: INTERNAL MEDICINE

## 2020-11-07 LAB — SARS-COV-2 RNA SPEC QL NAA+PROBE: NOT DETECTED

## 2020-11-09 ENCOUNTER — TELEPHONE (OUTPATIENT)
Dept: INTERNAL MEDICINE CLINIC | Facility: CLINIC | Age: 65
End: 2020-11-09

## 2020-12-16 DIAGNOSIS — J45.909 UNCOMPLICATED ASTHMA, UNSPECIFIED ASTHMA SEVERITY, UNSPECIFIED WHETHER PERSISTENT: ICD-10-CM

## 2020-12-16 RX ORDER — BUDESONIDE AND FORMOTEROL FUMARATE DIHYDRATE 160; 4.5 UG/1; UG/1
AEROSOL RESPIRATORY (INHALATION)
Qty: 10.2 G | Refills: 5 | Status: SHIPPED | OUTPATIENT
Start: 2020-12-16 | End: 2021-08-17

## 2021-01-21 LAB
ALBUMIN SERPL-MCNC: 4.3 G/DL (ref 3.8–4.8)
ALBUMIN/GLOB SERPL: 1.9 {RATIO} (ref 1.2–2.2)
ALP SERPL-CCNC: 80 IU/L (ref 39–117)
ALT SERPL-CCNC: 42 IU/L (ref 0–32)
AST SERPL-CCNC: 37 IU/L (ref 0–40)
BASOPHILS # BLD AUTO: 0.1 X10E3/UL (ref 0–0.2)
BASOPHILS NFR BLD AUTO: 1 %
BILIRUB SERPL-MCNC: 0.4 MG/DL (ref 0–1.2)
BUN SERPL-MCNC: 21 MG/DL (ref 8–27)
BUN/CREAT SERPL: 29 (ref 12–28)
CALCIUM SERPL-MCNC: 9 MG/DL (ref 8.7–10.3)
CHLORIDE SERPL-SCNC: 102 MMOL/L (ref 96–106)
CHOLEST SERPL-MCNC: 240 MG/DL (ref 100–199)
CO2 SERPL-SCNC: 26 MMOL/L (ref 20–29)
CREAT SERPL-MCNC: 0.73 MG/DL (ref 0.57–1)
EOSINOPHIL # BLD AUTO: 0.1 X10E3/UL (ref 0–0.4)
EOSINOPHIL NFR BLD AUTO: 2 %
ERYTHROCYTE [DISTWIDTH] IN BLOOD BY AUTOMATED COUNT: 12.3 % (ref 11.7–15.4)
GLOBULIN SER-MCNC: 2.3 G/DL (ref 1.5–4.5)
GLUCOSE SERPL-MCNC: 91 MG/DL (ref 65–99)
HBA1C MFR BLD: 5.4 % (ref 4.8–5.6)
HCT VFR BLD AUTO: 41 % (ref 34–46.6)
HCV AB S/CO SERPL IA: <0.1 S/CO RATIO (ref 0–0.9)
HDLC SERPL-MCNC: 58 MG/DL
HGB BLD-MCNC: 14.2 G/DL (ref 11.1–15.9)
HIV 1+2 AB+HIV1 P24 AG SERPL QL IA: NON REACTIVE
IMM GRANULOCYTES # BLD: 0 X10E3/UL (ref 0–0.1)
IMM GRANULOCYTES NFR BLD: 0 %
LDLC SERPL CALC-MCNC: 160 MG/DL (ref 0–99)
LYMPHOCYTES # BLD AUTO: 1.6 X10E3/UL (ref 0.7–3.1)
LYMPHOCYTES NFR BLD AUTO: 41 %
MCH RBC QN AUTO: 30.1 PG (ref 26.6–33)
MCHC RBC AUTO-ENTMCNC: 34.6 G/DL (ref 31.5–35.7)
MCV RBC AUTO: 87 FL (ref 79–97)
MONOCYTES # BLD AUTO: 0.3 X10E3/UL (ref 0.1–0.9)
MONOCYTES NFR BLD AUTO: 8 %
NEUTROPHILS # BLD AUTO: 1.9 X10E3/UL (ref 1.4–7)
NEUTROPHILS NFR BLD AUTO: 48 %
PLATELET # BLD AUTO: 274 X10E3/UL (ref 150–450)
POTASSIUM SERPL-SCNC: 3.3 MMOL/L (ref 3.5–5.2)
PROT SERPL-MCNC: 6.6 G/DL (ref 6–8.5)
RBC # BLD AUTO: 4.71 X10E6/UL (ref 3.77–5.28)
SL AMB EGFR AFRICAN AMERICAN: 101 ML/MIN/1.73
SL AMB EGFR NON AFRICAN AMERICAN: 87 ML/MIN/1.73
SL AMB INTERPRETATION: NORMAL
SL AMB VLDL CHOLESTEROL CALC: 22 MG/DL (ref 5–40)
SODIUM SERPL-SCNC: 141 MMOL/L (ref 134–144)
TRIGL SERPL-MCNC: 122 MG/DL (ref 0–149)
WBC # BLD AUTO: 4 X10E3/UL (ref 3.4–10.8)

## 2021-01-25 ENCOUNTER — HOSPITAL ENCOUNTER (EMERGENCY)
Facility: HOSPITAL | Age: 66
Discharge: HOME | End: 2021-01-25
Attending: EMERGENCY MEDICINE
Payer: COMMERCIAL

## 2021-01-25 ENCOUNTER — APPOINTMENT (EMERGENCY)
Dept: RADIOLOGY | Facility: HOSPITAL | Age: 66
End: 2021-01-25
Payer: COMMERCIAL

## 2021-01-25 VITALS
DIASTOLIC BLOOD PRESSURE: 60 MMHG | OXYGEN SATURATION: 97 % | RESPIRATION RATE: 17 BRPM | TEMPERATURE: 97.8 F | HEART RATE: 73 BPM | SYSTOLIC BLOOD PRESSURE: 114 MMHG

## 2021-01-25 DIAGNOSIS — R07.89 CHEST WALL PAIN: Primary | ICD-10-CM

## 2021-01-25 DIAGNOSIS — E87.6 HYPOKALEMIA: ICD-10-CM

## 2021-01-25 LAB
ALBUMIN SERPL BCP-MCNC: 3.8 G/DL (ref 3.5–5)
ALP SERPL-CCNC: 75 U/L (ref 46–116)
ALT SERPL W P-5'-P-CCNC: 73 U/L (ref 12–78)
ANION GAP SERPL CALCULATED.3IONS-SCNC: 7 MMOL/L (ref 4–13)
AST SERPL W P-5'-P-CCNC: 48 U/L (ref 5–45)
BASOPHILS # BLD AUTO: 0.06 THOUSANDS/ΜL (ref 0–0.1)
BASOPHILS NFR BLD AUTO: 2 % (ref 0–1)
BILIRUB SERPL-MCNC: 0.7 MG/DL (ref 0.2–1)
BUN SERPL-MCNC: 20 MG/DL (ref 5–25)
CALCIUM SERPL-MCNC: 9.4 MG/DL (ref 8.3–10.1)
CHLORIDE SERPL-SCNC: 102 MMOL/L (ref 100–108)
CO2 SERPL-SCNC: 32 MMOL/L (ref 21–32)
CREAT SERPL-MCNC: 0.8 MG/DL (ref 0.6–1.3)
EOSINOPHIL # BLD AUTO: 0.08 THOUSAND/ΜL (ref 0–0.61)
EOSINOPHIL NFR BLD AUTO: 2 % (ref 0–6)
ERYTHROCYTE [DISTWIDTH] IN BLOOD BY AUTOMATED COUNT: 12.3 % (ref 11.6–15.1)
FLUAV RNA RESP QL NAA+PROBE: NEGATIVE
FLUBV RNA RESP QL NAA+PROBE: NEGATIVE
GFR SERPL CREATININE-BSD FRML MDRD: 78 ML/MIN/1.73SQ M
GLUCOSE SERPL-MCNC: 103 MG/DL (ref 65–140)
HCT VFR BLD AUTO: 41 % (ref 34.8–46.1)
HGB BLD-MCNC: 13.5 G/DL (ref 11.5–15.4)
IMM GRANULOCYTES # BLD AUTO: 0.01 THOUSAND/UL (ref 0–0.2)
IMM GRANULOCYTES NFR BLD AUTO: 0 % (ref 0–2)
LYMPHOCYTES # BLD AUTO: 1.47 THOUSANDS/ΜL (ref 0.6–4.47)
LYMPHOCYTES NFR BLD AUTO: 40 % (ref 14–44)
MAGNESIUM SERPL-MCNC: 2.3 MG/DL (ref 1.6–2.6)
MCH RBC QN AUTO: 29.4 PG (ref 26.8–34.3)
MCHC RBC AUTO-ENTMCNC: 32.9 G/DL (ref 31.4–37.4)
MCV RBC AUTO: 89 FL (ref 82–98)
MONOCYTES # BLD AUTO: 0.33 THOUSAND/ΜL (ref 0.17–1.22)
MONOCYTES NFR BLD AUTO: 9 % (ref 4–12)
NEUTROPHILS # BLD AUTO: 1.69 THOUSANDS/ΜL (ref 1.85–7.62)
NEUTS SEG NFR BLD AUTO: 47 % (ref 43–75)
NRBC BLD AUTO-RTO: 0 /100 WBCS
PLATELET # BLD AUTO: 250 THOUSANDS/UL (ref 149–390)
PMV BLD AUTO: 9.7 FL (ref 8.9–12.7)
POTASSIUM SERPL-SCNC: 3.2 MMOL/L (ref 3.5–5.3)
PROT SERPL-MCNC: 7 G/DL (ref 6.4–8.2)
RBC # BLD AUTO: 4.59 MILLION/UL (ref 3.81–5.12)
RSV RNA RESP QL NAA+PROBE: NEGATIVE
SARS-COV-2 RNA RESP QL NAA+PROBE: NEGATIVE
SODIUM SERPL-SCNC: 141 MMOL/L (ref 136–145)
TROPONIN I SERPL-MCNC: <0.02 NG/ML
TROPONIN I SERPL-MCNC: <0.02 NG/ML
WBC # BLD AUTO: 3.64 THOUSAND/UL (ref 4.31–10.16)

## 2021-01-25 PROCEDURE — 36415 COLL VENOUS BLD VENIPUNCTURE: CPT | Performed by: EMERGENCY MEDICINE

## 2021-01-25 PROCEDURE — 80053 COMPREHEN METABOLIC PANEL: CPT | Performed by: EMERGENCY MEDICINE

## 2021-01-25 PROCEDURE — 99285 EMERGENCY DEPT VISIT HI MDM: CPT | Performed by: EMERGENCY MEDICINE

## 2021-01-25 PROCEDURE — 85025 COMPLETE CBC W/AUTO DIFF WBC: CPT | Performed by: EMERGENCY MEDICINE

## 2021-01-25 PROCEDURE — 99285 EMERGENCY DEPT VISIT HI MDM: CPT

## 2021-01-25 PROCEDURE — 0241U HB NFCT DS VIR RESP RNA 4 TRGT: CPT | Performed by: EMERGENCY MEDICINE

## 2021-01-25 PROCEDURE — 93005 ELECTROCARDIOGRAM TRACING: CPT

## 2021-01-25 PROCEDURE — 71046 X-RAY EXAM CHEST 2 VIEWS: CPT

## 2021-01-25 PROCEDURE — 83735 ASSAY OF MAGNESIUM: CPT | Performed by: EMERGENCY MEDICINE

## 2021-01-25 PROCEDURE — 84484 ASSAY OF TROPONIN QUANT: CPT | Performed by: EMERGENCY MEDICINE

## 2021-01-25 RX ORDER — POTASSIUM CHLORIDE 20 MEQ/1
40 TABLET, EXTENDED RELEASE ORAL ONCE
Status: COMPLETED | OUTPATIENT
Start: 2021-01-25 | End: 2021-01-25

## 2021-01-25 RX ADMIN — POTASSIUM CHLORIDE 40 MEQ: 1500 TABLET, EXTENDED RELEASE ORAL at 11:20

## 2021-01-25 NOTE — ED PROVIDER NOTES
History  Chief Complaint   Patient presents with    Chest Pain     Pt reports midsternal chest pain, not relieved by Tums, pt reports taking four aspirin 81mg while at work, EMS reports normal sinus on monitor     [de-identified] old female presents for chest pain  She states that it occurred while she was at work today  She describes it as a midsternal chest pain, feels consistent with heartburn however the patient does not usually get heartburn  She attempted taking Tums however this did not alleviate her symptoms  Ambulance was called  She did receive aspirin EN route to the hospital     Denies SOB, no F/C, no recent illness, no N/V/D/C  Denies other complaints  Prior to Admission Medications   Prescriptions Last Dose Informant Patient Reported? Taking?    PROVENTIL  (90 Base) MCG/ACT inhaler   No No   Sig: Inhale 2 puffs every 6 (six) hours as needed for wheezing   Symbicort 160-4 5 MCG/ACT inhaler   No No   Sig: inhale 2 puffs by mouth twice a day RINSE MOUTH AFTER USE   chlorthalidone 25 mg tablet  Self No No   Sig: Take 1 tablet (25 mg total) by mouth daily   Patient taking differently: Take 12 5 mg by mouth daily    loratadine (CLARITIN) 10 mg tablet  Self Yes No   Sig: Take 10 mg by mouth daily   montelukast (SINGULAIR) 10 mg tablet   No No   Sig: TAKE 1 TABLET EVERY DAY   valACYclovir (VALTREX) 1,000 mg tablet   No No   Sig: Take 1 tablet (1,000 mg total) by mouth daily for 1 day   zaleplon (SONATA) 10 MG capsule   No No   Sig: Take 1 capsule (10 mg total) by mouth daily at bedtime as needed for sleep      Facility-Administered Medications: None       Past Medical History:   Diagnosis Date    Allergic rhinitis     Last Assessed: 6/9/2016    Black tarry stools     Concussion with prolonged loss of consciousness without return to pre-existing conscious level     COPD (chronic obstructive pulmonary disease) (HCC)     Last Assessed: 3/8/2017    Fall     slipping, tripping or stumbling    Generalized osteoarthritis     GERD (gastroesophageal reflux disease)     Herpes simplex infection     Menopausal disorder     Morbid obesity due to excess calories (HCC)     Neuroma     right foot    Pharyngeal disorder     Pneumonia     Last Assessed: 5/15/2014    Post-menopausal bleeding     Last Assessed: 2015    Postmenopausal disorder        Past Surgical History:   Procedure Laterality Date     SECTION      COLONOSCOPY      Complete    DILATION AND CURETTAGE OF UTERUS      3 times    DILATION AND EVACUATION      Surgical treatment of missed  in first trimester - x 5    DILATION AND EVACUATION  ,     Surgically Induced     ENDOMETRIAL BIOPSY  2015    PMB/EB    KNEE ARTHROSCOPY Right     KNEE SURGERY Right     [id number] for right fractured patella    NM OPEN TX RADIAL HEAD/NECK FRACTURE Left 7/10/2018    Procedure: OPEN REDUCTION INTERNAL FIXATION LEFT DISTAL HUMERUS CAPITELLAR AND TROCHLEAR SHEAR FRACTURE WITH LATERAL EPICONDYLE FRACTURE; REPAIR LATERAL ULNAR COLLATERAL LIGAMENT COMPLEX OF THE ELBOW; SPLINT APPLICATION;  Surgeon: Vandy Runner, MD;  Location: [facility];  Service: Orthopedics       Family History   Problem Relation Age of Onset    Diabetes Mother     Migraines Mother     Hypertension Mother     Ovarian cancer Mother [de-identified]    Pancreatic cancer Mother     Other Mother         Skin disorder, Urinary Incontinence    Coronary artery disease Father     Heart disease Father     Hypertension Father     Hypertension Sister     Heart disease Brother     Jaundice Brother     Rectal cancer Brother     Endometrial cancer Brother 62    Osteoporosis Other     Breast cancer Paternal Aunt 72    No Known Problems Daughter     No Known Problems Paternal Grandmother     No Known Problems Maternal Aunt     No Known Problems Maternal Aunt     No Known Problems Maternal Aunt      I have reviewed and agree with the history as documented  E-Cigarette/Vaping    E-Cigarette Use Never User      E-Cigarette/Vaping Substances    Nicotine No     THC No     CBD No     Flavoring No     Other No     Unknown No      Social History     Tobacco Use    Smoking status: Former Smoker     Packs/day: 1 00     Years: 15 00     Pack years: 15 00     Types: Cigarettes     Start date:      Quit date: 6/15/1984     Years since quittin 6    Smokeless tobacco: Never Used   Substance Use Topics    Alcohol use: Yes     Frequency: Monthly or less     Drinks per session: 1 or 2     Binge frequency: Never    Drug use: No       Review of Systems   Constitutional: Negative for chills, fatigue and fever  HENT: Negative for congestion and sore throat  Respiratory: Negative for cough, chest tightness, shortness of breath and wheezing  Cardiovascular: Positive for chest pain  Negative for palpitations and leg swelling  Gastrointestinal: Negative for abdominal pain, constipation, diarrhea, nausea and vomiting  Genitourinary: Negative for dysuria and flank pain  Musculoskeletal: Negative for back pain and neck pain  Skin: Negative for color change and rash  Allergic/Immunologic: Negative for immunocompromised state  Neurological: Negative for dizziness, syncope, weakness, light-headedness and headaches  Psychiatric/Behavioral: Negative for confusion  Physical Exam  Physical Exam  Vitals signs reviewed  Constitutional:       General: She is not in acute distress  Appearance: She is well-developed  She is not ill-appearing, toxic-appearing or diaphoretic  HENT:      Head: Normocephalic and atraumatic  Eyes:      General: No scleral icterus  Right eye: No discharge  Left eye: No discharge  Conjunctiva/sclera: Conjunctivae normal       Pupils: Pupils are equal, round, and reactive to light  Neck:      Musculoskeletal: Normal range of motion and neck supple  Vascular: No JVD     Cardiovascular: Rate and Rhythm: Normal rate and regular rhythm  Heart sounds: Normal heart sounds  No murmur  No friction rub  No gallop  Pulmonary:      Effort: Pulmonary effort is normal  No respiratory distress  Breath sounds: Normal breath sounds  No decreased breath sounds, wheezing, rhonchi or rales  Chest:      Chest wall: No tenderness or crepitus  Abdominal:      General: Bowel sounds are normal  There is no distension  Palpations: Abdomen is soft  Tenderness: There is no abdominal tenderness  There is no guarding or rebound  Musculoskeletal: Normal range of motion  General: No tenderness or deformity  Skin:     General: Skin is warm and dry  Coloration: Skin is not pale  Findings: No erythema or rash  Neurological:      Mental Status: She is alert and oriented to person, place, and time  Cranial Nerves: No cranial nerve deficit     Psychiatric:         Behavior: Behavior normal          Vital Signs  ED Triage Vitals   Temperature Pulse Respirations Blood Pressure SpO2   01/25/21 1328 01/25/21 1007 01/25/21 1007 01/25/21 1007 01/25/21 1007   97 8 °F (36 6 °C) 63 18 148/80 99 %      Temp src Heart Rate Source Patient Position - Orthostatic VS BP Location FiO2 (%)   -- 01/25/21 1007 01/25/21 1007 01/25/21 1007 --    Monitor Lying Right arm       Pain Score       01/25/21 1007       3           Vitals:    01/25/21 1007 01/25/21 1215 01/25/21 1400   BP: 148/80 128/80 114/60   Pulse: 63 75 73   Patient Position - Orthostatic VS: Lying  Lying         Visual Acuity      ED Medications  Medications   potassium chloride (K-DUR,KLOR-CON) CR tablet 40 mEq (40 mEq Oral Given 1/25/21 1120)       Diagnostic Studies  Results Reviewed     Procedure Component Value Units Date/Time    Troponin I [[id number]]  (Normal) Collected: 01/25/21 1328    Lab Status: Final result Specimen: Blood from Arm, Right Updated: 01/25/21 1349     Troponin I <0 02 ng/mL     COVID19, Influenza A/B, RSV PCR, [facility] [[id number]]  (Normal) Collected: 01/25/21 1145    Lab Status: Final result Specimen: Nares from Nasopharyngeal Swab Updated: 01/25/21 1236     SARS-CoV-2 Negative     INFLUENZA A PCR Negative     INFLUENZA B PCR Negative     RSV PCR Negative    Narrative: This test has been authorized by FDA under an EUA (Emergency Use Assay) for use by authorized laboratories  Clinical caution and judgement should be used with the interpretation of these results with consideration of the clinical impression and other laboratory testing  Testing reported as "Positive" or "Negative" has been proven to be accurate according to standard laboratory validation requirements  All testing is performed with control materials showing appropriate reactivity at standard intervals      Troponin I [[id number]]  (Normal) Collected: 01/25/21 1028    Lab Status: Final result Specimen: Blood from Arm, Right Updated: 01/25/21 1100     Troponin I <0 02 ng/mL     Comprehensive metabolic panel [[id number]]  (Abnormal) Collected: 01/25/21 1028    Lab Status: Final result Specimen: Blood from Arm, Right Updated: 01/25/21 1054     Sodium 141 mmol/L      Potassium 3 2 mmol/L      Chloride 102 mmol/L      CO2 32 mmol/L      ANION GAP 7 mmol/L      BUN 20 mg/dL      Creatinine 0 80 mg/dL      Glucose 103 mg/dL      Calcium 9 4 mg/dL      AST 48 U/L      ALT 73 U/L      Alkaline Phosphatase 75 U/L      Total Protein 7 0 g/dL      Albumin 3 8 g/dL      Total Bilirubin 0 70 mg/dL      eGFR 78 ml/min/1 73sq m     Narrative:      [facility] guidelines for Chronic Kidney Disease (CKD):     Stage 1 with normal or high GFR (GFR > 90 mL/min/1 73 square meters)    Stage 2 Mild CKD (GFR = 60-89 mL/min/1 73 square meters)    Stage 3A Moderate CKD (GFR = 45-59 mL/min/1 73 square meters)    Stage 3B Moderate CKD (GFR = 30-44 mL/min/1 73 square meters)    Stage 4 Severe CKD (GFR = 15-29 mL/min/1 73 square meters)    Stage 5 End Stage CKD (GFR <15 mL/min/1 73 square meters)  Note: GFR calculation is accurate only with a steady state creatinine    Magnesium [[id number]]  (Normal) Collected: 01/25/21 1028    Lab Status: Final result Specimen: Blood from Arm, Right Updated: 01/25/21 1052     Magnesium 2 3 mg/dL     CBC and differential [[id number]]  (Abnormal) Collected: 01/25/21 1028    Lab Status: Final result Specimen: Blood from Arm, Right Updated: 01/25/21 1037     WBC 3 64 Thousand/uL      RBC 4 59 Million/uL      Hemoglobin 13 5 g/dL      Hematocrit 41 0 %      MCV 89 fL      MCH 29 4 pg      MCHC 32 9 g/dL      RDW 12 3 %      MPV 9 7 fL      Platelets 711 Thousands/uL      nRBC 0 /100 WBCs      Neutrophils Relative 47 %      Immat GRANS % 0 %      Lymphocytes Relative 40 %      Monocytes Relative 9 %      Eosinophils Relative 2 %      Basophils Relative 2 %      Neutrophils Absolute 1 69 Thousands/µL      Immature Grans Absolute 0 01 Thousand/uL      Lymphocytes Absolute 1 47 Thousands/µL      Monocytes Absolute 0 33 Thousand/µL      Eosinophils Absolute 0 08 Thousand/µL      Basophils Absolute 0 06 Thousands/µL                  XR chest 2 views   ED Interpretation by Silvia Lau DO (01/25 1038)   No acute cardiopulmonary disease      Final Result by Tayler Fry MD (01/25 1059)      No acute cardiopulmonary disease                    Workstation performed: [id number]                    Procedures  ECG 12 Lead Documentation Only    Date/Time: 1/25/2021 10:28 AM  Performed by: Silvia Lau DO  Authorized by: Silvia Lau DO     Indications / Diagnosis:  CP  ECG reviewed by me, the ED Provider: yes    Patient location:  ED  Previous ECG:     Previous ECG:  Unavailable    Comparison to cardiac monitor: Yes    Interpretation:     Interpretation: normal    Rate:     ECG rate:  69    ECG rate assessment: normal    Rhythm:     Rhythm: sinus rhythm    Ectopy:     Ectopy: none    QRS:     QRS axis: Normal    QRS intervals:  Normal  Conduction:     Conduction: normal    ST segments:     ST segments:  Normal  T waves:     T waves: normal      ECG 12 Lead Documentation Only    Date/Time: 1/25/2021 1:36 PM  Performed by: Victorina Velez DO  Authorized by: Victorina Velez DO     Indications / Diagnosis:  CP - repeat EKG  ECG reviewed by me, the ED Provider: yes    Patient location:  ED  Previous ECG:     Previous ECG:  Compared to current    Similarity:  No change    Comparison to cardiac monitor: Yes    Interpretation:     Interpretation: normal    Rate:     ECG rate:  75    ECG rate assessment: normal    Rhythm:     Rhythm: sinus rhythm    Ectopy:     Ectopy: none    QRS:     QRS axis:  Normal    QRS intervals:  Normal  Conduction:     Conduction: normal    ST segments:     ST segments:  Normal  T waves:     T waves: normal               ED Course  ED Course as of Jan 25 1730   Mon Jan 25, 2021   1111 Potassium(!): 3 2   1312 SARS-COV-2: Negative   1312 Troponin I: <0 02   1349 Patient feels well at this time - no return of chest pain while here  Updated her on negative work up, and advised follow up OP - will see PCP later this week  Troponin I: <0 02             HEART Risk Score      Most Recent Value   Heart Score Risk Calculator   History  1 Filed at: 01/25/2021 1112   ECG  0 Filed at: 01/25/2021 1112   Age  1 Filed at: 01/25/2021 1112   Risk Factors  1 [family hx and HLD] Filed at: 01/25/2021 1112   Troponin  0 Filed at: 01/25/2021 1112   HEART Score  3 Filed at: 01/25/2021 1112                      SBIRT 20yo+      Most Recent Value   SBIRT (23 yo +)   In order to provide better care to our patients, we are screening all of our patients for alcohol and drug use  Would it be okay to ask you these screening questions? Yes Filed at: 01/25/2021 1034   Initial Alcohol Screen: US AUDIT-C    1  How often do you have a drink containing alcohol? 1 Filed at: 01/25/2021 1034   2   How many drinks containing alcohol do you have on a typical day you are drinking? 1 Filed at: 01/25/2021 1034   3a  Male UNDER 65: How often do you have five or more drinks on one occasion? 0 Filed at: 01/25/2021 1034   3b  FEMALE Any Age, or MALE 65+: How often do you have 4 or more drinks on one occassion? 0 Filed at: 01/25/2021 1034   Audit-C Score  2 Filed at: 01/25/2021 1034   GARRISON: How many times in the past year have you    Used an illegal drug or used a prescription medication for non-medical reasons? Never Filed at: 01/25/2021 1034                    MDM  Number of Diagnoses or Management Options  Chest wall pain:   Hypokalemia:   Diagnosis management comments: Chest pain - cardiac work up preformed and is normal here, including CXR, delta trop and delta EKG  Patient also swabbed for COVID which is negative  HEART score of 3 - patient is stable for d/c home to follow up outpatient - has an appt w PCP later this week - given good return precautions in case of return of cardiac sx  Mild hypokalemia - repleted here  Amount and/or Complexity of Data Reviewed  Clinical lab tests: ordered and reviewed  Tests in the radiology section of CPT®: ordered and reviewed        Disposition  Final diagnoses:   Chest wall pain   Hypokalemia     Time reflects when diagnosis was documented in both MDM as applicable and the Disposition within this note     Time User Action Codes Description Comment    1/25/2021  1:54 PM Franklynjerome Ward Add [R07 89] Chest wall pain     1/25/2021  1:54 PM Coppersmith, Luan Aschoff Add [E87 6] Hypokalemia       ED Disposition     ED Disposition Condition Date/Time Comment    Discharge Stable Mon Jan 25, 2021  1:54 PM Theresa Velez discharge to home/self care              Follow-up Information     Follow up With Specialties Details Why Contact Info Additional Information    Josse Bettencourt MD Internal Medicine, Palliative Care  follow up at your regular PCP office visit later this week [phone] [address] [place] [address]  [address] [facility] Emergency Department Emergency Medicine  If symptoms worsen: chest pain, difficulty breathing, fever/chills, vomiting, etc [address] [facility]  [place] [id number] [phone] [address] [facility] Emergency Department, [address] [facility], [place], [place], [address]  [facility]  for follow up from this visit and further testing regarding your heart  [facility]  [facility] [address] [place] [address]   [address] [facility], [facility], [address], [place], [address] [phone]          Discharge Medication List as of 1/25/2021  1:56 PM      CONTINUE these medications which have NOT CHANGED    Details   chlorthalidone 25 mg tablet Take 1 tablet (25 mg total) by mouth daily, Starting Tue 2/18/2020, Normal      loratadine (CLARITIN) 10 mg tablet Take 10 mg by mouth daily, Historical Med      montelukast (SINGULAIR) 10 mg tablet TAKE 1 TABLET EVERY DAY, Normal      PROVENTIL  (90 Base) MCG/ACT inhaler Inhale 2 puffs every 6 (six) hours as needed for wheezing, Starting Wed 4/8/2020, Normal      Symbicort 160-4 5 MCG/ACT inhaler inhale 2 puffs by mouth twice a day RINSE MOUTH AFTER USE, Normal      valACYclovir (VALTREX) 1,000 mg tablet Take 1 tablet (1,000 mg total) by mouth daily for 1 day, Starting Thu 7/16/2020, Until Fri 7/17/2020, Normal      zaleplon (SONATA) 10 MG capsule Take 1 capsule (10 mg total) by mouth daily at bedtime as needed for sleep, Starting Fri 10/16/2020, Normal           No discharge procedures on file      PDMP Review       Value Time User    PDMP Reviewed  Yes 10/16/2020  8:37 AM Drea Sarmiento MD          ED Provider  Electronically Signed by           Marcy Healy DO  01/25/21 [id number]

## 2021-01-26 LAB
ATRIAL RATE: 69 BPM
ATRIAL RATE: 75 BPM
P AXIS: 56 DEGREES
P AXIS: 63 DEGREES
PR INTERVAL: 178 MS
PR INTERVAL: 178 MS
QRS AXIS: -1 DEGREES
QRS AXIS: 5 DEGREES
QRSD INTERVAL: 84 MS
QRSD INTERVAL: 86 MS
QT INTERVAL: 384 MS
QT INTERVAL: 396 MS
QTC INTERVAL: 411 MS
QTC INTERVAL: 442 MS
T WAVE AXIS: 54 DEGREES
T WAVE AXIS: 70 DEGREES
VENTRICULAR RATE: 69 BPM
VENTRICULAR RATE: 75 BPM

## 2021-01-26 PROCEDURE — 93010 ELECTROCARDIOGRAM REPORT: CPT | Performed by: INTERNAL MEDICINE

## 2021-01-27 ENCOUNTER — OFFICE VISIT (OUTPATIENT)
Dept: INTERNAL MEDICINE CLINIC | Facility: CLINIC | Age: 66
End: 2021-01-27
Payer: COMMERCIAL

## 2021-01-27 VITALS
HEART RATE: 59 BPM | TEMPERATURE: 97.3 F | OXYGEN SATURATION: 92 % | HEIGHT: 68 IN | WEIGHT: 152.6 LBS | DIASTOLIC BLOOD PRESSURE: 78 MMHG | BODY MASS INDEX: 23.13 KG/M2 | SYSTOLIC BLOOD PRESSURE: 132 MMHG

## 2021-01-27 DIAGNOSIS — R74.01 ELEVATED TRANSAMINASE LEVEL: ICD-10-CM

## 2021-01-27 DIAGNOSIS — E78.2 MIXED HYPERLIPIDEMIA: ICD-10-CM

## 2021-01-27 DIAGNOSIS — I10 BENIGN ESSENTIAL HYPERTENSION: ICD-10-CM

## 2021-01-27 DIAGNOSIS — J45.40 MODERATE PERSISTENT ASTHMA WITHOUT COMPLICATION: Primary | ICD-10-CM

## 2021-01-27 DIAGNOSIS — F51.01 PRIMARY INSOMNIA: ICD-10-CM

## 2021-01-27 DIAGNOSIS — R07.9 CHEST PAIN, UNSPECIFIED TYPE: ICD-10-CM

## 2021-01-27 DIAGNOSIS — E87.6 HYPOKALEMIA: ICD-10-CM

## 2021-01-27 PROCEDURE — 99215 OFFICE O/P EST HI 40 MIN: CPT | Performed by: INTERNAL MEDICINE

## 2021-01-27 PROCEDURE — 1036F TOBACCO NON-USER: CPT | Performed by: INTERNAL MEDICINE

## 2021-01-27 PROCEDURE — 3078F DIAST BP <80 MM HG: CPT | Performed by: INTERNAL MEDICINE

## 2021-01-27 PROCEDURE — 3008F BODY MASS INDEX DOCD: CPT | Performed by: INTERNAL MEDICINE

## 2021-01-27 PROCEDURE — 3075F SYST BP GE 130 - 139MM HG: CPT | Performed by: INTERNAL MEDICINE

## 2021-01-27 NOTE — PROGRESS NOTES
Assessment/Plan:    Diagnoses and all orders for this visit:    Moderate persistent asthma without complication    Benign essential hypertension  -     CBC and differential; Future  -     TSH, 3rd generation with Free T4 reflex; Future    Mixed hyperlipidemia  -     Lipid panel; Future  -     CT coronary calcium score; Future    Primary insomnia    Hypokalemia  -     Comprehensive metabolic panel; Future  -     Basic metabolic panel; Future    Chest pain, unspecified type  -     US right upper quadrant; Future  -     CT coronary calcium score; Future    Elevated transaminase level  -     US right upper quadrant; Future  -     Hepatic function panel; Future              Patient Instructions   Lab data reviewed in detail and compared prior    Chest pain syndrome of unclear etiology-patient has personal history of hyperlipidemia and family history of coronary artery disease  Previously intolerant to statins, unclear which she had tried  At this point will check coronary artery calcium score and plan to initiate low-dose rosuvastatin and titrate dose as able if calcium score greater than 0     Elevated transaminases in setting of chest pain syndrome, rule out any biliary etiology-check right upper quadrant ultrasound  Follow-up after imaging, recheck labs prior    Hyperlipidemia-manage as above, continue on low-cholesterol diet and aerobic exercise    Hypertension-stable on chlorthalidone  Hypokalemia related to chlorthalidone-increase potassium in diet, recheck prior to follow-up    Asthma and allergy appears stable on present regimen    Insomnia stable with rare use of sonata      Subjective:      Patient ID: Yoly Luz is a 60 y o  female    F/u mmp and review labs  Had an episode 2 days ago w/ [facility], went to ER, felt like GERD, but not relieved by tums, felt like bra was tight  No regular exercise, but no h/o exertional cp   TA's were up, but no r/t food  No cp since then    Lasted 15-30 min, relieved in ambulance w/in 15 min of asa  Asthma has been stable, taking singulair daily, symbicort q am, no proventil lately  Allergies have been stable  HSV-controlled w/ Valtrex as needed  Last outbreak more than 3 mos ago  Notes some urge incontinence if not careful, still not doing kegels  Hasn't needed pads  Insomnia-using sonata rarely, every few mos  HPL-previous intolerance to statins but can't recall which and was many yrs ago            Current Outpatient Medications:     chlorthalidone 25 mg tablet, Take 1 tablet (25 mg total) by mouth daily (Patient taking differently: Take 12 5 mg by mouth daily ), Disp: 30 tablet, Rfl: 5    loratadine (CLARITIN) 10 mg tablet, Take 10 mg by mouth daily, Disp: , Rfl:     montelukast (SINGULAIR) 10 mg tablet, TAKE 1 TABLET EVERY DAY, Disp: 90 tablet, Rfl: 3    PROVENTIL  (90 Base) MCG/ACT inhaler, Inhale 2 puffs every 6 (six) hours as needed for wheezing, Disp: 6 7 g, Rfl: 2    Symbicort 160-4 5 MCG/ACT inhaler, inhale 2 puffs by mouth twice a day RINSE MOUTH AFTER USE, Disp: 10 2 g, Rfl: 5    valACYclovir (VALTREX) 1,000 mg tablet, Take 1 tablet (1,000 mg total) by mouth daily for 1 day, Disp: 4 tablet, Rfl: 5    zaleplon (SONATA) 10 MG capsule, Take 1 capsule (10 mg total) by mouth daily at bedtime as needed for sleep, Disp: 30 capsule, Rfl: 3    Recent Results (from the past 1008 hour(s))   CBC and differential    Collection Time: 01/20/21  7:19 AM   Result Value Ref Range    White Blood Cell Count 4 0 3 4 - 10 8 x10E3/uL    Red Blood Cell Count 4 71 3 77 - 5 28 x10E6/uL    Hemoglobin 14 2 11 1 - 15 9 g/dL    HCT 41 0 34 0 - 46 6 %    MCV 87 79 - 97 fL    MCH 30 1 26 6 - 33 0 pg    MCHC 34 6 31 5 - 35 7 g/dL    RDW 12 3 11 7 - 15 4 %    Platelet Count 589 873 - 450 x10E3/uL    Neutrophils 48 Not Estab  %    Lymphocytes 41 Not Estab  %    Monocytes 8 Not Estab  %    Eosinophils 2 Not Estab  %    Basophils PCT 1 Not Estab  %    Neutrophils (Absolute) 1 9 1 4 - 7 0 x10E3/uL    Lymphocytes (Absolute) 1 6 0 7 - 3 1 x10E3/uL    Monocytes (Absolute) 0 3 0 1 - 0 9 x10E3/uL    Eosinophils (Absolute) 0 1 0 0 - 0 4 x10E3/uL    Basophils ABS 0 1 0 0 - 0 2 x10E3/uL    Immature Granulocytes 0 Not Estab  %    Immature Granulocytes (Absolute) 0 0 0 0 - 0 1 x10E3/uL   Comprehensive metabolic panel    Collection Time: 01/20/21  7:19 AM   Result Value Ref Range    Glucose, Random 91 65 - 99 mg/dL    BUN 21 8 - 27 mg/dL    Creatinine 0 73 0 57 - 1 00 mg/dL    eGFR Non  87 >59 mL/min/1 73    eGFR  101 >59 mL/min/1 73    SL AMB BUN/CREATININE RATIO 29 (H) 12 - 28    Sodium 141 134 - 144 mmol/L    Potassium 3 3 (L) 3 5 - 5 2 mmol/L    Chloride 102 96 - 106 mmol/L    CO2 26 20 - 29 mmol/L    CALCIUM 9 0 8 7 - 10 3 mg/dL    Protein, Total 6 6 6 0 - 8 5 g/dL    Albumin 4 3 3 8 - 4 8 g/dL    Globulin, Total 2 3 1 5 - 4 5 g/dL    Albumin/Globulin Ratio 1 9 1 2 - 2 2    TOTAL BILIRUBIN 0 4 0 0 - 1 2 mg/dL    Alk Phos Isoenzymes 80 39 - 117 IU/L    AST 37 0 - 40 IU/L    ALT 42 (H) 0 - 32 IU/L   Lipid panel    Collection Time: 01/20/21  7:19 AM   Result Value Ref Range    Cholesterol, Total 240 (H) 100 - 199 mg/dL    Triglycerides 122 0 - 149 mg/dL    HDL 58 >39 mg/dL    VLDL Cholesterol Calculated 22 5 - 40 mg/dL    LDL Calculated 160 (H) 0 - 99 mg/dL   Hemoglobin A1c (w/out EAG)    Collection Time: 01/20/21  7:19 AM   Result Value Ref Range    Hemoglobin A1C 5 4 4 8 - 5 6 %   Human Immunodeficiency Virus 1/2 Antigen / Antibody ( Fourth Generation) with Reflex Testing    Collection Time: 01/20/21  7:19 AM   Result Value Ref Range    HIV Screen 4th Generation wRflx Non Reactive Non Reactive   HCV Antibody reflex to JILLIAN    Collection Time: 01/20/21  7:19 AM   Result Value Ref Range    HEP C AB <0 1 0 0 - 0 9 s/co ratio   Interpretation    Collection Time: 01/20/21  7:19 AM   Result Value Ref Range    Interpretation: Comment    ECG 12 lead    Collection Time: 01/25/21 10:10 AM   Result Value Ref Range    Ventricular Rate 69 BPM    Atrial Rate 69 BPM    KY Interval 178 ms    QRSD Interval 84 ms    QT Interval 384 ms    QTC Interval 411 ms    P Agar 63 degrees    QRS Axis -1 degrees    T Wave Axis 70 degrees   CBC and differential    Collection Time: 01/25/21 10:28 AM   Result Value Ref Range    WBC 3 64 (L) 4 31 - 10 16 Thousand/uL    RBC 4 59 3 81 - 5 12 Million/uL    Hemoglobin 13 5 11 5 - 15 4 g/dL    Hematocrit 41 0 34 8 - 46 1 %    MCV 89 82 - 98 fL    MCH 29 4 26 8 - 34 3 pg    MCHC 32 9 31 4 - 37 4 g/dL    RDW 12 3 11 6 - 15 1 %    MPV 9 7 8 9 - 12 7 fL    Platelets 151 120 - 604 Thousands/uL    nRBC 0 /100 WBCs    Neutrophils Relative 47 43 - 75 %    Immat GRANS % 0 0 - 2 %    Lymphocytes Relative 40 14 - 44 %    Monocytes Relative 9 4 - 12 %    Eosinophils Relative 2 0 - 6 %    Basophils Relative 2 (H) 0 - 1 %    Neutrophils Absolute 1 69 (L) 1 85 - 7 62 Thousands/µL    Immature Grans Absolute 0 01 0 00 - 0 20 Thousand/uL    Lymphocytes Absolute 1 47 0 60 - 4 47 Thousands/µL    Monocytes Absolute 0 33 0 17 - 1 22 Thousand/µL    Eosinophils Absolute 0 08 0 00 - 0 61 Thousand/µL    Basophils Absolute 0 06 0 00 - 0 10 Thousands/µL   Comprehensive metabolic panel    Collection Time: 01/25/21 10:28 AM   Result Value Ref Range    Sodium 141 136 - 145 mmol/L    Potassium 3 2 (L) 3 5 - 5 3 mmol/L    Chloride 102 100 - 108 mmol/L    CO2 32 21 - 32 mmol/L    ANION GAP 7 4 - 13 mmol/L    BUN 20 5 - 25 mg/dL    Creatinine 0 80 0 60 - 1 30 mg/dL    Glucose 103 65 - 140 mg/dL    Calcium 9 4 8 3 - 10 1 mg/dL    AST 48 (H) 5 - 45 U/L    ALT 73 12 - 78 U/L    Alkaline Phosphatase 75 46 - 116 U/L    Total Protein 7 0 6 4 - 8 2 g/dL    Albumin 3 8 3 5 - 5 0 g/dL    Total Bilirubin 0 70 0 20 - 1 00 mg/dL    eGFR 78 ml/min/1 73sq m   Troponin I    Collection Time: 01/25/21 10:28 AM   Result Value Ref Range    Troponin I <0 02 <=0 04 ng/mL   Magnesium    Collection Time: 01/25/21 10:28 AM Result Value Ref Range    Magnesium 2 3 1 6 - 2 6 mg/dL   COVID19, Influenza A/B, RSV PCR, [facility]    Collection Time: 01/25/21 11:45 AM    Specimen: Nasopharyngeal Swab; Nares   Result Value Ref Range    SARS-CoV-2 Negative Negative    INFLUENZA A PCR Negative Negative    INFLUENZA B PCR Negative Negative    RSV PCR Negative Negative   ECG 12 lead    Collection Time: 01/25/21  1:25 PM   Result Value Ref Range    Ventricular Rate 75 BPM    Atrial Rate 75 BPM    WI Interval 178 ms    QRSD Interval 86 ms    QT Interval 396 ms    QTC Interval 442 ms    P Axis 56 degrees    QRS Axis 5 degrees    T Wave Axis 54 degrees   Troponin I    Collection Time: 01/25/21  1:28 PM   Result Value Ref Range    Troponin I <0 02 <=0 04 ng/mL       The following portions of the patient's history were reviewed and updated as appropriate: allergies, current medications, past family history, past medical history, past social history, past surgical history and problem list      Review of Systems   Constitutional: Negative for appetite change, chills, diaphoresis, fatigue, fever and unexpected weight change  HENT: Negative for congestion, hearing loss and rhinorrhea  Eyes: Negative for visual disturbance  Respiratory: Negative for cough, chest tightness, shortness of breath and wheezing  Cardiovascular: Positive for chest pain  Negative for palpitations and leg swelling  Gastrointestinal: Negative for abdominal pain and blood in stool  Endocrine: Negative for cold intolerance, heat intolerance, polydipsia and polyuria  Genitourinary: Negative for difficulty urinating, dysuria, frequency and urgency  Musculoskeletal: Negative for arthralgias and myalgias  Skin: Negative for rash  Neurological: Negative for dizziness, weakness, light-headedness and headaches  Hematological: Does not bruise/bleed easily  Psychiatric/Behavioral: Negative for dysphoric mood and sleep disturbance           Objective:      Vitals: 01/27/21 1510   BP: 132/78   Pulse: 59   Temp: (!) 97 3 °F (36 3 °C)   SpO2: 92%          Physical Exam  Constitutional:       Appearance: She is well-developed  HENT:      Head: Normocephalic and atraumatic  Nose: Nose normal    Eyes:      General: No scleral icterus  Conjunctiva/sclera: Conjunctivae normal       Pupils: Pupils are equal, round, and reactive to light  Neck:      Musculoskeletal: Normal range of motion and neck supple  Thyroid: No thyromegaly  Vascular: No JVD  Trachea: No tracheal deviation  Cardiovascular:      Rate and Rhythm: Normal rate and regular rhythm  Heart sounds: No murmur  No friction rub  No gallop  Pulmonary:      Effort: Pulmonary effort is normal  No respiratory distress  Breath sounds: Normal breath sounds  No wheezing or rales  Comments: Chest wall pain not reproducible to direct palpation of costal margin, sternum or torso twisting maneuver  Abdominal:      General: Bowel sounds are normal  There is no distension  Palpations: Abdomen is soft  There is no mass  Tenderness: There is no abdominal tenderness  There is no guarding or rebound  Musculoskeletal:         General: No tenderness  Lymphadenopathy:      Cervical: No cervical adenopathy  Skin:     General: Skin is warm and dry  Findings: No erythema or rash  Neurological:      Mental Status: She is alert and oriented to person, place, and time  Cranial Nerves: No cranial nerve deficit  Psychiatric:         Behavior: Behavior normal          Thought Content:  Thought content normal          Judgment: Judgment normal

## 2021-01-27 NOTE — PATIENT INSTRUCTIONS
Lab data reviewed in detail and compared prior    Chest pain syndrome of unclear etiology-patient has personal history of hyperlipidemia and family history of coronary artery disease  Previously intolerant to statins, unclear which she had tried  At this point will check coronary artery calcium score and plan to initiate low-dose rosuvastatin and titrate dose as able if calcium score greater than 0     Elevated transaminases in setting of chest pain syndrome, rule out any biliary etiology-check right upper quadrant ultrasound  Follow-up after imaging, recheck labs prior    Hyperlipidemia-manage as above, continue on low-cholesterol diet and aerobic exercise    Hypertension-stable on chlorthalidone    Hypokalemia related to chlorthalidone-increase potassium in diet, recheck prior to follow-up    Asthma and allergy appears stable on present regimen    Insomnia stable with rare use of sonata

## 2021-02-08 ENCOUNTER — HOSPITAL ENCOUNTER (OUTPATIENT)
Dept: CT IMAGING | Facility: CLINIC | Age: 66
Discharge: HOME | End: 2021-02-08
Payer: COMMERCIAL

## 2021-02-08 DIAGNOSIS — E78.2 MIXED HYPERLIPIDEMIA: ICD-10-CM

## 2021-02-08 DIAGNOSIS — R07.9 CHEST PAIN, UNSPECIFIED TYPE: ICD-10-CM

## 2021-02-08 PROCEDURE — 75571 CT HRT W/O DYE W/CA TEST: CPT

## 2021-02-08 PROCEDURE — G1004 CDSM NDSC: HCPCS

## 2021-02-12 DIAGNOSIS — E78.2 MIXED HYPERLIPIDEMIA: Primary | ICD-10-CM

## 2021-02-12 RX ORDER — ROSUVASTATIN CALCIUM 5 MG/1
5 TABLET, COATED ORAL DAILY
Qty: 30 TABLET | Refills: 3 | Status: SHIPPED | OUTPATIENT
Start: 2021-02-12 | End: 2021-06-02 | Stop reason: SDUPTHER

## 2021-02-13 ENCOUNTER — TELEPHONE (OUTPATIENT)
Dept: INTERNAL MEDICINE CLINIC | Facility: CLINIC | Age: 66
End: 2021-02-13

## 2021-02-13 DIAGNOSIS — I10 BENIGN ESSENTIAL HYPERTENSION: ICD-10-CM

## 2021-02-13 RX ORDER — CHLORTHALIDONE 25 MG/1
TABLET ORAL
Qty: 30 TABLET | Refills: 5 | Status: SHIPPED | OUTPATIENT
Start: 2021-02-13 | End: 2021-08-17

## 2021-02-13 NOTE — TELEPHONE ENCOUNTER
----- Message from Drew Hernandez MD sent at 2/12/2021  8:17 PM EST -----  Notify-coronary calcium score markedly elevated 450, please start crestor 5 mg m/w/f for a month and add one tab per week every month as tolerated until taking daily    F/u appt if any questions

## 2021-02-20 ENCOUNTER — HOSPITAL ENCOUNTER (OUTPATIENT)
Dept: ULTRASOUND IMAGING | Facility: HOSPITAL | Age: 66
Discharge: HOME | End: 2021-02-20
Payer: COMMERCIAL

## 2021-02-20 DIAGNOSIS — R74.01 ELEVATED TRANSAMINASE LEVEL: ICD-10-CM

## 2021-02-20 DIAGNOSIS — R07.9 CHEST PAIN, UNSPECIFIED TYPE: ICD-10-CM

## 2021-02-20 PROCEDURE — 76705 ECHO EXAM OF ABDOMEN: CPT

## 2021-02-24 LAB
ALBUMIN SERPL-MCNC: 4.3 G/DL (ref 3.8–4.8)
ALP SERPL-CCNC: 80 IU/L (ref 39–117)
ALT SERPL-CCNC: 58 IU/L (ref 0–32)
AST SERPL-CCNC: 46 IU/L (ref 0–40)
BILIRUB DIRECT SERPL-MCNC: 0.15 MG/DL (ref 0–0.4)
BILIRUB SERPL-MCNC: 0.3 MG/DL (ref 0–1.2)
BUN SERPL-MCNC: 23 MG/DL (ref 8–27)
BUN/CREAT SERPL: 26 (ref 12–28)
CALCIUM SERPL-MCNC: 9.2 MG/DL (ref 8.7–10.3)
CHLORIDE SERPL-SCNC: 104 MMOL/L (ref 96–106)
CO2 SERPL-SCNC: 27 MMOL/L (ref 20–29)
CREAT SERPL-MCNC: 0.89 MG/DL (ref 0.57–1)
GLUCOSE SERPL-MCNC: 92 MG/DL (ref 65–99)
POTASSIUM SERPL-SCNC: 3.8 MMOL/L (ref 3.5–5.2)
PROT SERPL-MCNC: 6.4 G/DL (ref 6–8.5)
SL AMB EGFR AFRICAN AMERICAN: 79 ML/MIN/1.73
SL AMB EGFR NON AFRICAN AMERICAN: 69 ML/MIN/1.73
SODIUM SERPL-SCNC: 142 MMOL/L (ref 134–144)

## 2021-03-10 ENCOUNTER — OFFICE VISIT (OUTPATIENT)
Dept: INTERNAL MEDICINE CLINIC | Facility: CLINIC | Age: 66
End: 2021-03-10
Payer: COMMERCIAL

## 2021-03-10 VITALS
HEART RATE: 80 BPM | DIASTOLIC BLOOD PRESSURE: 80 MMHG | RESPIRATION RATE: 12 BRPM | WEIGHT: 153 LBS | SYSTOLIC BLOOD PRESSURE: 120 MMHG | TEMPERATURE: 98.7 F | BODY MASS INDEX: 23.19 KG/M2 | HEIGHT: 68 IN

## 2021-03-10 DIAGNOSIS — E78.2 MIXED HYPERLIPIDEMIA: ICD-10-CM

## 2021-03-10 DIAGNOSIS — Z12.11 COLON CANCER SCREENING: ICD-10-CM

## 2021-03-10 DIAGNOSIS — F51.01 PRIMARY INSOMNIA: ICD-10-CM

## 2021-03-10 DIAGNOSIS — R74.01 ELEVATED TRANSAMINASE LEVEL: Primary | ICD-10-CM

## 2021-03-10 DIAGNOSIS — I10 BENIGN ESSENTIAL HYPERTENSION: ICD-10-CM

## 2021-03-10 DIAGNOSIS — J45.40 MODERATE PERSISTENT ASTHMA WITHOUT COMPLICATION: ICD-10-CM

## 2021-03-10 PROCEDURE — 99214 OFFICE O/P EST MOD 30 MIN: CPT | Performed by: INTERNAL MEDICINE

## 2021-03-10 PROCEDURE — 3725F SCREEN DEPRESSION PERFORMED: CPT | Performed by: INTERNAL MEDICINE

## 2021-03-10 PROCEDURE — 1036F TOBACCO NON-USER: CPT | Performed by: INTERNAL MEDICINE

## 2021-03-10 PROCEDURE — 3008F BODY MASS INDEX DOCD: CPT | Performed by: INTERNAL MEDICINE

## 2021-03-10 PROCEDURE — 3074F SYST BP LT 130 MM HG: CPT | Performed by: INTERNAL MEDICINE

## 2021-03-10 PROCEDURE — 3079F DIAST BP 80-89 MM HG: CPT | Performed by: INTERNAL MEDICINE

## 2021-03-10 RX ORDER — ZALEPLON 10 MG/1
10 CAPSULE ORAL
Qty: 30 CAPSULE | Refills: 3 | Status: SHIPPED | OUTPATIENT
Start: 2021-03-10 | End: 2022-03-17

## 2021-03-10 NOTE — PROGRESS NOTES
Assessment/Plan:  1  Mild transaminitis, persistent - hepatocellular injury pattern; AST/ALT decreased slightly but still above ULN  U/S without etiology; HCV (-)  Asymptomatic  Will order HBV screen, ASMA, AMA, ROBERT, iron panel, celiacs panel  Statin therapy initiated after initial rise so unlikely the etiology  Will follow results and contact her; monitor LFTs  2  R wrist ganglion cyst (about 1cm) - asymptomatic, monitor for significant / bothersome growth  3  Simple renal cyst    4  Screening for colon cancer - (+) family Hx; last colonoscopy without evidence of polyps  Repeat colonoscopy ordered  5  Insomnia - continue Zaleplon    6  Intermittent R hand tremor (action/rest) - not present today, recently started, no other associated symptoms  No cogwheel rigidity or tremor present on exam  Finger-to-nose testing normal, no falls  Monitor for now  Subjective:      Patient ID: Volodymyr Chiang is a 60 y o  female  Here for follow up after recent labs  Denies symptoms except for mild right hand tremor which is intermittent and present at rest and with action, no other associated symptoms  No further episodes of chest or abdominal discomfort  Review of Systems   Constitutional: Negative for activity change, appetite change, chills, diaphoresis, fatigue and fever  HENT: Negative for rhinorrhea and sore throat  Eyes: Negative for visual disturbance  Respiratory: Negative for cough and shortness of breath  Cardiovascular: Negative for chest pain, palpitations and leg swelling  Gastrointestinal: Negative for abdominal pain, blood in stool, constipation, diarrhea, nausea and vomiting  Genitourinary: Negative for decreased urine volume, difficulty urinating, dysuria, hematuria and urgency  Musculoskeletal: Negative for arthralgias  Skin: Negative for pallor, rash and wound  Neurological: Positive for tremors   Negative for dizziness, syncope, weakness, light-headedness, numbness and headaches  Psychiatric/Behavioral: Negative for confusion  Objective:      /80 (BP Location: Left arm, Patient Position: Sitting)   Pulse 80   Temp 98 7 °F (37 1 °C) (Tympanic)   Resp 12   Ht 5' 8" (1 727 m)   Wt 69 4 kg (153 lb)   BMI 23 26 kg/m²          Physical Exam  Vitals signs reviewed  Constitutional:       Appearance: Normal appearance  She is normal weight  She is not ill-appearing  HENT:      Head: Normocephalic and atraumatic  Nose: Nose normal       Mouth/Throat:      Mouth: Mucous membranes are moist       Pharynx: Oropharynx is clear  Eyes:      Conjunctiva/sclera: Conjunctivae normal       Pupils: Pupils are equal, round, and reactive to light  Cardiovascular:      Rate and Rhythm: Normal rate and regular rhythm  Pulses: Normal pulses  Heart sounds: Normal heart sounds  Pulmonary:      Breath sounds: Normal breath sounds  Abdominal:      General: Abdomen is flat  Bowel sounds are normal  There is no distension  Palpations: Abdomen is soft  Tenderness: There is no abdominal tenderness  Musculoskeletal:      Right lower leg: No edema  Left lower leg: No edema  Skin:     General: Skin is warm  Coloration: Skin is not jaundiced  Findings: No wound  Comments: R wrist ganglion cyst noted (about 1cm)   Neurological:      Mental Status: She is alert and oriented to person, place, and time  Mental status is at baseline  Motor: No weakness        Coordination: Coordination normal       Gait: Gait normal    Psychiatric:         Mood and Affect: Mood normal          Behavior: Behavior normal

## 2021-03-13 LAB
ACTIN IGG SERPL-ACNC: 5 UNITS (ref 0–19)
CENTROMERE B AB SER-ACNC: <0.2 AI (ref 0–0.9)
CHROMATIN AB SERPL-ACNC: <0.2 AI (ref 0–0.9)
DSDNA AB SER-ACNC: <1 IU/ML (ref 0–9)
ENA JO1 AB SER-ACNC: <0.2 AI (ref 0–0.9)
ENA RNP AB SER-ACNC: 0.9 AI (ref 0–0.9)
ENA SCL70 AB SER-ACNC: <0.2 AI (ref 0–0.9)
ENA SM AB SER-ACNC: <0.2 AI (ref 0–0.9)
ENA SS-A AB SER-ACNC: <0.2 AI (ref 0–0.9)
ENA SS-B AB SER-ACNC: <0.2 AI (ref 0–0.9)
ENDOMYSIUM IGA SER QL: NEGATIVE
FERRITIN SERPL-MCNC: 121 NG/ML (ref 15–150)
IGA SERPL-MCNC: 182 MG/DL (ref 87–352)
IRON SATN MFR SERPL: 35 % (ref 15–55)
IRON SERPL-MCNC: 99 UG/DL (ref 27–139)
MITOCHONDRIA M2 IGG SER-ACNC: <20 UNITS (ref 0–20)
SL AMB SEE BELOW:: NORMAL
TIBC SERPL-MCNC: 283 UG/DL (ref 250–450)
TTG IGA SER-ACNC: <2 U/ML (ref 0–3)
UIBC SERPL-MCNC: 184 UG/DL (ref 118–369)

## 2021-03-15 ENCOUNTER — TELEPHONE (OUTPATIENT)
Dept: INTERNAL MEDICINE CLINIC | Facility: CLINIC | Age: 66
End: 2021-03-15

## 2021-03-15 NOTE — TELEPHONE ENCOUNTER
----- Message from Nilda Tirado MD sent at 3/13/2021  4:14 PM EST -----  Notify-liver w/u all negative

## 2021-03-23 ENCOUNTER — PREP FOR PROCEDURE (OUTPATIENT)
Dept: GASTROENTEROLOGY | Facility: CLINIC | Age: 66
End: 2021-03-23

## 2021-03-23 DIAGNOSIS — Z12.11 SPECIAL SCREENING FOR MALIGNANT NEOPLASMS, COLON: Primary | ICD-10-CM

## 2021-04-16 ENCOUNTER — ANESTHESIA EVENT (OUTPATIENT)
Dept: GASTROENTEROLOGY | Facility: HOSPITAL | Age: 66
End: 2021-04-16

## 2021-04-16 ENCOUNTER — ANESTHESIA (OUTPATIENT)
Dept: GASTROENTEROLOGY | Facility: HOSPITAL | Age: 66
End: 2021-04-16

## 2021-04-16 ENCOUNTER — HOSPITAL ENCOUNTER (OUTPATIENT)
Dept: GASTROENTEROLOGY | Facility: HOSPITAL | Age: 66
Setting detail: OUTPATIENT SURGERY
Discharge: HOME | End: 2021-04-16
Attending: INTERNAL MEDICINE | Admitting: INTERNAL MEDICINE
Payer: COMMERCIAL

## 2021-04-16 VITALS
HEIGHT: 69 IN | HEART RATE: 65 BPM | RESPIRATION RATE: 16 BRPM | OXYGEN SATURATION: 96 % | WEIGHT: 156 LBS | DIASTOLIC BLOOD PRESSURE: 63 MMHG | TEMPERATURE: 97.8 F | BODY MASS INDEX: 23.11 KG/M2 | SYSTOLIC BLOOD PRESSURE: 112 MMHG

## 2021-04-16 DIAGNOSIS — Z12.11 SPECIAL SCREENING FOR MALIGNANT NEOPLASMS, COLON: ICD-10-CM

## 2021-04-16 PROCEDURE — G0121 COLON CA SCRN NOT HI RSK IND: HCPCS | Performed by: INTERNAL MEDICINE

## 2021-04-16 RX ORDER — PROPOFOL 10 MG/ML
INJECTION, EMULSION INTRAVENOUS AS NEEDED
Status: DISCONTINUED | OUTPATIENT
Start: 2021-04-16 | End: 2021-04-16

## 2021-04-16 RX ORDER — SODIUM CHLORIDE, SODIUM LACTATE, POTASSIUM CHLORIDE, CALCIUM CHLORIDE 600; 310; 30; 20 MG/100ML; MG/100ML; MG/100ML; MG/100ML
INJECTION, SOLUTION INTRAVENOUS CONTINUOUS PRN
Status: DISCONTINUED | OUTPATIENT
Start: 2021-04-16 | End: 2021-04-16

## 2021-04-16 RX ADMIN — PROPOFOL 20 MG: 10 INJECTION, EMULSION INTRAVENOUS at 07:56

## 2021-04-16 RX ADMIN — SODIUM CHLORIDE, SODIUM LACTATE, POTASSIUM CHLORIDE, AND CALCIUM CHLORIDE: .6; .31; .03; .02 INJECTION, SOLUTION INTRAVENOUS at 07:33

## 2021-04-16 RX ADMIN — PROPOFOL 100 MG: 10 INJECTION, EMULSION INTRAVENOUS at 07:51

## 2021-04-16 RX ADMIN — PROPOFOL 20 MG: 10 INJECTION, EMULSION INTRAVENOUS at 08:02

## 2021-04-16 RX ADMIN — PROPOFOL 20 MG: 10 INJECTION, EMULSION INTRAVENOUS at 07:54

## 2021-04-16 RX ADMIN — PROPOFOL 20 MG: 10 INJECTION, EMULSION INTRAVENOUS at 08:00

## 2021-04-16 RX ADMIN — PROPOFOL 20 MG: 10 INJECTION, EMULSION INTRAVENOUS at 07:58

## 2021-04-16 NOTE — H&P
History and Physical - [facility]  Tony Contreras 60 y o  female MRN: [id number]      HPI: Tony Contreras is a 60y o  year old female who presents for family history for colon cancer      REVIEW OF SYSTEMS: Per the HPI, and otherwise unremarkable      Historical Information   Past Medical History:   Diagnosis Date    Allergic rhinitis     Last Assessed: 2016    Black tarry stools     Concussion with prolonged loss of consciousness without return to pre-existing conscious level     COPD (chronic obstructive pulmonary disease) ([facility])     Last Assessed: 3/8/2017    Fall     slipping, tripping or stumbling    Generalized osteoarthritis     GERD (gastroesophageal reflux disease)     Herpes simplex infection     Hypertension     Menopausal disorder     Morbid obesity due to excess calories ([facility] [address] )     Neuroma 1975    right foot    Pharyngeal disorder     Pneumonia     Last Assessed: 5/15/2014    Post-menopausal bleeding     Last Assessed: 2015    Postmenopausal disorder      Past Surgical History:   Procedure Laterality Date     SECTION      COLONOSCOPY      Complete    DILATION AND CURETTAGE OF UTERUS      3 times    DILATION AND EVACUATION      Surgical treatment of missed  in first trimester - x 5   [facility]    Surgically Induced     ENDOMETRIAL BIOPSY  2015    PMB/EB    KNEE ARTHROSCOPY Right     KNEE SURGERY Right 1994 for right fractured patella    VA OPEN TX RADIAL HEAD/NECK FRACTURE Left 7/10/2018    Procedure: OPEN REDUCTION INTERNAL FIXATION LEFT DISTAL HUMERUS CAPITELLAR AND TROCHLEAR SHEAR FRACTURE WITH LATERAL EPICONDYLE FRACTURE; REPAIR LATERAL ULNAR COLLATERAL LIGAMENT COMPLEX OF THE ELBOW; SPLINT APPLICATION;  Surgeon: Marialuisa Guadalupe MD;  Location: [facility];  Service: Orthopedics     Social History   Social History     Substance and Sexual Activity   Alcohol Use Yes    Frequency: Monthly or less    Drinks per session: 1 or 2    Binge frequency: Never     Social History     Substance and Sexual Activity   Drug Use No     Social History     Tobacco Use   Smoking Status Former Smoker    Packs/day: 1 00    Years: 15 00    Pack years: 15     Types: Cigarettes    Start date:     Quit date: 6/15/1984    Years since quittin 8   Smokeless Tobacco Never Used     Family History   Problem Relation Age of Onset    Diabetes Mother    [employer] Mother     Hypertension Mother     Ovarian cancer Mother [de-identified]    Pancreatic cancer Mother     Other Mother         Skin disorder, Urinary Incontinence    Coronary artery disease Father     Heart disease Father     Hypertension Father     Hypertension Sister     Heart disease Brother     Jaundice Brother     Rectal cancer Brother     Endometrial cancer Brother 62    Osteoporosis Other     Breast cancer Paternal Aunt 72    No Known Problems Daughter     No Known Problems Paternal Grandmother     No Known Problems Maternal Aunt     No Known Problems Maternal Aunt     No Known Problems Maternal Aunt        Meds/Allergies     (Not in a hospital admission)      Allergies   Allergen Reactions    Pravastatin Myalgia    Simvastatin Myalgia       Objective     Blood pressure 126/75, pulse 73, temperature (!) 97 4 °F (36 3 °C), temperature source Temporal, resp  rate 20, height 5' 9" (1 753 m), weight 70 8 kg (156 lb), SpO2 100 %  PHYSICAL EXAM    Gen: NAD  CV: RRR  CHEST: Clear  ABD: soft, NT/ND  EXT: no edema      ASSESSMENT/PLAN:  This is a 60y o  year old female here for colonoscopy, and she is stable and optimized for her procedure

## 2021-04-16 NOTE — ANESTHESIA PREPROCEDURE EVALUATION
Procedure:  COLONOSCOPY    Relevant Problems   CARDIO   (+) Benign essential hypertension   (+) Hyperlipidemia      GI/HEPATIC   (+) GERD (gastroesophageal reflux disease)      HEMATOLOGY   (+) Iron deficiency anemia      NEURO/PSYCH   (+) Depression with anxiety      PULMONARY   (+) Bronchitis, asthmatic   (+) Moderate persistent asthma without complication      On chlorthalidone for BP    Asthma well controlled, has not used rescue inhaler in over a year  Physical Exam    Airway      TM Distance: >3 FB  Neck ROM: full     Dental   No notable dental hx     Cardiovascular  Cardiovascular exam normal    Pulmonary  Pulmonary exam normal     Other Findings        Anesthesia Plan  ASA Score- 2     Anesthesia Type- IV sedation with anesthesia with ASA Monitors  Additional Monitors:   Airway Plan:           Plan Factors-    Chart reviewed  Patient summary reviewed  Induction- intravenous  Postoperative Plan-     Informed Consent- Anesthetic plan and risks discussed with patient  I personally reviewed this patient with the CRNA  Discussed and agreed on the Anesthesia Plan with the CRNA  Chon Turk

## 2021-04-16 NOTE — ANESTHESIA POSTPROCEDURE EVALUATION
Post-Op Assessment Note    CV Status:  Stable    Pain management: adequate     Mental Status:  Alert and awake   Hydration Status:  Euvolemic   PONV Controlled:  Controlled   Airway Patency:  Patent      Post Op Vitals Reviewed: Yes      Staff: Anesthesiologist, CRNA         No complications documented      /63 (04/16/21 0809)    Temp 97 8 °F (36 6 °C) (04/16/21 0809)    Pulse 64 (04/16/21 0809)   Resp 16 (04/16/21 0809)    SpO2 97 % (04/16/21 0809)

## 2021-04-22 ENCOUNTER — TELEPHONE (OUTPATIENT)
Dept: OBGYN CLINIC | Facility: HOSPITAL | Age: 66
End: 2021-04-22

## 2021-04-22 NOTE — TELEPHONE ENCOUNTER
Patient is calling due to increased pain in her lt elbow  Patient was offered 5/21 which she is fine with but she will cb if the pain increases & she needs to be seen sooner

## 2021-05-21 ENCOUNTER — OFFICE VISIT (OUTPATIENT)
Dept: OBGYN CLINIC | Facility: HOSPITAL | Age: 66
End: 2021-05-21
Payer: COMMERCIAL

## 2021-05-21 ENCOUNTER — HOSPITAL ENCOUNTER (OUTPATIENT)
Dept: RADIOLOGY | Facility: HOSPITAL | Age: 66
Discharge: HOME | End: 2021-05-21
Attending: ORTHOPAEDIC SURGERY
Payer: COMMERCIAL

## 2021-05-21 VITALS
HEIGHT: 69 IN | HEART RATE: 67 BPM | WEIGHT: 156 LBS | DIASTOLIC BLOOD PRESSURE: 79 MMHG | BODY MASS INDEX: 23.11 KG/M2 | SYSTOLIC BLOOD PRESSURE: 127 MMHG

## 2021-05-21 DIAGNOSIS — R52 PAIN: ICD-10-CM

## 2021-05-21 DIAGNOSIS — M19.122 POST-TRAUMATIC OSTEOARTHRITIS OF LEFT ELBOW: Primary | ICD-10-CM

## 2021-05-21 DIAGNOSIS — G56.32 RADIAL TUNNEL SYNDROME, LEFT: ICD-10-CM

## 2021-05-21 PROCEDURE — 73080 X-RAY EXAM OF ELBOW: CPT

## 2021-05-21 PROCEDURE — 3008F BODY MASS INDEX DOCD: CPT | Performed by: ORTHOPAEDIC SURGERY

## 2021-05-21 PROCEDURE — 3078F DIAST BP <80 MM HG: CPT | Performed by: ORTHOPAEDIC SURGERY

## 2021-05-21 PROCEDURE — 3074F SYST BP LT 130 MM HG: CPT | Performed by: ORTHOPAEDIC SURGERY

## 2021-05-21 PROCEDURE — 1036F TOBACCO NON-USER: CPT | Performed by: ORTHOPAEDIC SURGERY

## 2021-05-21 PROCEDURE — 99214 OFFICE O/P EST MOD 30 MIN: CPT | Performed by: ORTHOPAEDIC SURGERY

## 2021-05-21 NOTE — PROGRESS NOTES
ASSESSMENT/PLAN:    Assessment:   Post-traumatic osteoarthritis of left elbow  Left radial tunnel syndrome     DOS 7/10/2018 OPEN REDUCTION INTERNAL FIXATION LEFT DISTAL HUMERUS CAPITELLAR AND TROCHLEAR SHEAR FRACTURE WITH LATERAL EPICONDYLE FRACTURE; REPAIR LATERAL ULNAR COLLATERAL LIGAMENT COMPLEX OF THE ELBOW    Plan:   The patient will begin a formal course of OT at this time for radial tunnel syndrome and left elbow post traumatic OA  She has no formal restrictions  Activity modifications was discussed with the patient today  We discussed surgical intervention as well as corticosteroid injections with the patient today  Patient has declined both of these options  She will contact me if any further problems arise  No formal restrictions    Follow Up:  PRN    To Do Next Visit:       General Discussions:     Osteoarthritis:  The anatomy and physiology of osteoarthritis was discussed with the patient today in the office  Deterioration of the articular cartilage eventually leads to altered mobility at the joint, resulting in joint subluxation, osteophyte formation, cystic changes, as well as subchondral sclerosis  Eventually, pain, limited mobility, and compensatory hypermobility at surrounding joints may develop  While normal activity and usage of the joint may provide a painful experience to the patient, this typically does not result in damage to the limb  Treatment options include splints to decreased joint edema, pain, and inflammation  Therapy exercises to strengthen the surrounding musculature may relieve pain, but do not alter the overall continued development of osteoarthritis  Oral medications, topical medications, corticosteroid injections may decrease pain and increase overall function  Eventually, some patients may require surgical intervention       Operative Discussions:       _____________________________________________________  CHIEF COMPLAINT:  Chief Complaint   Patient presents with   [facility] Left Elbow - Follow-up, Pain, Numbness     DOS 7/10/2018 OPEN REDUCTION INTERNAL FIXATION LEFT DISTAL HUMERUS CAPITELLAR AND TROCHLEAR SHEAR FRACTURE WITH LATERAL EPICONDYLE FRACTURE; REPAIR LATERAL ULNAR COLLATERAL LIGAMENT COMPLEX OF THE ELBOW         SUBJECTIVE:  Beto Romero is a 60 y o  female who presents with Pain  Moderate  Intermittant  Sharp and Aching to the left elbow  This started  3 year(s) ago as Insidious  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    Radiation: Yes to the  forearm  Previous Treatments: activity modification without relief  Associated symptoms: Numbness to the forearm while doing certain activities       PAST MEDICAL HISTORY:  Past Medical History:   Diagnosis Date    Allergic rhinitis     Last Assessed: 2016    Black tarry stools     Concussion with prolonged loss of consciousness without return to pre-existing conscious level     COPD (chronic obstructive pulmonary disease) ([facility])     Last Assessed: 3/8/2017    Fall     slipping, tripping or stumbling    Generalized osteoarthritis     GERD (gastroesophageal reflux disease)     Herpes simplex infection     Hypertension     Menopausal disorder     Morbid obesity due to excess calories ([facility] [address] )     Neuroma 1975    right foot    Pharyngeal disorder     Pneumonia     Last Assessed: 5/15/2014    Post-menopausal bleeding     Last Assessed: 2015    Postmenopausal disorder        PAST SURGICAL HISTORY:  Past Surgical History:   Procedure Laterality Date     SECTION      COLONOSCOPY      Complete    DILATION AND CURETTAGE OF UTERUS      3 times    DILATION AND EVACUATION      Surgical treatment of missed  in first trimester - x 5   [address] , 1982    Surgically Induced     ENDOMETRIAL BIOPSY  2015    PMB/EB    KNEE ARTHROSCOPY Right     KNEE SURGERY Right 1994 for right fractured patella    DC OPEN TX RADIAL HEAD/NECK FRACTURE Left 7/10/2018    Procedure: OPEN REDUCTION INTERNAL FIXATION LEFT DISTAL HUMERUS CAPITELLAR AND TROCHLEAR SHEAR FRACTURE WITH LATERAL EPICONDYLE FRACTURE; REPAIR LATERAL ULNAR COLLATERAL LIGAMENT COMPLEX OF THE ELBOW; SPLINT APPLICATION;  Surgeon: Ramiro Bravo MD;  Location: [facility];  Service: Orthopedics       FAMILY HISTORY:  Family History   Problem Relation Age of Onset    Diabetes Mother    Lori Pall Migraines Mother     Hypertension Mother     Ovarian cancer Mother [de-identified]    Pancreatic cancer Mother     Other Mother         Skin disorder, Urinary Incontinence    Coronary artery disease Father     Heart disease Father     Hypertension Father     Hypertension Sister     Heart disease Brother     Jaundice Brother     Rectal cancer Brother     Endometrial cancer Brother 62    Osteoporosis Other     Breast cancer Paternal Aunt 72    No Known Problems Daughter     No Known Problems Paternal Grandmother     No Known Problems Maternal Aunt     No Known Problems Maternal Aunt     No Known Problems Maternal Aunt        SOCIAL HISTORY:  Social History     Tobacco Use    Smoking status: Former Smoker     Packs/day:  00     Years: 15 00     Pack years: 15      Types: Cigarettes     Start date:      Quit date: 6/15/1984     Years since quittin 9    Smokeless tobacco: Never Used   Substance Use Topics    Alcohol use: Yes     Frequency: Monthly or less     Drinks per session: 1 or 2     Binge frequency: Never    Drug use: No       MEDICATIONS:    Current Outpatient Medications:     chlorthalidone 25 mg tablet, take 1 tablet by mouth once daily, Disp: 30 tablet, Rfl: 5    loratadine (CLARITIN) 10 mg tablet, Take 10 mg by mouth daily, Disp: , Rfl:     montelukast (SINGULAIR) 10 mg tablet, TAKE 1 TABLET EVERY DAY, Disp: 90 tablet, Rfl: 3    PROVENTIL  (90 Base) MCG/ACT inhaler, Inhale 2 puffs every 6 (six) hours as needed for wheezing, Disp: 6 7 g, Rfl: 2   rosuvastatin (CRESTOR) 5 mg tablet, Take 1 tablet (5 mg total) by mouth daily, Disp: 30 tablet, Rfl: 3    Symbicort 160-4 5 MCG/ACT inhaler, inhale 2 puffs by mouth twice a day RINSE MOUTH AFTER USE, Disp: 10 2 g, Rfl: 5    valACYclovir (VALTREX) 1,000 mg tablet, Take 1 tablet (1,000 mg total) by mouth daily for 1 day, Disp: 4 tablet, Rfl: 5    zaleplon (SONATA) 10 MG capsule, Take 1 capsule (10 mg total) by mouth daily at bedtime as needed for sleep, Disp: 30 capsule, Rfl: 3    ALLERGIES:  Allergies   Allergen Reactions    Pravastatin Myalgia    Simvastatin Myalgia       REVIEW OF SYSTEMS:  Pertinent items are noted in HPI      LABS:  HgA1c:   Lab Results   Component Value Date    HGBA1C 5 4 01/20/2021     BMP:   Lab Results   Component Value Date    CALCIUM 9 4 01/25/2021     04/07/2017    K 3 8 02/23/2021    CO2 27 02/23/2021     02/23/2021    BUN 23 02/23/2021    CREATININE 0 89 02/23/2021         _____________________________________________________  PHYSICAL EXAMINATION:  Vital signs: /79   Pulse 67   Ht 5' 9" (1 753 m)   Wt 70 8 kg (156 lb)   BMI 23 04 kg/m²   General: well developed and well nourished, alert, oriented times 3 and appears comfortable  Psychiatric: Normal  HEENT: Trachea Midline, No torticollis  Cardiovascular: No discernable arrhythmia  Pulmonary: No wheezing or stridor  Abdomen: No rebound or guarding  Extremities: No peripheral edema  Skin: No masses, erythema, lacerations, fluctation, ulcerations  Neurovascular: Sensation Intact to the Median, Ulnar, Radial Nerve, Motor Intact to the Median, Ulnar, Radial Nerve and Pulses Intact    MUSCULOSKELETAL EXAMINATION:  LEFT SIDE:  Elbow:  ROM, crepitation with ROM, full pronation and supination, radiocapitellar jt crepitation, collateral ligaments are stable, negative tinels at radial tunnel, ttp radial tunnel, negative tinels at carpal and cubital tunnel _____________________________________________________  STUDIES REVIEWED:  Images were reviewed in PACS by Dr Seleta Habermann and demonstrate: xray of left elbow on 5/21/2021 demonstrates intact orthopedic hardware with arthritic change noted at the ulnohumeral joint        PROCEDURES PERFORMED:  Procedures  No Procedures performed today   Scribe Attestation    I,:   am acting as a scribe while in the presence of the attending physician :       I,:   personally performed the services described in this documentation    as scribed in my presence :

## 2021-06-02 DIAGNOSIS — E78.2 MIXED HYPERLIPIDEMIA: ICD-10-CM

## 2021-06-02 RX ORDER — ROSUVASTATIN CALCIUM 5 MG/1
5 TABLET, COATED ORAL DAILY
Qty: 30 TABLET | Refills: 3 | Status: SHIPPED | OUTPATIENT
Start: 2021-06-02 | End: 2021-08-25

## 2021-06-02 NOTE — TELEPHONE ENCOUNTER
rosuvastatin (CRESTOR) 5 mg tablet    90 day supply, insurance needs her to refill only at [address] [place] [address] # [phone]

## 2021-06-03 ENCOUNTER — EVALUATION (OUTPATIENT)
Dept: OCCUPATIONAL THERAPY | Facility: CLINIC | Age: 66
End: 2021-06-03
Payer: COMMERCIAL

## 2021-06-03 DIAGNOSIS — G56.32 RADIAL TUNNEL SYNDROME, LEFT: Primary | ICD-10-CM

## 2021-06-03 DIAGNOSIS — M19.122 POST-TRAUMATIC OSTEOARTHRITIS OF LEFT ELBOW: ICD-10-CM

## 2021-06-03 PROCEDURE — 97165 OT EVAL LOW COMPLEX 30 MIN: CPT

## 2021-06-03 PROCEDURE — 97110 THERAPEUTIC EXERCISES: CPT

## 2021-06-03 NOTE — PROGRESS NOTES
OT Evaluation     Today's date: 6/3/2021  Patient name: Edison Dias  : 1955  MRN: [id number]  Referring provider: Joelle Vaz MD  Dx:   Encounter Diagnosis     ICD-10-CM    1  Radial tunnel syndrome, left  G56 32    2  Post-traumatic osteoarthritis of left elbow  M19 122                   Assessment  Assessment details: Fazal Mayer is a 60 y o  RHD female presenting with increased L elbow & forearm pain with increased numbness during and after activity  Pt has a negative Tinel's over the L radial tunnel but does have (+) palpable tenderness  Pt also presents with increased weakness with resisted middle finger extension, resisted wrist extension and forearm supination  L elbow extension has progressively worsened (from -25 to -47 degrees) over the past 2 years, with a hard end feel noted with passive extension  Pt also presents with L shoulder/scapular weakness and demonstrates increased forward shoulder posturing of the LUE  Pt has increased difficulty weight bearing through the LUE and performing work related tasks  Pt would benefit from continued skilled OT to address these impairments and improve overall function  Impairments: abnormal or restricted ROM, impaired physical strength, lacks appropriate home exercise program and pain with function  Functional limitations: unable to push up w/LUE, difficulty carrying heavy bags, difficulty with work tasks (cradling babies, opening bottles), difficulty turning steering wheel  Symptom irritability: moderateBarriers to therapy: none  Understanding of Dx/Px/POC: good   Prognosis: good    Goals  STGs (to be achieved in 3 weeks):  1  Pt will be independent and compliant with HEP  2  Decrease pain in forearm by 25%  3  Increase strength by 1/2 grade in affected planes    LTGs (to be achieved by discharge):  1  Pt will report decreased pain when driving  2  Pt will report improved ability to complete work related tasks  3   Improve FOTO score by 10 points    Plan  Plan details: Treatment to include RNG, eccentric wrist & forearm strengthening, scapular stabilization, proximal/upper girdle strengthening  Patient would benefit from: OT eval, skilled occupational therapy and custom splinting  Planned modality interventions: ultrasound and thermotherapy: hydrocollator packs  Planned therapy interventions: activity modification, manual therapy, neuromuscular re-education, home exercise program, therapeutic exercise, therapeutic activities, stretching, strengthening, patient education and orthotic fitting/training  Other planned therapy interventions: kinesiotape application  Frequency: 2x week  Duration in weeks: 6  Plan of Care beginning date: 6/3/2021  Plan of Care expiration date: 7/15/2021  Treatment plan discussed with: patient        Subjective Evaluation    History of Present Illness  Date of surgery: 7/10/2018  Mechanism of injury: surgery and trauma  Mechanism of injury: Pt was previously treated at this clinic following ORIF L distal humerus capitellar and trochlear fx with lateral epicondyle fx, and repair of ulnar collateral ligament complex on 7/10/18 after injury sustained from a [address]  Pt reports worsening pain in the L elbow for the past 2 years with new onset of L forearm pain and numbness with activity            Not a recurrent problem   Quality of life: excellent    Pain  Current pain ratin  At best pain ratin  At worst pain ratin  Location: dorsal forearm  Quality: sharp and radiating  Aggravating factors: lifting  Progression: worsening    Social Support  Lives with: adult children    Employment status: working  Hand dominance: right    Treatments  Previous treatment: occupational therapy  Current treatment: occupational therapy  Patient Goals  Patient goals for therapy: decreased pain, increased motion and increased strength  Patient goal: 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        Objective     Tenderness     Left Elbow   No tenderness in the cubital tunnel, lateral epicondyle and medial epicondyle  Left Wrist/Hand   No tenderness in the lateral epicondyle and medial epicondyle  Neurological Testing     Additional Neurological Details  Pt c/o intermittent numbness over dorsal L forearm during activity    Active Range of Motion     Left Elbow   Flexion: 135 degrees   Extension: 47 degrees   Forearm supination: 70 degrees   Forearm pronation: 60 degrees     Right Elbow   Flexion: 148 degrees   Extension: 0 degrees   Forearm supination: 75 degrees   Forearm pronation: 90 degrees     Left Wrist   Wrist flexion: 65 degrees   Wrist extension: 75 degrees     Right Wrist   Wrist flexion: 62 degrees   Wrist extension: 60 degrees     Additional Active Range of Motion Details  Crepitus noted throughout ROM    Strength/Myotome Testing     Left Elbow   Flexion: 4+  Extension: 4  Forearm supination: 4  Forearm pronation: 4+    Right Elbow   Flexion: 5  Extension: 5  Forearm supination: 5  Forearm pronation: 5    Left Wrist/Hand   Wrist extension: 4  Wrist flexion: 4+     (2nd hand position)     Trial 1: 59 5    Trial 2: 57 6    Trial 3: 55 4    Right Wrist/Hand   Wrist extension: 5  Wrist flexion: 5     (2nd hand position)     Trial 1: 63 9    Trial 2: 56 9    Trial 3: 53    Tests     Left Elbow   Negative Tinel's sign (cubital tunnel)  Left Wrist/Hand   Positive resisted middle finger  Negative Tinel's sign (radial tunnel)                Precautions: Universal  HEP: RNG    Manuals 6/3            IASTM             K-tape wrist ext assist                                       Neuro Re-Ed             RNG HEP                                                                                          Ther Ex                                                                                                                     Ther Activity                                                                              Modalities

## 2021-06-10 ENCOUNTER — OFFICE VISIT (OUTPATIENT)
Dept: OCCUPATIONAL THERAPY | Facility: CLINIC | Age: 66
End: 2021-06-10
Payer: COMMERCIAL

## 2021-06-10 DIAGNOSIS — G56.32 RADIAL TUNNEL SYNDROME, LEFT: Primary | ICD-10-CM

## 2021-06-10 DIAGNOSIS — M19.122 POST-TRAUMATIC OSTEOARTHRITIS OF LEFT ELBOW: ICD-10-CM

## 2021-06-10 PROCEDURE — 97112 NEUROMUSCULAR REEDUCATION: CPT

## 2021-06-10 PROCEDURE — 97110 THERAPEUTIC EXERCISES: CPT

## 2021-06-10 NOTE — PROGRESS NOTES
Daily Note     Today's date: 6/10/2021  Patient name: Izzy Harris  : 1955  MRN: [id number]  Referring provider: Javier Jordan MD  Dx:   Encounter Diagnosis     ICD-10-CM    1  Radial tunnel syndrome, left  G56 32    2  Post-traumatic osteoarthritis of left elbow  M19 122                   Subjective: "The tape really helped "      Objective: See treatment diary below      Assessment: Tolerated treatment well  Pt reports significant symptom relief with application of kinesiotape  Pt able to tolerate RN flossing w/o complaints  Added scapular stabilization exercises  Patient would benefit from continued OT      Plan: Continue per plan of care        Precautions: Universal  HEP: RNG    Manuals 6/3 6/10           IASTM             K-tape wrist ext assist  wrist ext assist 5'                                     Neuro Re-Ed             RNG HEP supine 10'                                                                                         Ther Ex  25'           SA punches  B/L 2x10           SL ER  L 1# R 0# 3x10           Eccentric wrist ext  1# 2x10           Eccentric sup  w/YFB 3x10                                                               Ther Activity                                                                              Modalities

## 2021-06-17 ENCOUNTER — OFFICE VISIT (OUTPATIENT)
Dept: OCCUPATIONAL THERAPY | Facility: CLINIC | Age: 66
End: 2021-06-17
Payer: COMMERCIAL

## 2021-06-17 DIAGNOSIS — G56.32 RADIAL TUNNEL SYNDROME, LEFT: Primary | ICD-10-CM

## 2021-06-17 DIAGNOSIS — M19.122 POST-TRAUMATIC OSTEOARTHRITIS OF LEFT ELBOW: ICD-10-CM

## 2021-06-17 PROCEDURE — 97110 THERAPEUTIC EXERCISES: CPT

## 2021-06-17 PROCEDURE — 97112 NEUROMUSCULAR REEDUCATION: CPT

## 2021-06-17 NOTE — PROGRESS NOTES
Daily Note     Today's date: 2021  Patient name: Lee Ribeiro  : 1955  MRN: [id number]  Referring provider: Mona Rodriguez MD  Dx:   Encounter Diagnosis     ICD-10-CM    1  Radial tunnel syndrome, left  G56 32    2  Post-traumatic osteoarthritis of left elbow  M19 122                   Subjective: "I feel a difference when the tape isn't on "      Objective: See treatment diary below      Assessment: Tolerated treatment well  Pt reports decreased nerve irritability when working but symptoms increase when reaching across her body and using her wrist in a repetitive flexion/extension pattern  Pt challenged by strengthening exer  HEP reviewed  Patient would benefit from continued OT      Plan: Continue per plan of care        Precautions: Universal  HEP: RNG    Manuals 6/3 6/10 6/17          IASTM             K-tape wrist ext assist  wrist ext assist 5' wrist ext assist 5'                                    Neuro Re-Ed   10'          RNG HEP supine 10' supine 10'                                                                                        Ther Ex  25' 30'          SA punches  B/L 2x10 1# 2x10          SL ER  L 1# R 0# 3x10 L 1# 3x10          Eccentric wrist ext  1# 2x10 1# 3x10          Eccentric sup  w/YFB 3x10 w/weighted bar 1# 3x10          Wrist isometrics   YFB 2x10          scap retraction   RTB 3x10                                    Ther Activity                                                                              Modalities

## 2021-06-24 ENCOUNTER — APPOINTMENT (OUTPATIENT)
Dept: OCCUPATIONAL THERAPY | Facility: CLINIC | Age: 66
End: 2021-06-24
Payer: COMMERCIAL

## 2021-07-01 ENCOUNTER — OFFICE VISIT (OUTPATIENT)
Dept: OCCUPATIONAL THERAPY | Facility: CLINIC | Age: 66
End: 2021-07-01
Payer: COMMERCIAL

## 2021-07-01 DIAGNOSIS — M19.122 POST-TRAUMATIC OSTEOARTHRITIS OF LEFT ELBOW: ICD-10-CM

## 2021-07-01 DIAGNOSIS — G56.32 RADIAL TUNNEL SYNDROME, LEFT: Primary | ICD-10-CM

## 2021-07-01 PROCEDURE — 97112 NEUROMUSCULAR REEDUCATION: CPT | Performed by: OCCUPATIONAL THERAPIST

## 2021-07-01 PROCEDURE — 97110 THERAPEUTIC EXERCISES: CPT | Performed by: OCCUPATIONAL THERAPIST

## 2021-07-01 NOTE — PROGRESS NOTES
Daily Note     Today's date: 2021  Patient name: Fransisco Purdy  : 1955  MRN: [id number]  Referring provider: Eldon Olsen MD  Dx:   Encounter Diagnosis     ICD-10-CM    1  Radial tunnel syndrome, left  G56 32    2  Post-traumatic osteoarthritis of left elbow  M19 122                   Subjective: "I feel a difference when the tape isn't on "  "I am having less shooting pain now"      Objective: See treatment diary below      Assessment: Tolerated treatment well  Pain relief reported with therapy and wear of kinesiotaping  Pt reports decreased nerve irritability when working but symptoms increase when reaching across her body and using her wrist in a repetitive flexion/extension pattern  Pt challenged by strengthening exer  HEP reviewed  Patient would benefit from continued OT      Plan: Continue per plan of care        Precautions: Universal  HEP: RNG    Manuals 6/3 6/10 6/17 7/1         IASTM             K-tape wrist ext assist  wrist ext assist 5' wrist ext assist 5' Wrist ext  Asst  5'                                   Neuro Re-Ed   10' 10'         RNG HEP supine 10' supine 10' 10'  RNG                                                                                       Ther Ex  25' 30'   30'         SA punches  B/L 2x10 L 1# 3x10   L 1# 3x10  R 0#3x10         SL ER  L 1# R 0# 3x10 L 1# 3x10 L 1# 3x10         Eccentric wrist ext  1# 2x10 1# 3x10 1# 3x10         Eccentric sup  w/YFB 3x10 w/weighted bar 1# 3x10 1#  3x10         Wrist isometrics   YFB 2x10 2x10         scap retraction   RTB 3x10 RTB   3x10                                   Ther Activity                                                                              Modalities

## 2021-07-08 ENCOUNTER — OFFICE VISIT (OUTPATIENT)
Dept: OCCUPATIONAL THERAPY | Facility: CLINIC | Age: 66
End: 2021-07-08
Payer: COMMERCIAL

## 2021-07-08 DIAGNOSIS — M19.122 POST-TRAUMATIC OSTEOARTHRITIS OF LEFT ELBOW: ICD-10-CM

## 2021-07-08 DIAGNOSIS — G56.32 RADIAL TUNNEL SYNDROME, LEFT: Primary | ICD-10-CM

## 2021-07-08 PROCEDURE — L3906 WHO W/O JOINTS CF: HCPCS

## 2021-07-08 PROCEDURE — 97112 NEUROMUSCULAR REEDUCATION: CPT

## 2021-07-08 NOTE — PROGRESS NOTES
OT Re-Evaluation     Today's date: 2021  Patient name: Isaac Diane  : 1955  MRN: [id number]  Referring provider: Cynthia Page MD  Dx:   Encounter Diagnosis     ICD-10-CM    1  Radial tunnel syndrome, left  G56 32    2  Post-traumatic osteoarthritis of left elbow  M19 122                   Assessment  Assessment details: Ethan Maradiaga is a 60 y o  RHD female presenting with increased L elbow & forearm pain with increased numbness during and after activity  Pt has a negative Tinel's over the L radial tunnel but does have (+) palpable tenderness  Pt also presents with increased weakness with resisted middle finger extension, resisted wrist extension and forearm supination  L elbow extension has progressively worsened (from -25 to -47 degrees) over the past 2 years, with a hard end feel noted with passive extension  Pt also presents with L shoulder/scapular weakness and demonstrates increased forward shoulder posturing of the LUE  Pt has increased difficulty weight bearing through the LUE and performing work related tasks  Pt would benefit from continued skilled OT to address these impairments and improve overall function  21 Dorian Muller has been seen for 5 OT visits  Treatment has included IASTM, radial nerve glides, upper girdle strengthening exercises, eccentric wrist extensor strengthening, isometric & isotonic strengthening, extrinsic stretches, kinesiotape application, activity/behavior modification, and instruction in a HEP  Overall, pt reports decreased pain and improved functional use of her LUE  Pt states she is now able to drive without pain  Today she presents with palpable pain over the lateral epicondyle, however symptoms are not reproduced with resisted wrist extension or resisted MF extension  Pt was fitted with a custom volar wrist extension splint for night time wear   Pt would benefit from continued skilled OT to further progress with strengthening program and enable improved overall function  Impairments: abnormal or restricted ROM, impaired physical strength and pain with function  Functional limitations: unable to push up w/LUE, difficulty carrying heavy bags, difficulty with work tasks (cradling babies, opening bottles)  Symptom irritability: lowBarriers to therapy: none  Understanding of Dx/Px/POC: good   Prognosis: good    Goals  STGs (to be achieved in 3 weeks):  1  Pt will be independent and compliant with HEP - MET  2  Decrease pain in forearm by 25% - MET  3  Increase strength by 1/2 grade in affected planes - MET    LTGs (to be achieved by discharge):  1  Pt will report decreased pain when driving - MET  2  Pt will report improved ability to complete work related tasks - PROGRESSING  3  Improve FOTO score by 10 points - MET    New STGs:  1  Decrease pain over lateral epicondyle by 50% (4 weeks)  2  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 2-3/10 (4 weeks)    Plan  Plan details: Treatment to include RNG, eccentric wrist & forearm strengthening, scapular stabilization, proximal/upper girdle strengthening  Patient would benefit from: skilled occupational therapy and custom splinting  Planned modality interventions: ultrasound and thermotherapy: hydrocollator packs  Planned therapy interventions: activity modification, manual therapy, neuromuscular re-education, home exercise program, therapeutic exercise, therapeutic activities, stretching, strengthening, patient education and orthotic fitting/training  Other planned therapy interventions: kinesiotape application  Frequency: 1-2x/week    Plan of Care beginning date: 7/8/2021  Plan of Care expiration date: 8/5/2021  Treatment plan discussed with: patient        Subjective Evaluation    History of Present Illness  Date of surgery: 7/10/2018  Mechanism of injury: surgery and trauma  Mechanism of injury: Pt was previously treated at this clinic following ORIF L distal humerus capitellar and trochlear fx with lateral epicondyle fx, and repair of ulnar collateral ligament complex on 7/10/18 after injury sustained from a [address]  Pt reports worsening pain in the L elbow for the past 2 years with new onset of L forearm pain and numbness with activity  Not a recurrent problem   Quality of life: excellent    Pain  No pain reported  Progression: improved    Social Support  Lives with: adult children    Employment status: working  Hand dominance: right    Treatments  Previous treatment: occupational therapy  Current treatment: occupational therapy  Patient Goals  Patient goals for therapy: decreased pain, increased motion and increased strength  Patient goal: 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        Objective     Tenderness     Left Elbow   Tenderness in the lateral epicondyle  No tenderness in the cubital tunnel and medial epicondyle  Left Wrist/Hand   Tenderness in the lateral epicondyle  No tenderness in the medial epicondyle       Additional Tenderness Details  Tenderness with palpation over lateral epicondyle    Neurological Testing     Additional Neurological Details  Pt c/o intermittent numbness over dorsal L forearm during activity    Active Range of Motion     Left Elbow   Flexion: 140 degrees   Extension: 45 degrees   Forearm supination: 70 degrees   Forearm pronation: 60 degrees     Right Elbow   Flexion: 148 degrees   Extension: 0 degrees   Forearm supination: 75 degrees   Forearm pronation: 90 degrees     Left Wrist   Wrist flexion: 70 degrees   Wrist extension: 75 degrees     Right Wrist   Wrist flexion: 62 degrees   Wrist extension: 60 degrees     Additional Active Range of Motion Details  Crepitus noted throughout ROM    Strength/Myotome Testing     Left Elbow   Flexion: 5  Extension: 5  Forearm supination: 5  Forearm pronation: 5    Right Elbow   Flexion: 5  Extension: 5  Forearm supination: 5  Forearm pronation: 5    Left Wrist/Hand   Wrist extension: 5  Wrist flexion: 5  Radial deviation: 5  Ulnar deviation: 5     (2nd hand position)     Trial 1: 63 8    Trial 2: 59 3    Trial 3: 55 7    Right Wrist/Hand   Wrist extension: 5  Wrist flexion: 5     (2nd hand position)     Trial 1: 63 2    Trial 2: 64 4    Trial 3: 61 7    Tests     Left Elbow   Negative Tinel's sign (cubital tunnel)  Left Wrist/Hand   Negative resisted middle finger and Tinel's sign (radial tunnel)  Daily Note     Today's date: 2021  Patient name: Vickie Ruffin  : 1955  MRN: [id number]  Referring provider: Sourav Flowers MD  Dx:   Encounter Diagnosis     ICD-10-CM    1  Radial tunnel syndrome, left  G56 32    2  Post-traumatic osteoarthritis of left elbow  M19 122                   Subjective: "The shooting pain is gone but I feel pain in my elbow  ""      Objective: See treatment diary below      Assessment: See Re-eval for details  Pt fitted with custom volar wrist splint to position wrist in extension for night time wear only (to place extensors in position of rest)  Also discussed activity/behavior modifications, including lifting objects with forearm in neutral or supination (avoid pronated position when lifting)  Plan: Continue per plan of care  Progress with strengthening as tolerated       Precautions: Universal  HEP: RNG    Manuals 6/3 6/10 6/17 7 7/8        IASTM             K-tape wrist ext assist  wrist ext assist 5' wrist ext assist 5' Wrist ext  Asst  5'         Re-eval     done             21'        Neuro Re-Ed   10' 10'         RNG HEP supine 10' supine 10' 10'  RNG         Behavior modification     10'                                                                         Ther Ex  25' 30'   30'         SA punches  B/L 2x10 L 1# 3x10   L 1# 3x10  R 0#3x10         SL ER  L 1# R 0# 3x10 L 1# 3x10 L 1# 3x10         Eccentric wrist ext  1# 2x10 1# 3x10 1# 3x10         Eccentric sup  w/YFB 3x10 w/weighted bar 1# 3x10 1#  3x10         Wrist isometrics   YFB 2x10 2x10         scap retraction   RTB

## 2021-07-13 LAB
ALBUMIN SERPL-MCNC: 4.3 G/DL (ref 3.8–4.8)
ALBUMIN/GLOB SERPL: 2.4 {RATIO} (ref 1.2–2.2)
ALP SERPL-CCNC: 63 IU/L (ref 48–121)
ALT SERPL-CCNC: 23 IU/L (ref 0–32)
AST SERPL-CCNC: 24 IU/L (ref 0–40)
BASOPHILS # BLD AUTO: 0.1 X10E3/UL (ref 0–0.2)
BASOPHILS NFR BLD AUTO: 2 %
BILIRUB SERPL-MCNC: 0.5 MG/DL (ref 0–1.2)
BUN SERPL-MCNC: 20 MG/DL (ref 8–27)
BUN/CREAT SERPL: 28 (ref 12–28)
CALCIUM SERPL-MCNC: 8.9 MG/DL (ref 8.7–10.3)
CHLORIDE SERPL-SCNC: 106 MMOL/L (ref 96–106)
CHOLEST SERPL-MCNC: 187 MG/DL (ref 100–199)
CO2 SERPL-SCNC: 24 MMOL/L (ref 20–29)
CREAT SERPL-MCNC: 0.71 MG/DL (ref 0.57–1)
EOSINOPHIL # BLD AUTO: 0.1 X10E3/UL (ref 0–0.4)
EOSINOPHIL NFR BLD AUTO: 4 %
ERYTHROCYTE [DISTWIDTH] IN BLOOD BY AUTOMATED COUNT: 12.5 % (ref 11.7–15.4)
GLOBULIN SER-MCNC: 1.8 G/DL (ref 1.5–4.5)
GLUCOSE SERPL-MCNC: 102 MG/DL (ref 65–99)
HCT VFR BLD AUTO: 37.7 % (ref 34–46.6)
HDLC SERPL-MCNC: 59 MG/DL
HGB BLD-MCNC: 13 G/DL (ref 11.1–15.9)
IMM GRANULOCYTES # BLD: 0 X10E3/UL (ref 0–0.1)
IMM GRANULOCYTES NFR BLD: 0 %
LDLC SERPL CALC-MCNC: 113 MG/DL (ref 0–99)
LYMPHOCYTES # BLD AUTO: 1.6 X10E3/UL (ref 0.7–3.1)
LYMPHOCYTES NFR BLD AUTO: 44 %
MCH RBC QN AUTO: 30.4 PG (ref 26.6–33)
MCHC RBC AUTO-ENTMCNC: 34.5 G/DL (ref 31.5–35.7)
MCV RBC AUTO: 88 FL (ref 79–97)
MONOCYTES # BLD AUTO: 0.4 X10E3/UL (ref 0.1–0.9)
MONOCYTES NFR BLD AUTO: 11 %
NEUTROPHILS # BLD AUTO: 1.4 X10E3/UL (ref 1.4–7)
NEUTROPHILS NFR BLD AUTO: 39 %
PLATELET # BLD AUTO: 210 X10E3/UL (ref 150–450)
POTASSIUM SERPL-SCNC: 3.6 MMOL/L (ref 3.5–5.2)
PROT SERPL-MCNC: 6.1 G/DL (ref 6–8.5)
RBC # BLD AUTO: 4.28 X10E6/UL (ref 3.77–5.28)
SL AMB EGFR AFRICAN AMERICAN: 104 ML/MIN/1.73
SL AMB EGFR NON AFRICAN AMERICAN: 90 ML/MIN/1.73
SL AMB VLDL CHOLESTEROL CALC: 15 MG/DL (ref 5–40)
SODIUM SERPL-SCNC: 143 MMOL/L (ref 134–144)
TRIGL SERPL-MCNC: 79 MG/DL (ref 0–149)
TSH SERPL DL<=0.005 MIU/L-ACNC: 3.7 UIU/ML (ref 0.45–4.5)
WBC # BLD AUTO: 3.7 X10E3/UL (ref 3.4–10.8)

## 2021-07-15 ENCOUNTER — OFFICE VISIT (OUTPATIENT)
Dept: OCCUPATIONAL THERAPY | Facility: CLINIC | Age: 66
End: 2021-07-15
Payer: COMMERCIAL

## 2021-07-15 DIAGNOSIS — G56.32 RADIAL TUNNEL SYNDROME, LEFT: Primary | ICD-10-CM

## 2021-07-15 DIAGNOSIS — M19.122 POST-TRAUMATIC OSTEOARTHRITIS OF LEFT ELBOW: ICD-10-CM

## 2021-07-15 PROCEDURE — 97140 MANUAL THERAPY 1/> REGIONS: CPT

## 2021-07-15 PROCEDURE — 97035 APP MDLTY 1+ULTRASOUND EA 15: CPT

## 2021-07-15 PROCEDURE — 97112 NEUROMUSCULAR REEDUCATION: CPT

## 2021-07-15 PROCEDURE — 97110 THERAPEUTIC EXERCISES: CPT

## 2021-07-15 NOTE — PROGRESS NOTES
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, repeatedly positioning her L elbow and wrist in a flexed position  Pt has been wearing her wrist brace at night  Treatment today focused on modalities to mediate pain and manuals  Pt reports decreased pain and stiffness at end of treatment  Patient would benefit from continued OT      Plan: Continue per plan of care        Precautions: Universal  HEP: RNG    Manuals 6/3 6/10 6/17 7/1 7/8 7/15       IASTM      10'       K-tape wrist ext assist  wrist ext assist 5' wrist ext assist 5' Wrist ext  Asst  5'  Wrist ext/supsupport 5'       Re-eval     done             21'        Neuro Re-Ed   10' 10'         RNG HEP supine 10' supine 10' 10'  RNG  8' RNG       Behavior modification     10'                                                                         Ther Ex  25' 30'   30'  10'       SA punches  B/L 2x10 L 1# 3x10   L 1# 3x10  R 0#3x10         SL ER  L 1# R 0# 3x10 L 1# 3x10 L 1# 3x10         Eccentric wrist ext  1# 2x10 1# 3x10 1# 3x10         Eccentric sup  w/YFB 3x10 w/weighted bar 1# 3x10 1#  3x10         Wrist isometrics   YFB 2x10 2x10  RFB 1x10 s/p/we/wf       scap retraction   RTB 3x10 RTB   3x10         Extrinsic stretches      P 5'                    Ther Activity                                                                              Modalities             MHP      5'       U/S 50% pulsed, 0 8 w/cm2, 3 3 mHz      8'

## 2021-07-21 ENCOUNTER — OFFICE VISIT (OUTPATIENT)
Dept: INTERNAL MEDICINE CLINIC | Facility: CLINIC | Age: 66
End: 2021-07-21
Payer: COMMERCIAL

## 2021-07-21 VITALS
HEART RATE: 69 BPM | SYSTOLIC BLOOD PRESSURE: 122 MMHG | WEIGHT: 153 LBS | BODY MASS INDEX: 22.66 KG/M2 | HEIGHT: 69 IN | OXYGEN SATURATION: 98 % | TEMPERATURE: 98.1 F | DIASTOLIC BLOOD PRESSURE: 82 MMHG

## 2021-07-21 DIAGNOSIS — Z12.11 SCREENING FOR COLON CANCER: Primary | ICD-10-CM

## 2021-07-21 DIAGNOSIS — R25.1 TREMOR: ICD-10-CM

## 2021-07-21 DIAGNOSIS — I10 BENIGN ESSENTIAL HYPERTENSION: ICD-10-CM

## 2021-07-21 DIAGNOSIS — R73.01 ABNORMAL FASTING GLUCOSE: ICD-10-CM

## 2021-07-21 DIAGNOSIS — M25.511 ACUTE PAIN OF RIGHT SHOULDER: ICD-10-CM

## 2021-07-21 DIAGNOSIS — E78.2 MIXED HYPERLIPIDEMIA: ICD-10-CM

## 2021-07-21 DIAGNOSIS — J45.40 MODERATE PERSISTENT ASTHMA WITHOUT COMPLICATION: ICD-10-CM

## 2021-07-21 DIAGNOSIS — Z12.31 ENCOUNTER FOR SCREENING MAMMOGRAM FOR MALIGNANT NEOPLASM OF BREAST: ICD-10-CM

## 2021-07-21 PROCEDURE — 3008F BODY MASS INDEX DOCD: CPT | Performed by: INTERNAL MEDICINE

## 2021-07-21 PROCEDURE — 3079F DIAST BP 80-89 MM HG: CPT | Performed by: INTERNAL MEDICINE

## 2021-07-21 PROCEDURE — 1036F TOBACCO NON-USER: CPT | Performed by: INTERNAL MEDICINE

## 2021-07-21 PROCEDURE — 3074F SYST BP LT 130 MM HG: CPT | Performed by: INTERNAL MEDICINE

## 2021-07-21 PROCEDURE — 99214 OFFICE O/P EST MOD 30 MIN: CPT | Performed by: INTERNAL MEDICINE

## 2021-07-21 NOTE — PATIENT INSTRUCTIONS
Lab data reviewed and compared prior    Hyperlipidemia-dramatic improvement on rosuvastatin, continue with same    Transaminitis resolved    Asthma clinically stable    Hypertension stable on present regimen    Impaired fasting glucose-check hemoglobin A1c    Right shoulder pain-no evidence for rotator cuff tear by exam, start with physical therapy  Consider imaging if this fails    Right hand tremor-this appears to be benign and perhaps related to significant shoulder pain  No signs PD  Health maintenance-mammogram ordered    Routine follow-up after labs in 6 months, sooner as needed

## 2021-07-21 NOTE — PROGRESS NOTES
Assessment/Plan:    Diagnoses and all orders for this visit:    Screening for colon cancer  -     Cancel: Mammo screening bilateral w 3d & cad; Future    Mixed hyperlipidemia  -     Lipid panel; Future    Moderate persistent asthma without complication    Benign essential hypertension  -     CBC and differential; Future  -     Comprehensive metabolic panel; Future    Abnormal fasting glucose  -     Hemoglobin A1C; Future    Acute pain of right shoulder  -     Ambulatory referral to Physical Therapy; Future    Tremor    Encounter for screening mammogram for malignant neoplasm of breast  -     Mammo screening bilateral w 3d & cad; Future              Patient Instructions   Lab data reviewed and compared prior    Hyperlipidemia-dramatic improvement on rosuvastatin, continue with same    Transaminitis resolved    Asthma clinically stable    Hypertension stable on present regimen    Impaired fasting glucose-check hemoglobin A1c    Right shoulder pain-no evidence for rotator cuff tear by exam, start with physical therapy  Consider imaging if this fails    Right hand tremor-this appears to be benign and perhaps related to significant shoulder pain  No signs PD  Health maintenance-mammogram ordered    Routine follow-up after labs in 6 months, sooner as needed  Subjective:      Patient ID: Leeann Kirk is a 60 y o  female    F/u mmp and review labs  Feeling well, but concerned w/ persistent R shoulder pain and r hand tremor  Tremor comes and goes and she can make it go away by stretching arm out straight  Shoulder pain worse w/ activity, but using all day at , manual trans etc     Asthma has been stable, taking singulair daily, symbicort q am, no proventil lately  Allergies have been stable  HSV-controlled w/ Valtrex as needed  Last outbreak more than 3 mos ago  Notes some urge incontinence if not careful, still not doing kegels  Hasn't needed pads  Insomnia-using sonata rarely, every few mos  HPL-tolerating rosuvastatin        Current Outpatient Medications:     chlorthalidone 25 mg tablet, take 1 tablet by mouth once daily, Disp: 30 tablet, Rfl: 5    loratadine (CLARITIN) 10 mg tablet, Take 10 mg by mouth daily, Disp: , Rfl:     montelukast (SINGULAIR) 10 mg tablet, TAKE 1 TABLET EVERY DAY, Disp: 90 tablet, Rfl: 3    PROVENTIL  (90 Base) MCG/ACT inhaler, Inhale 2 puffs every 6 (six) hours as needed for wheezing, Disp: 6 7 g, Rfl: 2    rosuvastatin (CRESTOR) 5 mg tablet, Take 1 tablet (5 mg total) by mouth daily, Disp: 30 tablet, Rfl: 3    Symbicort 160-4 5 MCG/ACT inhaler, inhale 2 puffs by mouth twice a day RINSE MOUTH AFTER USE, Disp: 10 2 g, Rfl: 5    zaleplon (SONATA) 10 MG capsule, Take 1 capsule (10 mg total) by mouth daily at bedtime as needed for sleep, Disp: 30 capsule, Rfl: 3    valACYclovir (VALTREX) 1,000 mg tablet, Take 1 tablet (1,000 mg total) by mouth daily for 1 day, Disp: 4 tablet, Rfl: 5    Recent Results (from the past 1008 hour(s))   CBC and differential    Collection Time: 07/12/21  7:23 AM   Result Value Ref Range    White Blood Cell Count 3 7 3 4 - 10 8 x10E3/uL    Red Blood Cell Count 4 28 3 77 - 5 28 x10E6/uL    Hemoglobin 13 0 11 1 - 15 9 g/dL    HCT 37 7 34 0 - 46 6 %    MCV 88 79 - 97 fL    MCH 30 4 26 6 - 33 0 pg    MCHC 34 5 31 5 - 35 7 g/dL    RDW 12 5 11 7 - 15 4 %    Platelet Count 975 162 - 450 x10E3/uL    Neutrophils 39 Not Estab  %    Lymphocytes 44 Not Estab  %    Monocytes 11 Not Estab  %    Eosinophils 4 Not Estab  %    Basophils PCT 2 Not Estab  %    Neutrophils (Absolute) 1 4 1 4 - 7 0 x10E3/uL    Lymphocytes (Absolute) 1 6 0 7 - 3 1 x10E3/uL    Monocytes (Absolute) 0 4 0 1 - 0 9 x10E3/uL    Eosinophils (Absolute) 0 1 0 0 - 0 4 x10E3/uL    Basophils ABS 0 1 0 0 - 0 2 x10E3/uL    Immature Granulocytes 0 Not Estab  %    Immature Granulocytes (Absolute) 0 0 0 0 - 0 1 x10E3/uL   Comprehensive metabolic panel    Collection Time: 07/12/21  7:23 AM Result Value Ref Range    Glucose, Random 102 (H) 65 - 99 mg/dL    BUN 20 8 - 27 mg/dL    Creatinine 0 71 0 57 - 1 00 mg/dL    eGFR Non African American 90 >59 mL/min/1 73    eGFR  104 >59 mL/min/1 73    SL AMB BUN/CREATININE RATIO 28 12 - 28    Sodium 143 134 - 144 mmol/L    Potassium 3 6 3 5 - 5 2 mmol/L    Chloride 106 96 - 106 mmol/L    CO2 24 20 - 29 mmol/L    CALCIUM 8 9 8 7 - 10 3 mg/dL    Protein, Total 6 1 6 0 - 8 5 g/dL    Albumin 4 3 3 8 - 4 8 g/dL    Globulin, Total 1 8 1 5 - 4 5 g/dL    Albumin/Globulin Ratio 2 4 (H) 1 2 - 2 2    TOTAL BILIRUBIN 0 5 0 0 - 1 2 mg/dL    Alk Phos Isoenzymes 63 48 - 121 IU/L    AST 24 0 - 40 IU/L    ALT 23 0 - 32 IU/L   Lipid panel    Collection Time: 07/12/21  7:23 AM   Result Value Ref Range    Cholesterol, Total 187 100 - 199 mg/dL    Triglycerides 79 0 - 149 mg/dL    HDL 59 >39 mg/dL    VLDL Cholesterol Calculated 15 5 - 40 mg/dL    LDL Calculated 113 (H) 0 - 99 mg/dL   TSH, 3rd generation with Free T4 reflex    Collection Time: 07/12/21  7:23 AM   Result Value Ref Range    TSH 3 700 0 450 - 4 500 uIU/mL       The following portions of the patient's history were reviewed and updated as appropriate: allergies, current medications, past family history, past medical history, past social history, past surgical history and problem list      Review of Systems   Constitutional: Negative for appetite change, chills, diaphoresis, fatigue, fever and unexpected weight change  HENT: Negative for congestion, hearing loss and rhinorrhea  Eyes: Negative for visual disturbance  Respiratory: Negative for cough, chest tightness, shortness of breath and wheezing  Cardiovascular: Negative for chest pain, palpitations and leg swelling  Gastrointestinal: Negative for abdominal pain and blood in stool  Endocrine: Negative for cold intolerance, heat intolerance, polydipsia and polyuria     Genitourinary: Negative for difficulty urinating, dysuria, frequency and urgency  Musculoskeletal: Positive for arthralgias  Negative for myalgias  Skin: Negative for rash  Neurological: Positive for tremors  Negative for dizziness, weakness, light-headedness and headaches  Hematological: Does not bruise/bleed easily  Psychiatric/Behavioral: Negative for dysphoric mood and sleep disturbance  Objective:      Vitals:    07/21/21 1546   BP: 122/82   Pulse: 69   Temp: 98 1 °F (36 7 °C)   SpO2: 98%          Physical Exam  Constitutional:       Appearance: She is well-developed  HENT:      Head: Normocephalic and atraumatic  Nose: Nose normal    Eyes:      General: No scleral icterus  Conjunctiva/sclera: Conjunctivae normal       Pupils: Pupils are equal, round, and reactive to light  Neck:      Thyroid: No thyromegaly  Vascular: No JVD  Trachea: No tracheal deviation  Cardiovascular:      Rate and Rhythm: Normal rate and regular rhythm  Heart sounds: No murmur heard  No friction rub  No gallop  Pulmonary:      Effort: Pulmonary effort is normal  No respiratory distress  Breath sounds: Normal breath sounds  No wheezing or rales  Musculoskeletal:         General: No deformity  Cervical back: Normal range of motion and neck supple  Comments: B/l shoulder w/ full rom, no ttp, no tremor   Lymphadenopathy:      Cervical: No cervical adenopathy  Skin:     General: Skin is warm and dry  Coloration: Skin is not pale  Findings: No erythema or rash  Neurological:      Mental Status: She is alert and oriented to person, place, and time  Cranial Nerves: No cranial nerve deficit  Comments: Nl ftn, no cogwheeling, nl gait   Psychiatric:         Behavior: Behavior normal          Thought Content:  Thought content normal          Judgment: Judgment normal

## 2021-07-22 ENCOUNTER — OFFICE VISIT (OUTPATIENT)
Dept: OCCUPATIONAL THERAPY | Facility: CLINIC | Age: 66
End: 2021-07-22
Payer: COMMERCIAL

## 2021-07-22 DIAGNOSIS — G56.32 RADIAL TUNNEL SYNDROME, LEFT: Primary | ICD-10-CM

## 2021-07-22 DIAGNOSIS — M19.122 POST-TRAUMATIC OSTEOARTHRITIS OF LEFT ELBOW: ICD-10-CM

## 2021-07-22 PROCEDURE — 97530 THERAPEUTIC ACTIVITIES: CPT

## 2021-07-22 PROCEDURE — 97140 MANUAL THERAPY 1/> REGIONS: CPT

## 2021-07-22 PROCEDURE — 97110 THERAPEUTIC EXERCISES: CPT

## 2021-07-22 NOTE — PROGRESS NOTES
Daily Note     Today's date: 2021  Patient name: Sunshine Harrison  : 1955  MRN: [id number]  Referring provider: Jonah Collazo MD  Dx:   Encounter Diagnosis     ICD-10-CM    1  Radial tunnel syndrome, left  G56 32    2  Post-traumatic osteoarthritis of left elbow  M19 122                   Subjective: "The elbow feels good  The shoulder is hurting me "    Objective: See treatment diary below    MMT LUE:  Wrist ext = 5/5  Wrist flexion = 5/5  FA sup = 5/5  FA pron = 5/    Assessment: Tolerated treatment well  Pt presents with decreased pain in L forearm/elbow and is able to tolerate strengthening exercises without complaints of increased pain  Fatigue and "tightness" reported at end of treatment session  Pt will be scheduled for PT evaluation of R shoulder pain  Patient would benefit from continued OT      Plan: Continue per plan of care  Upgrade as tolerated       Precautions: Universal  HEP: RNG    Manuals 6/3 6/10 6/17 7/1 7/8 7/15 7/22      IASTM      10' 10'      K-tape wrist ext assist  wrist ext assist 5' wrist ext assist 5' Wrist ext  Asst  5'  Wrist ext/supsupport 5' Wrist ext/supsupport 5'      Re-eval     done             21'        Neuro Re-Ed   10' 10'         RNG HEP supine 10' supine 10' 10'  RNG  8' RNG       Behavior modification     10'                                                                         Ther Ex  25' 30'   30'  10' 20'      SA punches  B/L 2x10 L 1# 3x10   L 1# 3x10  R 0#3x10         SL ER  L 1# R 0# 3x10 L 1# 3x10 L 1# 3x10         Eccentric wrist ext  1# 2x10 1# 3x10 1# 3x10   2# 2x10      Eccentric sup  w/YFB 3x10 w/weighted bar 1# 3x10 1#  3x10   weighted bar 1# 3x10      Wrist isometrics   YFB 2x10 2x10  RFB 1x10 s/p/we/wf RFB 3x10 s/p/we/wf      scap retraction   RTB 3x10 RTB   3x10         Extrinsic stretches      P 5' P 5'                   Ther Activity       8'      EDC       R gummy 2x10      gripping       RPW 3x10 Modalities             MHP      5'       U/S 50% pulsed, 0 8 w/cm2, 3 3 mHz      8'

## 2021-07-29 ENCOUNTER — OFFICE VISIT (OUTPATIENT)
Dept: OCCUPATIONAL THERAPY | Facility: CLINIC | Age: 66
End: 2021-07-29
Payer: COMMERCIAL

## 2021-07-29 DIAGNOSIS — M19.122 POST-TRAUMATIC OSTEOARTHRITIS OF LEFT ELBOW: ICD-10-CM

## 2021-07-29 DIAGNOSIS — G56.32 RADIAL TUNNEL SYNDROME, LEFT: Primary | ICD-10-CM

## 2021-07-29 PROCEDURE — 97110 THERAPEUTIC EXERCISES: CPT | Performed by: OCCUPATIONAL THERAPIST

## 2021-07-29 PROCEDURE — 97140 MANUAL THERAPY 1/> REGIONS: CPT | Performed by: OCCUPATIONAL THERAPIST

## 2021-07-29 NOTE — PROGRESS NOTES
Daily Note     Today's date: 2021  Patient name: Noni Rodriguez  : 1955  MRN: [id number]  Referring provider: Mariana Park MD  Dx:   Encounter Diagnosis     ICD-10-CM    1  Radial tunnel syndrome, left  G56 32    2  Post-traumatic osteoarthritis of left elbow  M19 122                   Subjective: "The elbow feels good  I don't have shooting pain anymore"    Objective: See treatment diary below    MMT LUE:  Wrist ext = 5/5  Wrist flexion = 5/5  FA sup = 5/5  FA pron = 5/5    Assessment: Tolerated treatment well  Pt presents with decreased pain in L forearm/elbow and is able to tolerate strengthening exercises without complaints of increased pain  Fatigue and "tightness" reported at end of treatment session  Pt will be scheduled for PT evaluation of R shoulder pain  Patient would benefit from continued OT      Plan: Continue per plan of care  Upgrade as tolerated       Precautions: Universal  HEP: RNG    Manuals 6/3 6/10 6/17 7/1 7/8 7/15 7/22 7/29     IASTM      10' 10' 10'     K-tape wrist ext assist  wrist ext assist 5' wrist ext assist 5' Wrist ext  Asst  5'  Wrist ext/supsupport 5' Wrist ext/supsupport 5' 5'     Re-eval     done             20'        Neuro Re-Ed   10' 10'         RNG HEP supine 10' supine 10' 10'  RNG  8' RNG       Behavior modification     10'                                                                         Ther Ex  25' 30'   30'  10' 20'   25'     SA punches  B/L 2x10 L 1# 3x10   L 1# 3x10  R 0#3x10         SL ER  L 1# R 0# 3x10 L 1# 3x10 L 1# 3x10         Eccentric wrist ext  1# 2x10 1# 3x10 1# 3x10   2# 2x10 2#  3x10     Eccentric sup  w/YFB 3x10 w/weighted bar 1# 3x10 1#  3x10   weighted bar 1# 3x10 3x10     Wrist isometrics   YFB 2x10 2x10  RFB 1x10 s/p/we/wf RFB 3x10 s/p/we/wf 3x10 each  S/P/WE/WF     scap retraction   RTB 3x10 RTB   3x10         Extrinsic stretches      P 5' P 5' 5'                  Ther Activity       8'  hold     EDC       R gummy 2x10 gripping       RPW 3x10                                             Modalities             MHP      5'       U/S 50% pulsed, 0 8 w/cm2, 3 3 mHz      8'

## 2021-08-05 ENCOUNTER — OFFICE VISIT (OUTPATIENT)
Dept: OCCUPATIONAL THERAPY | Facility: CLINIC | Age: 66
End: 2021-08-05
Payer: COMMERCIAL

## 2021-08-05 DIAGNOSIS — G56.32 RADIAL TUNNEL SYNDROME, LEFT: Primary | ICD-10-CM

## 2021-08-05 DIAGNOSIS — M19.122 POST-TRAUMATIC OSTEOARTHRITIS OF LEFT ELBOW: ICD-10-CM

## 2021-08-05 PROCEDURE — 97140 MANUAL THERAPY 1/> REGIONS: CPT

## 2021-08-05 PROCEDURE — 97530 THERAPEUTIC ACTIVITIES: CPT

## 2021-08-05 PROCEDURE — 97110 THERAPEUTIC EXERCISES: CPT

## 2021-08-05 NOTE — PROGRESS NOTES
Daily Note     Today's date: 2021  Patient name: Cris Ni  : 1955  MRN: [id number]  Referring provider: Cory Ortiz MD  Dx:   Encounter Diagnosis     ICD-10-CM    1  Radial tunnel syndrome, left  G56 32    2  Post-traumatic osteoarthritis of left elbow  M19 122                   Subjective: "I wore a wrist brace while I was working and it made my elbow feel worse "    Objective: See treatment diary below    MMT LUE:  Wrist ext = 5/5  Wrist flexion = 5/5  FA sup = 5/5  FA pron = 5/5    Assessment: Tolerated treatment well  Pt demo good tolerance for upgraded exercises  No tenderness noted over radial tunnel or over lateral epicondyle  Pt denies any pain at end of session  Patient would benefit from continued OT for progressive strengthening  Pt will be seen for PT eval for L shoulder next week  Plan: Continue per plan of care  Upgrade as tolerated       Precautions: Universal  HEP: RNG    Manuals 6/3 6/10 6/17 7/1 7/8 7/15 7/22 7/29 8/5    IASTM      10' 10' 10' 10'    K-tape wrist ext assist  wrist ext assist 5' wrist ext assist 5' Wrist ext  Asst  5'  Wrist ext/supsupport 5' Wrist ext/supsupport 5' 5' hold    Re-eval     done             21'        Neuro Re-Ed   10' 10'         RNG HEP supine 10' supine 10' 10'  RNG  8' RNG       Behavior modification     10'                                                                         Ther Ex  25' 30'   30'  10' 20'   25' 23'    SA punches  B/L 2x10 L 1# 3x10   L 1# 3x10  R 0#3x10         SL ER  L 1# R 0# 3x10 L 1# 3x10 L 1# 3x10         Eccentric wrist ext  1# 2x10 1# 3x10 1# 3x10   2# 2x10 2#  3x10 2# 3x10    Eccentric sup  w/YFB 3x10 w/weighted bar 1# 3x10 1#  3x10   weighted bar 1# 3x10 3x10 weighted bar 2# 3x10    Wrist isometrics   YFB 2x10 2x10  RFB 1x10 s/p/we/wf RFB 3x10 s/p/we/wf 3x10 each  S/P/WE/WF Manual 10x, RFB 2x10, GFB 9x    scap retraction   RTB 3x10 RTB   3x10     shoulder rolls 10x, seated scap retr 2x10    Extrinsic stretches      P 5' P 5' 5' 5'                 Ther Activity       8'  hold 12'    EDC       R gummy 2x10  R gummy 2x10    gripping       RPW 3x10  R flexgrip 2x10, GPW 3x10    pinching         velcro board                              Modalities             MHP      5'       U/S 50% pulsed, 0 8 w/cm2, 3 3 mHz      8'

## 2021-08-12 ENCOUNTER — OFFICE VISIT (OUTPATIENT)
Dept: OCCUPATIONAL THERAPY | Facility: CLINIC | Age: 66
End: 2021-08-12
Payer: COMMERCIAL

## 2021-08-12 ENCOUNTER — EVALUATION (OUTPATIENT)
Dept: PHYSICAL THERAPY | Facility: CLINIC | Age: 66
End: 2021-08-12
Payer: COMMERCIAL

## 2021-08-12 DIAGNOSIS — G56.32 RADIAL TUNNEL SYNDROME, LEFT: Primary | ICD-10-CM

## 2021-08-12 DIAGNOSIS — M19.122 POST-TRAUMATIC OSTEOARTHRITIS OF LEFT ELBOW: ICD-10-CM

## 2021-08-12 DIAGNOSIS — M25.511 ACUTE PAIN OF RIGHT SHOULDER: ICD-10-CM

## 2021-08-12 PROCEDURE — 97110 THERAPEUTIC EXERCISES: CPT

## 2021-08-12 PROCEDURE — 97161 PT EVAL LOW COMPLEX 20 MIN: CPT | Performed by: PHYSICAL THERAPIST

## 2021-08-12 PROCEDURE — 97530 THERAPEUTIC ACTIVITIES: CPT

## 2021-08-12 PROCEDURE — 97110 THERAPEUTIC EXERCISES: CPT | Performed by: PHYSICAL THERAPIST

## 2021-08-12 PROCEDURE — 97140 MANUAL THERAPY 1/> REGIONS: CPT

## 2021-08-12 NOTE — PROGRESS NOTES
Daily Note     Today's date: 2021  Patient name: Disha Davis  : 1955  MRN: [id number]  Referring provider: Evin Dutta MD  Dx:   Encounter Diagnosis     ICD-10-CM    1  Radial tunnel syndrome, left  G56 32    2  Post-traumatic osteoarthritis of left elbow  M19 122                   Subjective: "Work definitely makes it hurt more "    Objective: See treatment diary below      Assessment: Tolerated treatment well  Pt reports fatigue at end of session  Downgraded  strengthening exercise secondary to c/o pain  Radial tunnel symptoms have resolved, but lateral elbow pain still limits function  Patient would benefit from continued OT for progressive strengthening  Plan: Continue per plan of care  Upgrade as tolerated       Precautions: Universal  HEP: RNG    Manuals 6/3 6/10 6/17 7/1 7/8 7/15 7/22 7/29 8/5 8/12   IASTM      10' 10' 10' 10' 10'   K-tape wrist ext assist  wrist ext assist 5' wrist ext assist 5' Wrist ext  Asst  5'  Wrist ext/supsupport 5' Wrist ext/supsupport 5' 5' hold    Re-eval     done             21'        Neuro Re-Ed   10' 10'         RNG HEP supine 10' supine 10' 10'  RNG  8' RNG       Behavior modification     10'                                                                         Ther Ex  25' 30'   30'  10' 20'   25' 23' 20'   SA punches  B/L 2x10 L 1# 3x10   L 1# 3x10  R 0#3x10         SL ER  L 1# R 0# 3x10 L 1# 3x10 L 1# 3x10         Eccentric wrist ext  1# 2x10 1# 3x10 1# 3x10   2# 2x10 2#  3x10 2# 3x10 2# 3x10   Eccentric sup  w/YFB 3x10 w/weighted bar 1# 3x10 1#  3x10   weighted bar 1# 3x10 3x10 weighted bar 2# 3x10 weighted bar 2# 3x10   Wrist isometrics   YFB 2x10 2x10  RFB 1x10 s/p/we/wf RFB 3x10 s/p/we/wf 3x10 each  S/P/WE/WF Manual 10x, RFB 2x10, GFB 9x    scap retraction   RTB 3x10 RTB   3x10     shoulder rolls 10x, seated scap retr 2x10    Extrinsic stretches      P 5' P 5' 5' 5' 5'   Wrist stab          RFB on table 10x   Ther Activity       8' hold 12' 8'   EDC       R gummy 2x10  R gummy 2x10 R gummy 2x10   gripping       RPW 3x10  R flexgrip 2x10, GPW 3x10 RPW 3x10 downgraded   pinching         velcro board                              Modalities             MHP      5'       U/S 50% pulsed, 0 8 w/cm2, 3 3 mHz      8'

## 2021-08-12 NOTE — PROGRESS NOTES
PT Evaluation     Today's date: 2021  Patient name: Dwight Sellers  : 1955  MRN: [id number]  Referring provider: Marline Locke MD  Dx:   Encounter Diagnosis     ICD-10-CM    1  Acute pain of right shoulder  M25 511 Ambulatory referral to Physical Therapy                  Assessment  Assessment details: Patient is a 59 y/o female with chief complaints of right shoulder pain that began in the last month following increased use and strain on right shoulder  She has a history of left elbow fracture/surgery and has been using her right UE more than her left to compensate  She presents with forward shoulder positioning, which is also likely contributing to her pain  Patient presents with decreased functional mobility due to increased pain, mildly decreased shoulder strength, decreased shoulder ROM associated with possible mild impingement, but likely due to shoulder strain due to repetitive use  Patient will benefit from skilled physical therapy to address impairment and improve functional mobility  PT needed to allow for return to maximal function and improve quality of life  Impairments: abnormal or restricted ROM, activity intolerance, impaired physical strength, lacks appropriate home exercise program and pain with function  Barriers to therapy: Co-insurance   Understanding of Dx/Px/POC: good   Prognosis: good    Goals  STG within 4 weeks:   1  Patient to be independent in HEP  2  Reduce pain by 50% to improve quality of life  3  Improve shoulder flexion/abduction ROM by 5-10 degrees  4  Improve shoulder strength to 5/5   LTG within 8 weeks:   1  Patient to be independent in ADLs/IADLs without difficulty  2  Patient to be able to lift overhead with household objects without pain  3  Patient to be able to perform work tasks with no more than minimal pain  Plan  Plan details: Patient would benefit for two times a week therapy, but only wishes to attend 1 time per week at this time  Patient would benefit from: skilled physical therapy and PT eval  Planned modality interventions: cryotherapy, hydrotherapy and unattended electrical stimulation  Planned therapy interventions: therapeutic training, therapeutic exercise, therapeutic activities, stretching, strengthening, postural training, patient education, neuromuscular re-education, manual therapy, joint mobilization, IADL retraining, activity modification, ADL retraining, ADL training, body mechanics training, flexibility, functional ROM exercises, gait training, graded activity, graded exercise, graded motor and home exercise program  Frequency: 2x week  Duration in weeks: 8  Plan of Care beginning date: 2021  Plan of Care expiration date: 10/7/2021  Treatment plan discussed with: patient        Subjective Evaluation    History of Present Illness  Onset date: 2021  Mechanism of injury: Patient is a 59 y/o female with chief complaints of right shoulder pain that began in the last month  She states she works in a  and does a lot with her shoulder  She also reports she has a hard time getting off the floor and uses her right shoulder more so  "I feel like it's in the constant repetitious movements that caused this " She also states she had an elbow fracture on left side (currently being seen by OT), so she's using her right arm more  She was seen by her PCP and she was referred for evaluation and treatment             Not a recurrent problem   Pain  Current pain ratin  At best pain ratin  At worst pain ratin  Quality: dull ache  Relieving factors: medications  Symptom course: slight improvement     Social Support    Employment status: working (works at a day care )  Hand dominance: right  Exercise history: None currently       Diagnostic Tests  No diagnostic tests performed  Treatments  No previous or current treatments  Patient Goals  Patient goal: "to feel better" "I want to be able to use my shoulder without pain "        Objective     Concurrent Complaints  Negative for dizziness, faints, headaches, nausea/motion sickness, tinnitus, trouble swallowing, difficulty breathing, shortness of breath, respiratory pain and visual change    Cervical/Thoracic Screen   Cervical range of motion within normal limits with the following exceptions: Neck stiffness with flexion, right sidebending, right rotation     Active Range of Motion   Left Shoulder   Flexion: 140 degrees   Extension: 60 degrees   Abduction: 135 degrees   External rotation 0°: 85 degrees   Internal rotation BTB: T5     Right Shoulder   Flexion: 145 degrees   Extension: 40 degrees   Abduction: 140 degrees   External rotation 0°: 90 degrees   Internal rotation BTB: T12     Strength/Myotome Testing     Left Shoulder     Planes of Motion   Flexion: 4+   Abduction: 4+   Adduction: 4+   External rotation at 0°: 4+   Internal rotation at 0°: 4+     Right Shoulder     Planes of Motion   Flexion: 4+   Abduction: 4+   Adduction: 4+   External rotation at 0°: 4+   Internal rotation at 0°: 4+     Tests     Right Shoulder   Positive full can  Negative anterior slide, belly press, empty can, Hawkin's, lift-off, Speed's and anterior slide   Neuro Exam:     Headaches   Patient reports headaches: No               Precautions: HTN, high cholesterol, asthma, h/o R knee scope     Increased time spent on patient education with diagnosis, prognosis, goals of therapy, progression of therapy, and plan of care  All questions answered  Patient instructed to call clinic with questions or concerns  Written HEP provided to patient           Manuals 8/12            STR R UT  NV                                                   Neuro Re-Ed             Scap retraction x20             PSR x20             Seated thoracic extension  3"x20                                                                Ther Ex             Pt Edu KS            RetroUBE NV             TB Row/Ext yel 2x10 ea [facility] stretch- mid 4x20"                                                                              Ther Activity                                       Gait Training                                       Modalities                                           [facility]    Access Code: [email or web address]

## 2021-08-16 DIAGNOSIS — J45.909 UNCOMPLICATED ASTHMA, UNSPECIFIED ASTHMA SEVERITY, UNSPECIFIED WHETHER PERSISTENT: ICD-10-CM

## 2021-08-17 DIAGNOSIS — I10 BENIGN ESSENTIAL HYPERTENSION: ICD-10-CM

## 2021-08-17 RX ORDER — CHLORTHALIDONE 25 MG/1
TABLET ORAL
Qty: 90 TABLET | Refills: 1 | Status: SHIPPED | OUTPATIENT
Start: 2021-08-17

## 2021-08-17 RX ORDER — BUDESONIDE AND FORMOTEROL FUMARATE DIHYDRATE 160; 4.5 UG/1; UG/1
AEROSOL RESPIRATORY (INHALATION)
Qty: 10.2 G | Refills: 5 | Status: SHIPPED | OUTPATIENT
Start: 2021-08-17

## 2021-08-19 ENCOUNTER — OFFICE VISIT (OUTPATIENT)
Dept: OCCUPATIONAL THERAPY | Facility: CLINIC | Age: 66
End: 2021-08-19
Payer: COMMERCIAL

## 2021-08-19 ENCOUNTER — OFFICE VISIT (OUTPATIENT)
Dept: PHYSICAL THERAPY | Facility: CLINIC | Age: 66
End: 2021-08-19
Payer: COMMERCIAL

## 2021-08-19 DIAGNOSIS — M25.511 ACUTE PAIN OF RIGHT SHOULDER: Primary | ICD-10-CM

## 2021-08-19 DIAGNOSIS — M19.122 POST-TRAUMATIC OSTEOARTHRITIS OF LEFT ELBOW: ICD-10-CM

## 2021-08-19 DIAGNOSIS — G56.32 RADIAL TUNNEL SYNDROME, LEFT: Primary | ICD-10-CM

## 2021-08-19 PROCEDURE — 97112 NEUROMUSCULAR REEDUCATION: CPT | Performed by: PHYSICAL THERAPIST

## 2021-08-19 PROCEDURE — 97110 THERAPEUTIC EXERCISES: CPT

## 2021-08-19 PROCEDURE — 97110 THERAPEUTIC EXERCISES: CPT | Performed by: PHYSICAL THERAPIST

## 2021-08-19 PROCEDURE — 97140 MANUAL THERAPY 1/> REGIONS: CPT | Performed by: PHYSICAL THERAPIST

## 2021-08-19 PROCEDURE — 97112 NEUROMUSCULAR REEDUCATION: CPT

## 2021-08-19 PROCEDURE — 97140 MANUAL THERAPY 1/> REGIONS: CPT

## 2021-08-19 NOTE — PROGRESS NOTES
Daily Note     Today's date: 2021  Patient name: Sagar Morel  : 1955  MRN: [id number]  Referring provider: Ronnie Jordan MD  Dx:   Encounter Diagnosis     ICD-10-CM    1  Acute pain of right shoulder  M25 511                   Subjective: Patient states she had more pain after one day of doing her exercises and thought she was doing them wrong  Objective: See treatment diary below      Assessment: Tolerated treatment well  Re-educated patient on correct exercise technique  Kept HEP the same, in order to allow for improved exercise technique and compliance of program  Ended session with ice and encouraged patient to perform at home  Patient would benefit from continued PT      Plan: Continue per plan of care        Precautions: HTN, high cholesterol, asthma, h/o R knee scope       Medbridge: [email or web address]     Manuals            STR R UT  NV 10'                                                  Neuro Re-Ed             Scap retraction x20  x20            PSR x20  x20            Seated thoracic extension  3"x20 3"x20                                                                Ther Ex             Pt Edu KS KS           RetroUBE NV  5 min           TB Row/Ext yel 2x10 ea  yel 2x10 ea            Doorway stretch- mid 4x20"  2x20" ea low/mid           Supine ITY on foam roll   3"x10 ea                                                                Ther Activity                                       Gait Training                                       Modalities             Cold pack- R shoulder  Post 10'

## 2021-08-19 NOTE — PROGRESS NOTES
Daily Note     Today's date: 2021  Patient name: Chandra Patel  : 1955  MRN: [id number]  Referring provider: Mi Garber MD  Dx:   Encounter Diagnosis     ICD-10-CM    1  Radial tunnel syndrome, left  G56 32    2  Post-traumatic osteoarthritis of left elbow  M19 122                   Subjective: "My arm got really tired from writing "    Objective: See treatment diary below      Assessment: Tolerated treatment well  Pt reports fatigue at end of session  Pt able to tolerate exercise upgrades without complaints of increased pain  Patient would benefit from continued OT for progressive strengthening  Plan: Continue per plan of care  Upgrade as tolerated       Precautions: Universal  HEP: RNG    Manuals 6/3 6/10 6/17 7/1 7/8 7/15 7/22 7/29 8/5 8/12 8/19    IASTM      10' 10' 10' 10' 10' 8'    K-tape wrist ext assist  wrist ext assist 5' wrist ext assist 5' Wrist ext  Asst  5'  Wrist ext/supsupport 5' Wrist ext/supsupport 5' 5' hold      Re-eval     done               21'          Neuro Re-Ed   10' 10'       10'    RNG HEP supine 10' supine 10' 10'  RNG  8' RNG     2x30    Behavior modification     10'          Wrist jogs           RFB 3x30"    Weighted ball turns           Green ball s/p 2x10                                                 Ther Ex  25' 30'   30'  10' 20'   25' 23' 20' 22'    SA punches  B/L 2x10 L 1# 3x10   L 1# 3x10  R 0#3x10           SL ER  L 1# R 0# 3x10 L 1# 3x10 L 1# 3x10           Eccentric wrist ext  1# 2x10 1# 3x10 1# 3x10   2# 2x10 2#  3x10 2# 3x10 2# 3x10 2# 3x10    Eccentric sup  w/YFB 3x10 w/weighted bar 1# 3x10 1#  3x10   weighted bar 1# 3x10 3x10 weighted bar 2# 3x10 weighted bar 2# 3x10 weighted bar 2# 3x10    Wrist isometrics   YFB 2x10 2x10  RFB 1x10 s/p/we/wf RFB 3x10 s/p/we/wf 3x10 each  S/P/WE/WF Manual 10x, RFB 2x10, GFB 9x      scap retraction   RTB 3x10 RTB   3x10     shoulder rolls 10x, seated scap retr 2x10      Extrinsic stretches      P 5' P 5' 5' 5' 5' 3'    Wrist stab          RFB on table 10x     FA PREs               Wrist PREs           1# 3x10    Ther Activity       8'  hold 12' 8'     EDC       R gummy 2x10  R gummy 2x10 R gummy 2x10     gripping       RPW 3x10  R flexgrip 2x10, GPW 3x10 RPW 3x10 downgraded     pinching         velcro board                                    Modalities               MHP      5'         U/S 50% pulsed, 0 8 w/cm2, 3 3 mHz      8'

## 2021-08-25 DIAGNOSIS — E78.2 MIXED HYPERLIPIDEMIA: ICD-10-CM

## 2021-08-25 RX ORDER — ROSUVASTATIN CALCIUM 5 MG/1
TABLET, COATED ORAL
Qty: 90 TABLET | Refills: 1 | Status: SHIPPED | OUTPATIENT
Start: 2021-08-25 | End: 2022-02-24

## 2021-08-26 ENCOUNTER — OFFICE VISIT (OUTPATIENT)
Dept: OCCUPATIONAL THERAPY | Facility: CLINIC | Age: 66
End: 2021-08-26
Payer: COMMERCIAL

## 2021-08-26 ENCOUNTER — OFFICE VISIT (OUTPATIENT)
Dept: PHYSICAL THERAPY | Facility: CLINIC | Age: 66
End: 2021-08-26
Payer: COMMERCIAL

## 2021-08-26 DIAGNOSIS — M25.511 ACUTE PAIN OF RIGHT SHOULDER: Primary | ICD-10-CM

## 2021-08-26 DIAGNOSIS — G56.32 RADIAL TUNNEL SYNDROME, LEFT: Primary | ICD-10-CM

## 2021-08-26 DIAGNOSIS — M19.122 POST-TRAUMATIC OSTEOARTHRITIS OF LEFT ELBOW: ICD-10-CM

## 2021-08-26 PROCEDURE — 97140 MANUAL THERAPY 1/> REGIONS: CPT

## 2021-08-26 PROCEDURE — 97112 NEUROMUSCULAR REEDUCATION: CPT

## 2021-08-26 PROCEDURE — 97110 THERAPEUTIC EXERCISES: CPT

## 2021-08-26 PROCEDURE — 97530 THERAPEUTIC ACTIVITIES: CPT

## 2021-08-26 NOTE — PROGRESS NOTES
Daily Note     Today's date: 2021  Patient name: Sunshine Harrison  : 1955  MRN: [id number]  Referring provider: Alfie Knowles MD  Dx:   Encounter Diagnosis     ICD-10-CM    1  Acute pain of right shoulder  M25 511                   Subjective: Patient reports "my shoulder is sore, I get discomfort when I lift it "      Objective: See treatment diary below      Assessment: Tolerated treatment well  Anterior shoulder discomfort in bicep tendon area with rows but dissipates with rest  Added prone exercies to increase shoulder stability/strength  Required VC for shoulder retraction  Patient exhibited good technique with therapeutic exercises and would benefit from continued PT      Plan: Continue per plan of care        Precautions: HTN, high cholesterol, asthma, h/o R knee scope       Medbridge: [email or web address]     Manuals           STR R UT  NV 10' ALFONZO 8'                                                 Neuro Re-Ed             Scap retraction x20  x20  x20          PSR x20  x20  x20           Seated thoracic extension  3"x20 3"x20  3" x20          Prone ret+row   10x          Prone Horizontal abd   10x                                    Ther Ex             Pt Edu KS KS           RetroUBE NV  5 min 5 min          TB Row/Ext yel 2x10 ea  yel 2x10 ea  YTB 2x10 ea          Doorway stretch- mid 4x20"  2x20" ea low/mid 3x 20" low/mid          Supine ITY on foam roll   3"x10 ea  3" x10 ea                                                              Ther Activity                                       Gait Training                                       Modalities             Cold pack- R shoulder  Post 10'

## 2021-08-26 NOTE — PROGRESS NOTES
Daily Note     Today's date: 2021  Patient name: Darwin Frederick  : 1955  MRN: [id number]  Referring provider: Ghislaine Whittaker MD  Dx:   Encounter Diagnosis     ICD-10-CM    1  Radial tunnel syndrome, left  G56 32    2  Post-traumatic osteoarthritis of left elbow  M19 122                   Subjective: "It's ok "    Objective: See treatment diary below      Assessment: Tolerated treatment well  Issued yellow theraband & instructed in strengthening HEP for biceps, triceps & FA supination  Pt able to complete exercises without c/o pain  Plan: Re-eval & probable discharge next session       Precautions: Universal  HEP: RNG    Manuals 6/3 6/10 6/17 7/1 7/8 7/15 7/22 7/29 8/5 8/12 8/19 8/26   IASTM      10' 10' 10' 10' 10' 8' 8' prox FA, distal triceps   K-tape wrist ext assist  wrist ext assist 5' wrist ext assist 5' Wrist ext  Asst  5'  Wrist ext/supsupport 5' Wrist ext/supsupport 5' 5' hold      Re-eval     done               21'          Neuro Re-Ed   10' 10'       10' 8'   RNG HEP supine 10' supine 10' 10'  RNG  8' RNG     2x30    Behavior modification     10'          Wrist jogs           RFB 3x30" RFB 3x60"   Weighted ball turns           Green ball s/p 2x10    Wrist stab          RFB on table 10x  RFB on table 12x                                 Ther Ex  25' 30'   30'  10' 20'   25' 23' 20' 22' 20'   SA punches  B/L 2x10 L 1# 3x10   L 1# 3x10  R 0#3x10           SL ER  L 1# R 0# 3x10 L 1# 3x10 L 1# 3x10           Eccentric wrist ext  1# 2x10 1# 3x10 1# 3x10   2# 2x10 2#  3x10 2# 3x10 2# 3x10 2# 3x10    Eccentric sup  w/YFB 3x10 w/weighted bar 1# 3x10 1#  3x10   weighted bar 1# 3x10 3x10 weighted bar 2# 3x10 weighted bar 2# 3x10 weighted bar 2# 3x10    Wrist isometrics   YFB 2x10 2x10  RFB 1x10 s/p/we/wf RFB 3x10 s/p/we/wf 3x10 each  S/P/WE/WF Manual 10x, RFB 2x10, GFB 9x      scap retraction   RTB 3x10 RTB   3x10     shoulder rolls 10x, seated scap retr 2x10      Extrinsic stretches      P 5' P 5' 5' 5' 5' 3'    biceps            YTB 2x10 HEP   triceps            YTB 1x10 HEP   FA sup            YTB 1x10 HEP   FA PREs            weighted bar 2# 2x10   Wrist PREs           1# 3x10 2# 2x10   Ther Activity       8'  hold 12' 8'  8'   EDC       R gummy 2x10  R gummy 2x10 R gummy 2x10  R gummy 2x10   gripping       RPW 3x10  R flexgrip 2x10, GPW 3x10 RPW 3x10 downgraded  HG silver 3rd w/blocks 2x10   pinching         velcro board                                    Modalities               MHP      5'         U/S 50% pulsed, 0 8 w/cm2, 3 3 mHz      8'

## 2021-09-02 ENCOUNTER — OFFICE VISIT (OUTPATIENT)
Dept: OCCUPATIONAL THERAPY | Facility: CLINIC | Age: 66
End: 2021-09-02
Payer: COMMERCIAL

## 2021-09-02 ENCOUNTER — APPOINTMENT (OUTPATIENT)
Dept: PHYSICAL THERAPY | Facility: CLINIC | Age: 66
End: 2021-09-02
Payer: COMMERCIAL

## 2021-09-02 DIAGNOSIS — G56.32 RADIAL TUNNEL SYNDROME, LEFT: Primary | ICD-10-CM

## 2021-09-02 DIAGNOSIS — M19.122 POST-TRAUMATIC OSTEOARTHRITIS OF LEFT ELBOW: ICD-10-CM

## 2021-09-02 PROCEDURE — 97530 THERAPEUTIC ACTIVITIES: CPT

## 2021-09-02 PROCEDURE — 97110 THERAPEUTIC EXERCISES: CPT

## 2021-09-02 NOTE — PROGRESS NOTES
OT Re-Evaluation  and OT Discharge     Today's date: 2021  Patient name: Cris Ni  : 1955  MRN: [id number]  Referring provider: Cory Ortiz MD  Dx:   Encounter Diagnosis     ICD-10-CM    1  Radial tunnel syndrome, left  G56 32    2  Post-traumatic osteoarthritis of left elbow  M19 122                   Assessment  Assessment details: Kathy Kenny is a 60 y o  RHD female presenting with increased L elbow & forearm pain with increased numbness during and after activity  Pt has a negative Tinel's over the L radial tunnel but does have (+) palpable tenderness  Pt also presents with increased weakness with resisted middle finger extension, resisted wrist extension and forearm supination  L elbow extension has progressively worsened (from -25 to -47 degrees) over the past 2 years, with a hard end feel noted with passive extension  Pt also presents with L shoulder/scapular weakness and demonstrates increased forward shoulder posturing of the LUE  Pt has increased difficulty weight bearing through the LUE and performing work related tasks  Pt would benefit from continued skilled OT to address these impairments and improve overall function  21 Quintin Toussaint has been seen for 5 OT visits  Treatment has included IASTM, radial nerve glides, upper girdle strengthening exercises, eccentric wrist extensor strengthening, isometric & isotonic strengthening, extrinsic stretches, kinesiotape application, activity/behavior modification, and instruction in a HEP  Overall, pt reports decreased pain and improved functional use of her LUE  Pt states she is now able to drive without pain  Today she presents with palpable pain over the lateral epicondyle, however symptoms are not reproduced with resisted wrist extension or resisted MF extension  Pt was fitted with a custom volar wrist extension splint for night time wear   Pt would benefit from continued skilled OT to further progress with strengthening program and enable improved overall function  9/2/21 Aster De Guzman has been seen for 13 OT visits  L radial tunnel compression symptoms have resolved  L elbow AROM has not changed, but pt presents with increased overall strength and improved overall functional use of the LUE  L wrist & FA AROM are WNL and pain free  Pt has been instructed in an upgraded HEP addressing eccentric and isotonic strengthening exercises, extrinsic stretches, theraband exercises, and /pinch strengthening with theraputty  Pt is independent with use of a wrist extension splint at night and demo good understanding of her HEP  Barriers to therapy: none    Goals  STGs (to be achieved in 3 weeks):  1  Pt will be independent and compliant with HEP - MET  2  Decrease pain in forearm by 25% - MET  3  Increase strength by 1/2 grade in affected planes - MET    LTGs (to be achieved by discharge):  1  Pt will report decreased pain when driving - MET  2  Pt will report improved ability to complete work related tasks - MET  3  Improve FOTO score by 10 points - MET    New STGs:  1  Decrease pain over lateral epicondyle by 50% (4 weeks) - MET  2  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 2-3/10 (4 weeks) - MET    Plan  Plan details: Discharge from OT  Frequency: 1-2x/week  Treatment plan discussed with: patient        Subjective Evaluation    History of Present Illness  Date of surgery: 7/10/2018  Mechanism of injury: surgery and trauma  Mechanism of injury: Pt was previously treated at this clinic following ORIF L distal humerus capitellar and trochlear fx with lateral epicondyle fx, and repair of ulnar collateral ligament complex on 7/10/18 after injury sustained from a [address]  Pt reports worsening pain in the L elbow for the past 2 years with new onset of L forearm pain and numbness with activity            Not a recurrent problem   Quality of life: excellent    Pain  No pain reported  Progression: improved    Social Support  Lives with: adult children    Employment status: working  Hand dominance: right    Treatments  Previous treatment: occupational therapy  Current treatment: occupational therapy  Patient Goals  Patient goals for therapy: decreased pain, increased motion and increased strength  Patient goal: 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        Objective     Tenderness     Left Elbow   No tenderness in the cubital tunnel, lateral epicondyle and medial epicondyle  Left Wrist/Hand   No tenderness in the lateral epicondyle and medial epicondyle  Neurological Testing     Additional Neurological Details  Pt denies tingling in the L arm/forearm    Active Range of Motion     Left Elbow   Flexion: 140 degrees   Extension: 45 degrees   Forearm supination: 70 degrees   Forearm pronation: 60 degrees     Right Elbow   Flexion: 148 degrees   Extension: 0 degrees   Forearm supination: 75 degrees   Forearm pronation: 90 degrees     Left Wrist   Wrist flexion: 70 degrees   Wrist extension: 75 degrees   Radial deviation: 15 degrees   Ulnar deviation: 20 degrees     Additional Active Range of Motion Details  Crepitus noted throughout ROM    Strength/Myotome Testing     Left Elbow   Flexion: 5  Extension: 5  Forearm supination: 5  Forearm pronation: 5    Right Elbow   Flexion: 5  Extension: 5  Forearm supination: 5  Forearm pronation: 5    Left Wrist/Hand   Wrist extension: 5  Wrist flexion: 5  Radial deviation: 5  Ulnar deviation: 5     (2nd hand position)     Trial 1: 58 5    Trial 2: 55 4    Trial 3: 51 4    Tests     Left Elbow   Negative Tinel's sign (cubital tunnel)  Left Wrist/Hand   Negative resisted middle finger and Tinel's sign (radial tunnel)  Daily Note     Today's date: 2021  Patient name: Mar Georges  : 1955  MRN: [id number]  Referring provider: Deion Shen MD  Dx:   Encounter Diagnosis     ICD-10-CM    1  Radial tunnel syndrome, left  G56 32    2   Post-traumatic osteoarthritis of left elbow  M19 122 Subjective: "I don't get the pins and needles like I use to "    Objective: See RE and treatment diary below      Assessment: See RE for details  All goals met         Plan: Discharge from OT     Precautions: Universal  HEP: RNG    Manuals 6/3 6/10 6/17 7/1 7/8 7/15 7/22 7/29 8/5 8/12 8/19 8/26 9/2   IASTM      10' 10' 10' 10' 10' 8' 8' prox FA, distal triceps    K-tape wrist ext assist  wrist ext assist 5' wrist ext assist 5' Wrist ext  Asst  5'  Wrist ext/supsupport 5' Wrist ext/supsupport 5' 5' hold       Re-eval     done        completed        21'           Neuro Re-Ed   10' 10'       10' 8'    RNG HEP supine 10' supine 10' 10'  RNG  8' RNG     2x30     Behavior modification     10'           Wrist jogs           RFB 3x30" RFB 3x60"    Weighted ball turns           Green ball s/p 2x10     Wrist stab          RFB on table 10x  RFB on table 12x                                    Ther Ex  25' 30'   30'  10' 20'   25' 23' 20' 22' 20' 30'   SA punches  B/L 2x10 L 1# 3x10   L 1# 3x10  R 0#3x10            SL ER  L 1# R 0# 3x10 L 1# 3x10 L 1# 3x10            Eccentric wrist ext  1# 2x10 1# 3x10 1# 3x10   2# 2x10 2#  3x10 2# 3x10 2# 3x10 2# 3x10  2# 2x10 HEP   Eccentric sup  w/YFB 3x10 w/weighted bar 1# 3x10 1#  3x10   weighted bar 1# 3x10 3x10 weighted bar 2# 3x10 weighted bar 2# 3x10 weighted bar 2# 3x10  HEP   Wrist isometrics   YFB 2x10 2x10  RFB 1x10 s/p/we/wf RFB 3x10 s/p/we/wf 3x10 each  S/P/WE/WF Manual 10x, RFB 2x10, GFB 9x       scap retraction   RTB 3x10 RTB   3x10     shoulder rolls 10x, seated scap retr 2x10       Extrinsic stretches      P 5' P 5' 5' 5' 5' 3'     biceps            YTB 2x10 HEP reviewed HEP   triceps            YTB 1x10 HEP reviewed HEP   FA sup            YTB 1x10 HEP reviewed HEP   FA PREs            weighted bar 2# 2x10 weighted bar 2# 2x10   Wrist PREs           1# 3x10 2# 2x10 2# 2x10 HEP   Ther Activity       8'  hold 12' 8'  8' 8'   EDC       R gummy 2x10  R gummy 2x10 R gummy 2x10  R gummy 2x10 Y putty 10x HEP   gripping       RPW 3x10  R flexgrip 2x10, GPW 3x10 RPW 3x10 downgraded  HG silver 3rd w/blocks 2x10 Y putty , roll, pinch 5' HEP   pinching         velcro board                                       Modalities                MHP      5'          U/S 50% pulsed, 0 8 w/cm2, 3 3 mHz      8'

## 2021-09-08 DIAGNOSIS — T78.40XS ALLERGIC DISORDER, SEQUELA: ICD-10-CM

## 2021-09-08 RX ORDER — MONTELUKAST SODIUM 10 MG/1
TABLET ORAL
Qty: 90 TABLET | Refills: 3 | Status: SHIPPED | OUTPATIENT
Start: 2021-09-08 | End: 2022-07-05

## 2021-09-09 ENCOUNTER — OFFICE VISIT (OUTPATIENT)
Dept: PHYSICAL THERAPY | Facility: CLINIC | Age: 66
End: 2021-09-09
Payer: COMMERCIAL

## 2021-09-09 DIAGNOSIS — M25.511 ACUTE PAIN OF RIGHT SHOULDER: Primary | ICD-10-CM

## 2021-09-09 PROCEDURE — 97112 NEUROMUSCULAR REEDUCATION: CPT | Performed by: PHYSICAL THERAPIST

## 2021-09-09 PROCEDURE — 97140 MANUAL THERAPY 1/> REGIONS: CPT | Performed by: PHYSICAL THERAPIST

## 2021-09-09 PROCEDURE — 97110 THERAPEUTIC EXERCISES: CPT | Performed by: PHYSICAL THERAPIST

## 2021-09-09 NOTE — PROGRESS NOTES
Daily Note     Today's date: 2021  Patient name: Fransisco Purdy  : 1955  MRN: [id number]  Referring provider: Daria Ronquillo MD  Dx:   Encounter Diagnosis     ICD-10-CM    1  Acute pain of right shoulder  M25 511                   Subjective: Patient states, "It's feeling about the same " She reports only 2/10 pain at present, but last night states she was just sitting on the couch and she had a lot of pain  Objective: See treatment diary below  AROM screen: R shoulder pain with IR and abduction  Strength screen: R shoulder weaker ER, all others [facility]   Special tests: positive Full can; negative empty can, neg Lowery, neg Neer, neg Speeds      Assessment: Tolerated treatment well  Anterior shoulder discomfort continues to be chief complaint  Pain during prone row and hor abd  Increased time spent on re-assessment  See objective data above  Addition of PROM and R GHJ mobs; will re-assess any improvements over the course of the next day or two  Patient exhibited good technique with therapeutic exercises and would benefit from continued PT      Plan: Continue per plan of care        Precautions: HTN, high cholesterol, asthma, h/o R knee scope       Medbridge: [email or web address]     Manuals          STR R UT  NV 10' ALFONZO 8' 8'          R GHJ Post/Inf mobs    Gr 2-3 5'                                   Neuro Re-Ed             Scap retraction x20  x20  x20 PRN         PSR x20  x20  x20  PRN         Seated thoracic extension  3"x20 3"x20  3" x20          Prone retraction    x20 w pt edu         Prone ret+row   10x 2x10         Prone Horizontal abd   10x x10                                   Ther Ex             Pt Edu KS KS           RetroUBE NV  5 min 5 min 5 min          TB Row/Ext yel 2x10 ea  yel 2x10 ea  YTB 2x10 ea Red 2x10 ea         Doorway stretch- mid 4x20"  2x20" ea low/mid 3x 20" low/mid 2x20" ea low/mid/hi         Supine ITY on foam roll   3"x10 ea  3" x10 ea 3  x10 ea Ther Activity                                       Gait Training                                       Modalities             Cold pack- R shoulder  Post 10'   decline

## 2021-09-16 ENCOUNTER — OFFICE VISIT (OUTPATIENT)
Dept: PHYSICAL THERAPY | Facility: CLINIC | Age: 66
End: 2021-09-16
Payer: COMMERCIAL

## 2021-09-16 DIAGNOSIS — M25.511 ACUTE PAIN OF RIGHT SHOULDER: Primary | ICD-10-CM

## 2021-09-16 PROCEDURE — 97140 MANUAL THERAPY 1/> REGIONS: CPT

## 2021-09-16 PROCEDURE — 97112 NEUROMUSCULAR REEDUCATION: CPT

## 2021-09-16 PROCEDURE — 97110 THERAPEUTIC EXERCISES: CPT

## 2021-09-16 NOTE — PROGRESS NOTES
Daily Note     Today's date: 2021  Patient name: Cortes Silverman  : 1955  MRN: [id number]  Referring provider: Roxanne Leal MD  Dx:   Encounter Diagnosis     ICD-10-CM    1  Acute pain of right shoulder  M25 511        Start Time: 845  Stop Time: 945  Total time in clinic (min): 60 minutes    Subjective: Patient states, "The pain continues to be intermittent  However, over the weekend it didn't bother me as much"  Objective: See treatment diary below    Assessment: Tolerated treatment well  Anterior shoulder discomfort continues to be chief complaint  No pain t/o therapy this visit  Responding well to manual intervention performed by Esteban Hilario, PT  Patient exhibited good technique with therapeutic exercises and would benefit from continued PT    Plan: Continue per plan of care        Precautions: HTN, high cholesterol, asthma, h/o R knee scope     Medbridge: [email or web address]     Manuals         STR R UT  NV 10' ALFONZO 8' 8'  10'        R GHJ Post/Inf mobs    Gr 2-3 5' Gr 2-3 5' KS                                  Neuro Re-Ed             Scap retraction x20  x20  x20 PRN         PSR x20  x20  x20  PRN         Seated thoracic extension  3"x20 3"x20  3" x20          Prone retraction    x20 w pt edu x20        Prone ret+row   10x 2x10 2x10        Prone Horizontal abd   10x x10 x10                                  Ther Ex             Pt Edu KS KS           RetroUBE NV  5 min 5 min 5 min  5 min        TB Row/Ext yel 2x10 ea  yel 2x10 ea  YTB 2x10 ea Red 2x10 ea Red 2x10 ea        Doorway stretch- mid 4x20"  2x20" ea low/mid 3x 20" low/mid 2x20" ea low/mid/hi 2x20" ea low/mid/hi        Supine ITY on foam roll   3"x10 ea  3" x10 ea 3  x10 ea 3"x10 ea        PROM     5' KS                                               Ther Activity                                       Gait Training                                       Modalities             Cold pack- R shoulder  Post 10'   decline

## 2021-09-23 ENCOUNTER — OFFICE VISIT (OUTPATIENT)
Dept: PHYSICAL THERAPY | Facility: CLINIC | Age: 66
End: 2021-09-23
Payer: COMMERCIAL

## 2021-09-23 DIAGNOSIS — M25.511 ACUTE PAIN OF RIGHT SHOULDER: Primary | ICD-10-CM

## 2021-09-23 PROCEDURE — 97140 MANUAL THERAPY 1/> REGIONS: CPT | Performed by: PHYSICAL THERAPIST

## 2021-09-23 PROCEDURE — 97110 THERAPEUTIC EXERCISES: CPT | Performed by: PHYSICAL THERAPIST

## 2021-09-23 PROCEDURE — 97112 NEUROMUSCULAR REEDUCATION: CPT | Performed by: PHYSICAL THERAPIST

## 2021-09-23 NOTE — PROGRESS NOTES
Daily Note     Today's date: 2021  Patient name: Azul Kim  : 1955  MRN: [id number]  Referring provider: Jose Jernigan MD  Dx:   Encounter Diagnosis     ICD-10-CM    1  Acute pain of right shoulder  M25 511                   Subjective: Patient states her pain hasn't been as bad, but she hasn't been working  Objective: See treatment diary below    Assessment: Tolerated treatment well  No pain during or post treatment  She requires verbal and manual cueing for correct exercise technique for prone exercises  Patient exhibited good technique with therapeutic exercises and would benefit from continued PT    Plan: Continue per plan of care        Precautions: HTN, high cholesterol, asthma, h/o R knee scope     Medbridge: [email or web address]     Manuals        STR R UT  NV 10' ALFONZO 8' 8'  10' 8'       R GHJ Post/Inf mobs    Gr 2-3 5' Gr 2-3 5' KS Gr 2-3 5' KS                                  Neuro Re-Ed             Scap retraction x20  x20  x20 PRN  x20        PSR x20  x20  x20  PRN  x20        Seated thoracic extension  3"x20 3"x20  3" x20          Prone retraction    x20 w pt edu x20 x20        Prone ret+row   10x 2x10 2x10        Prone Horizontal abd   10x x10 x10 2x10        Shoulder shrugs      5# x20        Prone extension      2x10        Prone scapular retraction/extension (4 steps)       2x10        Ther Ex             Pt Edu KS KS           RetroUBE NV  5 min 5 min 5 min  5 min 5 min       TB Row/Ext yel 2x10 ea  yel 2x10 ea  YTB 2x10 ea Red 2x10 ea Red 2x10 ea Red 2x10 ea        Doorway stretch- mid 4x20"  2x20" ea low/mid 3x 20" low/mid 2x20" ea low/mid/hi 2x20" ea low/mid/hi        Supine ITY on foam roll   3"x10 ea  3" x10 ea 3  x10 ea 3"x10 ea 3"x10 ea        PROM     5' KS 5' KS                                               Ther Activity                                       Gait Training                                       Modalities             Cold pack- R shoulder  Post 10'   decline

## 2021-09-29 DIAGNOSIS — B00.9 HERPES: ICD-10-CM

## 2021-09-29 RX ORDER — VALACYCLOVIR HYDROCHLORIDE 1 G/1
2000 TABLET, FILM COATED ORAL 2 TIMES DAILY
Qty: 4 TABLET | Refills: 5 | Status: SHIPPED | OUTPATIENT
Start: 2021-09-29 | End: 2022-01-26

## 2021-09-30 ENCOUNTER — OFFICE VISIT (OUTPATIENT)
Dept: PHYSICAL THERAPY | Facility: CLINIC | Age: 66
End: 2021-09-30
Payer: COMMERCIAL

## 2021-09-30 DIAGNOSIS — M25.511 ACUTE PAIN OF RIGHT SHOULDER: Primary | ICD-10-CM

## 2021-09-30 PROCEDURE — 97110 THERAPEUTIC EXERCISES: CPT | Performed by: PHYSICAL THERAPIST

## 2021-09-30 PROCEDURE — 97140 MANUAL THERAPY 1/> REGIONS: CPT | Performed by: PHYSICAL THERAPIST

## 2021-09-30 PROCEDURE — 97112 NEUROMUSCULAR REEDUCATION: CPT | Performed by: PHYSICAL THERAPIST

## 2021-09-30 NOTE — PROGRESS NOTES
Daily Note     Today's date: 2021  Patient name: Carlin Hameed  : 1955  MRN: [id number]  Referring provider: Moises Sanford MD  Dx:   Encounter Diagnosis     ICD-10-CM    1  Acute pain of right shoulder  M25 511                   Subjective: Patient states her shoulder feels okay today  Objective: See treatment diary below    Assessment: Tolerated treatment well  Improved exercise technique during exercise program  Continued verbal cueing required  No complaints during or post session  Patient exhibited good technique with therapeutic exercises and would benefit from continued PT    Plan: Continue per plan of care        Precautions: HTN, high cholesterol, asthma, h/o R knee scope     Medbridge: [email or web address]     Manuals       STR R UT  NV 10' ALFONZO 8' 8'  10' 8' 8'      R GHJ Post/Inf mobs    Gr 2-3 5' Gr 2-3 5' KS Gr 2-3 5' KS  Gr 2-3 5' KS                                 Neuro Re-Ed             Scap retraction x20  x20  x20 PRN  x20  x20       PSR x20  x20  x20  PRN  x20  x20      Seated thoracic extension  3"x20 3"x20  3" x20          Prone retraction    x20 w pt edu x20 x20  x10       Prone ret+row   10x 2x10 2x10  2x10       Prone Horizontal abd   10x x10 x10 2x10  2x10       Shoulder shrugs      5# x20  5# x20       Prone extension      2x10  2x10       Prone scapular retraction/extension (4 steps)       2x10  2x10       Ther Ex             Pt Edu KS KS           RetroUBE NV  5 min 5 min 5 min  5 min 5 min Alt fwd/bwd x6 min      TB Row/Ext yel 2x10 ea  yel 2x10 ea  YTB 2x10 ea Red 2x10 ea Red 2x10 ea Red 2x10 ea  Red 2x10 ea       Doorway stretch- mid 4x20"  2x20" ea low/mid 3x 20" low/mid 2x20" ea low/mid/hi 2x20" ea low/mid/hi  2x20" ea low/mid/hi      Supine ITY on foam roll   3"x10 ea  3" x10 ea 3  x10 ea 3"x10 ea 3"x10 ea  3"x15 ea      PROM     5' KS 5' KS  5' KS                                             Ther Activity Gait Training                                       Modalities             Cold pack- R shoulder  Post 10'   decline

## 2021-10-14 ENCOUNTER — EVALUATION (OUTPATIENT)
Dept: PHYSICAL THERAPY | Facility: CLINIC | Age: 66
End: 2021-10-14
Payer: COMMERCIAL

## 2021-10-14 DIAGNOSIS — M25.511 ACUTE PAIN OF RIGHT SHOULDER: Primary | ICD-10-CM

## 2021-10-14 PROCEDURE — 97110 THERAPEUTIC EXERCISES: CPT | Performed by: PHYSICAL THERAPIST

## 2021-10-14 PROCEDURE — 97140 MANUAL THERAPY 1/> REGIONS: CPT | Performed by: PHYSICAL THERAPIST

## 2021-10-14 PROCEDURE — 97112 NEUROMUSCULAR REEDUCATION: CPT | Performed by: PHYSICAL THERAPIST

## 2021-10-21 ENCOUNTER — OFFICE VISIT (OUTPATIENT)
Dept: PHYSICAL THERAPY | Facility: CLINIC | Age: 66
End: 2021-10-21
Payer: COMMERCIAL

## 2021-10-21 DIAGNOSIS — M25.511 ACUTE PAIN OF RIGHT SHOULDER: Primary | ICD-10-CM

## 2021-10-21 PROCEDURE — 97140 MANUAL THERAPY 1/> REGIONS: CPT | Performed by: PHYSICAL THERAPIST

## 2021-10-21 PROCEDURE — 97110 THERAPEUTIC EXERCISES: CPT | Performed by: PHYSICAL THERAPIST

## 2021-10-21 PROCEDURE — 97112 NEUROMUSCULAR REEDUCATION: CPT | Performed by: PHYSICAL THERAPIST

## 2021-10-28 ENCOUNTER — APPOINTMENT (OUTPATIENT)
Dept: PHYSICAL THERAPY | Facility: CLINIC | Age: 66
End: 2021-10-28
Payer: COMMERCIAL

## 2022-01-21 LAB
ALBUMIN SERPL-MCNC: 4.4 G/DL (ref 3.8–4.8)
ALBUMIN/GLOB SERPL: 2.3 {RATIO} (ref 1.2–2.2)
ALP SERPL-CCNC: 65 IU/L (ref 44–121)
ALT SERPL-CCNC: 24 IU/L (ref 0–32)
AST SERPL-CCNC: 25 IU/L (ref 0–40)
BASOPHILS # BLD AUTO: 0.1 X10E3/UL (ref 0–0.2)
BASOPHILS NFR BLD AUTO: 2 %
BILIRUB SERPL-MCNC: 0.5 MG/DL (ref 0–1.2)
BUN SERPL-MCNC: 20 MG/DL (ref 8–27)
BUN/CREAT SERPL: 29 (ref 12–28)
CALCIUM SERPL-MCNC: 9.2 MG/DL (ref 8.7–10.3)
CHLORIDE SERPL-SCNC: 101 MMOL/L (ref 96–106)
CHOLEST SERPL-MCNC: 205 MG/DL (ref 100–199)
CO2 SERPL-SCNC: 25 MMOL/L (ref 20–29)
CREAT SERPL-MCNC: 0.7 MG/DL (ref 0.57–1)
EOSINOPHIL # BLD AUTO: 0.1 X10E3/UL (ref 0–0.4)
EOSINOPHIL NFR BLD AUTO: 2 %
ERYTHROCYTE [DISTWIDTH] IN BLOOD BY AUTOMATED COUNT: 12 % (ref 11.7–15.4)
GLOBULIN SER-MCNC: 1.9 G/DL (ref 1.5–4.5)
GLUCOSE SERPL-MCNC: 89 MG/DL (ref 65–99)
HBA1C MFR BLD: 5.4 % (ref 4.8–5.6)
HCT VFR BLD AUTO: 40.2 % (ref 34–46.6)
HDLC SERPL-MCNC: 61 MG/DL
HGB BLD-MCNC: 13.9 G/DL (ref 11.1–15.9)
IMM GRANULOCYTES # BLD: 0 X10E3/UL (ref 0–0.1)
IMM GRANULOCYTES NFR BLD: 0 %
LDLC SERPL CALC-MCNC: 122 MG/DL (ref 0–99)
LYMPHOCYTES # BLD AUTO: 1.7 X10E3/UL (ref 0.7–3.1)
LYMPHOCYTES NFR BLD AUTO: 32 %
MCH RBC QN AUTO: 30 PG (ref 26.6–33)
MCHC RBC AUTO-ENTMCNC: 34.6 G/DL (ref 31.5–35.7)
MCV RBC AUTO: 87 FL (ref 79–97)
MONOCYTES # BLD AUTO: 0.5 X10E3/UL (ref 0.1–0.9)
MONOCYTES NFR BLD AUTO: 10 %
NEUTROPHILS # BLD AUTO: 2.8 X10E3/UL (ref 1.4–7)
NEUTROPHILS NFR BLD AUTO: 54 %
PLATELET # BLD AUTO: 233 X10E3/UL (ref 150–450)
POTASSIUM SERPL-SCNC: 3.8 MMOL/L (ref 3.5–5.2)
PROT SERPL-MCNC: 6.3 G/DL (ref 6–8.5)
RBC # BLD AUTO: 4.63 X10E6/UL (ref 3.77–5.28)
SL AMB EGFR AFRICAN AMERICAN: 105 ML/MIN/1.73
SL AMB EGFR NON AFRICAN AMERICAN: 91 ML/MIN/1.73
SL AMB VLDL CHOLESTEROL CALC: 22 MG/DL (ref 5–40)
SODIUM SERPL-SCNC: 142 MMOL/L (ref 134–144)
TRIGL SERPL-MCNC: 123 MG/DL (ref 0–149)
WBC # BLD AUTO: 5.1 X10E3/UL (ref 3.4–10.8)

## 2022-01-26 ENCOUNTER — OFFICE VISIT (OUTPATIENT)
Dept: INTERNAL MEDICINE CLINIC | Facility: CLINIC | Age: 67
End: 2022-01-26
Payer: COMMERCIAL

## 2022-01-26 VITALS
SYSTOLIC BLOOD PRESSURE: 140 MMHG | BODY MASS INDEX: 23.7 KG/M2 | HEART RATE: 77 BPM | TEMPERATURE: 97.5 F | WEIGHT: 160 LBS | OXYGEN SATURATION: 98 % | RESPIRATION RATE: 18 BRPM | DIASTOLIC BLOOD PRESSURE: 82 MMHG | HEIGHT: 69 IN

## 2022-01-26 DIAGNOSIS — Z23 NEED FOR PNEUMOCOCCAL VACCINATION: ICD-10-CM

## 2022-01-26 DIAGNOSIS — B37.2 CANDIDAL INTERTRIGO: ICD-10-CM

## 2022-01-26 DIAGNOSIS — J41.0 SIMPLE CHRONIC BRONCHITIS (HCC): ICD-10-CM

## 2022-01-26 DIAGNOSIS — E78.2 MIXED HYPERLIPIDEMIA: ICD-10-CM

## 2022-01-26 DIAGNOSIS — B00.9 HERPES: ICD-10-CM

## 2022-01-26 DIAGNOSIS — R73.01 ABNORMAL FASTING GLUCOSE: ICD-10-CM

## 2022-01-26 DIAGNOSIS — N64.89 BILATERAL PENDULOUS BREASTS: Primary | ICD-10-CM

## 2022-01-26 DIAGNOSIS — R25.1 TREMOR: ICD-10-CM

## 2022-01-26 DIAGNOSIS — T78.40XS ALLERGIC DISORDER, SEQUELA: ICD-10-CM

## 2022-01-26 DIAGNOSIS — J45.40 MODERATE PERSISTENT ASTHMA WITHOUT COMPLICATION: ICD-10-CM

## 2022-01-26 DIAGNOSIS — I10 BENIGN ESSENTIAL HYPERTENSION: ICD-10-CM

## 2022-01-26 PROCEDURE — 99215 OFFICE O/P EST HI 40 MIN: CPT | Performed by: INTERNAL MEDICINE

## 2022-01-26 RX ORDER — TRIAMCINOLONE ACETONIDE 5 MG/G
CREAM TOPICAL 3 TIMES DAILY
Qty: 30 G | Refills: 0 | Status: SHIPPED | OUTPATIENT
Start: 2022-01-26

## 2022-01-26 RX ORDER — NYSTATIN 100000 [USP'U]/G
POWDER TOPICAL 2 TIMES DAILY
Qty: 60 G | Refills: 4 | Status: SHIPPED | OUTPATIENT
Start: 2022-01-26

## 2022-01-26 RX ORDER — NYSTATIN 100000 U/G
CREAM TOPICAL 2 TIMES DAILY
Qty: 30 G | Refills: 0 | Status: SHIPPED | OUTPATIENT
Start: 2022-01-26

## 2022-01-26 NOTE — PATIENT INSTRUCTIONS
Lab data reviewed in detail and compared prior    Hyperlipidemia-slightly suboptimal LDL control on 5 mg rosuvastatin  Given temporal relationship to tremor and rosuvastatin will hold medication for 3 weeks to see if tremor improves  If there is no improvement then resume rosuvastatin  If there is improvement then contact me to discuss further  Hypertension stable on present regimen    Tremor-no evidence of any other parkinsonian features  Continue to monitor symptoms  We discussed midodrine or propranolol but will hold off on that for now  Allergic rhinitis and asthma clinically stable on present regimen  Candida intertrigo related to pendulous breasts  Treat with combination nystatin and triamcinolone in till the redness improves, typically 3-5 days and then treat with nystatin cream alone until rash completely resolves and then use powder prophylactically  We discussed her chronic back pain, shoulder indentation and candidal infections would justify breast reduction surgery  Patient will consider

## 2022-01-26 NOTE — PROGRESS NOTES
Assessment/Plan:    Diagnoses and all orders for this visit:    Bilateral pendulous breasts    Mixed hyperlipidemia  -     Lipid panel; Future    Benign essential hypertension  -     CBC and differential; Future  -     Comprehensive metabolic panel; Future    Allergic disorder, sequela    Herpes    Moderate persistent asthma without complication    Abnormal fasting glucose  -     Hemoglobin A1C; Future    Need for pneumococcal vaccination  -     PNEUMOCOCCAL CONJUGATE VACCINE 13-VALENT GREATER THAN 6 MONTHS    Candidal intertrigo  -     nystatin (MYCOSTATIN) cream; Apply topically 2 (two) times a day  -     nystatin (MYCOSTATIN) powder; Apply topically 2 (two) times a day  -     triamcinolone (KENALOG) 0 5 % cream; Apply topically 3 (three) times a day    Simple chronic bronchitis (HCC)    Tremor              Patient Instructions   Lab data reviewed in detail and compared prior    Hyperlipidemia-slightly suboptimal LDL control on 5 mg rosuvastatin  Given temporal relationship to tremor and rosuvastatin will hold medication for 3 weeks to see if tremor improves  If there is no improvement then resume rosuvastatin  If there is improvement then contact me to discuss further  Hypertension stable on present regimen    Tremor-no evidence of any other parkinsonian features  Continue to monitor symptoms  We discussed midodrine or propranolol but will hold off on that for now  Allergic rhinitis and asthma clinically stable on present regimen  Candida intertrigo related to pendulous breasts  Treat with combination nystatin and triamcinolone in till the redness improves, typically 3-5 days and then treat with nystatin cream alone until rash completely resolves and then use powder prophylactically  We discussed her chronic back pain, shoulder indentation and candidal infections would justify breast reduction surgery  Patient will consider  Subjective:      Patient ID: Fe Medrano is a 73 y o  female    F/u mmp and review labs  Retired in Dec and loving  Feeling generally well, but concerned w/ persistent r hand tremor  Tremor comes and goes and she can make it go away by stretching arm out straight  Tremor started after starting rosuvastatin and getting covid vacc  Notes b/l pain in ears w/o hearing loss, fever, or tinitis  Notes rash under breasts b/l  Using powder  Shoulder pain improved w/ PT  Asthma has been stable, taking singulair daily, symbicort q am, no proventil lately  Allergies have been stable  HSV-controlled w/ Valtrex as needed  Last outbreak more than 3 mos ago  Notes some urge incontinence if not careful, still not doing kegels  Hasn't needed pads  Insomnia-using sonata rarely, every few mos     HPL-tolerating rosuvastatin        Current Outpatient Medications:     chlorthalidone 25 mg tablet, TAKE 1 TABLET BY MOUTH EVERY DAY, Disp: 90 tablet, Rfl: 1    loratadine (CLARITIN) 10 mg tablet, Take 10 mg by mouth daily, Disp: , Rfl:     montelukast (SINGULAIR) 10 mg tablet, TAKE 1 TABLET BY MOUTH EVERY DAY, Disp: 90 tablet, Rfl: 3    PROVENTIL  (90 Base) MCG/ACT inhaler, Inhale 2 puffs every 6 (six) hours as needed for wheezing, Disp: 6 7 g, Rfl: 2    rosuvastatin (CRESTOR) 5 mg tablet, TAKE 1 TABLET BY MOUTH EVERY DAY, Disp: 90 tablet, Rfl: 1    Symbicort 160-4 5 MCG/ACT inhaler, INHALE 2 PUFFS BY MOUTH TWICE A DAY, RINSE MOUTH AFTER USE, Disp: 10 2 g, Rfl: 5    valACYclovir (VALTREX) 1,000 mg tablet, Take 2 tablets (2,000 mg total) by mouth 2 (two) times a day for 1 day, Disp: 4 tablet, Rfl: 5    zaleplon (SONATA) 10 MG capsule, Take 1 capsule (10 mg total) by mouth daily at bedtime as needed for sleep, Disp: 30 capsule, Rfl: 3    nystatin (MYCOSTATIN) cream, Apply topically 2 (two) times a day, Disp: 30 g, Rfl: 0    nystatin (MYCOSTATIN) powder, Apply topically 2 (two) times a day, Disp: 60 g, Rfl: 4    triamcinolone (KENALOG) 0 5 % cream, Apply topically 3 (three) times a day, Disp: 30 g, Rfl: 0    Recent Results (from the past 1008 hour(s))   CBC and differential    Collection Time: 01/20/22  8:48 AM   Result Value Ref Range    White Blood Cell Count 5 1 3 4 - 10 8 x10E3/uL    Red Blood Cell Count 4 63 3 77 - 5 28 x10E6/uL    Hemoglobin 13 9 11 1 - 15 9 g/dL    HCT 40 2 34 0 - 46 6 %    MCV 87 79 - 97 fL    MCH 30 0 26 6 - 33 0 pg    MCHC 34 6 31 5 - 35 7 g/dL    RDW 12 0 11 7 - 15 4 %    Platelet Count 063 687 - 450 x10E3/uL    Neutrophils 54 Not Estab  %    Lymphocytes 32 Not Estab  %    Monocytes 10 Not Estab  %    Eosinophils 2 Not Estab  %    Basophils PCT 2 Not Estab  %    Neutrophils (Absolute) 2 8 1 4 - 7 0 x10E3/uL    Lymphocytes (Absolute) 1 7 0 7 - 3 1 x10E3/uL    Monocytes (Absolute) 0 5 0 1 - 0 9 x10E3/uL    Eosinophils (Absolute) 0 1 0 0 - 0 4 x10E3/uL    Basophils ABS 0 1 0 0 - 0 2 x10E3/uL    Immature Granulocytes 0 Not Estab  %    Immature Granulocytes (Absolute) 0 0 0 0 - 0 1 x10E3/uL   Comprehensive metabolic panel    Collection Time: 01/20/22  8:48 AM   Result Value Ref Range    Glucose, Random 89 65 - 99 mg/dL    BUN 20 8 - 27 mg/dL    Creatinine 0 70 0 57 - 1 00 mg/dL    eGFR Non African American 91 >59 mL/min/1 73    eGFR  105 >59 mL/min/1 73    SL AMB BUN/CREATININE RATIO 29 (H) 12 - 28    Sodium 142 134 - 144 mmol/L    Potassium 3 8 3 5 - 5 2 mmol/L    Chloride 101 96 - 106 mmol/L    CO2 25 20 - 29 mmol/L    CALCIUM 9 2 8 7 - 10 3 mg/dL    Protein, Total 6 3 6 0 - 8 5 g/dL    Albumin 4 4 3 8 - 4 8 g/dL    Globulin, Total 1 9 1 5 - 4 5 g/dL    Albumin/Globulin Ratio 2 3 (H) 1 2 - 2 2    TOTAL BILIRUBIN 0 5 0 0 - 1 2 mg/dL    Alk Phos Isoenzymes 65 44 - 121 IU/L    AST 25 0 - 40 IU/L    ALT 24 0 - 32 IU/L   Lipid panel    Collection Time: 01/20/22  8:48 AM   Result Value Ref Range    Cholesterol, Total 205 (H) 100 - 199 mg/dL    Triglycerides 123 0 - 149 mg/dL    HDL 61 >39 mg/dL    VLDL Cholesterol Calculated 22 5 - 40 mg/dL    LDL Calculated 122 (H) 0 - 99 mg/dL   Hemoglobin A1c (w/out EAG)    Collection Time: 01/20/22  8:48 AM   Result Value Ref Range    Hemoglobin A1C 5 4 4 8 - 5 6 %       The following portions of the patient's history were reviewed and updated as appropriate: allergies, current medications, past family history, past medical history, past social history, past surgical history and problem list      Review of Systems   Neurological: Positive for tremors  Objective:      Vitals:    01/26/22 1452   BP: 140/82   Pulse: 77   Resp: 18   Temp: 97 5 °F (36 4 °C)   SpO2: 98%          Physical Exam  Constitutional:       Appearance: She is well-developed  HENT:      Head: Normocephalic and atraumatic  Right Ear: Tympanic membrane normal       Left Ear: Tympanic membrane normal       Ears:      Comments: Right canal is erythematous without exudate     Nose: Nose normal    Eyes:      General: No scleral icterus  Conjunctiva/sclera: Conjunctivae normal       Pupils: Pupils are equal, round, and reactive to light  Neck:      Thyroid: No thyromegaly  Vascular: No JVD  Trachea: No tracheal deviation  Cardiovascular:      Rate and Rhythm: Normal rate and regular rhythm  Heart sounds: No murmur heard  No friction rub  No gallop  Pulmonary:      Effort: Pulmonary effort is normal  No respiratory distress  Breath sounds: Normal breath sounds  No wheezing or rales  Musculoskeletal:         General: No deformity  Cervical back: Normal range of motion and neck supple  Lymphadenopathy:      Cervical: No cervical adenopathy  Skin:     General: Skin is warm and dry  Coloration: Skin is not pale  Findings: No erythema or rash  Comments: Well demarcated erythematous rash in skin fold under right breast, mild hyperpigmentation under the left  Deep indentation approximately 3/4 to 1 in bilateral trapezius from bra strap     Neurological:      Mental Status: She is alert and oriented to person, place, and time  Cranial Nerves: No cranial nerve deficit  Comments: Cerebellar exam intact finger-to-nose, rapid alternating movement, no intention tremor  Patient able to hold hand steady outstretched without movement of paper  Mild rest tremor involving hand and forearm of the right upper extremity  There is no cogwheeling  Gait is normal tandem with a brisk turn  No evidence for masked face ease  Psychiatric:         Behavior: Behavior normal          Thought Content:  Thought content normal          Judgment: Judgment normal

## 2022-02-24 DIAGNOSIS — E78.2 MIXED HYPERLIPIDEMIA: ICD-10-CM

## 2022-02-24 RX ORDER — ROSUVASTATIN CALCIUM 5 MG/1
TABLET, COATED ORAL
Qty: 90 TABLET | Refills: 1 | Status: SHIPPED | OUTPATIENT
Start: 2022-02-24

## 2022-03-17 DIAGNOSIS — F51.01 PRIMARY INSOMNIA: ICD-10-CM

## 2022-03-17 RX ORDER — ZALEPLON 10 MG/1
CAPSULE ORAL
Qty: 30 CAPSULE | Refills: 1 | Status: SHIPPED | OUTPATIENT
Start: 2022-03-17

## 2022-04-05 DIAGNOSIS — J45.909 UNCOMPLICATED ASTHMA, UNSPECIFIED ASTHMA SEVERITY, UNSPECIFIED WHETHER PERSISTENT: ICD-10-CM

## 2022-04-05 RX ORDER — ALBUTEROL SULFATE 90 MCG
2 HFA AEROSOL WITH ADAPTER (GRAM) INHALATION EVERY 6 HOURS PRN
Qty: 6.7 G | Refills: 0 | Status: SHIPPED | OUTPATIENT
Start: 2022-04-05

## 2022-07-05 DIAGNOSIS — T78.40XS ALLERGIC DISORDER, SEQUELA: ICD-10-CM

## 2022-07-05 RX ORDER — MONTELUKAST SODIUM 10 MG/1
TABLET ORAL
Qty: 90 TABLET | Refills: 3 | Status: SHIPPED | OUTPATIENT
Start: 2022-07-05

## 2022-08-09 LAB — HBA1C MFR BLD HPLC: 5.5 %

## 2022-08-10 LAB
ALBUMIN SERPL-MCNC: 4.5 G/DL (ref 3.8–4.8)
ALBUMIN/GLOB SERPL: 2.8 {RATIO} (ref 1.2–2.2)
ALP SERPL-CCNC: 80 IU/L (ref 44–121)
ALT SERPL-CCNC: 24 IU/L (ref 0–32)
AST SERPL-CCNC: 20 IU/L (ref 0–40)
BASOPHILS # BLD AUTO: 0.1 X10E3/UL (ref 0–0.2)
BASOPHILS NFR BLD AUTO: 2 %
BILIRUB SERPL-MCNC: 0.3 MG/DL (ref 0–1.2)
BUN SERPL-MCNC: 20 MG/DL (ref 8–27)
BUN/CREAT SERPL: 31 (ref 12–28)
CALCIUM SERPL-MCNC: 8.9 MG/DL (ref 8.7–10.3)
CHLORIDE SERPL-SCNC: 106 MMOL/L (ref 96–106)
CHOLEST SERPL-MCNC: 185 MG/DL (ref 100–199)
CO2 SERPL-SCNC: 23 MMOL/L (ref 20–29)
CREAT SERPL-MCNC: 0.64 MG/DL (ref 0.57–1)
EGFR: 98 ML/MIN/1.73
EOSINOPHIL # BLD AUTO: 0.2 X10E3/UL (ref 0–0.4)
EOSINOPHIL NFR BLD AUTO: 5 %
ERYTHROCYTE [DISTWIDTH] IN BLOOD BY AUTOMATED COUNT: 12.5 % (ref 11.7–15.4)
GLOBULIN SER-MCNC: 1.6 G/DL (ref 1.5–4.5)
GLUCOSE SERPL-MCNC: 96 MG/DL (ref 65–99)
HBA1C MFR BLD: 5.5 % (ref 4.8–5.6)
HCT VFR BLD AUTO: 40 % (ref 34–46.6)
HDLC SERPL-MCNC: 47 MG/DL
HGB BLD-MCNC: 13.4 G/DL (ref 11.1–15.9)
IMM GRANULOCYTES # BLD: 0 X10E3/UL (ref 0–0.1)
IMM GRANULOCYTES NFR BLD: 0 %
LDLC SERPL CALC-MCNC: 118 MG/DL (ref 0–99)
LYMPHOCYTES # BLD AUTO: 2.1 X10E3/UL (ref 0.7–3.1)
LYMPHOCYTES NFR BLD AUTO: 45 %
MCH RBC QN AUTO: 29.7 PG (ref 26.6–33)
MCHC RBC AUTO-ENTMCNC: 33.5 G/DL (ref 31.5–35.7)
MCV RBC AUTO: 89 FL (ref 79–97)
MONOCYTES # BLD AUTO: 0.4 X10E3/UL (ref 0.1–0.9)
MONOCYTES NFR BLD AUTO: 8 %
NEUTROPHILS # BLD AUTO: 1.9 X10E3/UL (ref 1.4–7)
NEUTROPHILS NFR BLD AUTO: 40 %
PLATELET # BLD AUTO: 253 X10E3/UL (ref 150–450)
POTASSIUM SERPL-SCNC: 3.9 MMOL/L (ref 3.5–5.2)
PROT SERPL-MCNC: 6.1 G/DL (ref 6–8.5)
RBC # BLD AUTO: 4.51 X10E6/UL (ref 3.77–5.28)
SL AMB VLDL CHOLESTEROL CALC: 20 MG/DL (ref 5–40)
SODIUM SERPL-SCNC: 143 MMOL/L (ref 134–144)
TRIGL SERPL-MCNC: 112 MG/DL (ref 0–149)
WBC # BLD AUTO: 4.6 X10E3/UL (ref 3.4–10.8)

## 2022-08-23 ENCOUNTER — OFFICE VISIT (OUTPATIENT)
Dept: INTERNAL MEDICINE CLINIC | Facility: CLINIC | Age: 67
End: 2022-08-23
Payer: COMMERCIAL

## 2022-08-23 VITALS
SYSTOLIC BLOOD PRESSURE: 126 MMHG | HEART RATE: 80 BPM | BODY MASS INDEX: 23.73 KG/M2 | HEIGHT: 69 IN | RESPIRATION RATE: 16 BRPM | DIASTOLIC BLOOD PRESSURE: 70 MMHG | OXYGEN SATURATION: 98 % | WEIGHT: 160.2 LBS | TEMPERATURE: 97.9 F

## 2022-08-23 DIAGNOSIS — Z23 ENCOUNTER FOR IMMUNIZATION: Primary | ICD-10-CM

## 2022-08-23 DIAGNOSIS — J45.40 MODERATE PERSISTENT ASTHMA WITHOUT COMPLICATION: ICD-10-CM

## 2022-08-23 DIAGNOSIS — J41.0 SIMPLE CHRONIC BRONCHITIS (HCC): ICD-10-CM

## 2022-08-23 DIAGNOSIS — I10 BENIGN ESSENTIAL HYPERTENSION: ICD-10-CM

## 2022-08-23 DIAGNOSIS — Z23 NEED FOR PNEUMOCOCCAL VACCINATION: ICD-10-CM

## 2022-08-23 DIAGNOSIS — F51.01 PRIMARY INSOMNIA: ICD-10-CM

## 2022-08-23 DIAGNOSIS — R25.1 TREMOR: ICD-10-CM

## 2022-08-23 DIAGNOSIS — R73.01 ABNORMAL FASTING GLUCOSE: ICD-10-CM

## 2022-08-23 DIAGNOSIS — N64.89 BILATERAL PENDULOUS BREASTS: ICD-10-CM

## 2022-08-23 DIAGNOSIS — E78.2 MIXED HYPERLIPIDEMIA: ICD-10-CM

## 2022-08-23 DIAGNOSIS — Z12.31 ENCOUNTER FOR SCREENING MAMMOGRAM FOR MALIGNANT NEOPLASM OF BREAST: ICD-10-CM

## 2022-08-23 PROCEDURE — 90677 PCV20 VACCINE IM: CPT | Performed by: INTERNAL MEDICINE

## 2022-08-23 PROCEDURE — 1003F LEVEL OF ACTIVITY ASSESS: CPT | Performed by: INTERNAL MEDICINE

## 2022-08-23 PROCEDURE — 99214 OFFICE O/P EST MOD 30 MIN: CPT | Performed by: INTERNAL MEDICINE

## 2022-08-23 PROCEDURE — 3074F SYST BP LT 130 MM HG: CPT | Performed by: INTERNAL MEDICINE

## 2022-08-23 PROCEDURE — 3078F DIAST BP <80 MM HG: CPT | Performed by: INTERNAL MEDICINE

## 2022-08-23 PROCEDURE — 1160F RVW MEDS BY RX/DR IN RCRD: CPT | Performed by: INTERNAL MEDICINE

## 2022-08-23 PROCEDURE — G0009 ADMIN PNEUMOCOCCAL VACCINE: HCPCS | Performed by: INTERNAL MEDICINE

## 2022-08-23 NOTE — PATIENT INSTRUCTIONS
Lab data reviewed in detail and compared prior    Hyperlipidemia stable on rosuvastatin    Hypertension stable on present regimen    Impaired fasting glucose remains stable with A1c 5 5    Insomnia stable with occasional use of sonata    Asthma and allergic rhinitis stable on present regimen    Isolated right hand tremor-continue to monitor symptoms  Consider weighted utensils if necessary  We briefly discussed medication but would like to hold off on that for now  Health maintenance-due for screening mammogram    Routine follow-up after labs in 6 months, sooner as needed

## 2022-08-23 NOTE — PROGRESS NOTES
Assessment/Plan:    Diagnoses and all orders for this visit:    Encounter for immunization    Moderate persistent asthma without complication    Simple chronic bronchitis (HCC)    Benign essential hypertension    Abnormal fasting glucose  -     Hemoglobin A1C; Future    Bilateral pendulous breasts    Mixed hyperlipidemia  -     CBC and differential; Future  -     Comprehensive metabolic panel; Future  -     Lipid panel; Future  -     TSH, 3rd generation with Free T4 reflex; Future    Primary insomnia    Tremor    Encounter for screening mammogram for malignant neoplasm of breast  -     Mammo screening bilateral w 3d & cad; Future    Need for pneumococcal vaccination  -     Pneumococcal Conjugate Vaccine 20-valent (Pcv20)            Patient Instructions   Lab data reviewed in detail and compared prior    Hyperlipidemia stable on rosuvastatin    Hypertension stable on present regimen    Impaired fasting glucose remains stable with A1c 5 5    Insomnia stable with occasional use of sonata    Asthma and allergic rhinitis stable on present regimen    Isolated right hand tremor-continue to monitor symptoms  Consider weighted utensils if necessary  We briefly discussed medication but would like to hold off on that for now  Health maintenance-due for screening mammogram    Routine follow-up after labs in 6 months, sooner as needed  Subjective:      Patient ID: Luis Mishra is a 73 y o  female    F/u mmp and review labs  Feeling generally well  Right had tremor seems worse, and she can make it go away by doing something  No issues writing or eating  Rash under breasts has cleared  Still w/ neck pain  Not sure she wants to consider surgery  Shoulder pain improved w/ PT, but now having more issues  Asthma has been stable, taking singulair daily, symbicort q am, no proventil lately  Allergies have been stable  HSV-controlled w/ Valtrex as needed  Last outbreak 2 wks ago     Notes some urge incontinence if not careful, still not doing kegels  Hasn't needed pads  Insomnia-using sonata rarely, every few mos  HPL-tolerating rosuvastatin, no significant improvement in tremor off rx x 3 wks           Current Outpatient Medications:     chlorthalidone 25 mg tablet, TAKE 1 TABLET BY MOUTH EVERY DAY, Disp: 90 tablet, Rfl: 1    loratadine (CLARITIN) 10 mg tablet, Take 10 mg by mouth daily, Disp: , Rfl:     montelukast (SINGULAIR) 10 mg tablet, TAKE 1 TABLET BY MOUTH EVERY DAY, Disp: 90 tablet, Rfl: 3    nystatin (MYCOSTATIN) cream, Apply topically 2 (two) times a day, Disp: 30 g, Rfl: 0    nystatin (MYCOSTATIN) powder, Apply topically 2 (two) times a day, Disp: 60 g, Rfl: 4    Proventil  (90 Base) MCG/ACT inhaler, Inhale 2 puffs every 6 (six) hours as needed for wheezing, Disp: 6 7 g, Rfl: 0    rosuvastatin (CRESTOR) 5 mg tablet, TAKE 1 TABLET BY MOUTH EVERY DAY, Disp: 90 tablet, Rfl: 1    Symbicort 160-4 5 MCG/ACT inhaler, INHALE 2 PUFFS BY MOUTH TWICE A DAY, RINSE MOUTH AFTER USE, Disp: 10 2 g, Rfl: 5    triamcinolone (KENALOG) 0 5 % cream, Apply topically 3 (three) times a day, Disp: 30 g, Rfl: 0    zaleplon (SONATA) 10 MG capsule, take 1 capsule by mouth at bedtime if needed for sleep, Disp: 30 capsule, Rfl: 1    valACYclovir (VALTREX) 1,000 mg tablet, Take 2 tablets (2,000 mg total) by mouth 2 (two) times a day for 1 day, Disp: 4 tablet, Rfl: 5    Recent Results (from the past 1008 hour(s))   Hemoglobin A1C    Collection Time: 08/09/22 12:00 AM   Result Value Ref Range    Hemoglobin A1C 5 5    CBC and differential    Collection Time: 08/09/22  7:26 AM   Result Value Ref Range    White Blood Cell Count 4 6 3 4 - 10 8 x10E3/uL    Red Blood Cell Count 4 51 3 77 - 5 28 x10E6/uL    Hemoglobin 13 4 11 1 - 15 9 g/dL    HCT 40 0 34 0 - 46 6 %    MCV 89 79 - 97 fL    MCH 29 7 26 6 - 33 0 pg    MCHC 33 5 31 5 - 35 7 g/dL    RDW 12 5 11 7 - 15 4 %    Platelet Count 225 297 - 450 x10E3/uL    Neutrophils 40 Not Estab  %    Lymphocytes 45 Not Estab  %    Monocytes 8 Not Estab  %    Eosinophils 5 Not Estab  %    Basophils PCT 2 Not Estab  %    Neutrophils (Absolute) 1 9 1 4 - 7 0 x10E3/uL    Lymphocytes (Absolute) 2 1 0 7 - 3 1 x10E3/uL    Monocytes (Absolute) 0 4 0 1 - 0 9 x10E3/uL    Eosinophils (Absolute) 0 2 0 0 - 0 4 x10E3/uL    Basophils ABS 0 1 0 0 - 0 2 x10E3/uL    Immature Granulocytes 0 Not Estab  %    Immature Granulocytes (Absolute) 0 0 0 0 - 0 1 x10E3/uL   Comprehensive metabolic panel    Collection Time: 08/09/22  7:26 AM   Result Value Ref Range    Glucose, Random 96 65 - 99 mg/dL    BUN 20 8 - 27 mg/dL    Creatinine 0 64 0 57 - 1 00 mg/dL    eGFR 98 >59 mL/min/1 73    SL AMB BUN/CREATININE RATIO 31 (H) 12 - 28    Sodium 143 134 - 144 mmol/L    Potassium 3 9 3 5 - 5 2 mmol/L    Chloride 106 96 - 106 mmol/L    CO2 23 20 - 29 mmol/L    CALCIUM 8 9 8 7 - 10 3 mg/dL    Protein, Total 6 1 6 0 - 8 5 g/dL    Albumin 4 5 3 8 - 4 8 g/dL    Globulin, Total 1 6 1 5 - 4 5 g/dL    Albumin/Globulin Ratio 2 8 (H) 1 2 - 2 2    TOTAL BILIRUBIN 0 3 0 0 - 1 2 mg/dL    Alk Phos Isoenzymes 80 44 - 121 IU/L    AST 20 0 - 40 IU/L    ALT 24 0 - 32 IU/L   Lipid panel    Collection Time: 08/09/22  7:26 AM   Result Value Ref Range    Cholesterol, Total 185 100 - 199 mg/dL    Triglycerides 112 0 - 149 mg/dL    HDL 47 >39 mg/dL    VLDL Cholesterol Calculated 20 5 - 40 mg/dL    LDL Calculated 118 (H) 0 - 99 mg/dL   Hemoglobin A1c (w/out EAG)    Collection Time: 08/09/22  7:26 AM   Result Value Ref Range    Hemoglobin A1C 5 5 4 8 - 5 6 %       The following portions of the patient's history were reviewed and updated as appropriate: allergies, current medications, past family history, past medical history, past social history, past surgical history and problem list      Review of Systems      Objective:      Vitals:    08/23/22 1233   Pulse: 80   Resp: 16   Temp: 97 9 °F (36 6 °C)   SpO2: 98%          Physical Exam  Constitutional: Appearance: She is well-developed  HENT:      Head: Normocephalic and atraumatic  Nose: Nose normal    Eyes:      General: No scleral icterus  Conjunctiva/sclera: Conjunctivae normal       Pupils: Pupils are equal, round, and reactive to light  Neck:      Thyroid: No thyromegaly  Vascular: No JVD  Trachea: No tracheal deviation  Cardiovascular:      Rate and Rhythm: Normal rate and regular rhythm  Heart sounds: No murmur heard  No friction rub  No gallop  Pulmonary:      Effort: Pulmonary effort is normal  No respiratory distress  Breath sounds: Normal breath sounds  No wheezing or rales  Musculoskeletal:         General: No deformity  Cervical back: Normal range of motion and neck supple  Lymphadenopathy:      Cervical: No cervical adenopathy  Skin:     General: Skin is warm and dry  Coloration: Skin is not pale  Findings: No erythema or rash  Neurological:      Mental Status: She is alert and oriented to person, place, and time  Cranial Nerves: No cranial nerve deficit  Comments: Right hand tremor at rest, normal rapid alternating movements and finger-to-nose  No cogwheeling  Normal gait, brisk turns, normal arm swing  Psychiatric:         Behavior: Behavior normal          Thought Content:  Thought content normal          Judgment: Judgment normal

## 2022-08-24 DIAGNOSIS — E78.2 MIXED HYPERLIPIDEMIA: ICD-10-CM

## 2022-08-24 RX ORDER — ROSUVASTATIN CALCIUM 5 MG/1
TABLET, COATED ORAL
Qty: 90 TABLET | Refills: 1 | Status: SHIPPED | OUTPATIENT
Start: 2022-08-24

## 2022-10-22 DIAGNOSIS — I10 BENIGN ESSENTIAL HYPERTENSION: ICD-10-CM

## 2022-10-22 RX ORDER — CHLORTHALIDONE 25 MG/1
TABLET ORAL
Qty: 90 TABLET | Refills: 1 | Status: SHIPPED | OUTPATIENT
Start: 2022-10-22

## 2022-11-02 DIAGNOSIS — F51.01 PRIMARY INSOMNIA: ICD-10-CM

## 2022-11-02 RX ORDER — ZALEPLON 10 MG/1
CAPSULE ORAL
Qty: 30 CAPSULE | Refills: 0 | Status: SHIPPED | OUTPATIENT
Start: 2022-11-02

## 2022-11-07 ENCOUNTER — TELEPHONE (OUTPATIENT)
Dept: INTERNAL MEDICINE CLINIC | Facility: CLINIC | Age: 67
End: 2022-11-07

## 2022-11-07 NOTE — TELEPHONE ENCOUNTER
PA has been started for the patients Zaleplon 10MG capsules, patients key is: [id number] - Rx #: [id number]  PA has been processed  If [facility] Medicare Part D has not responded in 1-3 days or if you have any questions about your ePA request, please contact [place] Medicare Part D at [phone]

## 2022-12-20 ENCOUNTER — TELEPHONE (OUTPATIENT)
Dept: INTERNAL MEDICINE CLINIC | Facility: CLINIC | Age: 67
End: 2022-12-20

## 2022-12-20 DIAGNOSIS — J45.40 MODERATE PERSISTENT ASTHMA WITHOUT COMPLICATION: Primary | ICD-10-CM

## 2022-12-20 RX ORDER — BUDESONIDE 180 UG/1
2 AEROSOL, POWDER RESPIRATORY (INHALATION) 2 TIMES DAILY
Qty: 1 EACH | Refills: 3 | Status: SHIPPED | OUTPATIENT
Start: 2022-12-20 | End: 2022-12-21

## 2022-12-20 NOTE — TELEPHONE ENCOUNTER
Pt is requesting   RX for:    Budesonide and Sormoterol per [facility]  The Symbicort was denied  [facility] has been trying to reach [facility]      Please order pt uses this daily   2 puff am 2 puffs pm    Call pt with questions [phone]

## 2022-12-21 RX ORDER — BUDESONIDE AND FORMOTEROL FUMARATE DIHYDRATE 80; 4.5 UG/1; UG/1
2 AEROSOL RESPIRATORY (INHALATION) 2 TIMES DAILY
COMMUNITY
End: 2022-12-21 | Stop reason: SDUPTHER

## 2022-12-21 RX ORDER — BUDESONIDE AND FORMOTEROL FUMARATE DIHYDRATE 80; 4.5 UG/1; UG/1
2 AEROSOL RESPIRATORY (INHALATION) 2 TIMES DAILY
Qty: 10.2 G | Refills: 0 | Status: SHIPPED | OUTPATIENT
Start: 2022-12-21

## 2022-12-21 NOTE — TELEPHONE ENCOUNTER
[facility] told patient that insurance will cover Symbicort  Pt would like for order to be sent for the Symbicort to [facility], [address] [place] [address]    Please remove [employer] from the list of pharmacies  Her [employer] requires her to use [facility] only

## 2022-12-30 DIAGNOSIS — E78.2 MIXED HYPERLIPIDEMIA: ICD-10-CM

## 2022-12-30 RX ORDER — ROSUVASTATIN CALCIUM 5 MG/1
TABLET, COATED ORAL
Qty: 90 TABLET | Refills: 1 | Status: SHIPPED | OUTPATIENT
Start: 2022-12-30

## 2023-02-17 ENCOUNTER — HOSPITAL ENCOUNTER (OUTPATIENT)
Dept: MAMMOGRAPHY | Facility: CLINIC | Age: 68
Discharge: HOME | End: 2023-02-17

## 2023-02-17 VITALS — HEIGHT: 69 IN | BODY MASS INDEX: 23.7 KG/M2 | WEIGHT: 160 LBS

## 2023-02-17 DIAGNOSIS — Z12.31 ENCOUNTER FOR SCREENING MAMMOGRAM FOR MALIGNANT NEOPLASM OF BREAST: ICD-10-CM

## 2023-02-17 LAB — HBA1C MFR BLD HPLC: 5.4 %

## 2023-02-18 LAB
ALBUMIN SERPL-MCNC: 4.4 G/DL (ref 3.8–4.8)
ALBUMIN/GLOB SERPL: 2 {RATIO} (ref 1.2–2.2)
ALP SERPL-CCNC: 62 IU/L (ref 44–121)
ALT SERPL-CCNC: 26 IU/L (ref 0–32)
AST SERPL-CCNC: 24 IU/L (ref 0–40)
BASOPHILS # BLD AUTO: 0.1 X10E3/UL (ref 0–0.2)
BASOPHILS NFR BLD AUTO: 2 %
BILIRUB SERPL-MCNC: 0.5 MG/DL (ref 0–1.2)
BUN SERPL-MCNC: 14 MG/DL (ref 8–27)
BUN/CREAT SERPL: 19 (ref 12–28)
CALCIUM SERPL-MCNC: 9.4 MG/DL (ref 8.7–10.3)
CHLORIDE SERPL-SCNC: 101 MMOL/L (ref 96–106)
CHOLEST SERPL-MCNC: 204 MG/DL (ref 100–199)
CO2 SERPL-SCNC: 27 MMOL/L (ref 20–29)
CREAT SERPL-MCNC: 0.73 MG/DL (ref 0.57–1)
EGFR: 91 ML/MIN/1.73
EOSINOPHIL # BLD AUTO: 0.1 X10E3/UL (ref 0–0.4)
EOSINOPHIL NFR BLD AUTO: 3 %
ERYTHROCYTE [DISTWIDTH] IN BLOOD BY AUTOMATED COUNT: 12.1 % (ref 11.7–15.4)
GLOBULIN SER-MCNC: 2.2 G/DL (ref 1.5–4.5)
GLUCOSE SERPL-MCNC: 90 MG/DL (ref 70–99)
HBA1C MFR BLD: 5.4 % (ref 4.8–5.6)
HCT VFR BLD AUTO: 42.3 % (ref 34–46.6)
HDLC SERPL-MCNC: 54 MG/DL
HGB BLD-MCNC: 14.4 G/DL (ref 11.1–15.9)
IMM GRANULOCYTES # BLD: 0 X10E3/UL (ref 0–0.1)
IMM GRANULOCYTES NFR BLD: 0 %
LDLC SERPL CALC-MCNC: 126 MG/DL (ref 0–99)
LYMPHOCYTES # BLD AUTO: 1.5 X10E3/UL (ref 0.7–3.1)
LYMPHOCYTES NFR BLD AUTO: 39 %
MCH RBC QN AUTO: 30 PG (ref 26.6–33)
MCHC RBC AUTO-ENTMCNC: 34 G/DL (ref 31.5–35.7)
MCV RBC AUTO: 88 FL (ref 79–97)
MONOCYTES # BLD AUTO: 0.3 X10E3/UL (ref 0.1–0.9)
MONOCYTES NFR BLD AUTO: 9 %
NEUTROPHILS # BLD AUTO: 1.8 X10E3/UL (ref 1.4–7)
NEUTROPHILS NFR BLD AUTO: 47 %
PLATELET # BLD AUTO: 278 X10E3/UL (ref 150–450)
POTASSIUM SERPL-SCNC: 3.5 MMOL/L (ref 3.5–5.2)
PROT SERPL-MCNC: 6.6 G/DL (ref 6–8.5)
RBC # BLD AUTO: 4.8 X10E6/UL (ref 3.77–5.28)
SL AMB T4, FREE (DIRECT): 1.13 NG/DL (ref 0.82–1.77)
SL AMB VLDL CHOLESTEROL CALC: 24 MG/DL (ref 5–40)
SODIUM SERPL-SCNC: 143 MMOL/L (ref 134–144)
TRIGL SERPL-MCNC: 138 MG/DL (ref 0–149)
TSH SERPL DL<=0.005 MIU/L-ACNC: 4.52 UIU/ML (ref 0.45–4.5)
WBC # BLD AUTO: 3.7 X10E3/UL (ref 3.4–10.8)

## 2023-02-27 ENCOUNTER — OFFICE VISIT (OUTPATIENT)
Dept: INTERNAL MEDICINE CLINIC | Facility: CLINIC | Age: 68
End: 2023-02-27

## 2023-02-27 VITALS
TEMPERATURE: 98.2 F | WEIGHT: 162.6 LBS | RESPIRATION RATE: 16 BRPM | DIASTOLIC BLOOD PRESSURE: 72 MMHG | OXYGEN SATURATION: 98 % | BODY MASS INDEX: 24.08 KG/M2 | HEIGHT: 69 IN | HEART RATE: 78 BPM | SYSTOLIC BLOOD PRESSURE: 130 MMHG

## 2023-02-27 DIAGNOSIS — I10 BENIGN ESSENTIAL HYPERTENSION: ICD-10-CM

## 2023-02-27 DIAGNOSIS — R25.1 TREMOR: ICD-10-CM

## 2023-02-27 DIAGNOSIS — F51.01 PRIMARY INSOMNIA: ICD-10-CM

## 2023-02-27 DIAGNOSIS — J41.0 SIMPLE CHRONIC BRONCHITIS (HCC): ICD-10-CM

## 2023-02-27 DIAGNOSIS — E03.8 SUBCLINICAL HYPOTHYROIDISM: ICD-10-CM

## 2023-02-27 DIAGNOSIS — R73.01 ABNORMAL FASTING GLUCOSE: ICD-10-CM

## 2023-02-27 DIAGNOSIS — J45.40 MODERATE PERSISTENT ASTHMA WITHOUT COMPLICATION: ICD-10-CM

## 2023-02-27 DIAGNOSIS — E78.2 MIXED HYPERLIPIDEMIA: ICD-10-CM

## 2023-02-27 DIAGNOSIS — J45.909 UNCOMPLICATED ASTHMA, UNSPECIFIED ASTHMA SEVERITY, UNSPECIFIED WHETHER PERSISTENT: ICD-10-CM

## 2023-02-27 DIAGNOSIS — K21.9 GASTROESOPHAGEAL REFLUX DISEASE WITHOUT ESOPHAGITIS: ICD-10-CM

## 2023-02-27 DIAGNOSIS — Z00.00 MEDICARE ANNUAL WELLNESS VISIT, SUBSEQUENT: Primary | ICD-10-CM

## 2023-02-27 DIAGNOSIS — N64.89 BILATERAL PENDULOUS BREASTS: ICD-10-CM

## 2023-02-27 RX ORDER — BUDESONIDE AND FORMOTEROL FUMARATE DIHYDRATE 80; 4.5 UG/1; UG/1
2 AEROSOL RESPIRATORY (INHALATION) 2 TIMES DAILY
Qty: 10.2 G | Refills: 0 | Status: SHIPPED | OUTPATIENT
Start: 2023-02-27

## 2023-02-27 RX ORDER — PRIMIDONE 50 MG/1
50 TABLET ORAL
Qty: 30 TABLET | Refills: 2 | Status: SHIPPED | OUTPATIENT
Start: 2023-02-27

## 2023-02-27 RX ORDER — ALBUTEROL SULFATE 90 MCG
2 HFA AEROSOL WITH ADAPTER (GRAM) INHALATION EVERY 6 HOURS PRN
Qty: 6.7 G | Refills: 0 | Status: SHIPPED | OUTPATIENT
Start: 2023-02-27

## 2023-02-27 NOTE — PATIENT INSTRUCTIONS
Lab reviewed in detail and compared to prior    Hyperlipidemia-LDL slightly above goal, we discussed increasing rosuvastatin however patient would like to concentrate more on diet and exercise, will recheck in 6 months  Hypertension stable on chlorthalidone    Allergy and asthma stable on present regimen    Pendulous breast complicated upper back and neck pain, deep indentation in shoulders from bra, rash beneath and between breasts-referral to plastic    Right upper extremity tremor with no features of Parkinson's disease-risks, benefits, side effects and expectations of primidone discussed, start the hands daily and follow-up in 4 to 6 weeks  Health maintenance is up-to-date except for Shingrix available at local pharmacy  Medicare Preventive Visit Patient Instructions  Thank you for completing your Welcome to Medicare Visit or Medicare Annual Wellness Visit today  Your next wellness visit will be due in one year (2/28/2024)  The screening/preventive services that you may require over the next 5-10 years are detailed below  Some tests may not apply to you based off risk factors and/or age  Screening tests ordered at today's visit but not completed yet may show as past due  Also, please note that scanned in results may not display below  Preventive Screenings:  Service Recommendations Previous Testing/Comments   Colorectal Cancer Screening  * Colonoscopy    * Fecal Occult Blood Test (FOBT)/Fecal Immunochemical Test (FIT)  * Fecal DNA/Cologuard Test  * Flexible Sigmoidoscopy Age: 39-70 years old   Colonoscopy: every 10 years (may be performed more frequently if at higher risk)  OR  FOBT/FIT: every 1 year  OR  Cologuard: every 3 years  OR  Sigmoidoscopy: every 5 years  Screening may be recommended earlier than age 39 if at higher risk for colorectal cancer   Also, an individualized decision between you and your healthcare provider will decide whether screening between the ages of 74-80 would be appropriate  Colonoscopy: 04/16/2021  FOBT/FIT: Not on file  Cologuard: Not on file  Sigmoidoscopy: Not on file    Screening Current     Breast Cancer Screening Age: 36 years old  Frequency: every 1-2 years  Not required if history of left and right mastectomy Mammogram: 02/17/2023    Screening Current   Cervical Cancer Screening Between the ages of 21-29, pap smear recommended once every 3 years  Between the ages of 33-67, can perform pap smear with HPV co-testing every 5 years  Recommendations may differ for women with a history of total hysterectomy, cervical cancer, or abnormal pap smears in past  Pap Smear: 04/03/2017    Screening Not Indicated   Hepatitis C Screening Once for adults born between 1945 and 1965  More frequently in patients at high risk for Hepatitis C Hep C Antibody: 01/20/2021    Screening Current   Diabetes Screening 1-2 times per year if you're at risk for diabetes or have pre-diabetes Fasting glucose: 89 mg/dL (1/11/2020)  A1C: 5 4 % (2/17/2023)  Screening Current   Cholesterol Screening Once every 5 years if you don't have a lipid disorder  May order more often based on risk factors  Lipid panel: 02/17/2023    Screening Not Indicated  History Lipid Disorder     Other Preventive Screenings Covered by Medicare:  Abdominal Aortic Aneurysm (AAA) Screening: covered once if your at risk  You're considered to be at risk if you have a family history of AAA  Lung Cancer Screening: covers low dose CT scan once per year if you meet all of the following conditions: (1) Age 50-69; (2) No signs or symptoms of lung cancer; (3) Current smoker or have quit smoking within the last 15 years; (4) You have a tobacco smoking history of at least 20 pack years (packs per day multiplied by number of years you smoked); (5) You get a written order from a healthcare provider    Glaucoma Screening: covered annually if you're considered high risk: (1) You have diabetes OR (2) Family history of glaucoma OR (3)  aged 48 and older OR (3)  American aged 72 and older  Osteoporosis Screening: covered every 2 years if you meet one of the following conditions: (1) You're estrogen deficient and at risk for osteoporosis based off medical history and other findings; (2) Have a vertebral abnormality; (3) On glucocorticoid therapy for more than 3 months; (4) Have primary hyperparathyroidism; (5) On osteoporosis medications and need to assess response to drug therapy  Last bone density test (DXA Scan): 07/24/2019  HIV Screening: covered annually if you're between the age of 12-76  Also covered annually if you are younger than 13 and older than 72 with risk factors for HIV infection  For pregnant patients, it is covered up to 3 times per pregnancy  Immunizations:  Immunization Recommendations   Influenza Vaccine Annual influenza vaccination during flu season is recommended for all persons aged >= 6 months who do not have contraindications   Pneumococcal Vaccine   * Pneumococcal conjugate vaccine = PCV13 (Prevnar 13), PCV15 (Vaxneuvance), PCV20 (Prevnar 20)  * Pneumococcal polysaccharide vaccine = PPSV23 (Pneumovax) Adults 25-60 years old: 1-3 doses may be recommended based on certain risk factors  Adults 72 years old: 1-2 doses may be recommended based off what pneumonia vaccine you previously received   Hepatitis B Vaccine 3 dose series if at intermediate or high risk (ex: diabetes, end stage renal disease, liver disease)   Tetanus (Td) Vaccine - COST NOT COVERED BY MEDICARE PART B Following completion of primary series, a booster dose should be given every 10 years to maintain immunity against tetanus  Td may also be given as tetanus wound prophylaxis  Tdap Vaccine - COST NOT COVERED BY MEDICARE PART B Recommended at least once for all adults  For pregnant patients, recommended with each pregnancy     Shingles Vaccine (Shingrix) - COST NOT COVERED BY MEDICARE PART B  2 shot series recommended in those aged 48 and above     Health Maintenance Due:      Topic Date Due    Breast Cancer Screening: Mammogram  02/17/2024    Colorectal Cancer Screening  04/16/2026    Hepatitis C Screening  Completed     Immunizations Due:      Topic Date Due    Influenza Vaccine (1) 09/01/2022    COVID-19 Vaccine (4 - Booster for Jason Prom series) 09/28/2022     Advance Directives   What are advance directives? Advance directives are legal documents that state your wishes and plans for medical care  These plans are made ahead of time in case you lose your ability to make decisions for yourself  Advance directives can apply to any medical decision, such as the treatments you want, and if you want to donate organs  What are the types of advance directives? There are many types of advance directives, and each state has rules about how to use them  You may choose a combination of any of the following:  Living will: This is a written record of the treatment you want  You can also choose which treatments you do not want, which to limit, and which to stop at a certain time  This includes surgery, medicine, IV fluid, and tube feedings  Durable power of  for healthcare [facility]): This is a written record that states who you want to make healthcare choices for you when you are unable to make them for yourself  This person, called a proxy, is usually a family member or a friend  You may choose more than 1 proxy  Do not resuscitate (DNR) order:  A DNR order is used in case your heart stops beating or you stop breathing  It is a request not to have certain forms of treatment, such as CPR  A DNR order may be included in other types of advance directives  Medical directive: This covers the care that you want if you are in a coma, near death, or unable to make decisions for yourself  You can list the treatments you want for each condition   Treatment may include pain medicine, surgery, blood transfusions, dialysis, IV or tube feedings, and a ventilator (breathing machine)  Values history: This document has questions about your views, beliefs, and how you feel and think about life  This information can help others choose the care that you would choose  Why are advance directives important? An advance directive helps you control your care  Although spoken wishes may be used, it is better to have your wishes written down  Spoken wishes can be misunderstood, or not followed  Treatments may be given even if you do not want them  An advance directive may make it easier for your family to make difficult choices about your care  Urinary Incontinence   Urinary incontinence (UI)  is when you lose control of your bladder  UI develops because your bladder cannot store or empty urine properly  The 3 most common types of UI are stress incontinence, urge incontinence, or both  Medicines:   May be given to help strengthen your bladder control  Report any side effects of medication to your healthcare provider  Do pelvic muscle exercises often:  Your pelvic muscles help you stop urinating  Squeeze these muscles tight for 5 seconds, then relax for 5 seconds  Gradually work up to squeezing for 10 seconds  Do 3 sets of 15 repetitions a day, or as directed  This will help strengthen your pelvic muscles and improve bladder control  Train your bladder:  Go to the bathroom at set times, such as every 2 hours, even if you do not feel the urge to go  You can also try to hold your urine when you feel the urge to go  For example, hold your urine for 5 minutes when you feel the urge to go  As that becomes easier, hold your urine for 10 minutes  Self-care:   Keep a UI record  Write down how often you leak urine and how much you leak  Make a note of what you were doing when you leaked urine  Drink liquids as directed  You may need to limit the amount of liquid you drink to help control your urine leakage  Do not drink any liquid right before you go to bed   Limit or do not have drinks that contain caffeine or alcohol  Prevent constipation  Eat a variety of high-fiber foods  Good examples are high-fiber cereals, beans, vegetables, and whole-grain breads  Walking is the best way to trigger your intestines to have a bowel movement  Exercise regularly and maintain a healthy weight  Weight loss and exercise will decrease pressure on your bladder and help you control your leakage  Use a catheter as directed  to help empty your bladder  A catheter is a tiny, plastic tube that is put into your bladder to drain your urine  Go to behavior therapy as directed  Behavior therapy may be used to help you learn to control your urge to urinate  © Copyright [employer] 2018 Information is for End User's use only and may not be sold, redistributed or otherwise used for commercial purposes   All illustrations and images included in CareNotes® are the copyrighted property of [employer]  or [address] [place] [address]

## 2023-02-27 NOTE — PROGRESS NOTES
Assessment and Plan:     Problem List Items Addressed This Visit        Digestive    GERD (gastroesophageal reflux disease)       Respiratory    Moderate persistent asthma without complication    Relevant Medications    Proventil  (90 Base) MCG/ACT inhaler    budesonide-formoterol (SYMBICORT) 80-4 5 MCG/ACT inhaler    Simple chronic bronchitis (HCC)    Relevant Medications    Proventil  (90 Base) MCG/ACT inhaler    budesonide-formoterol (SYMBICORT) 80-4 5 MCG/ACT inhaler       Cardiovascular and Mediastinum    Benign essential hypertension       Other    Abnormal fasting glucose    Relevant Orders    Hemoglobin A1C    Hyperlipidemia    Relevant Orders    CBC and differential    Comprehensive metabolic panel    Lipid panel    Primary insomnia    Bilateral pendulous breasts    Relevant Orders    Ambulatory Referral to Plastic Surgery    Tremor    Relevant Medications    primidone (MYSOLINE) 50 mg tablet   Other Visit Diagnoses     Medicare annual wellness visit, subsequent    -  Primary    Uncomplicated asthma, unspecified asthma severity, unspecified whether persistent        Relevant Medications    Proventil  (90 Base) MCG/ACT inhaler    budesonide-formoterol (SYMBICORT) 80-4 5 MCG/ACT inhaler    Subclinical hypothyroidism        Relevant Orders    TSH, 3rd generation with Free T4 reflex           Preventive health issues were discussed with patient, and age appropriate screening tests were ordered as noted in patient's After Visit Summary  Personalized health advice and appropriate referrals for health education or preventive services given if needed, as noted in patient's After Visit Summary  History of Present Illness:     Patient presents for a Medicare Wellness Visit    F/u mmp, awav and review labs  Feeling generally well  Right had tremor is getting progressively worse  No issues writing or eating  Rash under breasts has cleared, but now b/t breasts  Still w/ neck pain    Not sure she wants to consider surgery  Asthma has been stable, taking singulair daily, symbicort q am, no proventil lately  Allergies have been stable  HSV-controlled w/ Valtrex as needed  Last outbreak 2 wks ago  Notes some urge incontinence if not careful, still not doing kegels  Hasn't needed pads  Insomnia-using sonata rarely, every few mos  HPL-tolerating rosuvastatin, no significant improvement in tremor off rx x 3 wks        Patient Care Team:  Kyung Hayden MD as PCP - MD Debi Amos MD     Review of Systems:     Review of Systems     Problem List:     Patient Active Problem List   Diagnosis   • Moderate persistent asthma without complication   • Abnormal fasting glucose   • Benign essential hypertension   • Depression with anxiety   • Iron deficiency anemia   • Hyperlipidemia   • Seborrheic dermatitis of scalp   • Urinary urgency   • Primary insomnia   • GERD (gastroesophageal reflux disease)   • Bilateral pendulous breasts   • Simple chronic bronchitis (HCC)   • Tremor      Past Medical and Surgical History:     Past Medical History:   Diagnosis Date   • Allergic rhinitis     Last Assessed: 2016   • Black tarry stools    • Concussion with prolonged loss of consciousness without return to pre-existing conscious level    • COPD (chronic obstructive pulmonary disease) (HCC)     Last Assessed: 3/8/2017   • Fall     slipping, tripping or stumbling   • Generalized osteoarthritis    • GERD (gastroesophageal reflux disease)    • Herpes simplex infection    • Hypertension    • Menopausal disorder    • Morbid obesity due to excess calories [facility])    • Neuroma 1975    right foot   • Pharyngeal disorder    • Pneumonia     Last Assessed: 5/15/2014   • Post-menopausal bleeding     Last Assessed: 2015   • Postmenopausal disorder      Past Surgical History:   Procedure Laterality Date   •  SECTION     • COLONOSCOPY      Complete   • DILATION AND CURETTAGE OF UTERUS 3 times   • DILATION AND EVACUATION      Surgical treatment of missed  in first trimester - x 5   • DILATION AND EVACUATION  ,     Surgically Induced    • ENDOMETRIAL BIOPSY  2015    PMB/EB   • KNEE ARTHROSCOPY Right    • KNEE SURGERY Right 1994 for right fractured patella   • [place] OPEN TX RADIAL HEAD/NECK FRACTURE Left 7/10/2018    Procedure: OPEN REDUCTION INTERNAL FIXATION LEFT DISTAL HUMERUS CAPITELLAR AND TROCHLEAR SHEAR FRACTURE WITH LATERAL EPICONDYLE FRACTURE; REPAIR LATERAL ULNAR COLLATERAL LIGAMENT COMPLEX OF THE ELBOW; SPLINT APPLICATION;  Surgeon: Fito Patel MD;  Location: [facility];  Service: Orthopedics      Family History:     Family History   Problem Relation Age of Onset   • Diabetes Mother    • Migraines Mother    • Hypertension Mother    • Ovarian cancer Mother [de-identified]   • Pancreatic cancer Mother    • Other Mother         Skin disorder, Urinary Incontinence   • Coronary artery disease Father    • Heart disease Father    • Hypertension Father    • Hypertension Sister    • No Known Problems Daughter    • No Known Problems Paternal Grandmother    • Heart disease Brother    • Jaundice Brother    • Rectal cancer Brother    • Endometrial cancer Brother 62   • No Known Problems Maternal Aunt    • No Known Problems Maternal Aunt    • Osteoporosis Maternal Aunt    • Breast cancer Paternal Aunt 72      Social History:     Social History     Socioeconomic History   • Marital status:      Spouse name: None   • Number of children: 4   • Years of education: None   • Highest education level: None   Occupational History   • None   Tobacco Use   • Smoking status: Former     Packs/day: 1 00     Years: 15 00     Pack years: 15 00     Types: Cigarettes     Start date: 57     Quit date: 6/15/1984     Years since quittin 7   • Smokeless tobacco: Never   Vaping Use   • Vaping Use: Never used   Substance and Sexual Activity   • Alcohol use:  Yes   • Drug use: No   • Sexual activity: Not Currently   Other Topics Concern   • None   Social History Narrative    Drinks coffee    Exercise habits     Social Determinants of Health     Financial Resource Strain: Unknown   • Difficulty of Paying Living Expenses: Patient refused   Food Insecurity: Not on file   Transportation Needs: No Transportation Needs   • Lack of Transportation (Medical): No   • Lack of Transportation (Non-Medical): No   Physical Activity: Unknown   • Days of Exercise per Week: 0 days   • Minutes of Exercise per Session: Not on file   Stress: Not on file   Social Connections: Not on file   Intimate Partner Violence: Not on file   Housing Stability: Not on file      Medications and Allergies:     Current Outpatient Medications   Medication Sig Dispense Refill   • budesonide-formoterol (SYMBICORT) 80-4 5 MCG/ACT inhaler Inhale 2 puffs 2 (two) times a day Rinse mouth after use   10 2 g 0   • chlorthalidone 25 mg tablet TAKE 1 TABLET BY MOUTH EVERY DAY 90 tablet 1   • loratadine (CLARITIN) 10 mg tablet Take 10 mg by mouth daily     • montelukast (SINGULAIR) 10 mg tablet TAKE 1 TABLET BY MOUTH EVERY DAY 90 tablet 3   • primidone (MYSOLINE) 50 mg tablet Take 1 tablet (50 mg total) by mouth daily at bedtime 30 tablet 2   • Proventil  (90 Base) MCG/ACT inhaler Inhale 2 puffs every 6 (six) hours as needed for wheezing 6 7 g 0   • rosuvastatin (CRESTOR) 5 mg tablet TAKE 1 TABLET BY MOUTH EVERY DAY 90 tablet 1   • triamcinolone (KENALOG) 0 5 % cream Apply topically 3 (three) times a day 30 g 0   • nystatin (MYCOSTATIN) cream Apply topically 2 (two) times a day (Patient not taking: Reported on 2/27/2023) 30 g 0   • nystatin (MYCOSTATIN) powder Apply topically 2 (two) times a day (Patient not taking: Reported on 2/27/2023) 60 g 4   • valACYclovir (VALTREX) 1,000 mg tablet Take 2 tablets (2,000 mg total) by mouth 2 (two) times a day for 1 day 4 tablet 5   • zaleplon (SONATA) 10 MG capsule take 1 capsule by mouth at bedtime if needed for sleep 30 capsule 0     No current facility-administered medications for this visit  Allergies   Allergen Reactions   • Pravastatin Myalgia   • Simvastatin Myalgia      Immunizations:     Immunization History   Administered Date(s) Administered   • COVID-19 MODERNA VACC 0 5 ML IM 01/12/2021, 02/11/2021, 08/03/2022   • H1N1 Inj 01/15/2010   • H1N1, All Formulations 01/15/2010   • INFLUENZA 09/26/2017, 10/22/2018, 12/15/2019, 09/22/2020   • IPV 12/15/2009   • Influenza Quadrivalent 3 years and older 10/22/2018   • Influenza Quadrivalent Preservative Free 3 years and older IM 09/22/2020, 09/28/2021   • Influenza Quadrivalent, 6-35 Months IM 10/01/2016   • Influenza Recombinant Preservative Free Im 09/26/2017   • Influenza Split 11/03/2010   • Influenza Split Preservative Free ID 10/05/2015   • Influenza Whole 10/05/2009, 11/15/2011, 10/01/2012, 10/21/2013   • Influenza, seasonal, injectable, preservative free 10/07/2014, 10/04/2016, 12/04/2019   • Pneumococcal Conjugate Vaccine 20-valent (Pcv20), Polysace 08/23/2022   • Pneumococcal Polysaccharide PPV23 06/04/2019   • Tdap 10/21/2013   • Tuberculin Skin Test-PPD Intradermal 12/15/2009, 12/21/2010, 11/15/2011, 11/05/2012, 10/21/2013, 10/05/2015      Health Maintenance:         Topic Date Due   • Breast Cancer Screening: Mammogram  02/17/2024   • Colorectal Cancer Screening  04/16/2026   • Hepatitis C Screening  Completed         Topic Date Due   • Influenza Vaccine (1) 09/01/2022   • COVID-19 Vaccine (4 - Booster for Andrew Constable series) 09/28/2022      Medicare Screening Tests and Risk Assessments:     Elías Last is here for her Subsequent Wellness visit  Health Risk Assessment:   Patient rates overall health as good  Patient feels that their physical health rating is same  Patient is satisfied with their life  Eyesight was rated as slightly worse  Hearing was rated as same  Patient feels that their emotional and mental health rating is same  Patients states they are sometimes angry  Patient states they are often unusually tired/fatigued  Pain experienced in the last 7 days has been some  Patient's pain rating has been 3/10  Patient states that she has experienced no weight loss or gain in last 6 months  Fall Risk Screening: In the past year, patient has experienced: no history of falling in past year      Urinary Incontinence Screening:   Patient has leaked urine accidently in the last six months  Home Safety:  Patient does not have trouble with stairs inside or outside of their home  Patient has working smoke alarms and has no working carbon monoxide detector  Home safety hazards include: household clutter  Nutrition:   Current diet is Regular  Medications:   Patient is not currently taking any over-the-counter supplements  Patient is able to manage medications  Activities of Daily Living (ADLs)/Instrumental Activities of Daily Living (IADLs):   Walk and transfer into and out of bed and chair?: Yes  Dress and groom yourself?: Yes    Bathe or shower yourself?: Yes    Feed yourself?  Yes  Do your laundry/housekeeping?: Yes  Manage your money, pay your bills and track your expenses?: Yes  Make your own meals?: Yes    Do your own shopping?: Yes    Previous Hospitalizations:   Any hospitalizations or ED visits within the last 12 months?: No      Advance Care Planning:   Living will: No    Durable POA for healthcare: No    Advanced directive: No    Advanced directive counseling given: Yes    Five wishes given: Yes      Cognitive Screening:   Provider or family/friend/caregiver concerned regarding cognition?: No    PREVENTIVE SCREENINGS      Cardiovascular Screening:    General: Screening Not Indicated and History Lipid Disorder      Diabetes Screening:     General: Screening Current      Colorectal Cancer Screening:     General: Screening Current      Breast Cancer Screening:     General: Screening Current      Cervical Cancer Screening:    General: Screening Not Indicated      Osteoporosis Screening:    General: Risks and Benefits Discussed      Abdominal Aortic Aneurysm (AAA) Screening:        General: Screening Not Indicated      Lung Cancer Screening:     General: Screening Not Indicated      Hepatitis C Screening:    General: Screening Current    Screening, Brief Intervention, and Referral to Treatment (SBIRT)    Screening  Typical number of drinks in a day: 0  Typical number of drinks in a week: 0  Interpretation: Low risk drinking behavior  AUDIT-C Screenin) How often did you have a drink containing alcohol in the past year? monthly or less  2) How many drinks did you have on a typical day when you were drinking in the past year? 1 to 2  3) How often did you have 6 or more drinks on one occasion in the past year? less than monthly    AUDIT-C Score: 2  Interpretation: Score 0-2 (female): Negative screen for alcohol misuse    Single Item Drug Screening:  How often have you used an illegal drug (including marijuana) or a prescription medication for non-medical reasons in the past year? never    Single Item Drug Screen Score: 0  Interpretation: Negative screen for possible drug use disorder    No results found  Physical Exam:     /72 (BP Location: Left arm, Patient Position: Sitting, Cuff Size: Standard)   Pulse 78   Temp 98 2 °F (36 8 °C) (Tympanic)   Resp 16   Ht 5' 9" (1 753 m)   Wt 73 8 kg (162 lb 9 6 oz)   SpO2 98%   BMI 24 01 kg/m²     Physical Exam  Vitals and nursing note reviewed  Constitutional:       General: She is not in acute distress  Appearance: She is well-developed  HENT:      Head: Normocephalic and atraumatic  Eyes:      Conjunctiva/sclera: Conjunctivae normal    Cardiovascular:      Rate and Rhythm: Normal rate and regular rhythm  Heart sounds: No murmur heard  Pulmonary:      Effort: Pulmonary effort is normal  No respiratory distress        Breath sounds: Normal breath sounds  Abdominal:      Palpations: Abdomen is soft  Tenderness: There is no abdominal tenderness  Musculoskeletal:         General: No swelling  Cervical back: Neck supple  Skin:     General: Skin is warm and dry  Capillary Refill: Capillary refill takes less than 2 seconds  Neurological:      Mental Status: She is alert  Comments: Right upper extremity rest tremor, cerebellar intact finger-to-nose and rapid alternating movements  No cogwheeling  Normal tandem gait with brisk get up and go and turn  Normal arm swing     Psychiatric:         Mood and Affect: Mood normal           Hue Anderson MD

## 2023-03-15 DIAGNOSIS — R25.1 TREMOR: Primary | ICD-10-CM

## 2023-03-24 DIAGNOSIS — J45.909 UNCOMPLICATED ASTHMA, UNSPECIFIED ASTHMA SEVERITY, UNSPECIFIED WHETHER PERSISTENT: ICD-10-CM

## 2023-03-24 DIAGNOSIS — R25.1 TREMOR: ICD-10-CM

## 2023-03-24 RX ORDER — ALBUTEROL SULFATE 90 UG/1
AEROSOL, METERED RESPIRATORY (INHALATION)
Qty: 18 G | Refills: 6 | Status: SHIPPED | OUTPATIENT
Start: 2023-03-24

## 2023-03-24 RX ORDER — PRIMIDONE 50 MG/1
TABLET ORAL
Qty: 90 TABLET | Refills: 1 | Status: SHIPPED | OUTPATIENT
Start: 2023-03-24

## 2023-03-24 NOTE — TELEPHONE ENCOUNTER
[facility]  Please forward to your office staff for further review as this medication was reviewed by a [facility] RN

## 2023-05-30 DIAGNOSIS — T78.40XS ALLERGIC DISORDER, SEQUELA: ICD-10-CM

## 2023-05-31 RX ORDER — MONTELUKAST SODIUM 10 MG/1
10 TABLET ORAL DAILY
Qty: 90 TABLET | Refills: 3 | Status: SHIPPED | OUTPATIENT
Start: 2023-05-31

## 2023-06-12 DIAGNOSIS — J45.40 MODERATE PERSISTENT ASTHMA WITHOUT COMPLICATION: ICD-10-CM

## 2023-06-12 RX ORDER — BUDESONIDE AND FORMOTEROL FUMARATE DIHYDRATE 80; 4.5 UG/1; UG/1
AEROSOL RESPIRATORY (INHALATION)
Qty: 10.2 G | Refills: 0 | Status: SHIPPED | OUTPATIENT
Start: 2023-06-12

## 2023-07-25 LAB
ALBUMIN SERPL-MCNC: 4.3 G/DL (ref 3.9–4.9)
ALBUMIN/GLOB SERPL: 2.3 {RATIO} (ref 1.2–2.2)
ALP SERPL-CCNC: 65 IU/L (ref 44–121)
ALT SERPL-CCNC: 21 IU/L (ref 0–32)
AST SERPL-CCNC: 24 IU/L (ref 0–40)
BASOPHILS # BLD AUTO: 0.1 X10E3/UL (ref 0–0.2)
BASOPHILS NFR BLD AUTO: 2 %
BILIRUB SERPL-MCNC: 0.5 MG/DL (ref 0–1.2)
BUN SERPL-MCNC: 16 MG/DL (ref 8–27)
BUN/CREAT SERPL: 24 (ref 12–28)
CALCIUM SERPL-MCNC: 9.1 MG/DL (ref 8.7–10.3)
CHLORIDE SERPL-SCNC: 103 MMOL/L (ref 96–106)
CHOLEST SERPL-MCNC: 190 MG/DL (ref 100–199)
CO2 SERPL-SCNC: 24 MMOL/L (ref 20–29)
CREAT SERPL-MCNC: 0.68 MG/DL (ref 0.57–1)
EGFR: 96 ML/MIN/1.73
EOSINOPHIL # BLD AUTO: 0.1 X10E3/UL (ref 0–0.4)
EOSINOPHIL NFR BLD AUTO: 2 %
ERYTHROCYTE [DISTWIDTH] IN BLOOD BY AUTOMATED COUNT: 12.5 % (ref 11.7–15.4)
GLOBULIN SER-MCNC: 1.9 G/DL (ref 1.5–4.5)
GLUCOSE SERPL-MCNC: 92 MG/DL (ref 70–99)
HBA1C MFR BLD: 5.3 % (ref 4.8–5.6)
HCT VFR BLD AUTO: 41.8 % (ref 34–46.6)
HDLC SERPL-MCNC: 57 MG/DL
HGB BLD-MCNC: 13.7 G/DL (ref 11.1–15.9)
IMM GRANULOCYTES # BLD: 0 X10E3/UL (ref 0–0.1)
IMM GRANULOCYTES NFR BLD: 0 %
LDLC SERPL CALC-MCNC: 113 MG/DL (ref 0–99)
LYMPHOCYTES # BLD AUTO: 1.6 X10E3/UL (ref 0.7–3.1)
LYMPHOCYTES NFR BLD AUTO: 39 %
MCH RBC QN AUTO: 29.7 PG (ref 26.6–33)
MCHC RBC AUTO-ENTMCNC: 32.8 G/DL (ref 31.5–35.7)
MCV RBC AUTO: 91 FL (ref 79–97)
MONOCYTES # BLD AUTO: 0.4 X10E3/UL (ref 0.1–0.9)
MONOCYTES NFR BLD AUTO: 10 %
NEUTROPHILS # BLD AUTO: 1.9 X10E3/UL (ref 1.4–7)
NEUTROPHILS NFR BLD AUTO: 47 %
PLATELET # BLD AUTO: 251 X10E3/UL (ref 150–450)
POTASSIUM SERPL-SCNC: 3.4 MMOL/L (ref 3.5–5.2)
PROT SERPL-MCNC: 6.2 G/DL (ref 6–8.5)
RBC # BLD AUTO: 4.62 X10E6/UL (ref 3.77–5.28)
SL AMB VLDL CHOLESTEROL CALC: 20 MG/DL (ref 5–40)
SODIUM SERPL-SCNC: 143 MMOL/L (ref 134–144)
TRIGL SERPL-MCNC: 114 MG/DL (ref 0–149)
TSH SERPL DL<=0.005 MIU/L-ACNC: 2.61 UIU/ML (ref 0.45–4.5)
WBC # BLD AUTO: 4.1 X10E3/UL (ref 3.4–10.8)

## 2023-08-02 ENCOUNTER — OFFICE VISIT (OUTPATIENT)
Age: 68
End: 2023-08-02
Payer: COMMERCIAL

## 2023-08-02 VITALS
SYSTOLIC BLOOD PRESSURE: 130 MMHG | HEIGHT: 69 IN | WEIGHT: 158 LBS | OXYGEN SATURATION: 97 % | RESPIRATION RATE: 16 BRPM | HEART RATE: 70 BPM | BODY MASS INDEX: 23.4 KG/M2 | DIASTOLIC BLOOD PRESSURE: 78 MMHG

## 2023-08-02 DIAGNOSIS — Z12.83 SKIN CANCER SCREENING: ICD-10-CM

## 2023-08-02 DIAGNOSIS — J45.40 MODERATE PERSISTENT ASTHMA WITHOUT COMPLICATION: ICD-10-CM

## 2023-08-02 DIAGNOSIS — N64.89 BILATERAL PENDULOUS BREASTS: ICD-10-CM

## 2023-08-02 DIAGNOSIS — F41.8 DEPRESSION WITH ANXIETY: ICD-10-CM

## 2023-08-02 DIAGNOSIS — R73.01 ABNORMAL FASTING GLUCOSE: ICD-10-CM

## 2023-08-02 DIAGNOSIS — E78.2 MIXED HYPERLIPIDEMIA: ICD-10-CM

## 2023-08-02 DIAGNOSIS — B00.9 HERPES: ICD-10-CM

## 2023-08-02 DIAGNOSIS — I10 BENIGN ESSENTIAL HYPERTENSION: ICD-10-CM

## 2023-08-02 DIAGNOSIS — R39.15 URINARY URGENCY: ICD-10-CM

## 2023-08-02 DIAGNOSIS — E53.8 B12 DEFICIENCY: ICD-10-CM

## 2023-08-02 DIAGNOSIS — R25.1 TREMOR: Primary | ICD-10-CM

## 2023-08-02 PROBLEM — J41.0 SIMPLE CHRONIC BRONCHITIS (HCC): Status: RESOLVED | Noted: 2022-01-26 | Resolved: 2023-08-02

## 2023-08-02 PROCEDURE — 99215 OFFICE O/P EST HI 40 MIN: CPT | Performed by: INTERNAL MEDICINE

## 2023-08-02 RX ORDER — BUDESONIDE AND FORMOTEROL FUMARATE DIHYDRATE 80; 4.5 UG/1; UG/1
2 AEROSOL RESPIRATORY (INHALATION) 2 TIMES DAILY
Qty: 10.2 G | Refills: 5 | Status: SHIPPED | OUTPATIENT
Start: 2023-08-02

## 2023-08-02 RX ORDER — LORAZEPAM 0.5 MG/1
0.5 TABLET ORAL EVERY 6 HOURS PRN
Qty: 50 TABLET | Refills: 3 | Status: SHIPPED | OUTPATIENT
Start: 2023-08-02

## 2023-08-02 RX ORDER — VALACYCLOVIR HYDROCHLORIDE 1 G/1
2000 TABLET, FILM COATED ORAL 2 TIMES DAILY
Qty: 4 TABLET | Refills: 5 | Status: SHIPPED | OUTPATIENT
Start: 2023-08-02 | End: 2023-08-03

## 2023-08-02 NOTE — PATIENT INSTRUCTIONS
Lab data reviewed in detail and compared to prior    Hypertension stable on chlorthalidone however potassium is low, low sodium and high potassium diet stressed. Hyperlipidemia-LDL improved on rosuvastatin. Continue with same    HSV stable with as needed Valtrex    Asthma has been stable on present regimen. Refill Symbicort. Urge incontinence of urine-we discussed medication however patient would prefer urogynecology evaluation to consider other options rather than medication. Right upper extremity tremor-no other features of Parkinson's disease. Primidone helped the tremor however side effects were intolerable. We will try low-dose lorazepam 0.25 to 0.5 mg 3 times daily as needed. Refer to neurology.

## 2023-08-02 NOTE — PROGRESS NOTES
Assessment/Plan:    Diagnoses and all orders for this visit:    Tremor  -     Ambulatory Referral to Neurology; Future  -     Vitamin B12; Future  -     Folate; Future    Herpes  -     valACYclovir (VALTREX) 1,000 mg tablet; Take 2 tablets (2,000 mg total) by mouth 2 (two) times a day for 1 day    Moderate persistent asthma without complication  -     budesonide-formoterol (SYMBICORT) 80-4.5 MCG/ACT inhaler; Inhale 2 puffs 2 (two) times a day Rinse mouth after use. Benign essential hypertension    Urinary urgency  -     Ambulatory Referral to Urogynecology; Future    Mixed hyperlipidemia  -     CBC and differential; Future  -     Comprehensive metabolic panel; Future  -     Lipid panel; Future    Depression with anxiety  -     LORazepam (ATIVAN) 0.5 mg tablet; Take 1 tablet (0.5 mg total) by mouth every 6 (six) hours as needed for anxiety    Bilateral pendulous breasts    Abnormal fasting glucose  -     Hemoglobin A1C; Future    B12 deficiency  -     Vitamin B12; Future  -     Folate; Future    Skin cancer screening  -     Ambulatory Referral to Dermatology; Future              Patient Instructions   Lab data reviewed in detail and compared to prior    Hypertension stable on chlorthalidone however potassium is low, low sodium and high potassium diet stressed. Hyperlipidemia-LDL improved on rosuvastatin. Continue with same    HSV stable with as needed Valtrex    Asthma has been stable on present regimen. Refill Symbicort. Urge incontinence of urine-we discussed medication however patient would prefer urogynecology evaluation to consider other options rather than medication. Right upper extremity tremor-no other features of Parkinson's disease. Primidone helped the tremor however side effects were intolerable. We will try low-dose lorazepam 0.25 to 0.5 mg 3 times daily as needed. Refer to neurology.       Subjective:      Patient ID: Joaquin Nj is a 78 y.o. female    F/u mmp, awav and review labs  Feeling blah  Stressed caring for 3 y/o grandson  Right had tremor is getting progressively worse. Tremor improved dramatically after 1 primodone, but felt awful the next day w/ fatigue and dizziness that lasted several days. Hasn't tried another. Now starting to have issues writing and eating. Now w/ R shoulder pain w/o injury. Rash under breasts has cleared. Still w/ neck pain. Not sure she wants to consider surgery. Asthma has been stable, taking singulair daily, symbicort q am, no proventil lately  Allergies have been stable. HSV-controlled w/ Valtrex as needed. Last outbreak just today. Urge incontinence is more of an issue, no help w/ kegels. Now limiting lifestyle. Hasn't needed pads, but bought some. Doesn't want meds. Insomnia-using sonata rarely, every few mos.    HPL-tolerating rosuvastatin         Current Outpatient Medications:   •  albuterol (PROVENTIL HFA,VENTOLIN HFA) 90 mcg/act inhaler, INHALE 2 PUFFS EVERY 6 HOURS AS NEEDED FOR WHEEZING, Disp: 18 g, Rfl: 6  •  budesonide-formoterol (SYMBICORT) 80-4.5 MCG/ACT inhaler, Inhale 2 puffs 2 (two) times a day Rinse mouth after use., Disp: 10.2 g, Rfl: 5  •  chlorthalidone 25 mg tablet, TAKE 1 TABLET BY MOUTH EVERY DAY, Disp: 90 tablet, Rfl: 1  •  loratadine (CLARITIN) 10 mg tablet, Take 10 mg by mouth daily, Disp: , Rfl:   •  LORazepam (ATIVAN) 0.5 mg tablet, Take 1 tablet (0.5 mg total) by mouth every 6 (six) hours as needed for anxiety, Disp: 50 tablet, Rfl: 3  •  montelukast (SINGULAIR) 10 mg tablet, Take 1 tablet (10 mg total) by mouth daily, Disp: 90 tablet, Rfl: 3  •  rosuvastatin (CRESTOR) 5 mg tablet, TAKE 1 TABLET BY MOUTH EVERY DAY, Disp: 90 tablet, Rfl: 1  •  triamcinolone (KENALOG) 0.5 % cream, Apply topically 3 (three) times a day, Disp: 30 g, Rfl: 0  •  valACYclovir (VALTREX) 1,000 mg tablet, Take 2 tablets (2,000 mg total) by mouth 2 (two) times a day for 1 day, Disp: 4 tablet, Rfl: 5  •  nystatin (MYCOSTATIN) cream, Apply topically 2 (two) times a day (Patient not taking: Reported on 2/27/2023), Disp: 30 g, Rfl: 0  •  nystatin (MYCOSTATIN) powder, Apply topically 2 (two) times a day (Patient not taking: Reported on 2/27/2023), Disp: 60 g, Rfl: 4  •  zaleplon (SONATA) 10 MG capsule, take 1 capsule by mouth at bedtime if needed for sleep, Disp: 30 capsule, Rfl: 0    Recent Results (from the past 1008 hour(s))   CBC and differential    Collection Time: 07/24/23  8:58 AM   Result Value Ref Range    White Blood Cell Count 4.1 3.4 - 10.8 x10E3/uL    Red Blood Cell Count 4.62 3.77 - 5.28 x10E6/uL    Hemoglobin 13.7 11.1 - 15.9 g/dL    HCT 41.8 34.0 - 46.6 %    MCV 91 79 - 97 fL    MCH 29.7 26.6 - 33.0 pg    MCHC 32.8 31.5 - 35.7 g/dL    RDW 12.5 11.7 - 15.4 %    Platelet Count 552 275 - 450 x10E3/uL    Neutrophils 47 Not Estab. %    Lymphocytes 39 Not Estab. %    Monocytes 10 Not Estab. %    Eosinophils 2 Not Estab. %    Basophils PCT 2 Not Estab. %    Neutrophils (Absolute) 1.9 1.4 - 7.0 x10E3/uL    Lymphocytes (Absolute) 1.6 0.7 - 3.1 x10E3/uL    Monocytes (Absolute) 0.4 0.1 - 0.9 x10E3/uL    Eosinophils (Absolute) 0.1 0.0 - 0.4 x10E3/uL    Basophils ABS 0.1 0.0 - 0.2 x10E3/uL    Immature Granulocytes 0 Not Estab. %    Immature Granulocytes (Absolute) 0.0 0.0 - 0.1 x10E3/uL   Comprehensive metabolic panel    Collection Time: 07/24/23  8:58 AM   Result Value Ref Range    Glucose, Random 92 70 - 99 mg/dL    BUN 16 8 - 27 mg/dL    Creatinine 0.68 0.57 - 1.00 mg/dL    eGFR 96 >59 mL/min/1.73    SL AMB BUN/CREATININE RATIO 24 12 - 28    Sodium 143 134 - 144 mmol/L    Potassium 3.4 (L) 3.5 - 5.2 mmol/L    Chloride 103 96 - 106 mmol/L    CO2 24 20 - 29 mmol/L    CALCIUM 9.1 8.7 - 10.3 mg/dL    Protein, Total 6.2 6.0 - 8.5 g/dL    Albumin 4.3 3.9 - 4.9 g/dL    Globulin, Total 1.9 1.5 - 4.5 g/dL    Albumin/Globulin Ratio 2.3 (H) 1.2 - 2.2    TOTAL BILIRUBIN 0.5 0.0 - 1.2 mg/dL    Alk Phos Isoenzymes 65 44 - 121 IU/L AST 24 0 - 40 IU/L    ALT 21 0 - 32 IU/L   Lipid panel    Collection Time: 07/24/23  8:58 AM   Result Value Ref Range    Cholesterol, Total 190 100 - 199 mg/dL    Triglycerides 114 0 - 149 mg/dL    HDL 57 >39 mg/dL    VLDL Cholesterol Calculated 20 5 - 40 mg/dL    LDL Calculated 113 (H) 0 - 99 mg/dL   Hemoglobin A1c (w/out EAG)    Collection Time: 07/24/23  8:58 AM   Result Value Ref Range    Hemoglobin A1C 5.3 4.8 - 5.6 %   TSH, 3rd generation with Free T4 reflex    Collection Time: 07/24/23  8:58 AM   Result Value Ref Range    TSH 2.610 0.450 - 4.500 uIU/mL       The following portions of the patient's history were reviewed and updated as appropriate: allergies, current medications, past family history, past medical history, past social history, past surgical history and problem list.     Review of Systems   Constitutional: Negative for appetite change, chills, diaphoresis, fatigue, fever and unexpected weight change. HENT: Negative for congestion, hearing loss and rhinorrhea. Eyes: Negative for visual disturbance. Respiratory: Negative for cough, chest tightness, shortness of breath and wheezing. Cardiovascular: Negative for chest pain, palpitations and leg swelling. Gastrointestinal: Negative for abdominal pain and blood in stool. Endocrine: Negative for cold intolerance, heat intolerance, polydipsia and polyuria. Genitourinary: Negative for difficulty urinating, dysuria, frequency and urgency. Musculoskeletal: Negative for arthralgias and myalgias. Skin: Negative for rash. Neurological: Positive for tremors. Negative for dizziness, weakness, light-headedness and headaches. Hematological: Does not bruise/bleed easily. Psychiatric/Behavioral: Negative for dysphoric mood and sleep disturbance. Objective:      Vitals:    08/02/23 1403   BP: 130/78   Pulse: 70   Resp: 16   SpO2: 97%          Physical Exam  Constitutional:       Appearance: She is well-developed.    HENT:      Head: Normocephalic and atraumatic. Nose: Nose normal.   Eyes:      General: No scleral icterus. Conjunctiva/sclera: Conjunctivae normal.      Pupils: Pupils are equal, round, and reactive to light. Neck:      Thyroid: No thyromegaly. Vascular: No JVD. Trachea: No tracheal deviation. Cardiovascular:      Rate and Rhythm: Normal rate and regular rhythm. Heart sounds: No murmur heard. No friction rub. No gallop. Pulmonary:      Effort: Pulmonary effort is normal. No respiratory distress. Breath sounds: Normal breath sounds. No wheezing or rales. Musculoskeletal:         General: No deformity. Cervical back: Normal range of motion and neck supple. Lymphadenopathy:      Cervical: No cervical adenopathy. Skin:     General: Skin is warm and dry. Coloration: Skin is not pale. Findings: No erythema or rash. Neurological:      Mental Status: She is alert and oriented to person, place, and time. Cranial Nerves: No cranial nerve deficit. Comments: Right upper extremity tremor pill-rolling today. Still no cogwheeling, no masked facies. Gait is normal tandem with quick turns. Normal arm swing   Psychiatric:         Behavior: Behavior normal.         Thought Content:  Thought content normal.         Judgment: Judgment normal.

## 2023-08-16 DIAGNOSIS — E87.6 HYPOKALEMIA: Primary | ICD-10-CM

## 2023-08-16 LAB
ALBUMIN SERPL-MCNC: 4.7 G/DL (ref 3.9–4.9)
ALBUMIN/GLOB SERPL: 2.1 {RATIO} (ref 1.2–2.2)
ALP SERPL-CCNC: 76 IU/L (ref 44–121)
ALT SERPL-CCNC: 25 IU/L (ref 0–32)
AST SERPL-CCNC: 25 IU/L (ref 0–40)
BASOPHILS # BLD AUTO: 0.1 X10E3/UL (ref 0–0.2)
BASOPHILS NFR BLD AUTO: 2 %
BILIRUB SERPL-MCNC: 0.5 MG/DL (ref 0–1.2)
BUN SERPL-MCNC: 16 MG/DL (ref 8–27)
BUN/CREAT SERPL: 22 (ref 12–28)
CALCIUM SERPL-MCNC: 9.2 MG/DL (ref 8.7–10.3)
CHLORIDE SERPL-SCNC: 100 MMOL/L (ref 96–106)
CO2 SERPL-SCNC: 24 MMOL/L (ref 20–29)
CREAT SERPL-MCNC: 0.73 MG/DL (ref 0.57–1)
EGFR: 91 ML/MIN/1.73
EOSINOPHIL # BLD AUTO: 0.1 X10E3/UL (ref 0–0.4)
EOSINOPHIL NFR BLD AUTO: 2 %
ERYTHROCYTE [DISTWIDTH] IN BLOOD BY AUTOMATED COUNT: 12.4 % (ref 11.7–15.4)
FOLATE SERPL-MCNC: 12.4 NG/ML
GLOBULIN SER-MCNC: 2.2 G/DL (ref 1.5–4.5)
GLUCOSE SERPL-MCNC: 92 MG/DL (ref 70–99)
HCT VFR BLD AUTO: 43.9 % (ref 34–46.6)
HGB BLD-MCNC: 14.6 G/DL (ref 11.1–15.9)
IMM GRANULOCYTES # BLD: 0 X10E3/UL (ref 0–0.1)
IMM GRANULOCYTES NFR BLD: 0 %
LYMPHOCYTES # BLD AUTO: 1.4 X10E3/UL (ref 0.7–3.1)
LYMPHOCYTES NFR BLD AUTO: 36 %
MCH RBC QN AUTO: 29.9 PG (ref 26.6–33)
MCHC RBC AUTO-ENTMCNC: 33.3 G/DL (ref 31.5–35.7)
MCV RBC AUTO: 90 FL (ref 79–97)
MONOCYTES # BLD AUTO: 0.3 X10E3/UL (ref 0.1–0.9)
MONOCYTES NFR BLD AUTO: 9 %
NEUTROPHILS # BLD AUTO: 1.9 X10E3/UL (ref 1.4–7)
NEUTROPHILS NFR BLD AUTO: 51 %
PLATELET # BLD AUTO: 277 X10E3/UL (ref 150–450)
POTASSIUM SERPL-SCNC: 3.3 MMOL/L (ref 3.5–5.2)
PROT SERPL-MCNC: 6.9 G/DL (ref 6–8.5)
RBC # BLD AUTO: 4.89 X10E6/UL (ref 3.77–5.28)
SODIUM SERPL-SCNC: 141 MMOL/L (ref 134–144)
VIT B12 SERPL-MCNC: 413 PG/ML (ref 232–1245)
WBC # BLD AUTO: 3.7 X10E3/UL (ref 3.4–10.8)

## 2023-08-16 RX ORDER — POTASSIUM CHLORIDE 750 MG/1
10 CAPSULE, EXTENDED RELEASE ORAL DAILY
Qty: 90 CAPSULE | Refills: 3 | Status: SHIPPED | OUTPATIENT
Start: 2023-08-16

## 2023-10-04 DIAGNOSIS — E78.2 MIXED HYPERLIPIDEMIA: ICD-10-CM

## 2023-10-04 RX ORDER — ROSUVASTATIN CALCIUM 5 MG/1
TABLET, COATED ORAL
Qty: 90 TABLET | Refills: 1 | Status: SHIPPED | OUTPATIENT
Start: 2023-10-04

## 2023-10-26 DIAGNOSIS — N32.81 OAB (OVERACTIVE BLADDER): Primary | ICD-10-CM

## 2023-10-26 RX ORDER — SOLIFENACIN SUCCINATE 5 MG/1
5 TABLET, FILM COATED ORAL DAILY
Qty: 30 TABLET | Refills: 1 | Status: SHIPPED | OUTPATIENT
Start: 2023-10-26

## 2023-12-12 DIAGNOSIS — I10 BENIGN ESSENTIAL HYPERTENSION: ICD-10-CM

## 2023-12-12 RX ORDER — CHLORTHALIDONE 25 MG/1
TABLET ORAL
Qty: 90 TABLET | Refills: 1 | Status: SHIPPED | OUTPATIENT
Start: 2023-12-12

## 2023-12-22 ENCOUNTER — TELEPHONE (OUTPATIENT)
Dept: NEUROLOGY | Facility: CLINIC | Age: 68
End: 2023-12-22

## 2023-12-22 DIAGNOSIS — N32.81 OAB (OVERACTIVE BLADDER): ICD-10-CM

## 2023-12-22 RX ORDER — SOLIFENACIN SUCCINATE 5 MG/1
5 TABLET, FILM COATED ORAL DAILY
Qty: 30 TABLET | Refills: 1 | Status: SHIPPED | OUTPATIENT
Start: 2023-12-22

## 2024-01-02 ENCOUNTER — TELEPHONE (OUTPATIENT)
Dept: NEUROLOGY | Facility: CLINIC | Age: 69
End: 2024-01-02

## 2024-02-08 DIAGNOSIS — F41.8 DEPRESSION WITH ANXIETY: ICD-10-CM

## 2024-02-08 RX ORDER — LORAZEPAM 0.5 MG/1
0.5 TABLET ORAL EVERY 6 HOURS PRN
Qty: 50 TABLET | Refills: 0 | Status: SHIPPED | OUTPATIENT
Start: 2024-02-08

## 2024-02-20 DIAGNOSIS — N32.81 OAB (OVERACTIVE BLADDER): ICD-10-CM

## 2024-02-20 RX ORDER — SOLIFENACIN SUCCINATE 5 MG/1
5 TABLET, FILM COATED ORAL DAILY
Qty: 90 TABLET | Refills: 3 | Status: SHIPPED | OUTPATIENT
Start: 2024-02-20

## 2024-02-21 ENCOUNTER — OFFICE VISIT (OUTPATIENT)
Dept: DERMATOLOGY | Facility: CLINIC | Age: 69
End: 2024-02-21
Payer: COMMERCIAL

## 2024-02-21 VITALS — TEMPERATURE: 98 F | WEIGHT: 155 LBS | HEIGHT: 69 IN | BODY MASS INDEX: 22.96 KG/M2

## 2024-02-21 DIAGNOSIS — L82.1 SEBORRHEIC KERATOSIS: ICD-10-CM

## 2024-02-21 DIAGNOSIS — D18.01 CHERRY ANGIOMA: ICD-10-CM

## 2024-02-21 DIAGNOSIS — Z12.83 SKIN CANCER SCREENING: ICD-10-CM

## 2024-02-21 DIAGNOSIS — L81.4 LENTIGO: ICD-10-CM

## 2024-02-21 DIAGNOSIS — L85.3 XEROSIS OF SKIN: ICD-10-CM

## 2024-02-21 DIAGNOSIS — D22.9 MULTIPLE MELANOCYTIC NEVI: Primary | ICD-10-CM

## 2024-02-21 DIAGNOSIS — L40.9 PSORIASIS: ICD-10-CM

## 2024-02-21 DIAGNOSIS — L92.0 GRANULOMA ANNULARE: ICD-10-CM

## 2024-02-21 PROCEDURE — 99204 OFFICE O/P NEW MOD 45 MIN: CPT | Performed by: DERMATOLOGY

## 2024-02-21 RX ORDER — PRIMIDONE 50 MG/1
TABLET ORAL
COMMUNITY

## 2024-02-21 RX ORDER — TRIAMCINOLONE ACETONIDE 1 MG/G
OINTMENT TOPICAL 2 TIMES DAILY
Qty: 453.6 G | Refills: 0 | Status: SHIPPED | OUTPATIENT
Start: 2024-02-21

## 2024-02-21 NOTE — PATIENT INSTRUCTIONS
"MELANOCYTIC NEVI (\"Moles\")        Assessment and Plan:  Based on a thorough discussion of this condition and the management approach to it (including a comprehensive discussion of the known risks, side effects and potential benefits of treatment), the patient (family) agrees to implement the following specific plan:  When outside we recommend using a wide brim hat, sunglasses, long sleeve and pants, sunscreen with SPF 30+ with reapplication every 2 hours, or SPF specific clothing   Benign, reassured  Annual skin check     Melanocytic Nevi  Melanocytic nevi (\"moles\") are tan or brown, raised or flat areas of the skin which have an increased number of melanocytes. Melanocytes are the cells in our body which make pigment and account for skin color.    Some moles are present at birth (I.e., \"congenital nevi\"), while others come up later in life (i.e., \"acquired nevi\").  The sun can stimulate the body to make more moles.  Sunburns are not the only thing that triggers more moles.  Chronic sun exposure can do it too.     Clinically distinguishing a healthy mole from melanoma may be difficult, even for experienced dermatologists. The \"ABCDE's\" of moles have been suggested as a means of helping to alert a person to a suspicious mole and the possible increased risk of melanoma.  The suggestions for raising alert are as follows:    Asymmetry: Healthy moles tend to be symmetric, while melanomas are often asymmetric.  Asymmetry means if you draw a line through the mole, the two halves do not match in color, size, shape, or surface texture. Asymmetry can be a result of rapid enlargement of a mole, the development of a raised area on a previously flat lesion, scaling, ulceration, bleeding or scabbing within the mole.  Any mole that starts to demonstrate \"asymmetry\" should be examined promptly by a board certified dermatologist.     Border: Healthy moles tend to have discrete, even borders.  The border of a melanoma often blends " "into the normal skin and does not sharply delineate the mole from normal skin.  Any mole that starts to demonstrate \"uneven borders\" should be examined promptly by a board certified dermatologist.     Color: Healthy moles tend to be one color throughout.  Melanomas tend to be made up of different colors ranging from dark black, blue, white, or red.  Any mole that demonstrates a color change should be examined promptly by a board certified dermatologist.     Diameter: Healthy moles tend to be smaller than 0.6 cm in size; an exception are \"congenital nevi\" that can be larger.  Melanomas tend to grow and can often be greater than 0.6 cm (1/4 of an inch, or the size of a pencil eraser). This is only a guideline, and many normal moles may be larger than 0.6 cm without being unhealthy.  Any mole that starts to change in size (small to bigger or bigger to smaller) should be examined promptly by a board certified dermatologist.     Evolving: Healthy moles tend to \"stay the same.\"  Melanomas may often show signs of change or evolution such as a change in size, shape, color, or elevation.  Any mole that starts to itch, bleed, crust, burn, hurt, or ulcerate or demonstrate a change or evolution should be examined promptly by a board certified dermatologist.        LENTIGO        Assessment and Plan:  Based on a thorough discussion of this condition and the management approach to it (including a comprehensive discussion of the known risks, side effects and potential benefits of treatment), the patient (family) agrees to implement the following specific plan:  When outside we recommend using a wide brim hat, sunglasses, long sleeve and pants, sunscreen with SPF 30+ with reapplication every 2 hours, or SPF specific clothing       What is a lentigo?  A lentigo is a pigmented flat or slightly raised lesion with a clearly defined edge. Unlike an ephelis (freckle), it does not fade in the winter months. There are several kinds of " lentigo.  The name lentigo originally referred to its appearance resembling a small lentil. The plural of lentigo is lentigines, although “lentigos” is also in common use.    Who gets lentigines?  Lentigines can affect males and females of all ages and races. Solar lentigines are especially prevalent in fair skinned adults. Lentigines associated with syndromes are present at birth or arise during childhood.    What causes lentigines?  Common forms of lentigo are due to exposure to ultraviolet radiation:  Sun damage including sunburn   Indoor tanning   Phototherapy, especially photochemotherapy (PUVA)    Ionizing radiation, eg radiation therapy, can also cause lentigines.  Several familial syndromes associated with widespread lentigines originate from mutations in Gibson-MAP kinase, mTOR signaling and PTEN pathways.    What is the treatment for lentigines?  Most lentigines are left alone. Attempts to lighten them may not be successful. The following approaches are used:  SPF 50+ broad-spectrum sunscreen   Hydroquinone bleaching cream   Alpha hydroxy acids   Vitamin C   Retinoids   Azelaic acid   Chemical peels  Individual lesions can be permanently removed using:  Cryotherapy   Intense pulsed light   Pigment lasers    How can lentigines be prevented?  Lentigines associated with exposure ultraviolet radiation can be prevented by very careful sun protection. Clothing is more successful at preventing new lentigines than are sunscreens.    What is the outlook for lentigines?  Lentigines usually persist. They may increase in number with age and sun exposure. Some in sun-protected sites may fade and disappear.    THOMPSON ANGIOMAS        Assessment and Plan:  Based on a thorough discussion of this condition and the management approach to it (including a comprehensive discussion of the known risks, side effects and potential benefits of treatment), the patient (family) agrees to implement the following specific plan:  Monitor  "for changes  Benign, reassured      Assessment and Plan:    Cherry angioma, also known as Ray de Hugh spots, are benign vascular skin lesions. A \"cherry angioma\" is a firm red, blue or purple papule, 0.1-1 cm in diameter. When thrombosed, they can appear black in colour until evaluated with a dermatoscope when the red or purple colour is more easily seen. Cherry angioma may develop on any part of the body but most often appear on the scalp, face, lips and trunk.  An angioma is due to proliferating endothelial cells; these are the cells that line the inside of a blood vessel.    Angiomas can arise in early life or later in life; the most common type of angioma is a cherry angioma.  Cherry angiomas are very common in males and females of any age or race. They are more noticeable in white skin than in skin of colour. They markedly increase in number from about the age of 40. There may be a family history of similar lesions. Eruptive cherry angiomas have been rarely reported to be associated with internal malignancy. The cause of angiomas is unknown. Genetic analysis of cherry angiomas has shown that they frequently carry specific somatic missense mutations in the GNAQ and GNA11 (Q209H) genes, which are involved in other vascular and melanocytic proliferations.      SEBORRHEIC KERATOSIS; NON-INFLAMED        Assessment and Plan:  Based on a thorough discussion of this condition and the management approach to it (including a comprehensive discussion of the known risks, side effects and potential benefits of treatment), the patient (family) agrees to implement the following specific plan:  Monitor for changes  Benign, reassured      Seborrheic Keratosis  A seborrheic keratosis is a harmless warty spot that appears during adult life as a common sign of skin aging.  Seborrheic keratoses can arise on any area of skin, covered or uncovered, with the usual exception of the palms and soles. They do not arise from mucous " "membranes. Seborrheic keratoses can have highly variable appearance.      Seborrheic keratoses are extremely common. It has been estimated that over 90% of adults over the age of 60 years have one or more of them. They occur in males and females of all races, typically beginning to erupt in the 30s or 40s. They are uncommon under the age of 20 years.  The precise cause of seborrhoeic keratoses is not known.  Seborrhoeic keratoses are considered degenerative in nature. As time goes by, seborrheic keratoses tend to become more numerous. Some people inherit a tendency to develop a very large number of them; some people may have hundreds of them.      There is no easy way to remove multiple lesions on a single occasion.  Unless a specific lesion is \"inflamed\" and is causing pain or stinging/burning or is bleeding, most insurance companies do not authorize treatment.    XEROSIS (\"DRY SKIN\")        Assessment and Plan:  Based on a thorough discussion of this condition and the management approach to it (including a comprehensive discussion of the known risks, side effects and potential benefits of treatment), the patient (family) agrees to implement the following specific plan:  Use moisturizer like Eucerin,Cerave or Aveeno Cream 3 times a day for the dry skin            Dry skin refers to skin that feels dry to touch. Dry skin has a dull surface with a rough, scaly quality. The skin is less pliable and cracked. When dryness is severe, the skin may become inflamed and fissured.  Although any body site can be dry, dry skin tends to affect the shins more than any other site.    Dry skin is lacking moisture in the outer horny cell layer (stratum corneum) and this results in cracks in the skin surface.  Dry skin is also called xerosis, xeroderma or asteatosis (lack of fat).  It can affect males and females of all ages. There is some racial variability in water and lipid content of the skin.  Dry skin that starts in early " childhood may be one of about 20 types of ichthyosis (fish-scale skin). There is often a family history of dry skin.   Dry skin is commonly seen in people with atopic dermatitis.  Nearly everyone > 60 years has dry skin.    Dry skin that begins later may be seen in people with certain diseases and conditions.  Postmenopausal women  Hypothyroidism  Chronic renal disease   Malnutrition and weight loss   Subclinical dermatitis   Treatment with certain drugs such as oral retinoids, diuretics and epidermal growth factor receptor inhibitors      What is the treatment for dry skin?  The mainstay of treatment of dry skin and ichthyosis is moisturisers/emollients. They should be applied liberally and often enough to:  Reduce itch   Improve the barrier function   Prevent entry of irritants, bacteria   Reduce transepidermal water loss.      How can dry skin be prevented?  Eliminate aggravating factors:  Reduce the frequency of bathing.   A humidifier in winter and air conditioner in summer   Compare having a short shower with a prolonged soak in a bath.   Use lukewarm, not hot, water.   Replace standard soap with a substitute such as a synthetic detergent cleanser, water-miscible emollient, bath oil, anti-pruritic tar oil, colloidal oatmeal etc.   Apply an emollient liberally and often, particularly shortly after bathing, and when itchy. The drier the skin, the thicker this should be, especially on the hands.    What is the outlook for dry skin?  A tendency to dry skin may persist life-long, or it may improve once contributing factors are controlled.     PSORIASIS        Assessment and Plan:  Based on a thorough discussion of this condition and the management approach to it (including a comprehensive discussion of the known risks, side effects and potential benefits of treatment), the patient (family) agrees to implement the following specific plan:  Start Triamcinolone 0.1% ointment apply too affected area twice a day on the  weekdays.      Psoriasis is a chronic inflammatory condition that causes the body to make new skin cells in days rather than weeks. As these cells pile up on the surface of the skin, you may see thick, scaly patches of thickened skin.   Psoriasis affects 2-4% of males and females. It can start at any age including childhood, with peaks of onset at 15-25 years and 50-60 years. It tends to persist lifelong, fluctuating in extent and severity. It is particularly common in Caucasians but may affect people of any race. About one-third of patients with psoriasis have family members with psoriasis.  Psoriasis is multifactorial. It is classified as an immune-mediated inflammatory disease (IMID). Genetic factors are important and influence the type of psoriasis and response to treatment.     What are the signs and symptoms of psoriasis?    There are many different types of psoriasis that each have present uniquely. The types of psoriasis include:    Plaque psoriasis: About 80% to 90% of people who have psoriasis develop this type. When plaque psoriasis appears, you may see:  Plaque psoriasis usually presents with symmetrically distributed, red, scaly plaques with well-defined edges. The scale is typically silvery white, except in skin folds where the plaques often appear shiny and they may have a moist peeling surface. The most common sites are scalp, elbows and knees, but any part of the skin can be involved. The plaques are usually very persistent without treatment.  Itch is mostly mild but may be severe in some patients, leading to scratching and lichenification (thickened leathery skin with increased skin markings). Painful skin cracks or fissures may occur.  When psoriatic plaques clear up, they may leave brown or pale marks that can be expected to fade over several months.    Guttate psoriasis: When someone gets this type of psoriasis, you often see tiny bumps appear on the skin quite suddenly. The bumps tend to cover  much of the torso, legs, and arms. Sometimes, the bumps also develop on the face, scalp, and ears. No matter where they appear, the bumps tend to be:   Small and scaly  Lindsay-colored to pink  Temporary, clearing in a few weeks or months without treatment  When guttate psoriasis clears, it may never return. Why this happens is still a bit of a mystery. Guttate psoriasis tends to develop in children and young adults who've had an infection, such as strep throat. It's possible that when the infection clears so does guttate psoriasis.  It's also possible to have:  Guttate psoriasis for life  See the guttate psoriasis clear and plaque psoriasis develop later in life  Plaque psoriasis when you develop guttate psoriasis  There's no way to predict what will happen after the first flare-up of guttate psoriasis clears.    Inverse psoriasis: This type of psoriasis develops in areas where skin touches skin, such as the armpits, genitals, and crease of the buttocks. Where the inverse psoriasis appears, you're likely to notice:  Smooth, red patches of skin that look raw  Little, if any, silvery-white coating  Sore or painful skin  Other names for this type of psoriasis are intertriginous psoriasis or flexural psoriasis.  Pustular psoriasis: This type of psoriasis causes pus-filled bumps that usually appear only on the feet and hands. While the pus-filled bumps may look like an infection, the skin is not infected. The bumps don't contain bacteria or anything else that could cause an infection.  Where pustular psoriasis appears, you tend to notice:  Red, swollen skin that is dotted with pus-filled bumps  Extremely sore or painful skin  Brown dots (and sometimes scale) appear as the pus-filled bumps dry  Pustular psoriasis can make just about any activity that requires your hands or feet, such as typing or walking, unbearably painful.    Pustular psoriasis (generalized): Serious and life-threatening, this rare type of psoriasis  causes pus-filled bumps to develop on much of the skin. Also called von Zumbusch psoriasis, a flare-up causes this sequence of events:  Skin on most of the body suddenly turns dry, red, and tender.  Within hours, pus-filled bumps cover most of the skin.  Often within a day, the pus-filled bumps break open and pools of pus leak onto the skin.  As the pus dries (usually within 24 to 48 hours), the skin dries out and peels (as shown in this picture).  When the dried skin peels off, you see a smooth, glazed surface.  In a few days or weeks, you may see a new crop of pus-filled bumps covering most of the skin, as the cycle repeats itself.  Anyone with pustular psoriasis also feels very sick, and may develop a fever, headache, muscle weakness, and other symptoms. Medical care is often necessary to save the person's life.    Erythrodermic psoriasis: Serious and life-threatening, this type of psoriasis requires immediate medical care. When someone develops erythrodermic psoriasis, you may notice:  Skin on most of the body looks burnt  Chills, fever, and the person looks extremely ill  Muscle weakness, a rapid pulse, and severe itch  The person may also be unable to keep warm, so hypothermia can set in quickly.  Most people who develop this type of psoriasis already have another type of psoriasis. Before developing erythrodermic psoriasis, they often notice that their psoriasis is worsening or not improving with treatment. If you notice either of these happening, see a board-certified dermatologist.    Nails    Nail psoriasis: With any type of psoriasis, you may see changes to your fingernails or toenails. About half of the people who have plaque psoriasis see signs of psoriasis on their fingernails at some point2.  When psoriasis affects the nails, you may notice:  Tiny dents in your nails (called “nail pits”)  White, yellow, or brown discoloration under one or more nails  Crumbling, rough nails  A nail lifting up so that  it's no longer attached  Buildup of skin cells beneath one or more nails, which lifts up the nail  Treatment and proper nail care can help you control nail psoriasis.    Psoriatic arthritis: If you have psoriasis, it's important to pay attention to your joints. Some people who have psoriasis develop a type of arthritis called psoriatic arthritis. This is more likely to occur if you have severe psoriasis.  Most people notice psoriasis on their skin years before they develop psoriatic arthritis. It's also possible to get psoriatic arthritis before psoriasis, but this is less common.  When psoriatic arthritis develops, the signs can be subtle. At first, you may notice:  A swollen and tender joint, especially in a finger or toe  Heel pain  Swelling on the back of your leg, just above your heel  Stiffness in the morning that fades during the day  Like psoriasis, psoriatic arthritis cannot be cured. Treatment can prevent psoriatic arthritis from worsening, which is important. Allowed to progress, psoriatic arthritis can become disabling.    Diagnosis and treatment of psoriasis   Psoriasis is usually diagnosed by clinical features, and skin biopsy if necessary.   It is important to decrease factors that aggravate psoriasis. These include treating streptococcal infections, minimizing skin injuries, avoiding sun exposure if it exacerbates psoriasis, smoking, alcohol usage, decreasing stress, and maintaining an optimal body weight. Certain medications such as lithium, beta blockers, antimalarials, and NSAIDs have also been implicated. Suddenly stopping oral steroids or strong topical steroids can cause rebound disease.     There are many categories of psoriasis treatments available.     Topical therapy  Mild psoriasis is generally treated with topical agents alone. Which treatment is selected may depend on body site, extent and severity of psoriasis.  Emollients  Coal tar preparations  Dithranol  Salicylic acid  Vitamin D  analogue (calcipotriol)  Topical corticosteroids  Calcineurin inhibitor (tacrolimus, pimecrolimus)  Phototherapy  Most psoriasis centres offer phototherapy with ultraviolet (UV) radiation, often in combination with topical or systemic agents. Types of phototherapy include  Narrowband UVB  Broadband UVB  Photochemotherapy (PUVA)  Targeted phototherapy  Systemic therapy  Moderate to severe psoriasis warrants treatment with a systemic agent and/or phototherapy. The most common treatments are:  Methotrexate  Ciclosporin  Acitretin  Other medicines occasionally used for psoriasis include:  Mycophenolate  Apremilast  Hydroxyurea  Azathioprine  6-mercaptopurine  Systemic corticosteroids are best avoided due to a risk of severe withdrawal flare of psoriasis and adverse effects.  Biologics or targeted therapies are reserved for conventional treatment-resistant severe psoriasis, mainly because of expense, as side effects compare favorably with other systemic agents. These include:  Anti-tumour necrosis factor-alpha antagonists (anti-TNF?) infliximab, adalimumab and etanercept  The interleukin (IL)-12/23 antagonist ustekinumab  IL-17 antagonists such as secukinumab  Many other monoclonal antibodies are under investigation in the treatment of psoriasis.     GRANULOMA ANNULARE      Assessment and Plan:  Based on a thorough discussion of this condition and the management approach to it (including a comprehensive discussion of the known risks, side effects and potential benefits of treatment), the patient (family) agrees to implement the following specific plan:  Triamcinolone 0.1 % apply to spots on breast twice a day on weekdays only     What is granuloma annulare?  Granuloma annulare, also known as necrobiotic papulosis, is a non-contagious, benign skin condition that causes a rash. It is usually asymptomatic and can have a wide range of presentations on the skin. Most commonly, patients see a slightly raised patch, thickened,  ring-shaped patch. These tend to be found on the hand, arm, foot, or leg, but can appear anywhere on the skin. It is more common in females than males. Interestingly, the perforating type of granuloma annulare most often affects people who live in [place].    What causes granuloma annulare?  Most people who develop granuloma annulare are otherwise healthy. Children are most likely to get the localized, subcutaneous form, while older adults tend to develop generalized and perforating types. It is still unclear what exactly causes granuloma annulare, but there is evidence to suggest that it may follow skin injury, certain medications, and other diseases. The inflammation is mediated by tumor necrosis factor. Those who have HIV, lymphoma, thyroid disease, or diabetes may be especially prone.     What are the signs and symptoms of granuloma annulare?  Granuloma annulare can occur at any site of the body and can be quite widespread. It may by asymptomatic, but often quite tender if the area affected is bumped. The ring-like rash can be easily confused with tinea corporis, which is a type of fungal infection on the body.  In localized granuloma annulare, there is a raised ring-shaped patch on the skin. They are usually reddish in color, but can be pink, violet, or skin-colored. Bumps on the skin often develop prior, and join together to form the rash. Most common sites are the skin over joints, knuckles, backs of both hands, and on top of the foot. The center of each ring-like rash is often a little depression.  Some people develop widespread bumps on their skin that grow together to form large patches in generalized granuloma annulare. These appear in many colors as widespread skin-colored, pinkish to slightly mauve-colored patches.     Other types include:  Subcutaneous granuloma annulare. When this type develops, you often see a roundish, firm, and usually painless lump in the skin. You may have a single mass or  clusters of lumps. Most lumps stay the same for months, but a lump can also grow quickly. The lumps tend to be flesh-colored, pink, or red, and look similar to rheumatoid nodules.  Perforating granuloma annulare. This rare type usually develops on the hands and fingers. It causes small bumps that often feel scaly. The bumps may leak fluid, itch, or feel painful. Some people have a few bumps. Others develop widespread bumps that join together to form raised patches on the skin. When this type clears, it may leave scars on the skin.  Patch granuloma annulare. When this rare type develops, you see one or more patches on the skin. The patches may be red, reddish brown, or violet. Some people have one or a few patches; others have widespread patches.    How is granuloma annulare diagnosed?  Granuloma annulare can usually be diagnosed simply based on its characteristic appearance. If diagnosis if unclear, a skin biopsy may be performed that shows degeneration of dermal collagen surrounded by an inflammatory reaction. Other testing may be done to rule out associated diseases such as thyroid or diabetes.     How do we treat granuloma annulare?  Most people do not need treatment as the condition may clear by itself from a few months to years. If left alone, most rashes will clear within 2 years. If you develop perforating granuloma annulare, you may see scarring when the granuloma clears.     If you have a few noticeable patches, these are often treated with:  Topical corticosteroids help reduce inflammation and clear the skin more quickly.   Corticosteroid injections into affected skin patches to reduce the inflammation, which can help your skin clear more quickly.  Topical calcineurin inhibitors such as tacrolimus and pimecrloimus  Cryotherapy freezes your skin, which can eliminate the raised patches  If the granuloma annulare covers much of your skin or you have a deep lump in your skin, you'll have different treatment  options. For these types of granuloma annulare, you may need:  Imiquimod is a medication used to treat malaria that is also effective at clearing the rash of granuloma annulare.. In one study, patients started to see results after taking the medication for about 3 months.  Light therapy: Exposing the skin with granuloma annulare to ultraviolet (UV) light in a controlled way can be helpful. Some people receive a type of light therapy called PUVA. This involves taking a medication called psoralen and then treating the skin with UVA light. The medication makes your skin more sensitive to light, so light therapy can be more effective. Another type of light therapy, laser treatments, can also be helpful.  While granuloma annulare is not an infection, an antibiotic may help be helpful for some people to decrease inflammation. Other options for widespread granuloma annulare that does not respond to other forms of treatment include:  Systemic steroids  Isotretinoin  Methotrexate  Potassium iodide  Dapsone  Hydroxychloroquine  Pentoxifylline  Allopurinol (note: allopurinol has also be cited as a cause of disseminated granuloma annulare)  Combination of antibiotics once monthly: rifampicin, ofloxacin, minocycline  Ciclosporin  Biologics particularly TNF? inhibitors such as adalimumab and infliximab

## 2024-02-21 NOTE — PROGRESS NOTES
"[facility] Note     Patient Name: Maryann Milner  Encounter Date: 2/21/2024    Have you been cared for by a [facility] Dermatologist in the last 3 years and, if so, which description applies to you?    NO.   I am considered a \"new\" patient and must complete all patient intake questions. I am FEMALE/of child-bearing potential.    REVIEW OF SYSTEMS:  Have you recently had or currently have any of the following? Recent fever or chills? No  Any non-healing wound? No  Are you pregnant or planning to become pregnant? No  Are you currently or planning to be nursing or breast feeding? No   PAST MEDICAL HISTORY:  Have you personally ever had or currently have any of the following?  If \"YES,\" then please provide more detail. Skin cancer (such as Melanoma, Basal Cell Carcinoma, Squamous Cell Carcinoma?  No  Tuberculosis, HIV/AIDS, Hepatitis B or C: No  Radiation Treatment No   HISTORY OF IMMUNOSUPPRESSION:   Do you have a history of any of the following:  Systemic Immunosuppression such as Diabetes, Biologic or Immunotherapy, Chemotherapy, Organ Transplantation, Bone Marrow Transplantation?  No    Answering \"YES\" requires the addition of the dotphrase \"IMMUNOSUPPRESSED\" as the first diagnosis of the patient's visit.   FAMILY HISTORY:  Any \"first degree relatives\" (parent, brother, sister, or child) with the following?    Skin Cancer, Pancreatic or Other Cancer? No   PATIENT EXPERIENCE:    Do you want the Dermatologist to perform a COMPLETE skin exam today including a clinical examination under the \"bra and underwear\" areas?  NO groin and buttocks not examined today  If necessary, do we have your permission to call and leave a detailed message on your Preferred Phone number that includes your specific medical information?  Yes      Allergies   Allergen Reactions    Pravastatin Myalgia    Simvastatin Myalgia      Current Outpatient Medications:     LORazepam (ATIVAN) 0.5 mg tablet, TAKE 1 TABLET BY MOUTH EVERY 6 " "HOURS AS NEEDED FOR ANXIETY., Disp: 50 tablet, Rfl: 0    albuterol (PROVENTIL HFA,VENTOLIN HFA) 90 mcg/act inhaler, INHALE 2 PUFFS EVERY 6 HOURS AS NEEDED FOR WHEEZING, Disp: 18 g, Rfl: 6    budesonide-formoterol (SYMBICORT) 80-4.5 MCG/ACT inhaler, Inhale 2 puffs 2 (two) times a day Rinse mouth after use., Disp: 10.2 g, Rfl: 5    chlorthalidone 25 mg tablet, TAKE 1 TABLET BY MOUTH EVERY DAY, Disp: 90 tablet, Rfl: 1    loratadine (CLARITIN) 10 mg tablet, Take 10 mg by mouth daily, Disp: , Rfl:     montelukast (SINGULAIR) 10 mg tablet, Take 1 tablet (10 mg total) by mouth daily, Disp: 90 tablet, Rfl: 3    nystatin (MYCOSTATIN) cream, Apply topically 2 (two) times a day (Patient not taking: Reported on 2/27/2023), Disp: 30 g, Rfl: 0    nystatin (MYCOSTATIN) powder, Apply topically 2 (two) times a day (Patient not taking: Reported on 2/27/2023), Disp: 60 g, Rfl: 4    potassium chloride (MICRO-K) 10 MEQ CR capsule, Take 1 capsule (10 mEq total) by mouth daily, Disp: 90 capsule, Rfl: 3    rosuvastatin (CRESTOR) 5 mg tablet, TAKE 1 TABLET BY MOUTH EVERY DAY, Disp: 90 tablet, Rfl: 1    solifenacin (VESICARE) 5 mg tablet, Take 1 tablet (5 mg total) by mouth daily, Disp: 90 tablet, Rfl: 3    triamcinolone (KENALOG) 0.5 % cream, Apply topically 3 (three) times a day, Disp: 30 g, Rfl: 0    valACYclovir (VALTREX) 1,000 mg tablet, Take 2 tablets (2,000 mg total) by mouth 2 (two) times a day for 1 day, Disp: 4 tablet, Rfl: 5    zaleplon (SONATA) 10 MG capsule, take 1 capsule by mouth at bedtime if needed for sleep, Disp: 30 capsule, Rfl: 0          Whom besides the patient is providing clinical information about today's encounter?   NO ADDITIONAL HISTORIAN (patient alone provided history)    Physical Exam and Assessment/Plan by Diagnosis:    Patient here for skin exam has some spots of concern on abdomen, arm and inner thighs.     MELANOCYTIC NEVI (\"Moles\")    Physical Exam:  Anatomic Location Affected:   Mostly on sun-exposed " "areas of the trunk and extremities  Morphological Description:  Scattered, 1-4mm round to ovoid, symmetrical-appearing, even bordered, skin colored to dark brown macules/papules, mostly in sun-exposed areas  Pertinent Positives:  Pertinent Negatives:    Additional History of Present Condition:      Assessment and Plan:  Based on a thorough discussion of this condition and the management approach to it (including a comprehensive discussion of the known risks, side effects and potential benefits of treatment), the patient (family) agrees to implement the following specific plan:  When outside we recommend using a wide brim hat, sunglasses, long sleeve and pants, sunscreen with SPF 30+ with reapplication every 2 hours, or SPF specific clothing   Benign, reassured  Annual skin check     Melanocytic Nevi  Melanocytic nevi (\"moles\") are tan or brown, raised or flat areas of the skin which have an increased number of melanocytes. Melanocytes are the cells in our body which make pigment and account for skin color.    Some moles are present at birth (I.e., \"congenital nevi\"), while others come up later in life (i.e., \"acquired nevi\").  The sun can stimulate the body to make more moles.  Sunburns are not the only thing that triggers more moles.  Chronic sun exposure can do it too.     Clinically distinguishing a healthy mole from melanoma may be difficult, even for experienced dermatologists. The \"ABCDE's\" of moles have been suggested as a means of helping to alert a person to a suspicious mole and the possible increased risk of melanoma.  The suggestions for raising alert are as follows:    Asymmetry: Healthy moles tend to be symmetric, while melanomas are often asymmetric.  Asymmetry means if you draw a line through the mole, the two halves do not match in color, size, shape, or surface texture. Asymmetry can be a result of rapid enlargement of a mole, the development of a raised area on a previously flat lesion, scaling, " "ulceration, bleeding or scabbing within the mole.  Any mole that starts to demonstrate \"asymmetry\" should be examined promptly by a board certified dermatologist.     Border: Healthy moles tend to have discrete, even borders.  The border of a melanoma often blends into the normal skin and does not sharply delineate the mole from normal skin.  Any mole that starts to demonstrate \"uneven borders\" should be examined promptly by a board certified dermatologist.     Color: Healthy moles tend to be one color throughout.  Melanomas tend to be made up of different colors ranging from dark black, blue, white, or red.  Any mole that demonstrates a color change should be examined promptly by a board certified dermatologist.     Diameter: Healthy moles tend to be smaller than 0.6 cm in size; an exception are \"congenital nevi\" that can be larger.  Melanomas tend to grow and can often be greater than 0.6 cm (1/4 of an inch, or the size of a pencil eraser). This is only a guideline, and many normal moles may be larger than 0.6 cm without being unhealthy.  Any mole that starts to change in size (small to bigger or bigger to smaller) should be examined promptly by a board certified dermatologist.     Evolving: Healthy moles tend to \"stay the same.\"  Melanomas may often show signs of change or evolution such as a change in size, shape, color, or elevation.  Any mole that starts to itch, bleed, crust, burn, hurt, or ulcerate or demonstrate a change or evolution should be examined promptly by a board certified dermatologist.        LENTIGO    Physical Exam:  Anatomic Location Affected:  trunk, arms  Morphological Description:  Light brown macules  Pertinent Positives:  Pertinent Negatives:    Additional History of Present Condition:      Assessment and Plan:  Based on a thorough discussion of this condition and the management approach to it (including a comprehensive discussion of the known risks, side effects and potential benefits of " treatment), the patient (family) agrees to implement the following specific plan:  When outside we recommend using a wide brim hat, sunglasses, long sleeve and pants, sunscreen with SPF 30+ with reapplication every 2 hours, or SPF specific clothing       What is a lentigo?  A lentigo is a pigmented flat or slightly raised lesion with a clearly defined edge. Unlike an ephelis (freckle), it does not fade in the winter months. There are several kinds of lentigo.  The name lentigo originally referred to its appearance resembling a small lentil. The plural of lentigo is lentigines, although “lentigos” is also in common use.    Who gets lentigines?  Lentigines can affect males and females of all ages and races. Solar lentigines are especially prevalent in fair skinned adults. Lentigines associated with syndromes are present at birth or arise during childhood.    What causes lentigines?  Common forms of lentigo are due to exposure to ultraviolet radiation:  Sun damage including sunburn   Indoor tanning   Phototherapy, especially photochemotherapy (PUVA)    Ionizing radiation, eg radiation therapy, can also cause lentigines.  Several familial syndromes associated with widespread lentigines originate from mutations in Gibson-MAP kinase, mTOR signaling and PTEN pathways.    What is the treatment for lentigines?  Most lentigines are left alone. Attempts to lighten them may not be successful. The following approaches are used:  SPF 50+ broad-spectrum sunscreen   Hydroquinone bleaching cream   Alpha hydroxy acids   Vitamin C   Retinoids   Azelaic acid   Chemical peels  Individual lesions can be permanently removed using:  Cryotherapy   Intense pulsed light   Pigment lasers    How can lentigines be prevented?  Lentigines associated with exposure ultraviolet radiation can be prevented by very careful sun protection. Clothing is more successful at preventing new lentigines than are sunscreens.    What is the outlook for  "lentigines?  Lentigines usually persist. They may increase in number with age and sun exposure. Some in sun-protected sites may fade and disappear.    THOMPSON ANGIOMAS    Physical Exam:  Anatomic Location Affected:  trunk  Morphological Description:  Scattered cherry red, 1-4 mm papules.  Pertinent Positives:  Pertinent Negatives:    Additional History of Present Condition:      Assessment and Plan:  Based on a thorough discussion of this condition and the management approach to it (including a comprehensive discussion of the known risks, side effects and potential benefits of treatment), the patient (family) agrees to implement the following specific plan:  Monitor for changes  Benign, reassured      Assessment and Plan:    Cherry angioma, also known as Ray de Hugh spots, are benign vascular skin lesions. A \"cherry angioma\" is a firm red, blue or purple papule, 0.1-1 cm in diameter. When thrombosed, they can appear black in colour until evaluated with a dermatoscope when the red or purple colour is more easily seen. Cherry angioma may develop on any part of the body but most often appear on the scalp, face, lips and trunk.  An angioma is due to proliferating endothelial cells; these are the cells that line the inside of a blood vessel.    Angiomas can arise in early life or later in life; the most common type of angioma is a cherry angioma.  Cherry angiomas are very common in males and females of any age or race. They are more noticeable in white skin than in skin of colour. They markedly increase in number from about the age of 40. There may be a family history of similar lesions. Eruptive cherry angiomas have been rarely reported to be associated with internal malignancy. The cause of angiomas is unknown. Genetic analysis of cherry angiomas has shown that they frequently carry specific somatic missense mutations in the GNAQ and GNA11 (Q209H) genes, which are involved in other vascular and melanocytic " "proliferations.      SEBORRHEIC KERATOSIS; NON-INFLAMED    Physical Exam:  Anatomic Location Affected:  trunk  Morphological Description:  Flat and raised, waxy, smooth to warty textured, yellow to brownish-grey to dark brown to blackish, discrete, \"stuck-on\" appearing papules.  Pertinent Positives:  Pertinent Negatives:    Additional History of Present Condition:      Assessment and Plan:  Based on a thorough discussion of this condition and the management approach to it (including a comprehensive discussion of the known risks, side effects and potential benefits of treatment), the patient (family) agrees to implement the following specific plan:  Monitor for changes  Benign, reassured      Seborrheic Keratosis  A seborrheic keratosis is a harmless warty spot that appears during adult life as a common sign of skin aging.  Seborrheic keratoses can arise on any area of skin, covered or uncovered, with the usual exception of the palms and soles. They do not arise from mucous membranes. Seborrheic keratoses can have highly variable appearance.      Seborrheic keratoses are extremely common. It has been estimated that over 90% of adults over the age of 60 years have one or more of them. They occur in males and females of all races, typically beginning to erupt in the 30s or 40s. They are uncommon under the age of 20 years.  The precise cause of seborrhoeic keratoses is not known.  Seborrhoeic keratoses are considered degenerative in nature. As time goes by, seborrheic keratoses tend to become more numerous. Some people inherit a tendency to develop a very large number of them; some people may have hundreds of them.      There is no easy way to remove multiple lesions on a single occasion.  Unless a specific lesion is \"inflamed\" and is causing pain or stinging/burning or is bleeding, most insurance companies do not authorize treatment.    XEROSIS (\"DRY SKIN\")    Physical Exam:  Anatomic Location Affected:  " diffuse  Morphological Description:  xerosis  Pertinent Positives:  Pertinent Negatives:    Additional History of Present Condition:      Assessment and Plan:  Based on a thorough discussion of this condition and the management approach to it (including a comprehensive discussion of the known risks, side effects and potential benefits of treatment), the patient (family) agrees to implement the following specific plan:  Use moisturizer like Eucerin,Cerave or Aveeno Cream 3 times a day for the dry skin            Dry skin refers to skin that feels dry to touch. Dry skin has a dull surface with a rough, scaly quality. The skin is less pliable and cracked. When dryness is severe, the skin may become inflamed and fissured.  Although any body site can be dry, dry skin tends to affect the shins more than any other site.    Dry skin is lacking moisture in the outer horny cell layer (stratum corneum) and this results in cracks in the skin surface.  Dry skin is also called xerosis, xeroderma or asteatosis (lack of fat).  It can affect males and females of all ages. There is some racial variability in water and lipid content of the skin.  Dry skin that starts in early childhood may be one of about 20 types of ichthyosis (fish-scale skin). There is often a family history of dry skin.   Dry skin is commonly seen in people with atopic dermatitis.  Nearly everyone > 60 years has dry skin.    Dry skin that begins later may be seen in people with certain diseases and conditions.  Postmenopausal women  Hypothyroidism  Chronic renal disease   Malnutrition and weight loss   Subclinical dermatitis   Treatment with certain drugs such as oral retinoids, diuretics and epidermal growth factor receptor inhibitors      What is the treatment for dry skin?  The mainstay of treatment of dry skin and ichthyosis is moisturisers/emollients. They should be applied liberally and often enough to:  Reduce itch   Improve the barrier function   Prevent  entry of irritants, bacteria   Reduce transepidermal water loss.      How can dry skin be prevented?  Eliminate aggravating factors:  Reduce the frequency of bathing.   A humidifier in winter and air conditioner in summer   Compare having a short shower with a prolonged soak in a bath.   Use lukewarm, not hot, water.   Replace standard soap with a substitute such as a synthetic detergent cleanser, water-miscible emollient, bath oil, anti-pruritic tar oil, colloidal oatmeal etc.   Apply an emollient liberally and often, particularly shortly after bathing, and when itchy. The drier the skin, the thicker this should be, especially on the hands.    What is the outlook for dry skin?  A tendency to dry skin may persist life-long, or it may improve once contributing factors are controlled.       PSORIASIS    Physical Exam:  Anatomic Location Affected:  left lower eye lid, back, left lower leg   Morphological Description:  scattered pink papules   Severity: mild  Body Percent Affected: 1%   Pertinent Positives:  Pertinent Negatives:      Additional History of Present Condition:  Patient has no joint pain      Assessment and Plan:  Based on a thorough discussion of this condition and the management approach to it (including a comprehensive discussion of the known risks, side effects and potential benefits of treatment), the patient (family) agrees to implement the following specific plan:  Start Triamcinolone 0.1% ointment apply too affected area twice a day on the weekdays.      Psoriasis is a chronic inflammatory condition that causes the body to make new skin cells in days rather than weeks. As these cells pile up on the surface of the skin, you may see thick, scaly patches of thickened skin.   Psoriasis affects 2-4% of males and females. It can start at any age including childhood, with peaks of onset at 15-25 years and 50-60 years. It tends to persist lifelong, fluctuating in extent and severity. It is particularly  common in Caucasians but may affect people of any race. About one-third of patients with psoriasis have family members with psoriasis.  Psoriasis is multifactorial. It is classified as an immune-mediated inflammatory disease (IMID). Genetic factors are important and influence the type of psoriasis and response to treatment.     What are the signs and symptoms of psoriasis?    There are many different types of psoriasis that each have present uniquely. The types of psoriasis include:    Plaque psoriasis: About 80% to 90% of people who have psoriasis develop this type. When plaque psoriasis appears, you may see:  Plaque psoriasis usually presents with symmetrically distributed, red, scaly plaques with well-defined edges. The scale is typically silvery white, except in skin folds where the plaques often appear shiny and they may have a moist peeling surface. The most common sites are scalp, elbows and knees, but any part of the skin can be involved. The plaques are usually very persistent without treatment.  Itch is mostly mild but may be severe in some patients, leading to scratching and lichenification (thickened leathery skin with increased skin markings). Painful skin cracks or fissures may occur.  When psoriatic plaques clear up, they may leave brown or pale marks that can be expected to fade over several months.    Guttate psoriasis: When someone gets this type of psoriasis, you often see tiny bumps appear on the skin quite suddenly. The bumps tend to cover much of the torso, legs, and arms. Sometimes, the bumps also develop on the face, scalp, and ears. No matter where they appear, the bumps tend to be:   Small and scaly  [place]-colored to pink  Temporary, clearing in a few weeks or months without treatment  When guttate psoriasis clears, it may never return. Why this happens is still a bit of a mystery. Guttate psoriasis tends to develop in children and young adults who've had an infection, such as strep  throat. It's possible that when the infection clears so does guttate psoriasis.  It's also possible to have:  Guttate psoriasis for life  See the guttate psoriasis clear and plaque psoriasis develop later in life  Plaque psoriasis when you develop guttate psoriasis  There's no way to predict what will happen after the first flare-up of guttate psoriasis clears.    Inverse psoriasis: This type of psoriasis develops in areas where skin touches skin, such as the armpits, genitals, and crease of the buttocks. Where the inverse psoriasis appears, you're likely to notice:  Smooth, red patches of skin that look raw  Little, if any, silvery-white coating  Sore or painful skin  Other names for this type of psoriasis are intertriginous psoriasis or flexural psoriasis.  Pustular psoriasis: This type of psoriasis causes pus-filled bumps that usually appear only on the feet and hands. While the pus-filled bumps may look like an infection, the skin is not infected. The bumps don't contain bacteria or anything else that could cause an infection.  Where pustular psoriasis appears, you tend to notice:  Red, swollen skin that is dotted with pus-filled bumps  Extremely sore or painful skin  Brown dots (and sometimes scale) appear as the pus-filled bumps dry  Pustular psoriasis can make just about any activity that requires your hands or feet, such as typing or walking, unbearably painful.    Pustular psoriasis (generalized): Serious and life-threatening, this rare type of psoriasis causes pus-filled bumps to develop on much of the skin. Also called von Zumbusch psoriasis, a flare-up causes this sequence of events:  Skin on most of the body suddenly turns dry, red, and tender.  Within hours, pus-filled bumps cover most of the skin.  Often within a day, the pus-filled bumps break open and pools of pus leak onto the skin.  As the pus dries (usually within 24 to 48 hours), the skin dries out and peels (as shown in this picture).  When the  dried skin peels off, you see a smooth, glazed surface.  In a few days or weeks, you may see a new crop of pus-filled bumps covering most of the skin, as the cycle repeats itself.  Anyone with pustular psoriasis also feels very sick, and may develop a fever, headache, muscle weakness, and other symptoms. Medical care is often necessary to save the person's life.    Erythrodermic psoriasis: Serious and life-threatening, this type of psoriasis requires immediate medical care. When someone develops erythrodermic psoriasis, you may notice:  Skin on most of the body looks burnt  Chills, fever, and the person looks extremely ill  Muscle weakness, a rapid pulse, and severe itch  The person may also be unable to keep warm, so hypothermia can set in quickly.  Most people who develop this type of psoriasis already have another type of psoriasis. Before developing erythrodermic psoriasis, they often notice that their psoriasis is worsening or not improving with treatment. If you notice either of these happening, see a board-certified dermatologist.    Nails    Nail psoriasis: With any type of psoriasis, you may see changes to your fingernails or toenails. About half of the people who have plaque psoriasis see signs of psoriasis on their fingernails at some point2.  When psoriasis affects the nails, you may notice:  Tiny dents in your nails (called “nail pits”)  White, yellow, or brown discoloration under one or more nails  Crumbling, rough nails  A nail lifting up so that it's no longer attached  Buildup of skin cells beneath one or more nails, which lifts up the nail  Treatment and proper nail care can help you control nail psoriasis.    Psoriatic arthritis: If you have psoriasis, it's important to pay attention to your joints. Some people who have psoriasis develop a type of arthritis called psoriatic arthritis. This is more likely to occur if you have severe psoriasis.  Most people notice psoriasis on their skin years  before they develop psoriatic arthritis. It's also possible to get psoriatic arthritis before psoriasis, but this is less common.  When psoriatic arthritis develops, the signs can be subtle. At first, you may notice:  A swollen and tender joint, especially in a finger or toe  Heel pain  Swelling on the back of your leg, just above your heel  Stiffness in the morning that fades during the day  Like psoriasis, psoriatic arthritis cannot be cured. Treatment can prevent psoriatic arthritis from worsening, which is important. Allowed to progress, psoriatic arthritis can become disabling.    Diagnosis and treatment of psoriasis   Psoriasis is usually diagnosed by clinical features, and skin biopsy if necessary.   It is important to decrease factors that aggravate psoriasis. These include treating streptococcal infections, minimizing skin injuries, avoiding sun exposure if it exacerbates psoriasis, smoking, alcohol usage, decreasing stress, and maintaining an optimal body weight. Certain medications such as lithium, beta blockers, antimalarials, and NSAIDs have also been implicated. Suddenly stopping oral steroids or strong topical steroids can cause rebound disease.     There are many categories of psoriasis treatments available.     Topical therapy  Mild psoriasis is generally treated with topical agents alone. Which treatment is selected may depend on body site, extent and severity of psoriasis.  Emollients  Coal tar preparations  Dithranol  Salicylic acid  Vitamin D analogue (calcipotriol)  Topical corticosteroids  Calcineurin inhibitor (tacrolimus, pimecrolimus)  Phototherapy  Most psoriasis centres offer phototherapy with ultraviolet (UV) radiation, often in combination with topical or systemic agents. Types of phototherapy include  Narrowband UVB  Broadband UVB  Photochemotherapy (PUVA)  Targeted phototherapy  Systemic therapy  Moderate to severe psoriasis warrants treatment with a systemic agent and/or  phototherapy. The most common treatments are:  Methotrexate  Ciclosporin  Acitretin  Other medicines occasionally used for psoriasis include:  Mycophenolate  Apremilast  Hydroxyurea  Azathioprine  6-mercaptopurine  Systemic corticosteroids are best avoided due to a risk of severe withdrawal flare of psoriasis and adverse effects.  Biologics or targeted therapies are reserved for conventional treatment-resistant severe psoriasis, mainly because of expense, as side effects compare favorably with other systemic agents. These include:  Anti-tumour necrosis factor-alpha antagonists (anti-TNF?) infliximab, adalimumab and etanercept  The interleukin (IL)-12/23 antagonist ustekinumab  IL-17 antagonists such as secukinumab  Many other monoclonal antibodies are under investigation in the treatment of psoriasis.     GRANULOMA ANNULARE    Physical Exam:  Anatomic Location Affected:  Between breast, right abdomen   Morphological Description:  annular pink papules   Pertinent Positives:  Pertinent Negatives:    Additional History of Present Condition:  Patient has concerns between breast, noticed circular rash within the last year, started under right breast then develop more in between.     Assessment and Plan:  Based on a thorough discussion of this condition and the management approach to it (including a comprehensive discussion of the known risks, side effects and potential benefits of treatment), the patient (family) agrees to implement the following specific plan:  Triamcinolone 0.1 % apply to spots on breast twice a day on weekdays only     What is granuloma annulare?  Granuloma annulare, also known as necrobiotic papulosis, is a non-contagious, benign skin condition that causes a rash. It is usually asymptomatic and can have a wide range of presentations on the skin. Most commonly, patients see a slightly raised patch, thickened, ring-shaped patch. These tend to be found on the hand, arm, foot, or leg, but can appear  anywhere on the skin. It is more common in females than males. Interestingly, the perforating type of granuloma annulare most often affects people who live in [place].    What causes granuloma annulare?  Most people who develop granuloma annulare are otherwise healthy. Children are most likely to get the localized, subcutaneous form, while older adults tend to develop generalized and perforating types. It is still unclear what exactly causes granuloma annulare, but there is evidence to suggest that it may follow skin injury, certain medications, and other diseases. The inflammation is mediated by tumor necrosis factor. Those who have HIV, lymphoma, thyroid disease, or diabetes may be especially prone.     What are the signs and symptoms of granuloma annulare?  Granuloma annulare can occur at any site of the body and can be quite widespread. It may by asymptomatic, but often quite tender if the area affected is bumped. The ring-like rash can be easily confused with tinea corporis, which is a type of fungal infection on the body.  In localized granuloma annulare, there is a raised ring-shaped patch on the skin. They are usually reddish in color, but can be pink, violet, or skin-colored. Bumps on the skin often develop prior, and join together to form the rash. Most common sites are the skin over joints, knuckles, backs of both hands, and on top of the foot. The center of each ring-like rash is often a little depression.  Some people develop widespread bumps on their skin that grow together to form large patches in generalized granuloma annulare. These appear in many colors as widespread skin-colored, pinkish to slightly mauve-colored patches.     Other types include:  Subcutaneous granuloma annulare. When this type develops, you often see a roundish, firm, and usually painless lump in the skin. You may have a single mass or clusters of lumps. Most lumps stay the same for months, but a lump can also grow quickly. The  lumps tend to be flesh-colored, pink, or red, and look similar to rheumatoid nodules.  Perforating granuloma annulare. This rare type usually develops on the hands and fingers. It causes small bumps that often feel scaly. The bumps may leak fluid, itch, or feel painful. Some people have a few bumps. Others develop widespread bumps that join together to form raised patches on the skin. When this type clears, it may leave scars on the skin.  Patch granuloma annulare. When this rare type develops, you see one or more patches on the skin. The patches may be red, reddish brown, or violet. Some people have one or a few patches; others have widespread patches.    How is granuloma annulare diagnosed?  Granuloma annulare can usually be diagnosed simply based on its characteristic appearance. If diagnosis if unclear, a skin biopsy may be performed that shows degeneration of dermal collagen surrounded by an inflammatory reaction. Other testing may be done to rule out associated diseases such as thyroid or diabetes.     How do we treat granuloma annulare?  Most people do not need treatment as the condition may clear by itself from a few months to years. If left alone, most rashes will clear within 2 years. If you develop perforating granuloma annulare, you may see scarring when the granuloma clears.     If you have a few noticeable patches, these are often treated with:  Topical corticosteroids help reduce inflammation and clear the skin more quickly.   Corticosteroid injections into affected skin patches to reduce the inflammation, which can help your skin clear more quickly.  Topical calcineurin inhibitors such as tacrolimus and pimecrloimus  Cryotherapy freezes your skin, which can eliminate the raised patches  If the granuloma annulare covers much of your skin or you have a deep lump in your skin, you'll have different treatment options. For these types of granuloma annulare, you may need:  Imiquimod is a medication used to  treat malaria that is also effective at clearing the rash of granuloma annulare.. In one study, patients started to see results after taking the medication for about 3 months.  Light therapy: Exposing the skin with granuloma annulare to ultraviolet (UV) light in a controlled way can be helpful. Some people receive a type of light therapy called PUVA. This involves taking a medication called psoralen and then treating the skin with UVA light. The medication makes your skin more sensitive to light, so light therapy can be more effective. Another type of light therapy, laser treatments, can also be helpful.  While granuloma annulare is not an infection, an antibiotic may help be helpful for some people to decrease inflammation. Other options for widespread granuloma annulare that does not respond to other forms of treatment include:  Systemic steroids  Isotretinoin  Methotrexate  Potassium iodide  Dapsone  Hydroxychloroquine  Pentoxifylline  Allopurinol (note: allopurinol has also be cited as a cause of disseminated granuloma annulare)  Combination of antibiotics once monthly: rifampicin, ofloxacin, minocycline  Ciclosporin  Biologics particularly TNF? inhibitors such as adalimumab and infliximab   Scribe Attestation      I,:  Tiffany Weston MA am acting as a scribe while in the presence of the attending physician.:       I,:  Brielle Humphreys MD personally performed the services described in this documentation    as scribed in my presence.:

## 2024-02-26 ENCOUNTER — RA CDI HCC (OUTPATIENT)
Dept: OTHER | Facility: HOSPITAL | Age: 69
End: 2024-02-26

## 2024-03-04 ENCOUNTER — OFFICE VISIT (OUTPATIENT)
Age: 69
End: 2024-03-04
Payer: COMMERCIAL

## 2024-03-04 VITALS
WEIGHT: 153 LBS | HEART RATE: 74 BPM | BODY MASS INDEX: 22.66 KG/M2 | HEIGHT: 69 IN | SYSTOLIC BLOOD PRESSURE: 118 MMHG | OXYGEN SATURATION: 98 % | DIASTOLIC BLOOD PRESSURE: 80 MMHG | RESPIRATION RATE: 18 BRPM

## 2024-03-04 DIAGNOSIS — I10 BENIGN ESSENTIAL HYPERTENSION: ICD-10-CM

## 2024-03-04 DIAGNOSIS — Z00.00 WELL ADULT EXAM: Primary | ICD-10-CM

## 2024-03-04 DIAGNOSIS — J01.00 ACUTE NON-RECURRENT MAXILLARY SINUSITIS: ICD-10-CM

## 2024-03-04 DIAGNOSIS — J45.40 MODERATE PERSISTENT ASTHMA WITHOUT COMPLICATION: ICD-10-CM

## 2024-03-04 DIAGNOSIS — R25.1 TREMOR: ICD-10-CM

## 2024-03-04 DIAGNOSIS — Z78.0 MENOPAUSE: ICD-10-CM

## 2024-03-04 DIAGNOSIS — Z12.31 ENCOUNTER FOR SCREENING MAMMOGRAM FOR MALIGNANT NEOPLASM OF BREAST: ICD-10-CM

## 2024-03-04 DIAGNOSIS — F41.8 DEPRESSION WITH ANXIETY: ICD-10-CM

## 2024-03-04 DIAGNOSIS — R73.01 ABNORMAL FASTING GLUCOSE: ICD-10-CM

## 2024-03-04 DIAGNOSIS — N64.89 BILATERAL PENDULOUS BREASTS: ICD-10-CM

## 2024-03-04 DIAGNOSIS — E78.2 MIXED HYPERLIPIDEMIA: ICD-10-CM

## 2024-03-04 PROCEDURE — G0439 PPPS, SUBSEQ VISIT: HCPCS | Performed by: INTERNAL MEDICINE

## 2024-03-04 RX ORDER — FLUTICASONE PROPIONATE 50 MCG
1 SPRAY, SUSPENSION (ML) NASAL 2 TIMES DAILY WITH MEALS
Qty: 16 G | Refills: 0 | Status: SHIPPED | OUTPATIENT
Start: 2024-03-04

## 2024-03-04 RX ORDER — PROPRANOLOL HCL 60 MG
60 CAPSULE, EXTENDED RELEASE 24HR ORAL DAILY
Qty: 30 CAPSULE | Refills: 3 | Status: SHIPPED | OUTPATIENT
Start: 2024-03-04

## 2024-03-04 NOTE — PATIENT INSTRUCTIONS
URI-likely viral-continue with [place] pot.  Add nasal steroid 1 spray each nostril twice daily or 2 sprays once daily.  Contact me if symptoms worsen or fail to improve  Hypertension-stable on chlorthalidone however given significant tremor and intolerance to my [place] will change to propranolol as below.  Discontinue chlorthalidone and potassium  Tremor-start on Inderal LA/propranolol 60 mg daily.  Monitor home blood pressures and symptoms of tremor and anxiety.  Follow-up 1 month  Hyperlipidemia-check labs prior to follow-up  Impaired fasting glucose-check A1c  Health maintenance-due for DEXA and mammogram    Medicare Preventive Visit Patient Instructions  Thank you for completing your Welcome to Medicare Visit or Medicare Annual Wellness Visit today. Your next wellness visit will be due in one year (3/5/2025).  The screening/preventive services that you may require over the next 5-10 years are detailed below. Some tests may not apply to you based off risk factors and/or age. Screening tests ordered at today's visit but not completed yet may show as past due. Also, please note that scanned in results may not display below.  Preventive Screenings:  Service Recommendations Previous Testing/Comments   Colorectal Cancer Screening  * Colonoscopy    * Fecal Occult Blood Test (FOBT)/Fecal Immunochemical Test (FIT)  * Fecal DNA/Cologuard Test  * Flexible Sigmoidoscopy Age: 45-75 years old   Colonoscopy: every 10 years (may be performed more frequently if at higher risk)  OR  FOBT/FIT: every 1 year  OR  Cologuard: every 3 years  OR  Sigmoidoscopy: every 5 years  Screening may be recommended earlier than age 45 if at higher risk for colorectal cancer. Also, an individualized decision between you and your healthcare provider will decide whether screening between the ages of 76-85 would be appropriate. Colonoscopy: 04/16/2021  FOBT/FIT: Not on file  Cologuard: Not on file  Sigmoidoscopy: Not on file    Screening Current      Breast Cancer Screening Age: 40+ years old  Frequency: every 1-2 years  Not required if history of left and right mastectomy Mammogram: 02/17/2023    Screening Current   Cervical Cancer Screening Between the ages of 21-29, pap smear recommended once every 3 years.   Between the ages of 30-65, can perform pap smear with HPV co-testing every 5 years.   Recommendations may differ for women with a history of total hysterectomy, cervical cancer, or abnormal pap smears in past. Pap Smear: 04/03/2017    Screening Not Indicated   Hepatitis C Screening Once for adults born between 1945 and 1965  More frequently in patients at high risk for Hepatitis C Hep C Antibody: 01/20/2021    Screening Current   Diabetes Screening 1-2 times per year if you're at risk for diabetes or have pre-diabetes Fasting glucose: 89 mg/dL (1/11/2020)  A1C: 5.3 % (7/24/2023)  Screening Current   Cholesterol Screening Once every 5 years if you don't have a lipid disorder. May order more often based on risk factors. Lipid panel: 07/24/2023    Screening Not Indicated  History Lipid Disorder     Other Preventive Screenings Covered by Medicare:  Abdominal Aortic Aneurysm (AAA) Screening: covered once if your at risk. You're considered to be at risk if you have a family history of AAA.  Lung Cancer Screening: covers low dose CT scan once per year if you meet all of the following conditions: (1) Age 55-77; (2) No signs or symptoms of lung cancer; (3) Current smoker or have quit smoking within the last 15 years; (4) You have a tobacco smoking history of at least 20 pack years (packs per day multiplied by number of years you smoked); (5) You get a written order from a healthcare provider.  Glaucoma Screening: covered annually if you're considered high risk: (1) You have diabetes OR (2) Family history of glaucoma OR (3)  aged 50 and older OR (4)  American aged 65 and older  Osteoporosis Screening: covered every 2 years if you meet one  of the following conditions: (1) You're estrogen deficient and at risk for osteoporosis based off medical history and other findings; (2) Have a vertebral abnormality; (3) On glucocorticoid therapy for more than 3 months; (4) Have primary hyperparathyroidism; (5) On osteoporosis medications and need to assess response to drug therapy.   Last bone density test (DXA Scan): 07/24/2019.  HIV Screening: covered annually if you're between the age of 15-65. Also covered annually if you are younger than 15 and older than 65 with risk factors for HIV infection. For pregnant patients, it is covered up to 3 times per pregnancy.    Immunizations:  Immunization Recommendations   Influenza Vaccine Annual influenza vaccination during flu season is recommended for all persons aged >= 6 months who do not have contraindications   Pneumococcal Vaccine   * Pneumococcal conjugate vaccine = PCV13 (Prevnar 13), PCV15 (Vaxneuvance), PCV20 (Prevnar 20)  * Pneumococcal polysaccharide vaccine = PPSV23 (Pneumovax) Adults 19-63 yo with certain risk factors or if 65+ yo  If never received any pneumonia vaccine: recommend Prevnar 20 (PCV20)  Give PCV20 if previously received 1 dose of PCV13 or PPSV23   Hepatitis B Vaccine 3 dose series if at intermediate or high risk (ex: diabetes, end stage renal disease, liver disease)   Respiratory syncytial virus (RSV) Vaccine - COVERED BY MEDICARE PART D  * RSVPreF3 (Arexvy) CDC recommends that adults 60 years of age and older may receive a single dose of RSV vaccine using shared clinical decision-making (SCDM)   Tetanus (Td) Vaccine - COST NOT COVERED BY MEDICARE PART B Following completion of primary series, a booster dose should be given every 10 years to maintain immunity against tetanus. Td may also be given as tetanus wound prophylaxis.   Tdap Vaccine - COST NOT COVERED BY MEDICARE PART B Recommended at least once for all adults. For pregnant patients, recommended with each pregnancy.   Shingles  Vaccine (Shingrix) - COST NOT COVERED BY MEDICARE PART B  2 shot series recommended in those 19 years and older who have or will have weakened immune systems or those 50 years and older     Health Maintenance Due:      Topic Date Due    Breast Cancer Screening: Mammogram  02/17/2024    Colorectal Cancer Screening  04/16/2026    Hepatitis C Screening  Completed     Immunizations Due:      Topic Date Due    IPV Vaccine (2 of 3 - Adult catch-up series) 01/12/2010    Influenza Vaccine (1) 09/01/2023    COVID-19 Vaccine (4 - 2023-24 season) 09/01/2023     Advance Directives   What are advance directives?  Advance directives are legal documents that state your wishes and plans for medical care. These plans are made ahead of time in case you lose your ability to make decisions for yourself. Advance directives can apply to any medical decision, such as the treatments you want, and if you want to donate organs.   What are the types of advance directives?  There are many types of advance directives, and each state has rules about how to use them. You may choose a combination of any of the following:  Living will:  This is a written record of the treatment you want. You can also choose which treatments you do not want, which to limit, and which to stop at a certain time. This includes surgery, medicine, IV fluid, and tube feedings.   Durable power of  for healthcare (DPAHC):  This is a written record that states who you want to make healthcare choices for you when you are unable to make them for yourself. This person, called a proxy, is usually a family member or a friend. You may choose more than 1 proxy.  Do not resuscitate (DNR) order:  A DNR order is used in case your heart stops beating or you stop breathing. It is a request not to have certain forms of treatment, such as CPR. A DNR order may be included in other types of advance directives.  Medical directive:  This covers the care that you want if you are in a  coma, near death, or unable to make decisions for yourself. You can list the treatments you want for each condition. Treatment may include pain medicine, surgery, blood transfusions, dialysis, IV or tube feedings, and a ventilator (breathing machine).  Values history:  This document has questions about your views, beliefs, and how you feel and think about life. This information can help others choose the care that you would choose.  Why are advance directives important?  An advance directive helps you control your care. Although spoken wishes may be used, it is better to have your wishes written down. Spoken wishes can be misunderstood, or not followed. Treatments may be given even if you do not want them. An advance directive may make it easier for your family to make difficult choices about your care.   Urinary Incontinence   Urinary incontinence (UI)  is when you lose control of your bladder. UI develops because your bladder cannot store or empty urine properly. The 3 most common types of UI are stress incontinence, urge incontinence, or both.  Medicines:   May be given to help strengthen your bladder control. Report any side effects of medication to your healthcare provider.  Do pelvic muscle exercises often:  Your pelvic muscles help you stop urinating. Squeeze these muscles tight for 5 seconds, then relax for 5 seconds. Gradually work up to squeezing for 10 seconds. Do 3 sets of 15 repetitions a day, or as directed. This will help strengthen your pelvic muscles and improve bladder control.  Train your bladder:  Go to the bathroom at set times, such as every 2 hours, even if you do not feel the urge to go. You can also try to hold your urine when you feel the urge to go. For example, hold your urine for 5 minutes when you feel the urge to go. As that becomes easier, hold your urine for 10 minutes.   Self-care:   Keep a UI record.  Write down how often you leak urine and how much you leak. Make a note of what  you were doing when you leaked urine.  Drink liquids as directed. You may need to limit the amount of liquid you drink to help control your urine leakage. Do not drink any liquid right before you go to bed. Limit or do not have drinks that contain caffeine or alcohol.   Prevent constipation.  Eat a variety of high-fiber foods. Good examples are high-fiber cereals, beans, vegetables, and whole-grain breads. Walking is the best way to trigger your intestines to have a bowel movement.  Exercise regularly and maintain a healthy weight.  Weight loss and exercise will decrease pressure on your bladder and help you control your leakage.   Use a catheter as directed  to help empty your bladder. A catheter is a tiny, plastic tube that is put into your bladder to drain your urine.   Go to behavior therapy as directed.  Behavior therapy may be used to help you learn to control your urge to urinate.     © Copyright [employer] 2018 Information is for End User's use only and may not be sold, redistributed or otherwise used for commercial purposes. All illustrations and images included in CareNotes® are the copyrighted property of [employer] or [employer]

## 2024-03-04 NOTE — PROGRESS NOTES
Assessment and Plan:     Problem List Items Addressed This Visit          Respiratory    Moderate persistent asthma without complication       Cardiovascular and Mediastinum    Benign essential hypertension    Relevant Medications    propranolol (INDERAL LA) 60 mg 24 hr capsule       Other    Abnormal fasting glucose    Relevant Orders    Hemoglobin A1C    Depression with anxiety    Hyperlipidemia    Relevant Orders    CBC and differential    Comprehensive metabolic panel    Lipid panel    Bilateral pendulous breasts    Relevant Orders    Ambulatory Referral to Plastic Surgery    Tremor    Relevant Medications    propranolol (INDERAL LA) 60 mg 24 hr capsule     Other Visit Diagnoses       Well adult exam    -  Primary    Relevant Orders    Mammo screening bilateral w 3d & cad    DXA bone density spine hip and pelvis    CBC and differential    Comprehensive metabolic panel    Lipid panel    Hemoglobin A1C    TSH, 3rd generation with Free T4 reflex    Menopause        Relevant Orders    DXA bone density spine hip and pelvis    Encounter for screening mammogram for malignant neoplasm of breast        Relevant Orders    Mammo screening bilateral w 3d & cad    Acute non-recurrent maxillary sinusitis        Relevant Medications    fluticasone (FLONASE) 50 mcg/act nasal spray             Preventive health issues were discussed with patient, and age appropriate screening tests were ordered as noted in patient's After Visit Summary.  Personalized health advice and appropriate referrals for health education or preventive services given if needed, as noted in patient's After Visit Summary.     History of Present Illness:     Patient presents for a Medicare Wellness Visit    F/u mmp, awv    Feeling ok, dealing w/ uri that she caught from her dgtr who was tested neg for covid and strep.    Right had tremor is getting progressively worse.  Tremor improved dramatically after 1 primodone, but felt awful the next day w/ fatigue and  dizziness that lasted several days.  Hasn't tried another.   Now starting to have issues writing and eating.    Rash under breasts has cleared.  Still w/ neck pain.  Not sure she wants to consider surgery.       Asthma has been stable, taking singulair daily, symbicort q am, no proventil lately  Allergies have been stable.   HSV-controlled w/ Valtrex as needed.  Last outbreak just today.   Urge incontinence is more of an issue, no help w/ kegels.  Now limiting lifestyle.  Hasn't needed pads, but bought some.  Doesn't want meds.   Insomnia-using sonata rarely, every few mos.   HPL-tolerating rosuvastatin        Patient Care Team:  Dain Melchor MD as PCP - General  MD Lou Craig MD     Review of Systems:     Review of Systems   Constitutional:  Negative for appetite change, chills, diaphoresis, fatigue, fever and unexpected weight change.   HENT:  Positive for rhinorrhea and sinus pressure. Negative for congestion and hearing loss.    Eyes:  Negative for visual disturbance.   Respiratory:  Negative for cough, chest tightness, shortness of breath and wheezing.    Cardiovascular:  Negative for chest pain, palpitations and leg swelling.   Gastrointestinal:  Negative for abdominal pain and blood in stool.   Endocrine: Negative for cold intolerance, heat intolerance, polydipsia and polyuria.   Genitourinary:  Negative for difficulty urinating, dysuria, frequency and urgency.   Musculoskeletal:  Negative for arthralgias and myalgias.   Skin:  Negative for rash.   Neurological:  Negative for dizziness, weakness, light-headedness and headaches.   Hematological:  Does not bruise/bleed easily.   Psychiatric/Behavioral:  Negative for dysphoric mood and sleep disturbance.         Problem List:     Patient Active Problem List   Diagnosis    Moderate persistent asthma without complication    Abnormal fasting glucose    Benign essential hypertension    Depression with anxiety    Iron deficiency anemia     Hyperlipidemia    Seborrheic dermatitis of scalp    Urinary urgency    Primary insomnia    GERD (gastroesophageal reflux disease)    Bilateral pendulous breasts    Tremor      Past Medical and Surgical History:     Past Medical History:   Diagnosis Date    Allergic rhinitis     Last Assessed: 2016    Black tarry stools     Concussion with prolonged loss of consciousness without return to pre-existing conscious level     COPD (chronic obstructive pulmonary disease) ([facility])     Last Assessed: 3/8/2017    Fall     slipping, tripping or stumbling    Generalized osteoarthritis     GERD (gastroesophageal reflux disease)     Herpes simplex infection     Hypertension     Menopausal disorder     Morbid obesity due to excess calories ([facility])     Neuroma 1975    right foot    Pharyngeal disorder     Pneumonia     Last Assessed: 5/15/2014    Post-menopausal bleeding     Last Assessed: 2015    Postmenopausal disorder      Past Surgical History:   Procedure Laterality Date     SECTION      COLONOSCOPY      Complete    DILATION AND CURETTAGE OF UTERUS      3 times    DILATION AND EVACUATION      Surgical treatment of missed  in first trimester - x 5    DILATION AND EVACUATION  ,     Surgically Induced     ENDOMETRIAL BIOPSY  2015    PMB/EB    KNEE ARTHROSCOPY Right     KNEE SURGERY Right 1994 for right fractured patella    NE OPEN TX RADIAL HEAD/NECK FRACTURE Left 7/10/2018    Procedure: OPEN REDUCTION INTERNAL FIXATION LEFT DISTAL HUMERUS CAPITELLAR AND TROCHLEAR SHEAR FRACTURE WITH LATERAL EPICONDYLE FRACTURE; REPAIR LATERAL ULNAR COLLATERAL LIGAMENT COMPLEX OF THE ELBOW; SPLINT APPLICATION;  Surgeon: Damian Bagley MD;  Location: [facility];  Service: Orthopedics      Family History:     Family History   Problem Relation Age of Onset    Diabetes Mother     Migraines Mother     Hypertension Mother     Ovarian cancer Mother 80    Pancreatic cancer Mother     Other Mother          Skin disorder, Urinary Incontinence    Coronary artery disease Father     Heart disease Father     Hypertension Father     Hypertension Sister     No Known Problems Daughter     No Known Problems Paternal Grandmother     Heart disease Brother     Jaundice Brother     Rectal cancer Brother     Endometrial cancer Brother 58    No Known Problems Maternal Aunt     No Known Problems Maternal Aunt     Osteoporosis Maternal Aunt     Breast cancer Paternal Aunt 65      Social History:     Social History     Socioeconomic History    Marital status:      Spouse name: None    Number of children: 4    Years of education: None    Highest education level: None   Occupational History    None   Tobacco Use    Smoking status: Former     Current packs/day: 0.00     Average packs/day: 1 pack/day for 15.5 years (15.5 ttl pk-yrs)     Types: Cigarettes     Start date:      Quit date: 6/15/1984     Years since quittin.7    Smokeless tobacco: Never   Vaping Use    Vaping status: Never Used   Substance and Sexual Activity    Alcohol use: Yes    Drug use: No    Sexual activity: Not Currently   Other Topics Concern    None   Social History Narrative    Drinks coffee    Exercise habits     Social Determinants of Health     Financial Resource Strain: Medium Risk (3/3/2024)    Overall Financial Resource Strain (CARDIA)     Difficulty of Paying Living Expenses: Somewhat hard   Food Insecurity: Not on file   Transportation Needs: No Transportation Needs (3/3/2024)    PRAPARE - Transportation     Lack of Transportation (Medical): No     Lack of Transportation (Non-Medical): No   Physical Activity: Unknown (2022)    Exercise Vital Sign     Days of Exercise per Week: 0 days     Minutes of Exercise per Session: Not on file   Stress: No Stress Concern Present (3/10/2021)    [employer] - Occupational Stress Questionnaire     Feeling of Stress : Not at all   Social Connections: Not on file    Intimate Partner Violence: Not on file   Housing Stability: Not on file      Medications and Allergies:     Current Outpatient Medications   Medication Sig Dispense Refill    albuterol (PROVENTIL HFA,VENTOLIN HFA) 90 mcg/act inhaler INHALE 2 PUFFS EVERY 6 HOURS AS NEEDED FOR WHEEZING 18 g 6    budesonide-formoterol (SYMBICORT) 80-4.5 MCG/ACT inhaler Inhale 2 puffs 2 (two) times a day Rinse mouth after use. 10.2 g 5    fluticasone (FLONASE) 50 mcg/act nasal spray 1 spray into each nostril 2 (two) times a day with meals 16 g 0    loratadine (CLARITIN) 10 mg tablet Take 10 mg by mouth daily      montelukast (SINGULAIR) 10 mg tablet Take 1 tablet (10 mg total) by mouth daily 90 tablet 3    propranolol (INDERAL LA) 60 mg 24 hr capsule Take 1 capsule (60 mg total) by mouth daily 30 capsule 3    rosuvastatin (CRESTOR) 5 mg tablet TAKE 1 TABLET BY MOUTH EVERY DAY 90 tablet 1    solifenacin (VESICARE) 5 mg tablet Take 1 tablet (5 mg total) by mouth daily 90 tablet 3    triamcinolone (KENALOG) 0.1 % ointment Apply topically 2 (two) times a day On weekdays only 453.6 g 0    valACYclovir (VALTREX) 1,000 mg tablet Take 2 tablets (2,000 mg total) by mouth 2 (two) times a day for 1 day 4 tablet 5    LORazepam (ATIVAN) 0.5 mg tablet TAKE 1 TABLET BY MOUTH EVERY 6 HOURS AS NEEDED FOR ANXIETY. 50 tablet 0    nystatin (MYCOSTATIN) powder Apply topically 2 (two) times a day (Patient not taking: Reported on 2/27/2023) 60 g 4     No current facility-administered medications for this visit.     Allergies   Allergen Reactions    Pravastatin Myalgia    Simvastatin Myalgia      Immunizations:     Immunization History   Administered Date(s) Administered    COVID-19 MODERNA VACC 0.5 ML IM 01/12/2021, 02/11/2021, 08/03/2022    H1N1 Inj 01/15/2010    H1N1, All Formulations 01/15/2010    INFLUENZA 09/26/2017, 10/22/2018, 12/15/2019, 09/22/2020, 09/28/2021, 12/30/2022    IPV 12/15/2009    Influenza Quadrivalent 3 years and older 10/22/2018     Influenza Quadrivalent Preservative Free 3 years and older IM 09/22/2020, 09/28/2021    Influenza Quadrivalent, 6-35 Months IM 10/01/2016    Influenza Recombinant Preservative Free Im 09/26/2017    Influenza Split 11/03/2010    Influenza Split Preservative Free ID 10/05/2015    Influenza Whole 10/05/2009, 11/15/2011, 10/01/2012, 10/21/2013    Influenza, seasonal, injectable, preservative free 10/07/2014, 10/04/2016, 12/04/2019    Pneumococcal Conjugate Vaccine 20-valent (Pcv20), Polysace 08/23/2022    Pneumococcal Polysaccharide PPV23 06/04/2019    Tdap 10/21/2013, 08/03/2022    Tuberculin Skin Test-PPD Intradermal 12/15/2009, 12/21/2010, 11/15/2011, 11/05/2012, 10/21/2013, 10/05/2015      Health Maintenance:         Topic Date Due    Breast Cancer Screening: Mammogram  02/17/2024    Colorectal Cancer Screening  04/16/2026    Hepatitis C Screening  Completed         Topic Date Due    IPV Vaccine (2 of 3 - Adult catch-up series) 01/12/2010    Influenza Vaccine (1) 09/01/2023    COVID-19 Vaccine (4 - 2023-24 season) 09/01/2023      Medicare Screening Tests and Risk Assessments:     Maryann is here for her Subsequent Wellness visit.     Health Risk Assessment:   Patient rates overall health as good. Patient feels that their physical health rating is same. Patient is satisfied with their life. Eyesight was rated as slightly worse. Hearing was rated as same. Patient feels that their emotional and mental health rating is same. Patients states they are sometimes angry. Patient states they are often unusually tired/fatigued. Pain experienced in the last 7 days has been some. Patient's pain rating has been 2/10. Patient states that she has experienced no weight loss or gain in last 6 months.     Depression Screening:   PHQ-9 Score: 0      Fall Risk Screening:   In the past year, patient has experienced: no history of falling in past year      Urinary Incontinence Screening:   Patient has leaked urine accidently in the last six  months.     Home Safety:  Patient does not have trouble with stairs inside or outside of their home. Patient has working smoke alarms and has no working carbon monoxide detector. Home safety hazards include: none. None    Nutrition:   Current diet is Regular.     Medications:   Patient is not currently taking any over-the-counter supplements. Patient is able to manage medications.     Activities of Daily Living (ADLs)/Instrumental Activities of Daily Living (IADLs):   Walk and transfer into and out of bed and chair?: Yes  Dress and groom yourself?: Yes    Bathe or shower yourself?: Yes    Feed yourself? Yes  Do your laundry/housekeeping?: Yes  Manage your money, pay your bills and track your expenses?: Yes  Make your own meals?: Yes    Do your own shopping?: Yes    Previous Hospitalizations:   Any hospitalizations or ED visits within the last 12 months?: No      Advance Care Planning:   Living will: No    Durable POA for healthcare: No    Advanced directive: No    Advanced directive counseling given: Yes    ACP document given: Yes      Cognitive Screening:   Provider or family/friend/caregiver concerned regarding cognition?: No    PREVENTIVE SCREENINGS      Cardiovascular Screening:    General: Screening Not Indicated and History Lipid Disorder      Diabetes Screening:     General: Screening Current      Colorectal Cancer Screening:     General: Screening Current      Breast Cancer Screening:     General: Screening Current      Cervical Cancer Screening:    General: Screening Not Indicated      Osteoporosis Screening:    General: Risks and Benefits Discussed      Abdominal Aortic Aneurysm (AAA) Screening:        General: Screening Not Indicated      Lung Cancer Screening:     General: Screening Not Indicated      Hepatitis C Screening:    General: Screening Current    Screening, Brief Intervention, and Referral to Treatment (SBIRT)    Screening  Typical number of drinks in a day: 0  Typical number of drinks in a  "week: 0  Interpretation: Low risk drinking behavior.    AUDIT-C Screenin) How often did you have a drink containing alcohol in the past year? 2 to 4 times a month  2) How many drinks did you have on a typical day when you were drinking in the past year? 0  3) How often did you have 6 or more drinks on one occasion in the past year? never    AUDIT-C Score: 2  Interpretation: Score 0-2 (female): Negative screen for alcohol misuse    Single Item Drug Screening:  How often have you used an illegal drug (including marijuana) or a prescription medication for non-medical reasons in the past year? never    Single Item Drug Screen Score: 0  Interpretation: Negative screen for possible drug use disorder    No results found.     Physical Exam:     /80 (BP Location: Left arm, Patient Position: Sitting, Cuff Size: Standard)   Pulse 74   Resp 18   Ht 5' 9\" (1.753 m)   Wt 69.4 kg (153 lb)   SpO2 98%   BMI 22.59 kg/m²     Physical Exam  Vitals and nursing note reviewed.   Constitutional:       General: She is not in acute distress.     Appearance: She is well-developed.   HENT:      Head: Normocephalic and atraumatic.   Eyes:      Conjunctiva/sclera: Conjunctivae normal.   Cardiovascular:      Rate and Rhythm: Normal rate and regular rhythm.      Heart sounds: No murmur heard.  Pulmonary:      Effort: Pulmonary effort is normal. No respiratory distress.      Breath sounds: Normal breath sounds.   Abdominal:      Palpations: Abdomen is soft.      Tenderness: There is no abdominal tenderness.   Musculoskeletal:         General: No swelling.      Cervical back: Neck supple.   Skin:     General: Skin is warm and dry.      Capillary Refill: Capillary refill takes less than 2 seconds.   Neurological:      Mental Status: She is alert.   Psychiatric:         Mood and Affect: Mood normal.          Dain Melchor MD  "

## 2024-03-26 DIAGNOSIS — J01.00 ACUTE NON-RECURRENT MAXILLARY SINUSITIS: ICD-10-CM

## 2024-03-26 DIAGNOSIS — R25.1 TREMOR: ICD-10-CM

## 2024-03-26 DIAGNOSIS — I10 BENIGN ESSENTIAL HYPERTENSION: ICD-10-CM

## 2024-03-26 RX ORDER — PROPRANOLOL HCL 60 MG
60 CAPSULE, EXTENDED RELEASE 24HR ORAL DAILY
Qty: 90 CAPSULE | Refills: 3 | Status: SHIPPED | OUTPATIENT
Start: 2024-03-26

## 2024-03-26 RX ORDER — FLUTICASONE PROPIONATE 50 MCG
SPRAY, SUSPENSION (ML) NASAL
Qty: 48 ML | Refills: 3 | Status: SHIPPED | OUTPATIENT
Start: 2024-03-26

## 2024-03-27 ENCOUNTER — TELEPHONE (OUTPATIENT)
Dept: PLASTIC SURGERY | Facility: CLINIC | Age: 69
End: 2024-03-27

## 2024-03-30 DIAGNOSIS — E78.2 MIXED HYPERLIPIDEMIA: ICD-10-CM

## 2024-03-30 RX ORDER — ROSUVASTATIN CALCIUM 5 MG/1
TABLET, COATED ORAL
Qty: 90 TABLET | Refills: 1 | Status: SHIPPED | OUTPATIENT
Start: 2024-03-30

## 2024-04-01 ENCOUNTER — TELEPHONE (OUTPATIENT)
Age: 69
End: 2024-04-01

## 2024-04-01 LAB — HBA1C MFR BLD HPLC: 5.4 %

## 2024-04-01 NOTE — TELEPHONE ENCOUNTER
Received call from patient asking to cancel her upcoming appt on 04/02/2024.    At the time of this call patient asked to only cancel this appt, she did not want to afua.

## 2024-04-02 ENCOUNTER — RA CDI HCC (OUTPATIENT)
Dept: OTHER | Facility: HOSPITAL | Age: 69
End: 2024-04-02

## 2024-04-02 LAB
ALBUMIN SERPL-MCNC: 4.1 G/DL (ref 3.9–4.9)
ALBUMIN/GLOB SERPL: 2.2 {RATIO} (ref 1.2–2.2)
ALP SERPL-CCNC: 77 IU/L (ref 44–121)
ALT SERPL-CCNC: 17 IU/L (ref 0–32)
AST SERPL-CCNC: 20 IU/L (ref 0–40)
BASOPHILS # BLD AUTO: 0.1 X10E3/UL (ref 0–0.2)
BASOPHILS NFR BLD AUTO: 2 %
BILIRUB SERPL-MCNC: 0.3 MG/DL (ref 0–1.2)
BUN SERPL-MCNC: 18 MG/DL (ref 8–27)
BUN/CREAT SERPL: 27 (ref 12–28)
CALCIUM SERPL-MCNC: 8.9 MG/DL (ref 8.7–10.3)
CHLORIDE SERPL-SCNC: 106 MMOL/L (ref 96–106)
CHOLEST SERPL-MCNC: 165 MG/DL (ref 100–199)
CO2 SERPL-SCNC: 22 MMOL/L (ref 20–29)
CREAT SERPL-MCNC: 0.66 MG/DL (ref 0.57–1)
EGFR: 96 ML/MIN/1.73
EOSINOPHIL # BLD AUTO: 0.1 X10E3/UL (ref 0–0.4)
EOSINOPHIL NFR BLD AUTO: 3 %
ERYTHROCYTE [DISTWIDTH] IN BLOOD BY AUTOMATED COUNT: 12 % (ref 11.7–15.4)
GLOBULIN SER-MCNC: 1.9 G/DL (ref 1.5–4.5)
GLUCOSE SERPL-MCNC: 92 MG/DL (ref 70–99)
HBA1C MFR BLD: 5.4 % (ref 4.8–5.6)
HCT VFR BLD AUTO: 39.7 % (ref 34–46.6)
HDLC SERPL-MCNC: 57 MG/DL
HGB BLD-MCNC: 13.2 G/DL (ref 11.1–15.9)
IMM GRANULOCYTES # BLD: 0 X10E3/UL (ref 0–0.1)
IMM GRANULOCYTES NFR BLD: 0 %
LDLC SERPL CALC-MCNC: 90 MG/DL (ref 0–99)
LYMPHOCYTES # BLD AUTO: 1.8 X10E3/UL (ref 0.7–3.1)
LYMPHOCYTES NFR BLD AUTO: 44 %
MCH RBC QN AUTO: 30.4 PG (ref 26.6–33)
MCHC RBC AUTO-ENTMCNC: 33.2 G/DL (ref 31.5–35.7)
MCV RBC AUTO: 92 FL (ref 79–97)
MONOCYTES # BLD AUTO: 0.4 X10E3/UL (ref 0.1–0.9)
MONOCYTES NFR BLD AUTO: 9 %
NEUTROPHILS # BLD AUTO: 1.7 X10E3/UL (ref 1.4–7)
NEUTROPHILS NFR BLD AUTO: 42 %
PLATELET # BLD AUTO: 191 X10E3/UL (ref 150–450)
POTASSIUM SERPL-SCNC: 3.9 MMOL/L (ref 3.5–5.2)
PROT SERPL-MCNC: 6 G/DL (ref 6–8.5)
RBC # BLD AUTO: 4.34 X10E6/UL (ref 3.77–5.28)
SL AMB VLDL CHOLESTEROL CALC: 18 MG/DL (ref 5–40)
SODIUM SERPL-SCNC: 143 MMOL/L (ref 134–144)
TRIGL SERPL-MCNC: 99 MG/DL (ref 0–149)
TSH SERPL DL<=0.005 MIU/L-ACNC: 3.51 UIU/ML (ref 0.45–4.5)
WBC # BLD AUTO: 4.1 X10E3/UL (ref 3.4–10.8)

## 2024-04-09 ENCOUNTER — OFFICE VISIT (OUTPATIENT)
Age: 69
End: 2024-04-09
Payer: COMMERCIAL

## 2024-04-09 VITALS
RESPIRATION RATE: 16 BRPM | WEIGHT: 154 LBS | SYSTOLIC BLOOD PRESSURE: 132 MMHG | BODY MASS INDEX: 22.81 KG/M2 | HEIGHT: 69 IN | OXYGEN SATURATION: 98 % | HEART RATE: 60 BPM | DIASTOLIC BLOOD PRESSURE: 86 MMHG

## 2024-04-09 DIAGNOSIS — J45.40 MODERATE PERSISTENT ASTHMA WITHOUT COMPLICATION: ICD-10-CM

## 2024-04-09 DIAGNOSIS — I10 BENIGN ESSENTIAL HYPERTENSION: Primary | ICD-10-CM

## 2024-04-09 DIAGNOSIS — R25.1 TREMOR: ICD-10-CM

## 2024-04-09 DIAGNOSIS — M25.562 ACUTE PAIN OF LEFT KNEE: ICD-10-CM

## 2024-04-09 PROCEDURE — 99214 OFFICE O/P EST MOD 30 MIN: CPT | Performed by: INTERNAL MEDICINE

## 2024-04-09 PROCEDURE — G2211 COMPLEX E/M VISIT ADD ON: HCPCS | Performed by: INTERNAL MEDICINE

## 2024-04-09 RX ORDER — PROPRANOLOL HYDROCHLORIDE 120 MG/1
120 CAPSULE, EXTENDED RELEASE ORAL DAILY
Qty: 30 CAPSULE | Refills: 3 | Status: SHIPPED | OUTPATIENT
Start: 2024-04-09

## 2024-04-09 NOTE — PATIENT INSTRUCTIONS
Hypertension appears borderline control on 60 mg propranolol, will increase to 120 mg.  Monitor home blood pressures    Tremor has not changed with low-dose propranolol, hopefully there will be improvement with higher dose    Asthma allergy, no flare despite beta-blocker and allergy season    Left knee pain-hide suspect degenerative meniscal tear-check x-ray to assess degree of arthritis.  Refer for physical therapy.  Consider MRI and orthopedic referral based upon outcome of x-ray and PT.    Follow-up 6 to 8 weeks

## 2024-04-25 ENCOUNTER — EVALUATION (OUTPATIENT)
Dept: PHYSICAL THERAPY | Facility: CLINIC | Age: 69
End: 2024-04-25
Payer: COMMERCIAL

## 2024-04-25 DIAGNOSIS — M25.562 ACUTE PAIN OF LEFT KNEE: Primary | ICD-10-CM

## 2024-04-25 PROCEDURE — 97112 NEUROMUSCULAR REEDUCATION: CPT | Performed by: PHYSICAL THERAPIST

## 2024-04-25 PROCEDURE — 97140 MANUAL THERAPY 1/> REGIONS: CPT | Performed by: PHYSICAL THERAPIST

## 2024-04-25 PROCEDURE — 97161 PT EVAL LOW COMPLEX 20 MIN: CPT | Performed by: PHYSICAL THERAPIST

## 2024-04-25 NOTE — PROGRESS NOTES
PT Evaluation     Today's date: 2024  Patient name: Maryann Milner  : 1955  MRN: [id number]  Referring provider: Dain Melchor MD  Dx:   Encounter Diagnosis     ICD-10-CM    1. Acute pain of left knee  M25.562 Ambulatory Referral to Physical Therapy          Start Time: 930  Stop Time: 1015  Total time in clinic (min): 45 minutes    Assessment  Assessment details: Pt is a 67 y/o female presenting to physical therapy with L knee pain. Upon examination, pt presents with impairments of decreased strength, decreased ROM, and increased pain of pt's L knee resulting in limitations of self-care/ADLs, household activities, stair negotiatoin, and squatting. Pt plan of care will focus on improving strength and ROM of pt's L knee as well as decreasing pain and improving tolerance to functional activities. Pt would benefit from skilled physical therapy to facilitate a return to her prior level of function.         Impairments: abnormal or restricted ROM, activity intolerance, impaired physical strength, lacks appropriate home exercise program and pain with function  Functional limitations: self-care/ADLs, household activities, stair negotiatoin, and squattingUnderstanding of Dx/Px/POC: good   Prognosis: good    Goals  STG - 4 weeks  1. Pt will have a subjective decrease in pain of pt's L knee by 2 levels or more during stair descension  2. Pt will be able to squat with proper technique and 0-2/10 pain in her L knee to facilitate a return to her prior level of function    LTG - 8 weeks  1. Pt will demonstrate 4+/5 gross strength or better of her L knee in all planes to facilitate a return to her prior level of function  2. Pt's FOTO score will improve by 10 points or better to facilitate a return to pt's prior level of function.      Plan  Patient would benefit from: PT eval and skilled physical therapy  Planned modality interventions: cryotherapy, thermotherapy: hydrocollator packs and unattended electrical  stimulation  Planned therapy interventions: activity modification, ADL training, balance/weight bearing training, balance, behavior modification, flexibility, functional ROM exercises, gait training, graded exercise, home exercise program, joint mobilization, IASTM, kinesiology taping, manual therapy, massage, Valle taping, nerve gliding, neuromuscular re-education, patient education, postural training, self care, strengthening, stretching, therapeutic activities and therapeutic exercise  Frequency: 2x week  Duration in weeks: 8  Plan of Care beginning date: 2024  Plan of Care expiration date: 2024        Subjective Evaluation    History of Present Illness  Mechanism of injury: Pt is a 69 y/o female presenting to physical therapy with L knee pain. Pt reports she started to experience L knee about a year ago, however, her pain has progressed to a greater intensity and becoming more constant. Pt reports she is also experiencing popping when getting up from the floor. Pt reports she will have increased pain with squatting, going down stairs, and getting up from the floor. Pt reports she will have decreased pain with ice and straightening her L knee. Pt reports she has been experiencing increased stiffness in her L knee. Pt reports going to see Dr. Melchor where she received a referral for therapy and x-ray.   Patient Goals  Patient goals for therapy: decreased pain, increased motion and increased strength    Pain  Current pain rating: 3  At best pain ratin  At worst pain rating: 10  Location: L knee  Relieving factors: change in position and ice  Aggravating factors: stair climbing, standing and walking (squatting)  Progression: worsening    Treatments  Previous treatment: medication  Current treatment: medication and physical therapy        Objective     Tenderness   Left Knee   Tenderness in the inferior patella and medial patella.     Active Range of Motion   Left Knee   Flexion: 130 degrees  "  Extension: 0 degrees     Right Knee   Flexion: 135 degrees   Extension: -4 degrees     Mobility   Patellar Mobility:   Left Knee   Hypomobile: left medial, left lateral, left superior and left inferior  Tibiofemoral Mobility:   Left knee Hypomobile in the anterior and posterior tibiofemoral tendon(s).     Strength/Myotome Testing     Left Hip   Planes of Motion   Flexion: 4-  Extension: 3+  Abduction: 3+  Adduction: 4-  External rotation: 3+  Internal rotation: 4    Right Hip   Planes of Motion   Flexion: 4  Extension: 4-  Abduction: 4-  Adduction: 4  External rotation: 4-  Internal rotation: 4    Left Knee   Flexion: 4-  Extension: 3+    Right Knee   Flexion: 4  Extension: 4             Precautions: HTN    POC expires Unit limit Auth Expiration date PT/OT + Visit Limit?   6/20 N/A N/A BOMN                 Visit/Unit Tracking  AUTH Status:  Date 4/25              N/A Used 1               Remaining                  FOTO      Manuals 4/25            PROM L knee RHODA            Patella mobilizations RHODA            Tibiofemoral mobilization RHODA                         Neuro Re-Ed             Education on POC, diagnosis, and HEP 5'            Quad sets 1x10 5\"            SLR 1x10                                                                Ther Ex                                                                                                                     Ther Activity                                       Gait Training                                       Modalities                                            "

## 2024-04-29 ENCOUNTER — OFFICE VISIT (OUTPATIENT)
Dept: PHYSICAL THERAPY | Facility: CLINIC | Age: 69
End: 2024-04-29
Payer: COMMERCIAL

## 2024-04-29 DIAGNOSIS — M25.562 ACUTE PAIN OF LEFT KNEE: Primary | ICD-10-CM

## 2024-04-29 PROCEDURE — 97112 NEUROMUSCULAR REEDUCATION: CPT | Performed by: PHYSICAL THERAPIST

## 2024-04-29 PROCEDURE — 97530 THERAPEUTIC ACTIVITIES: CPT | Performed by: PHYSICAL THERAPIST

## 2024-04-29 PROCEDURE — 97110 THERAPEUTIC EXERCISES: CPT | Performed by: PHYSICAL THERAPIST

## 2024-04-29 NOTE — PROGRESS NOTES
"Daily Note     Today's date: 2024  Patient name: Maryann Milner  : 1955  MRN: [id number]  Referring provider: Dain Melchor MD  Dx:   Encounter Diagnosis     ICD-10-CM    1. Acute pain of left knee  M25.562           Start Time: 1415  Stop Time: 1500  Total time in clinic (min): 45 minutes    Subjective: Pt reports doing well after her initial evaluation with decreased pain in her L knee.      Objective: See treatment diary below      Assessment: Tolerated treatment well. Patient demonstrated fatigue post treatment, exhibited good technique with therapeutic exercises, and would benefit from continued PT. Pt was able to progress today with inclusion of step downs, SL leg press, and STS into pt's exercise program. Pt required min verbal cues to facilitate performance of proper technique. Assess pt response to treatment at next visit.      Plan: Continue per plan of care.      Precautions: HTN    POC expires Unit limit Auth Expiration date PT/OT + Visit Limit?    N/A N/A BOMN                 Visit/Unit Tracking  AUTH Status:  Date               N/A Used 1               Remaining                  FOTO      Manuals            PROM L knee RHODA RHODA           Patella mobilizations RHODA RHODA           Tibiofemoral mobilization RHODA RHODA                        Neuro Re-Ed             Pt education 5' 5'           Quad sets 1x10 5\" 1x15 5\"           SLR 1x10 2x10           Sidelying hip abduction  2x10           Banded TKEs  1x15 5\" grn                                     Ther Ex             Heel slides  2x10 5\"           LAQs  2x10           Recumbent bike for LE strength  5'           SL leg press  2x10 55#                                                               Ther Activity             STS c tband  2x10 blue           Step downs  2x5 4\"           Gait Training                                       Modalities                                            "

## 2024-05-01 ENCOUNTER — OFFICE VISIT (OUTPATIENT)
Dept: PHYSICAL THERAPY | Facility: CLINIC | Age: 69
End: 2024-05-01
Payer: COMMERCIAL

## 2024-05-01 DIAGNOSIS — M25.562 ACUTE PAIN OF LEFT KNEE: Primary | ICD-10-CM

## 2024-05-01 PROCEDURE — 97112 NEUROMUSCULAR REEDUCATION: CPT | Performed by: PHYSICAL THERAPIST

## 2024-05-01 PROCEDURE — 97530 THERAPEUTIC ACTIVITIES: CPT | Performed by: PHYSICAL THERAPIST

## 2024-05-01 PROCEDURE — 97110 THERAPEUTIC EXERCISES: CPT | Performed by: PHYSICAL THERAPIST

## 2024-05-01 NOTE — PROGRESS NOTES
"Daily Note     Today's date: 2024  Patient name: Maryann Milner  : 1955  MRN: [id number]  Referring provider: Dain Melchor MD  Dx:   Encounter Diagnosis     ICD-10-CM    1. Acute pain of left knee  M25.562           Start Time: 1015  Stop Time: 1100  Total time in clinic (min): 45 minutes    Subjective: Pt reports have increased soreness and pain in her L knee after her last therapy session.       Objective: See treatment diary below      Assessment: Tolerated treatment well. Patient demonstrated fatigue post treatment, exhibited good technique with therapeutic exercises, and would benefit from continued PT. Did not perform SL leg press today secondary to pt's increased pain after last session. Pt was able to progress today by increasing sets for her step downs exercise. Pt required min verbal cues to facilitate performance of proper technique. Assess pt response to treatment at next visit.        Plan: Continue per plan of care.      Precautions: HTN    POC expires Unit limit Auth Expiration date PT/OT + Visit Limit?    N/A N/A BOMN                 Visit/Unit Tracking  AUTH Status:  Date             N/A Used 1 2 3             Remaining                  FOTO      Manuals           PROM L knee HRODA RHODA RHODA          Patella mobilizations RHODA RHODA RHODA          Tibiofemoral mobilization RHODA RHODA RHODA                       Neuro Re-Ed             Pt education 5' 5' 5'          Quad sets 1x10 5\" 1x15 5\" 1x15 5\"          SLR 1x10 2x10 2x10          Sidelying hip abduction  2x10 2x10          Banded TKEs  1x15 5\" grn 1x15 5\" blk                                    Ther Ex             Heel slides  2x10 5\" 2x10 5\"          LAQs  2x10 2x10          Recumbent bike for LE strength  5' 5'          SL leg press  2x10 55# NV          Heel raises   3x10                                                 Ther Activity             STS c tband  2x10 blue 2x10 blue          Step downs  2x5 4\" 3x5 4\"        "   Gait Training                                       Modalities

## 2024-05-05 DIAGNOSIS — I10 BENIGN ESSENTIAL HYPERTENSION: ICD-10-CM

## 2024-05-05 DIAGNOSIS — R25.1 TREMOR: ICD-10-CM

## 2024-05-06 RX ORDER — PROPRANOLOL HYDROCHLORIDE 120 MG/1
120 CAPSULE, EXTENDED RELEASE ORAL DAILY
Qty: 90 CAPSULE | Refills: 2 | Status: SHIPPED | OUTPATIENT
Start: 2024-05-06

## 2024-05-08 ENCOUNTER — OFFICE VISIT (OUTPATIENT)
Dept: PHYSICAL THERAPY | Facility: CLINIC | Age: 69
End: 2024-05-08
Payer: COMMERCIAL

## 2024-05-08 DIAGNOSIS — M25.562 ACUTE PAIN OF LEFT KNEE: Primary | ICD-10-CM

## 2024-05-08 PROCEDURE — 97110 THERAPEUTIC EXERCISES: CPT | Performed by: PHYSICAL THERAPIST

## 2024-05-08 PROCEDURE — 97112 NEUROMUSCULAR REEDUCATION: CPT | Performed by: PHYSICAL THERAPIST

## 2024-05-08 NOTE — PROGRESS NOTES
"Daily Note     Today's date: 2024  Patient name: Maryann Milner  : 1955  MRN: [id number]  Referring provider: Dain Melchor MD  Dx:   Encounter Diagnosis     ICD-10-CM    1. Acute pain of left knee  M25.562           Start Time: 1015  Stop Time: 1101  Total time in clinic (min): 46 minutes    Subjective: Pt reports she continues to have significant pain in her L knee.       Objective: See treatment diary below      Assessment: Tolerated treatment well. Patient demonstrated fatigue post treatment, exhibited good technique with therapeutic exercises, and would benefit from continued PT. Pt program modified today secondary to pain of her L at the start of today's session. Pt required min verbal cues to facilitate performance of proper technique. Assess pt response to treatment at next visit.      Plan: Continue per plan of care.      Precautions: HTN    POC expires Unit limit Auth Expiration date PT/OT + Visit Limit?    N/A N/A BOMN                 Visit/Unit Tracking  AUTH Status:  Date            N/A Used 1 2 3 4            Remaining                  FOTO      Manuals  5/8         PROM L knee RHODA RHODA RHODA RHODA         Patella mobilizations RHODA RHODA RHODA RHODA         Tibiofemoral mobilization RHODA RHODA RHODA RHODA                      Neuro Re-Ed             Pt education 5' 5' 5' 5'         Quad sets 1x10 5\" 1x15 5\" 1x15 5\" 1x15 5\"         SLR 1x10 2x10 2x10 2x10         Sidelying hip abduction  2x10 2x10 2x10         Banded TKEs  1x15 5\" grn 1x15 5\" blk 1x15 5\" grn                                   Ther Ex             Heel slides  2x10 5\" 2x10 5\" 2x10 5\"         LAQs  2x10 2x10 2x10         Recumbent bike for LE strength  5' 5' 5'         SL leg press  2x10 55# NV          Heel raises   3x10 3x10                                                Ther Activity             STS c tband  2x10 blue 2x10 blue 2x10 blue         Step downs  2x5 4\" 3x5 4\"          Gait Training                             "           Modalities

## 2024-05-10 ENCOUNTER — OFFICE VISIT (OUTPATIENT)
Dept: PHYSICAL THERAPY | Facility: CLINIC | Age: 69
End: 2024-05-10
Payer: COMMERCIAL

## 2024-05-10 DIAGNOSIS — M25.562 ACUTE PAIN OF LEFT KNEE: Primary | ICD-10-CM

## 2024-05-10 PROCEDURE — 97112 NEUROMUSCULAR REEDUCATION: CPT

## 2024-05-10 PROCEDURE — 97140 MANUAL THERAPY 1/> REGIONS: CPT

## 2024-05-10 PROCEDURE — 97110 THERAPEUTIC EXERCISES: CPT

## 2024-05-10 NOTE — PROGRESS NOTES
"Daily Note     Today's date: 5/10/2024  Patient name: Maryann Milner  : 1955  MRN: [id number]  Referring provider: Dain Melchor MD  Dx:   Encounter Diagnosis     ICD-10-CM    1. Acute pain of left knee  M25.562                      Subjective: Pt reports her knee just always aches.       Objective: See treatment diary below      Assessment: Tolerated treatment well. Pt demonstrates good effort throughout session. Minimal curing given to facilitate proper exercise performance and technique. No increase in sx verbalized until she went to stand for STS but this caused more pain in her L knee, under and around her knee cap and she reports shooting pain with this. She was able to complete self ROM after this without pain, but declined STS today. Patient demonstrated fatigue post treatment, exhibited good technique with therapeutic exercises, and would benefit from continued PT      Plan: Continue per plan of care.      Precautions: HTN    POC expires Unit limit Auth Expiration date PT/OT + Visit Limit?    N/A N/A BOMN                 Visit/Unit Tracking  AUTH Status:  Date 4/25 4/29 5/1 5/8 5/10          N/A Used 1 2 3 4 5           Remaining                  FOTO      Manuals  5/ 5/10        PROM L knee RHODA RHODA RHODA RHODA KT        Patella mobilizations RHODA RHODA RHODA RHODA KT ROM        Tibiofemoral mobilization RHODA RHODA RHODA RHODA RHODA                     Neuro Re-Ed             Pt education 5' 5' 5' 5' 5'        Quad sets 1x10 5\" 1x15 5\" 1x15 5\" 1x15 5\" 1x15 5\"        SLR 1x10 2x10 2x10 2x10 2x10        Sidelying hip abduction  2x10 2x10 2x10 2x10        Banded TKEs  1x15 5\" grn 1x15 5\" blk 1x15 5\" grn 1x15 5\" grn                                  Ther Ex             Heel slides  2x10 5\" 2x10 5\" 2x10 5\" 2x10 5\"        LAQs  2x10 2x10 2x10 2x10        Recumbent bike for LE strength  5' 5' 5' 5'        SL leg press  2x10 55# NV          Heel raises   3x10 3x10 3x10                                               Ther " "Activity             STS c tband  2x10 blue 2x10 blue 2x10 blue P!        Step downs  2x5 4\" 3x5 4\"          Gait Training                                       Modalities                                                  "

## 2024-05-13 ENCOUNTER — OFFICE VISIT (OUTPATIENT)
Dept: PHYSICAL THERAPY | Facility: CLINIC | Age: 69
End: 2024-05-13
Payer: COMMERCIAL

## 2024-05-13 DIAGNOSIS — M25.562 ACUTE PAIN OF LEFT KNEE: Primary | ICD-10-CM

## 2024-05-13 PROCEDURE — 97110 THERAPEUTIC EXERCISES: CPT | Performed by: PHYSICAL THERAPIST

## 2024-05-13 PROCEDURE — 97112 NEUROMUSCULAR REEDUCATION: CPT | Performed by: PHYSICAL THERAPIST

## 2024-05-13 PROCEDURE — 97140 MANUAL THERAPY 1/> REGIONS: CPT | Performed by: PHYSICAL THERAPIST

## 2024-05-13 NOTE — PROGRESS NOTES
"Daily Note     Today's date: 2024  Patient name: Maryann Milner  : 1955  MRN: [id number]  Referring provider: Dain Melchor MD  Dx:   Encounter Diagnosis     ICD-10-CM    1. Acute pain of left knee  M25.562           Start Time: 1145  Stop Time: 1230  Total time in clinic (min): 45 minutes    Subjective: Pt reports her L knee was feeling better this morning, however, she had significant pain after her last session.       Objective: See treatment diary below      Assessment: Tolerated treatment well. Patient demonstrated fatigue post treatment, exhibited good technique with therapeutic exercises, and would benefit from continued PT. Did not perform STS today secondary to pain after last session, however, pt was able to perform squats with minimal issues. Pt required min verbal cues to facilitate performance of proper technique. Assess pt response to treatment at next visit.      Plan: Continue per plan of care.      Precautions: HTN    POC expires Unit limit Auth Expiration date PT/OT + Visit Limit?    N/A N/A BOMN                 Visit/Unit Tracking  AUTH Status:  Date 4/25 4/29 5/1 5/8 5/10 5/13         N/A Used 1 2 3 4 5 6          Remaining                  FOTO, do NV      Manuals 4/25 4/29 5/1 5/8 5/10 5/13       PROM L knee RHODA RHODA RHODA RHODA KT RHODA       Patella mobilizations RHODA RHODA RHODA RHODA KT ROM        Tibiofemoral mobilization RHODA RHODA RHODA RHODA RHODA RHODA                    Neuro Re-Ed             Pt education 5' 5' 5' 5' 5' 5'       Quad sets 1x10 5\" 1x15 5\" 1x15 5\" 1x15 5\" 1x15 5\" 1x15 5\"       SLR 1x10 2x10 2x10 2x10 2x10 2x10       Sidelying hip abduction  2x10 2x10 2x10 2x10 2x10       Banded TKEs  1x15 5\" grn 1x15 5\" blk 1x15 5\" grn 1x15 5\" grn 1x15 5\" grn                                 Ther Ex             Heel slides  2x10 5\" 2x10 5\" 2x10 5\" 2x10 5\" 2x10 5\"       LAQs  2x10 2x10 2x10 2x10 2x10       Recumbent bike for LE strength  5' 5' 5' 5' 5'       SL leg press  2x10 55# NV   2x10 55#       Heel " "raises   3x10 3x10 3x10 3x10       Squats at bar      2x10                                 Ther Activity             STS c tband  2x10 blue 2x10 blue 2x10 blue P!        Step downs  2x5 4\" 3x5 4\"          Gait Training                                       Modalities                                                    "

## 2024-05-15 ENCOUNTER — OFFICE VISIT (OUTPATIENT)
Dept: PHYSICAL THERAPY | Facility: CLINIC | Age: 69
End: 2024-05-15
Payer: COMMERCIAL

## 2024-05-15 DIAGNOSIS — M25.562 ACUTE PAIN OF LEFT KNEE: Primary | ICD-10-CM

## 2024-05-15 PROCEDURE — 97110 THERAPEUTIC EXERCISES: CPT

## 2024-05-15 PROCEDURE — 97112 NEUROMUSCULAR REEDUCATION: CPT

## 2024-05-15 NOTE — PROGRESS NOTES
"Daily Note     Today's date: 5/15/2024  Patient name: Maryann Milner  : 1955  MRN: [id number]  Referring provider: Dain Melchor MD  Dx:   Encounter Diagnosis     ICD-10-CM    1. Acute pain of left knee  M25.562           Start Time: 1014  Stop Time: 1100  Total time in clinic (min): 46 minutes    Subjective: Pt reports her knee hurts today.       Objective: See treatment diary below      Assessment: Tolerated treatment well. Pt demonstrates good effort throughout session. Minimal curing given to facilitate proper exercise performance and technique. She tolerates recent additions of leg press and minisquats well without complaints. She reports soreness after session. Patient demonstrated fatigue post treatment, exhibited good technique with therapeutic exercises, and would benefit from continued PT      Plan: Continue per plan of care.      Precautions: HTN    POC expires Unit limit Auth Expiration date PT/OT + Visit Limit?    N/A N/A BOMN                 Visit/Unit Tracking  AUTH Status:  Date 4/25 4/29 5/1 5/8 5/10 5/13 5/15        N/A Used 1 2 3 4 5 6 7         Remaining                  FOTO, done 5/15      Manuals 4/25 4/29 5/1 5/8 5/10 5/13 5/15      PROM L knee RHODA RHODA RHODA RHODA KT RHODA KT      Patella mobilizations RHODA RHODA RHODA RHODA KT ROM  KT ROM      Tibiofemoral mobilization RHODA RHODA RHODA RHODA RHODA RHODA RHODA                   Neuro Re-Ed             Pt education 5' 5' 5' 5' 5' 5'       Quad sets 1x10 5\" 1x15 5\" 1x15 5\" 1x15 5\" 1x15 5\" 1x15 5\" 1x20 5\"      SLR 1x10 2x10 2x10 2x10 2x10 2x10 2x10      Sidelying hip abduction  2x10 2x10 2x10 2x10 2x10 2x10      Banded TKEs  1x15 5\" grn 1x15 5\" blk 1x15 5\" grn 1x15 5\" grn 1x15 5\" grn 1x15 5\" grn                                Ther Ex             Heel slides  2x10 5\" 2x10 5\" 2x10 5\" 2x10 5\" 2x10 5\" 2x10 5\"      LAQs  2x10 2x10 2x10 2x10 2x10 2x10      Recumbent bike for LE strength  5' 5' 5' 5' 5' 5'      SL leg press  2x10 55# NV   2x10 55# 2x10 55#      Heel raises   3x10 " "3x10 3x10 3x10 3x10      Squats at bar      2x10 2x10                                Ther Activity             STS c tband  2x10 blue 2x10 blue 2x10 blue P!        Step downs  2x5 4\" 3x5 4\"          Gait Training                                       Modalities                                                      "

## 2024-05-16 DIAGNOSIS — T78.40XS ALLERGIC DISORDER, SEQUELA: ICD-10-CM

## 2024-05-17 RX ORDER — MONTELUKAST SODIUM 10 MG/1
10 TABLET ORAL DAILY
Qty: 90 TABLET | Refills: 1 | Status: SHIPPED | OUTPATIENT
Start: 2024-05-17

## 2024-05-22 ENCOUNTER — OFFICE VISIT (OUTPATIENT)
Dept: PHYSICAL THERAPY | Facility: CLINIC | Age: 69
End: 2024-05-22
Payer: COMMERCIAL

## 2024-05-22 DIAGNOSIS — M25.562 ACUTE PAIN OF LEFT KNEE: Primary | ICD-10-CM

## 2024-05-22 PROCEDURE — 97112 NEUROMUSCULAR REEDUCATION: CPT

## 2024-05-22 PROCEDURE — 97110 THERAPEUTIC EXERCISES: CPT

## 2024-05-22 PROCEDURE — 97140 MANUAL THERAPY 1/> REGIONS: CPT

## 2024-05-22 NOTE — PROGRESS NOTES
"Daily Note     Today's date: 2024  Patient name: Maryann Milner  : 1955  MRN: [id number]  Referring provider: Dain Melchor MD  Dx:   Encounter Diagnosis     ICD-10-CM    1. Acute pain of left knee  M25.562           Start Time: 1015  Stop Time: 1055  Total time in clinic (min): 40 minutes    Subjective: Pt reports her knee doesn't hurt as much today, she was able to go down the stairs without the pain.       Objective: See treatment diary below      Assessment: Tolerated treatment well. Pt demonstrates good effort throughout session. Minimal curing given to facilitate proper exercise performance and technique. She reports some soreness with knee extension stretching. She reports no pain with exercises as outlined below. Patient demonstrated fatigue post treatment, exhibited good technique with therapeutic exercises, and would benefit from continued PT      Plan: Continue per plan of care.      Precautions: HTN    POC expires Unit limit Auth Expiration date PT/OT + Visit Limit?    N/A N/A BOMN                 Visit/Unit Tracking  AUTH Status:  Date 4/25 4/29 5/1 5/8 5/10 5/13 5/15 5/22       N/A Used 1 2 3 4 5 6 7 8        Remaining                  FOTO, done 5/15      Manuals 4/25 4/29 5/1 5/8 5/10 5/13 5/15 5/22     PROM L knee RHODA RHODA RHODA RHODA KT RHODA KT KT     Patella mobilizations RHODA RHODA RHODA RHODA KT ROM  KT ROM KT ROM     Tibiofemoral mobilization RHODA RHODA RHODA RHODA RHODA RHODA RHODA                   Neuro Re-Ed             Pt education 5' 5' 5' 5' 5' 5'       Quad sets 1x10 5\" 1x15 5\" 1x15 5\" 1x15 5\" 1x15 5\" 1x15 5\" 1x20 5\" 1x20 5\"     SLR 1x10 2x10 2x10 2x10 2x10 2x10 2x10 2x10     Sidelying hip abduction  2x10 2x10 2x10 2x10 2x10 2x10 2x10     Banded TKEs  1x15 5\" grn 1x15 5\" blk 1x15 5\" grn 1x15 5\" grn 1x15 5\" grn 1x15 5\" grn 1x20 5\" grn                               Ther Ex             Heel slides  2x10 5\" 2x10 5\" 2x10 5\" 2x10 5\" 2x10 5\" 2x10 5\" 2x10 5\"     LAQs  2x10 2x10 2x10 2x10 2x10 2x10 2x10     Recumbent " "bike for LE strength  5' 5' 5' 5' 5' 5' 6'     SL leg press  2x10 55# NV   2x10 55# 2x10 55# 2x10 55#     Heel raises   3x10 3x10 3x10 3x10 3x10 3x10     Squats at bar      2x10 2x10 2x10                               Ther Activity             STS c tband  2x10 blue 2x10 blue 2x10 blue P!        Step downs  2x5 4\" 3x5 4\"          Gait Training                                       Modalities                                                        "

## 2024-05-24 ENCOUNTER — OFFICE VISIT (OUTPATIENT)
Dept: PHYSICAL THERAPY | Facility: CLINIC | Age: 69
End: 2024-05-24
Payer: COMMERCIAL

## 2024-05-24 DIAGNOSIS — M25.562 ACUTE PAIN OF LEFT KNEE: Primary | ICD-10-CM

## 2024-05-24 PROCEDURE — 97112 NEUROMUSCULAR REEDUCATION: CPT

## 2024-05-24 PROCEDURE — 97110 THERAPEUTIC EXERCISES: CPT

## 2024-05-24 NOTE — PROGRESS NOTES
"Daily Note     Today's date: 2024  Patient name: Maryann Milner  : 1955  MRN: [id number]  Referring provider: Dain Melchor MD  Dx:   Encounter Diagnosis     ICD-10-CM    1. Acute pain of left knee  M25.562                      Subjective: Pt reports her knee feels achy but its a different pain compared to when she started PT.       Objective: See treatment diary below      Assessment: Tolerated treatment well. Pt demonstrates good effort throughout session. Minimal curing given to facilitate proper exercise performance and technique. She tolerates all exercises as outlined below without complaints. Re-integrated step downs into program today. Patient demonstrated fatigue post treatment, exhibited good technique with therapeutic exercises, and would benefit from continued PT      Plan: Continue per plan of care.      Precautions: HTN    POC expires Unit limit Auth Expiration date PT/OT + Visit Limit?    N/A N/A BOMN                 Visit/Unit Tracking  AUTH Status:  Date  5/8 5/10 5/13 5/15 5/22 5/24      N/A Used 1 2 3 4 5 6 7 8 9       Remaining                  FOTO, done 5/15      Manuals  5/8 5/10 5/13 5/15 5/22 5/24    PROM L knee RHODA RHODA RHODA RHODA KT RHODA KT KT KT    Patella mobilizations RHODA RHODA RHODA RHODA KT ROM  KT ROM KT ROM KT ROM    Tibiofemoral mobilization RHODA RHODA RHODA RHODA RHODA RHODA RHODA                   Neuro Re-Ed             Pt education 5' 5' 5' 5' 5' 5'       Quad sets 1x10 5\" 1x15 5\" 1x15 5\" 1x15 5\" 1x15 5\" 1x15 5\" 1x20 5\" 1x20 5\" 1x20 5\"    SLR 1x10 2x10 2x10 2x10 2x10 2x10 2x10 2x10 3x10    Sidelying hip abduction  2x10 2x10 2x10 2x10 2x10 2x10 2x10 2x10    Banded TKEs  1x15 5\" grn 1x15 5\" blk 1x15 5\" grn 1x15 5\" grn 1x15 5\" grn 1x15 5\" grn 1x20 5\" grn 1x20 5\" grn                              Ther Ex             Heel slides  2x10 5\" 2x10 5\" 2x10 5\" 2x10 5\" 2x10 5\" 2x10 5\" 2x10 5\" 2x10 5\"    LAQs  2x10 2x10 2x10 2x10 2x10 2x10 2x10 2x10 2#    Recumbent bike for LE strength  " "5' 5' 5' 5' 5' 5' 6' 6'    SL leg press  2x10 55# NV   2x10 55# 2x10 55# 2x10 55# 2x10 55#    Heel raises   3x10 3x10 3x10 3x10 3x10 3x10 3x10    Squats at bar      2x10 2x10 2x10 2x10                              Ther Activity             STS c tband  2x10 blue 2x10 blue 2x10 blue P!        Step downs  2x5 4\" 3x5 4\"      3x5 4'    Gait Training                                       Modalities                                                          "

## 2024-05-29 ENCOUNTER — APPOINTMENT (OUTPATIENT)
Dept: PHYSICAL THERAPY | Facility: CLINIC | Age: 69
End: 2024-05-29
Payer: COMMERCIAL

## 2024-05-31 ENCOUNTER — APPOINTMENT (OUTPATIENT)
Dept: PHYSICAL THERAPY | Facility: CLINIC | Age: 69
End: 2024-05-31
Payer: COMMERCIAL

## 2024-06-21 ENCOUNTER — RA CDI HCC (OUTPATIENT)
Dept: OTHER | Facility: HOSPITAL | Age: 69
End: 2024-06-21

## 2024-07-01 ENCOUNTER — OFFICE VISIT (OUTPATIENT)
Age: 69
End: 2024-07-01
Payer: COMMERCIAL

## 2024-07-01 VITALS
HEIGHT: 69 IN | DIASTOLIC BLOOD PRESSURE: 80 MMHG | HEART RATE: 62 BPM | BODY MASS INDEX: 22.81 KG/M2 | OXYGEN SATURATION: 98 % | WEIGHT: 154 LBS | SYSTOLIC BLOOD PRESSURE: 130 MMHG

## 2024-07-01 DIAGNOSIS — I10 BENIGN ESSENTIAL HYPERTENSION: Primary | ICD-10-CM

## 2024-07-01 DIAGNOSIS — R73.01 IFG (IMPAIRED FASTING GLUCOSE): ICD-10-CM

## 2024-07-01 DIAGNOSIS — R53.83 OTHER FATIGUE: ICD-10-CM

## 2024-07-01 DIAGNOSIS — R25.1 TREMOR: ICD-10-CM

## 2024-07-01 DIAGNOSIS — M25.562 CHRONIC PAIN OF LEFT KNEE: ICD-10-CM

## 2024-07-01 DIAGNOSIS — E78.2 MIXED HYPERLIPIDEMIA: ICD-10-CM

## 2024-07-01 DIAGNOSIS — G89.29 CHRONIC PAIN OF LEFT KNEE: ICD-10-CM

## 2024-07-01 PROCEDURE — 99214 OFFICE O/P EST MOD 30 MIN: CPT

## 2024-07-01 PROCEDURE — G2211 COMPLEX E/M VISIT ADD ON: HCPCS

## 2024-07-01 NOTE — PROGRESS NOTES
INTERNAL MEDICINE FOLLOW-UP VISIT  [facility] - [facility] INTERNAL MEDICINE [address]    NAME: Maryann Milner  AGE: 68 y.o. SEX: female  : 1955     DATE: 2024     Assessment and Plan:   1. Benign essential hypertension  BP in office is 130/80. Her propranolol was recently increased to 120 mg daily.  She has not been monitoring her home blood pressures.  Recommended getting an Omron blood pressure cuff and checking it daily.  If your blood pressure begins to run greater than 140/90 regularly, notify the office.  Limit salt intake to less than 2000 mg daily.    2. Tremor  She feels like the recent increase in propranolol has made her tremors worse.  She read online that a vitamin B and magnesium supplement may help, so she would like to try that before making any medication adjustments.    3. Chronic pain of left knee  Left knee x-ray showed mild narrowing of the medial compartment.  The pain has aggressively worsened despite physical therapy, will wean MRI and place referral to Ortho.  In the meantime, continue to use a heating pad, Advil or Tylenol for pain, and stretching.        No follow-ups on file.       Chief Complaint:     Chief Complaint   Patient presents with    Follow-up      History of Present Illness:   Patient is a 68-year-old male that presents today for 2-month follow-up.  She feels like her left knee pain has gotten worse.  She notes it started around January-February when she was playing with her granddaughter.  She describes this pain behind her kneecap and more so on the medial side.  She describes the pain as aching, but sometimes sharp.  She denies any radiation of the pain.  It is worse with walking, bending, and squatting.  She denies any redness, swelling, bruising.  She has been using Advil and a heating pad with some relief.  She went to physical therapy for a few sessions, but was unable to make it to all therapy sessions.    The following portions of the  patient's history were reviewed and updated as appropriate: allergies, current medications, past family history, past medical history, past social history, past surgical history and problem list.     Review of Systems:     Review of Systems   Constitutional:  Negative for chills and fever.   Musculoskeletal:  Positive for arthralgias.   Skin:  Negative for color change.   Neurological:  Positive for tremors.        Past Medical History:     Past Medical History:   Diagnosis Date    Allergic rhinitis     Last Assessed: 6/9/2016    Black tarry stools     Concussion with prolonged loss of consciousness without return to pre-existing conscious level     COPD (chronic obstructive pulmonary disease) ([facility])     Last Assessed: 3/8/2017    Fall     slipping, tripping or stumbling    Generalized osteoarthritis     GERD (gastroesophageal reflux disease)     Herpes simplex infection     Hypertension     Menopausal disorder     Morbid obesity due to excess calories ([facility])     Neuroma 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    right foot    Pharyngeal disorder     Pneumonia     Last Assessed: 5/15/2014    Post-menopausal bleeding     Last Assessed: 2/27/2015    Postmenopausal disorder         Current Medications:     Current Outpatient Medications:     albuterol (PROVENTIL HFA,VENTOLIN HFA) 90 mcg/act inhaler, INHALE 2 PUFFS EVERY 6 HOURS AS NEEDED FOR WHEEZING, Disp: 18 g, Rfl: 6    budesonide-formoterol (SYMBICORT) 80-4.5 MCG/ACT inhaler, Inhale 2 puffs 2 (two) times a day Rinse mouth after use., Disp: 10.2 g, Rfl: 5    fluticasone (FLONASE) 50 mcg/act nasal spray, SPRAY 1 SPRAY INTO EACH NOSTRIL 2 TIMES A DAY WITH MEALS, Disp: 48 mL, Rfl: 3    loratadine (CLARITIN) 10 mg tablet, Take 10 mg by mouth daily, Disp: , Rfl:     montelukast (SINGULAIR) 10 mg tablet, TAKE 1 TABLET BY MOUTH EVERY DAY, Disp: 90 tablet, Rfl: 1    propranolol (INDERAL LA) 120 mg 24 hr capsule, TAKE 1 CAPSULE BY MOUTH EVERY DAY, Disp: 90 capsule, Rfl: 2    rosuvastatin (CRESTOR) 5 mg tablet,  "TAKE 1 TABLET BY MOUTH EVERY DAY, Disp: 90 tablet, Rfl: 1    solifenacin (VESICARE) 5 mg tablet, Take 1 tablet (5 mg total) by mouth daily, Disp: 90 tablet, Rfl: 3    triamcinolone (KENALOG) 0.1 % ointment, Apply topically 2 (two) times a day On weekdays only, Disp: 453.6 g, Rfl: 0    LORazepam (ATIVAN) 0.5 mg tablet, TAKE 1 TABLET BY MOUTH EVERY 6 HOURS AS NEEDED FOR ANXIETY. (Patient not taking: Reported on 4/9/2024), Disp: 50 tablet, Rfl: 0    nystatin (MYCOSTATIN) powder, Apply topically 2 (two) times a day (Patient not taking: Reported on 2/27/2023), Disp: 60 g, Rfl: 4    valACYclovir (VALTREX) 1,000 mg tablet, Take 2 tablets (2,000 mg total) by mouth 2 (two) times a day for 1 day, Disp: 4 tablet, Rfl: 5     Allergies:     Allergies   Allergen Reactions    Pravastatin Myalgia    Simvastatin Myalgia        Physical Exam:     /80   Pulse 62   Ht 5' 9\" (1.753 m)   Wt 69.9 kg (154 lb)   SpO2 98%   BMI 22.74 kg/m²     Physical Exam  Vitals and nursing note reviewed.   Constitutional:       General: She is awake. She is not in acute distress.     Appearance: Normal appearance. She is well-developed, well-groomed and normal weight.   HENT:      Head: Normocephalic and atraumatic.      Right Ear: Hearing and external ear normal.      Left Ear: Hearing and external ear normal.      Nose: Nose normal.      Mouth/Throat:      Lips: Pink.      Mouth: Mucous membranes are moist.   Eyes:      General: Lids are normal. Vision grossly intact. Gaze aligned appropriately.      Conjunctiva/sclera: Conjunctivae normal.   Neck:      Vascular: No carotid bruit.      Trachea: Trachea and phonation normal.   Cardiovascular:      Rate and Rhythm: Normal rate and regular rhythm.      Heart sounds: Normal heart sounds, S1 normal and S2 normal. No murmur heard.     No friction rub. No gallop.   Pulmonary:      Effort: Pulmonary effort is normal. No respiratory distress.      Breath sounds: Normal breath sounds and air entry. No " decreased breath sounds, wheezing, rhonchi or rales.   Abdominal:      General: Abdomen is flat.   Musculoskeletal:         General: No swelling.      Cervical back: Neck supple.      Right lower leg: No edema.      Left lower leg: No edema.   Skin:     General: Skin is warm.      Capillary Refill: Capillary refill takes less than 2 seconds.   Neurological:      Mental Status: She is alert.      Motor: Tremor present.   Psychiatric:         Attention and Perception: Attention and perception normal.         Mood and Affect: Mood and affect normal.         Speech: Speech normal.         Behavior: Behavior normal. Behavior is cooperative.         Thought Content: Thought content normal.         Cognition and Memory: Cognition and memory normal.         Judgment: Judgment normal.           Data:     Laboratory Results: I have personally reviewed the pertinent laboratory results/reports   Radiology/Other Diagnostic Testing Results: I have personally reviewed pertinent reports.      Abbie Recio PA-C  [facility] INTERNAL MEDICINE [address]

## 2024-07-25 ENCOUNTER — HOSPITAL ENCOUNTER (OUTPATIENT)
Dept: MRI IMAGING | Facility: HOSPITAL | Age: 69
End: 2024-07-25
Payer: COMMERCIAL

## 2024-07-25 DIAGNOSIS — M25.562 CHRONIC PAIN OF LEFT KNEE: ICD-10-CM

## 2024-07-25 DIAGNOSIS — G89.29 CHRONIC PAIN OF LEFT KNEE: ICD-10-CM

## 2024-07-25 PROCEDURE — 73721 MRI JNT OF LWR EXTRE W/O DYE: CPT

## 2024-07-30 ENCOUNTER — OFFICE VISIT (OUTPATIENT)
Dept: OBGYN CLINIC | Facility: CLINIC | Age: 69
End: 2024-07-30
Payer: COMMERCIAL

## 2024-07-30 VITALS
SYSTOLIC BLOOD PRESSURE: 137 MMHG | DIASTOLIC BLOOD PRESSURE: 82 MMHG | HEIGHT: 69 IN | BODY MASS INDEX: 22.81 KG/M2 | WEIGHT: 154 LBS | HEART RATE: 62 BPM

## 2024-07-30 DIAGNOSIS — M62.9 HAMSTRING TIGHTNESS OF BOTH LOWER EXTREMITIES: ICD-10-CM

## 2024-07-30 DIAGNOSIS — R29.898 WEAKNESS OF BOTH HIPS: ICD-10-CM

## 2024-07-30 DIAGNOSIS — M22.2X2 PATELLOFEMORAL SYNDROME OF BOTH KNEES: ICD-10-CM

## 2024-07-30 DIAGNOSIS — M17.12 PRIMARY OSTEOARTHRITIS OF LEFT KNEE: Primary | ICD-10-CM

## 2024-07-30 DIAGNOSIS — M22.2X1 PATELLOFEMORAL SYNDROME OF BOTH KNEES: ICD-10-CM

## 2024-07-30 PROCEDURE — 99204 OFFICE O/P NEW MOD 45 MIN: CPT | Performed by: FAMILY MEDICINE

## 2024-07-30 PROCEDURE — 3075F SYST BP GE 130 - 139MM HG: CPT | Performed by: FAMILY MEDICINE

## 2024-07-30 PROCEDURE — 1160F RVW MEDS BY RX/DR IN RCRD: CPT | Performed by: FAMILY MEDICINE

## 2024-07-30 PROCEDURE — 3079F DIAST BP 80-89 MM HG: CPT | Performed by: FAMILY MEDICINE

## 2024-07-30 PROCEDURE — 1159F MED LIST DOCD IN RCRD: CPT | Performed by: FAMILY MEDICINE

## 2024-07-30 RX ORDER — MELOXICAM 15 MG/1
15 TABLET ORAL DAILY
Qty: 30 TABLET | Refills: 1 | Status: SHIPPED | OUTPATIENT
Start: 2024-07-30

## 2024-07-30 NOTE — PATIENT INSTRUCTIONS
Over the counter vitamins:    - Turmeric vitamin at least 1000 mg daily    - Tart cherry vitamin at least 1000 mg daily    - Glucosamine-chondrointin 2-3 times daily    Copper knee sleeve during physical activity    Physical Therapy and home exercises    Rx: Meloxicam 15 mg  - once daily  - eat first  - everyday  - no ibuprofen  - no aleve  - tylenol is ok

## 2024-07-30 NOTE — PROGRESS NOTES
Assessment/Plan:  Assessment & Plan   Diagnoses and all orders for this visit:    Primary osteoarthritis of left knee  -     Ambulatory referral to Orthopedic Surgery  -     meloxicam (MOBIC) 15 mg tablet; Take 1 tablet (15 mg total) by mouth daily  -     Ambulatory Referral to Physical Therapy; Future    Patellofemoral syndrome of both knees  -     Ambulatory Referral to Physical Therapy; Future    Weakness of both hips  -     Ambulatory Referral to Physical Therapy; Future    Hamstring tightness of both lower extremities  -     Ambulatory Referral to Physical Therapy; Future      68-year-old female with left knee pain and swelling more than 7 months duration.  Discussed with patient imaging studies, physical exam, impression, and plan.  MRI left knee noted for thickening of the MCL, 1 cm x 0.870 full-thickness cartilage defect lateral patella facet, mild to moderate patellofemoral arthritis, small degenerative oblique horizontal tear left meniscus, degenerative changes mid meniscus, moderate-sized Baker's cyst.  Imaging studies, clinical exam, and symptoms suggest degenerative changes and abnormal patellofemoral mechanics.  I discussed treatment regimen of anti-inflammatory, supplements, supportive wear, and formal therapy.  Surgery is not warranted.  She declines injection at this time.  She is to take meloxicam 15 mg once daily with food for 30 days and not to take ibuprofen or Aleve, but may take Tylenol.  She is to start taking turmeric, tart cherry, and glucosamine supplements.  She may wear copper knee sleeve during physical activity.  She is to start formal therapy as soon as possible and do home exercises as directed.  She will follow-up as needed.        Subjective:   Patient ID: Maryann Milner is a 68 y.o. female.  Chief Complaint   Patient presents with    Left Knee - Pain        68-year-old female presents for evaluation left knee pain and swelling more than 7 months duration.  She denies any particular  trauma or inciting event.  She states during that time she started being more active with helping take care of her grandchild and going up and down stairs much more regularly.  She had pain described as gradual in onset, localized to the medial aspect of the knee, radiating to the anterior aspect, worse with twisting, worse with stairs, and improved with resting or changing position.  She started treating with Tylenol, ibuprofen, ice, and heat.  She was seen by primary care physician and referred for x-rays left knee and referred to formal therapy.  She started formal therapy 4/25/2024 and attended through 5/24/2024.  She started experiencing clicking/popping.  She was referred for MRI left knee and referred to orthopedic care.      Knee Pain  This is a new problem. The current episode started more than 1 month ago. The problem occurs daily. The problem has been unchanged. Associated symptoms include arthralgias and joint swelling. Pertinent negatives include no numbness or weakness. Exacerbated by: Stairs. She has tried rest, position changes, NSAIDs, ice, heat and acetaminophen (Physical therapy, home exercise) for the symptoms. The treatment provided mild relief.         The following portions of the patient's history were reviewed and updated as appropriate: She  has a past medical history of Allergic rhinitis, Black tarry stools, Concussion with prolonged loss of consciousness without return to pre-existing conscious level, COPD (chronic obstructive pulmonary disease) ([facility]), Fall, Generalized osteoarthritis, GERD (gastroesophageal reflux disease), Herpes simplex infection, Hypertension, Menopausal disorder, Morbid obesity due to excess calories ([facility]), Neuroma (1975), Pharyngeal disorder, Pneumonia, Post-menopausal bleeding, and Postmenopausal disorder.  She is allergic to pravastatin and simvastatin..    Review of Systems   Musculoskeletal:  Positive for arthralgias and joint swelling.   Neurological:  Negative  "for weakness and numbness.       Objective:  Vitals:    07/30/24 1129   BP: 137/82   Pulse: 62   Weight: 69.9 kg (154 lb)   Height: 5' 9\" (1.753 m)      Right Knee Exam     Other   Swelling: moderate      Left Knee Exam     Muscle Strength   The patient has normal left knee strength.    Tenderness   The patient is experiencing tenderness in the medial joint line.    Range of Motion   Extension:  normal   Flexion:  120     Tests   Varus: negative Valgus: positive (laxity, no pain)    Other   Swelling: mild    Comments:  Positive patella inhibition and grind      Right Hip Exam     Muscle Strength   Abduction: 4/5   Flexion: 4/5     Tests   STACI: negative    Comments:  Negative FADDIR  Hamstring tightness      Left Hip Exam     Muscle Strength   Abduction: 4/5   Flexion: 4/5     Tests   STACI: negative    Comments:  Negative FADDIR  Hamstring tightness            Physical Exam  Vitals and nursing note reviewed.   Constitutional:       Appearance: Normal appearance. She is well-developed. She is not ill-appearing or diaphoretic.   HENT:      Head: Normocephalic and atraumatic.      Right Ear: External ear normal.      Left Ear: External ear normal.   Eyes:      Conjunctiva/sclera: Conjunctivae normal.   Neck:      Trachea: No tracheal deviation.   Cardiovascular:      Rate and Rhythm: Normal rate.   Pulmonary:      Effort: Pulmonary effort is normal. No respiratory distress.   Abdominal:      General: There is no distension.   Musculoskeletal:         General: Swelling and tenderness present.   Skin:     General: Skin is warm and dry.      Coloration: Skin is not jaundiced or pale.   Neurological:      Mental Status: She is alert and oriented to person, place, and time.   Psychiatric:         Mood and Affect: Mood normal.         Behavior: Behavior normal.         Thought Content: Thought content normal.         Judgment: Judgment normal.         I have personally reviewed pertinent films in PACS and my interpretation " is  .  Degenerative changes of the left knee.  Baker's cyst.

## 2024-07-30 NOTE — LETTER
July 30, 2024     Abbie Recio PA-C  [address]   [address]  [facility] [address]    Patient: Maryann Milner   YOB: 1955   Date of Visit: 7/30/2024       Dear Dr. Recio:    Thank you for referring Maryann Milner to me for evaluation. Below are my notes for this consultation.    If you have questions, please do not hesitate to call me. I look forward to following your patient along with you.         Sincerely,        Soren Azar DO        CC: No Recipients    Soren Azar DO  7/30/2024 12:19 PM  Sign when Signing Visit  Assessment/Plan:  Assessment & Plan  Diagnoses and all orders for this visit:    Primary osteoarthritis of left knee  -     Ambulatory referral to Orthopedic Surgery  -     meloxicam (MOBIC) 15 mg tablet; Take 1 tablet (15 mg total) by mouth daily  -     Ambulatory Referral to Physical Therapy; Future    Patellofemoral syndrome of both knees  -     Ambulatory Referral to Physical Therapy; Future    Weakness of both hips  -     Ambulatory Referral to Physical Therapy; Future    Hamstring tightness of both lower extremities  -     Ambulatory Referral to Physical Therapy; Future      68-year-old female with left knee pain and swelling more than 7 months duration.  Discussed with patient imaging studies, physical exam, impression, and plan.  MRI left knee noted for thickening of the MCL, 1 cm x 0.870 full-thickness cartilage defect lateral patella facet, mild to moderate patellofemoral arthritis, small degenerative oblique horizontal tear left meniscus, degenerative changes mid meniscus, moderate-sized Baker's cyst.  Imaging studies, clinical exam, and symptoms suggest degenerative changes and abnormal patellofemoral mechanics.  I discussed treatment regimen of anti-inflammatory, supplements, supportive wear, and formal therapy.  Surgery is not warranted.  She declines injection at this time.  She is to take meloxicam 15 mg once daily with food  for 30 days and not to take ibuprofen or Aleve, but may take Tylenol.  She is to start taking turmeric, tart cherry, and glucosamine supplements.  She may wear copper knee sleeve during physical activity.  She is to start formal therapy as soon as possible and do home exercises as directed.  She will follow-up as needed.        Subjective:   Patient ID: Maryann Milner is a 68 y.o. female.  Chief Complaint   Patient presents with   • Left Knee - Pain        68-year-old female presents for evaluation left knee pain and swelling more than 7 months duration.  She denies any particular trauma or inciting event.  She states during that time she started being more active with helping take care of her grandchild and going up and down stairs much more regularly.  She had pain described as gradual in onset, localized to the medial aspect of the knee, radiating to the anterior aspect, worse with twisting, worse with stairs, and improved with resting or changing position.  She started treating with Tylenol, ibuprofen, ice, and heat.  She was seen by primary care physician and referred for x-rays left knee and referred to formal therapy.  She started formal therapy 4/25/2024 and attended through 5/24/2024.  She started experiencing clicking/popping.  She was referred for MRI left knee and referred to orthopedic care.      Knee Pain  This is a new problem. The current episode started more than 1 month ago. The problem occurs daily. The problem has been unchanged. Associated symptoms include arthralgias and joint swelling. Pertinent negatives include no numbness or weakness. Exacerbated by: Stairs. She has tried rest, position changes, NSAIDs, ice, heat and acetaminophen (Physical therapy, home exercise) for the symptoms. The treatment provided mild relief.         The following portions of the patient's history were reviewed and updated as appropriate: She  has a past medical history of Allergic rhinitis, Black tarry stools,  "Concussion with prolonged loss of consciousness without return to pre-existing conscious level, COPD (chronic obstructive pulmonary disease) ([facility]), Fall, Generalized osteoarthritis, GERD (gastroesophageal reflux disease), Herpes simplex infection, Hypertension, Menopausal disorder, Morbid obesity due to excess calories ([facility]), Neuroma (1975), Pharyngeal disorder, Pneumonia, Post-menopausal bleeding, and Postmenopausal disorder.  She is allergic to pravastatin and simvastatin..    Review of Systems   Musculoskeletal:  Positive for arthralgias and joint swelling.   Neurological:  Negative for weakness and numbness.       Objective:  Vitals:    07/30/24 1129   BP: 137/82   Pulse: 62   Weight: 69.9 kg (154 lb)   Height: 5' 9\" (1.753 m)      Right Knee Exam     Other   Swelling: moderate      Left Knee Exam     Muscle Strength   The patient has normal left knee strength.    Tenderness   The patient is experiencing tenderness in the medial joint line.    Range of Motion   Extension:  normal   Flexion:  120     Tests   Varus: negative Valgus: positive (laxity, no pain)    Other   Swelling: mild    Comments:  Positive patella inhibition and grind      Right Hip Exam     Muscle Strength   Abduction: 4/5   Flexion: 4/5     Tests   STACI: negative    Comments:  Negative FADDIR  Hamstring tightness      Left Hip Exam     Muscle Strength   Abduction: 4/5   Flexion: 4/5     Tests   STACI: negative    Comments:  Negative FADDIR  Hamstring tightness            Physical Exam  Vitals and nursing note reviewed.   Constitutional:       Appearance: Normal appearance. She is well-developed. She is not ill-appearing or diaphoretic.   HENT:      Head: Normocephalic and atraumatic.      Right Ear: External ear normal.      Left Ear: External ear normal.   Eyes:      Conjunctiva/sclera: Conjunctivae normal.   Neck:      Trachea: No tracheal deviation.   Cardiovascular:      Rate and Rhythm: Normal rate.   Pulmonary:      Effort: Pulmonary " effort is normal. No respiratory distress.   Abdominal:      General: There is no distension.   Musculoskeletal:         General: Swelling and tenderness present.   Skin:     General: Skin is warm and dry.      Coloration: Skin is not jaundiced or pale.   Neurological:      Mental Status: She is alert and oriented to person, place, and time.   Psychiatric:         Mood and Affect: Mood normal.         Behavior: Behavior normal.         Thought Content: Thought content normal.         Judgment: Judgment normal.         I have personally reviewed pertinent films in PACS and my interpretation is  .  Degenerative changes of the left knee.  Baker's cyst.

## 2024-08-06 DIAGNOSIS — J45.40 MODERATE PERSISTENT ASTHMA WITHOUT COMPLICATION: ICD-10-CM

## 2024-08-07 RX ORDER — BUDESONIDE AND FORMOTEROL FUMARATE DIHYDRATE 80; 4.5 UG/1; UG/1
2 AEROSOL RESPIRATORY (INHALATION) 2 TIMES DAILY
Qty: 10.2 G | Refills: 1 | Status: SHIPPED | OUTPATIENT
Start: 2024-08-07

## 2024-09-13 DIAGNOSIS — E78.2 MIXED HYPERLIPIDEMIA: ICD-10-CM

## 2024-09-13 RX ORDER — ROSUVASTATIN CALCIUM 5 MG/1
TABLET, COATED ORAL
Qty: 90 TABLET | Refills: 1 | Status: SHIPPED | OUTPATIENT
Start: 2024-09-13

## 2024-10-15 NOTE — PROGRESS NOTES
Assessment/Plan:    Diagnoses and all orders for this visit:    Benign essential hypertension  -     propranolol (INDERAL LA) 120 mg 24 hr capsule; Take 1 capsule (120 mg total) by mouth daily    Tremor  -     propranolol (INDERAL LA) 120 mg 24 hr capsule; Take 1 capsule (120 mg total) by mouth daily    Moderate persistent asthma without complication    Acute pain of left knee  -     XR knee 3 vw left non injury; Future  -     Ambulatory Referral to Physical Therapy; Future              Patient Instructions   Hypertension appears borderline control on 60 mg propranolol, will increase to 120 mg.  Monitor home blood pressures    Tremor has not changed with low-dose propranolol, hopefully there will be improvement with higher dose    Asthma allergy, no flare despite beta-blocker and allergy season    Left knee pain-hide suspect degenerative meniscal tear-check x-ray to assess degree of arthritis.  Refer for physical therapy.  Consider MRI and orthopedic referral based upon outcome of x-ray and PT.    Follow-up 6 to 8 weeks    Subjective:      Patient ID: Maryann Milner is a 68 y.o. female    F/u MMP    Tremor-no significant improvement since starting on Inderal    Hypertension-she did not know to take home blood pressures.  She has been taking propranolol at night    Asthma and allergy-stable without any flare    Notes worsening L knee pain, popping.  No injury.  No falls.  No swelling.      Knee Pain           Current Outpatient Medications:     albuterol (PROVENTIL HFA,VENTOLIN HFA) 90 mcg/act inhaler, INHALE 2 PUFFS EVERY 6 HOURS AS NEEDED FOR WHEEZING, Disp: 18 g, Rfl: 6    budesonide-formoterol (SYMBICORT) 80-4.5 MCG/ACT inhaler, Inhale 2 puffs 2 (two) times a day Rinse mouth after use., Disp: 10.2 g, Rfl: 5    fluticasone (FLONASE) 50 mcg/act nasal spray, SPRAY 1 SPRAY INTO EACH NOSTRIL 2 TIMES A DAY WITH MEALS, Disp: 48 mL, Rfl: 3    loratadine (CLARITIN) 10 mg tablet, Take 10 mg by mouth daily, Disp: , Rfl:      Refill Routing Note   Medication(s) are not appropriate for processing by [facility] for the following reason(s):        Outside of protocol    [facility] action(s):  Route               Appointments  past 12m or future 3m with PCP    Date Provider   Last Visit   3/1/2024 Tara Jolly MD   Next Visit   Visit date not found Tara Jolly MD   ED visits in past 90 days: 1        Note composed:2:59 PM 10/15/2024            montelukast (SINGULAIR) 10 mg tablet, Take 1 tablet (10 mg total) by mouth daily, Disp: 90 tablet, Rfl: 3    propranolol (INDERAL LA) 120 mg 24 hr capsule, Take 1 capsule (120 mg total) by mouth daily, Disp: 30 capsule, Rfl: 3    rosuvastatin (CRESTOR) 5 mg tablet, TAKE 1 TABLET BY MOUTH EVERY DAY, Disp: 90 tablet, Rfl: 1    solifenacin (VESICARE) 5 mg tablet, Take 1 tablet (5 mg total) by mouth daily, Disp: 90 tablet, Rfl: 3    triamcinolone (KENALOG) 0.1 % ointment, Apply topically 2 (two) times a day On weekdays only, Disp: 453.6 g, Rfl: 0    valACYclovir (VALTREX) 1,000 mg tablet, Take 2 tablets (2,000 mg total) by mouth 2 (two) times a day for 1 day, Disp: 4 tablet, Rfl: 5    LORazepam (ATIVAN) 0.5 mg tablet, TAKE 1 TABLET BY MOUTH EVERY 6 HOURS AS NEEDED FOR ANXIETY. (Patient not taking: Reported on 4/9/2024), Disp: 50 tablet, Rfl: 0    nystatin (MYCOSTATIN) powder, Apply topically 2 (two) times a day (Patient not taking: Reported on 2/27/2023), Disp: 60 g, Rfl: 4    Recent Results (from the past 1008 hour(s))   Hemoglobin A1C    Collection Time: 04/01/24 12:00 AM   Result Value Ref Range    Hemoglobin A1C 5.4    CBC and differential    Collection Time: 04/01/24  7:32 AM   Result Value Ref Range    White Blood Cell Count 4.1 3.4 - 10.8 x10E3/uL    Red Blood Cell Count 4.34 3.77 - 5.28 x10E6/uL    Hemoglobin 13.2 11.1 - 15.9 g/dL    HCT 39.7 34.0 - 46.6 %    MCV 92 79 - 97 fL    MCH 30.4 26.6 - 33.0 pg    MCHC 33.2 31.5 - 35.7 g/dL    RDW 12.0 11.7 - 15.4 %    Platelet Count 191 150 - 450 x10E3/uL    Neutrophils 42 Not Estab. %    Lymphocytes 44 Not Estab. %    Monocytes 9 Not Estab. %    Eosinophils 3 Not Estab. %    Basophils PCT 2 Not Estab. %    Neutrophils (Absolute) 1.7 1.4 - 7.0 x10E3/uL    Lymphocytes (Absolute) 1.8 0.7 - 3.1 x10E3/uL    Monocytes (Absolute) 0.4 0.1 - 0.9 x10E3/uL    Eosinophils (Absolute) 0.1 0.0 - 0.4 x10E3/uL    Basophils ABS 0.1 0.0 - 0.2 x10E3/uL    Immature Granulocytes 0 Not  Estab. %    Immature Granulocytes (Absolute) 0.0 0.0 - 0.1 x10E3/uL   Comprehensive metabolic panel    Collection Time: 04/01/24  7:32 AM   Result Value Ref Range    Glucose, Random 92 70 - 99 mg/dL    BUN 18 8 - 27 mg/dL    Creatinine 0.66 0.57 - 1.00 mg/dL    eGFR 96 >59 mL/min/1.73    SL AMB BUN/CREATININE RATIO 27 12 - 28    Sodium 143 134 - 144 mmol/L    Potassium 3.9 3.5 - 5.2 mmol/L    Chloride 106 96 - 106 mmol/L    CO2 22 20 - 29 mmol/L    CALCIUM 8.9 8.7 - 10.3 mg/dL    Protein, Total 6.0 6.0 - 8.5 g/dL    Albumin 4.1 3.9 - 4.9 g/dL    Globulin, Total 1.9 1.5 - 4.5 g/dL    Albumin/Globulin Ratio 2.2 1.2 - 2.2    TOTAL BILIRUBIN 0.3 0.0 - 1.2 mg/dL    Alk Phos Isoenzymes 77 44 - 121 IU/L    AST 20 0 - 40 IU/L    ALT 17 0 - 32 IU/L   Lipid panel    Collection Time: 04/01/24  7:32 AM   Result Value Ref Range    Cholesterol, Total 165 100 - 199 mg/dL    Triglycerides 99 0 - 149 mg/dL    HDL 57 >39 mg/dL    VLDL Cholesterol Calculated 18 5 - 40 mg/dL    LDL Calculated 90 0 - 99 mg/dL   Hemoglobin A1c (w/out EAG)    Collection Time: 04/01/24  7:32 AM   Result Value Ref Range    Hemoglobin A1C 5.4 4.8 - 5.6 %   TSH, 3rd generation with Free T4 reflex    Collection Time: 04/01/24  7:32 AM   Result Value Ref Range    TSH 3.510 0.450 - 4.500 uIU/mL       The following portions of the patient's history were reviewed and updated as appropriate: allergies, current medications, past family history, past medical history, past social history, past surgical history and problem list.     Review of Systems      Objective:      Vitals:    04/09/24 1320   BP: 132/86   Pulse: 60   Resp: 16   SpO2: 98%          Physical Exam  Constitutional:       Appearance: She is well-developed.   HENT:      Head: Normocephalic and atraumatic.      Nose: Nose normal.   Eyes:      General: No scleral icterus.     Conjunctiva/sclera: Conjunctivae normal.      Pupils: Pupils are equal, round, and reactive to light.   Neck:      Thyroid: No  thyromegaly.      Vascular: No JVD.      Trachea: No tracheal deviation.   Cardiovascular:      Rate and Rhythm: Normal rate and regular rhythm.      Heart sounds: No murmur heard.     No friction rub. No gallop.   Pulmonary:      Effort: Pulmonary effort is normal. No respiratory distress.      Breath sounds: Normal breath sounds. No wheezing or rales.   Musculoskeletal:         General: No deformity.      Cervical back: Normal range of motion and neck supple.      Comments: Left knee full range of motion, no ligamentous laxity, no effusion.  There is lateral pain with valgus stress.  There is guarding.  There is retropatellar pain with stress on ACL   Lymphadenopathy:      Cervical: No cervical adenopathy.   Skin:     General: Skin is warm and dry.      Coloration: Skin is not pale.      Findings: No erythema or rash.   Neurological:      Mental Status: She is alert and oriented to person, place, and time.      Cranial Nerves: No cranial nerve deficit.   Psychiatric:         Behavior: Behavior normal.         Thought Content: Thought content normal.         Judgment: Judgment normal.

## 2024-10-27 ENCOUNTER — RA CDI HCC (OUTPATIENT)
Dept: OTHER | Facility: HOSPITAL | Age: 69
End: 2024-10-27

## 2024-10-27 DIAGNOSIS — B00.9 HERPES: ICD-10-CM

## 2024-10-28 RX ORDER — VALACYCLOVIR HYDROCHLORIDE 1 G/1
2000 TABLET, FILM COATED ORAL 2 TIMES DAILY
Qty: 4 TABLET | Refills: 5 | Status: SHIPPED | OUTPATIENT
Start: 2024-10-28 | End: 2025-10-28

## 2024-11-02 LAB
ALBUMIN SERPL-MCNC: 4.3 G/DL (ref 3.9–4.9)
ALP SERPL-CCNC: 84 IU/L (ref 44–121)
ALT SERPL-CCNC: 19 IU/L (ref 0–32)
AST SERPL-CCNC: 24 IU/L (ref 0–40)
BASOPHILS # BLD AUTO: 0.1 X10E3/UL (ref 0–0.2)
BASOPHILS NFR BLD AUTO: 2 %
BILIRUB SERPL-MCNC: 0.5 MG/DL (ref 0–1.2)
BUN SERPL-MCNC: 14 MG/DL (ref 8–27)
BUN/CREAT SERPL: 18 (ref 12–28)
CALCIUM SERPL-MCNC: 8.5 MG/DL (ref 8.7–10.3)
CHLORIDE SERPL-SCNC: 107 MMOL/L (ref 96–106)
CHOLEST SERPL-MCNC: 188 MG/DL (ref 100–199)
CO2 SERPL-SCNC: 17 MMOL/L (ref 20–29)
CREAT SERPL-MCNC: 0.78 MG/DL (ref 0.57–1)
EGFR: 83 ML/MIN/1.73
EOSINOPHIL # BLD AUTO: 0.2 X10E3/UL (ref 0–0.4)
EOSINOPHIL NFR BLD AUTO: 5 %
ERYTHROCYTE [DISTWIDTH] IN BLOOD BY AUTOMATED COUNT: 12.4 % (ref 11.7–15.4)
GLOBULIN SER-MCNC: 2 G/DL (ref 1.5–4.5)
GLUCOSE SERPL-MCNC: 89 MG/DL (ref 70–99)
HBA1C MFR BLD: 5.6 % (ref 4.8–5.6)
HCT VFR BLD AUTO: 43.4 % (ref 34–46.6)
HDLC SERPL-MCNC: 57 MG/DL
HGB BLD-MCNC: 14.1 G/DL (ref 11.1–15.9)
IMM GRANULOCYTES # BLD: 0 X10E3/UL (ref 0–0.1)
IMM GRANULOCYTES NFR BLD: 1 %
LDLC SERPL CALC-MCNC: 111 MG/DL (ref 0–99)
LYMPHOCYTES # BLD AUTO: 1.4 X10E3/UL (ref 0.7–3.1)
LYMPHOCYTES NFR BLD AUTO: 38 %
MCH RBC QN AUTO: 30.7 PG (ref 26.6–33)
MCHC RBC AUTO-ENTMCNC: 32.5 G/DL (ref 31.5–35.7)
MCV RBC AUTO: 95 FL (ref 79–97)
MONOCYTES # BLD AUTO: 0.3 X10E3/UL (ref 0.1–0.9)
MONOCYTES NFR BLD AUTO: 9 %
NEUTROPHILS # BLD AUTO: 1.7 X10E3/UL (ref 1.4–7)
NEUTROPHILS NFR BLD AUTO: 45 %
PLATELET # BLD AUTO: 249 X10E3/UL (ref 150–450)
POTASSIUM SERPL-SCNC: 4.4 MMOL/L (ref 3.5–5.2)
PROT SERPL-MCNC: 6.3 G/DL (ref 6–8.5)
RBC # BLD AUTO: 4.59 X10E6/UL (ref 3.77–5.28)
SL AMB VLDL CHOLESTEROL CALC: 20 MG/DL (ref 5–40)
SODIUM SERPL-SCNC: 146 MMOL/L (ref 134–144)
TRIGL SERPL-MCNC: 112 MG/DL (ref 0–149)
TSH SERPL DL<=0.005 MIU/L-ACNC: 3.83 UIU/ML (ref 0.45–4.5)
WBC # BLD AUTO: 3.7 X10E3/UL (ref 3.4–10.8)

## 2024-11-04 PROBLEM — L30.9 DERMATITIS: Status: RESOLVED | Noted: 2017-12-19 | Resolved: 2024-11-04

## 2024-11-04 PROBLEM — R53.83 FATIGUE: Status: RESOLVED | Noted: 2017-04-14 | Resolved: 2024-11-04

## 2024-11-04 PROBLEM — E78.00 PURE HYPERCHOLESTEROLEMIA: Status: RESOLVED | Noted: 2024-11-04 | Resolved: 2024-11-04

## 2024-11-04 PROBLEM — R49.0 HOARSENESS: Status: RESOLVED | Noted: 2017-11-20 | Resolved: 2024-11-04

## 2024-11-04 PROBLEM — N39.41 URGE INCONTINENCE OF URINE: Status: RESOLVED | Noted: 2024-11-04 | Resolved: 2024-11-04

## 2024-11-04 PROBLEM — N95.2 ATROPHIC VAGINITIS: Status: ACTIVE | Noted: 2024-11-04

## 2024-11-04 PROBLEM — R87.615 UNSATISFACTORY CERVICAL PAPANICOLAOU SMEAR: Status: RESOLVED | Noted: 2017-04-03 | Resolved: 2024-11-04

## 2024-11-04 PROBLEM — N32.81 OVERACTIVE BLADDER: Status: RESOLVED | Noted: 2024-11-04 | Resolved: 2024-11-04

## 2024-11-04 PROBLEM — R14.0 BLOATING: Status: RESOLVED | Noted: 2017-03-01 | Resolved: 2024-11-04

## 2024-11-12 PROBLEM — R73.01 ABNORMAL FASTING GLUCOSE: Status: RESOLVED | Noted: 2017-03-08 | Resolved: 2024-11-12

## 2024-11-13 ENCOUNTER — OFFICE VISIT (OUTPATIENT)
Age: 69
End: 2024-11-13
Payer: COMMERCIAL

## 2024-11-13 VITALS
SYSTOLIC BLOOD PRESSURE: 134 MMHG | WEIGHT: 163 LBS | BODY MASS INDEX: 24.14 KG/M2 | HEART RATE: 64 BPM | DIASTOLIC BLOOD PRESSURE: 84 MMHG | OXYGEN SATURATION: 98 % | HEIGHT: 69 IN | RESPIRATION RATE: 16 BRPM

## 2024-11-13 DIAGNOSIS — E87.0 HYPERNATREMIA: ICD-10-CM

## 2024-11-13 DIAGNOSIS — R73.01 IFG (IMPAIRED FASTING GLUCOSE): ICD-10-CM

## 2024-11-13 DIAGNOSIS — F51.01 PRIMARY INSOMNIA: ICD-10-CM

## 2024-11-13 DIAGNOSIS — I10 BENIGN ESSENTIAL HYPERTENSION: Primary | ICD-10-CM

## 2024-11-13 DIAGNOSIS — R25.1 TREMOR: ICD-10-CM

## 2024-11-13 DIAGNOSIS — E78.2 MIXED HYPERLIPIDEMIA: ICD-10-CM

## 2024-11-13 DIAGNOSIS — Z01.818 PRE-OP EXAMINATION: ICD-10-CM

## 2024-11-13 DIAGNOSIS — J45.40 MODERATE PERSISTENT ASTHMA WITHOUT COMPLICATION: ICD-10-CM

## 2024-11-13 DIAGNOSIS — F41.8 DEPRESSION WITH ANXIETY: ICD-10-CM

## 2024-11-13 DIAGNOSIS — R53.83 OTHER FATIGUE: ICD-10-CM

## 2024-11-13 PROCEDURE — 99214 OFFICE O/P EST MOD 30 MIN: CPT

## 2024-11-13 PROCEDURE — G2211 COMPLEX E/M VISIT ADD ON: HCPCS

## 2024-11-13 RX ORDER — BUDESONIDE AND FORMOTEROL FUMARATE DIHYDRATE 80; 4.5 UG/1; UG/1
2 AEROSOL RESPIRATORY (INHALATION) 2 TIMES DAILY
Qty: 10.2 G | Refills: 1 | Status: SHIPPED | OUTPATIENT
Start: 2024-11-13

## 2024-11-13 RX ORDER — LORAZEPAM 0.5 MG/1
0.5 TABLET ORAL
Qty: 30 TABLET | Refills: 0 | Status: SHIPPED | OUTPATIENT
Start: 2024-11-13

## 2024-11-13 NOTE — PROGRESS NOTES
INTERNAL MEDICINE FOLLOW-UP VISIT  [facility] - [facility] INTERNAL MEDICINE [address]    NAME: Maryann Milner  AGE: 69 y.o. SEX: female  : 1955     DATE: 2024     Assessment and Plan:   1. Benign essential hypertension (Primary)  BP in office is 134/84.  No home BPs.  She is currently on propranolol 120 mg daily.  Limit salt intake to less than 2000 mg daily.    2. Moderate persistent asthma without complication  Currently stable on Symbicort once daily. She denies any chest tightness, coughing, wheezing, shortness of breath.  She uses her albuterol inhaler as needed.    3. Depression with anxiety  Currently stable on no medications at this time.     4. Primary insomnia  She uses lorazepam prn for sleep which provides some relief.     5. Mixed hyperlipidemia  LDL is slightly elevated at 111. Currently on rosuvastatin 5 mg daily. Will repeat in 6 months, can consider increasing to 10 mg daily. Reviewed a low cholesterol diet.     6. Hypernatremia  Sodium was elevated at 146. Will repeat BMP to ensure sodium has returned to baseline.     7. Pre-op examination  She is scheduled for an eye procedure 24. There are no CI to procedure at this time, she is cleared for proposed surgery.     8. Tremor  She had been on primidone in the past with adverse effects. Propanolol 120 mg is not providing any relief at this time. Recommended referral to neurology for further treatment.           No follow-ups on file.       Chief Complaint:     Chief Complaint   Patient presents with    Follow-up     4 weeks.      History of Present Illness:   Patient is a 69-year-old female that presents today for 6-month follow-up.  She needs a preop clearance today.    Recommended eating a well-balanced diet of fruits, veggies, and lean meats, and increasing water intake. She sleeps okay. She does no formal exercise, recommended 30 mins 5x a week. She denies any tobacco, alcohol, or illicit drug use. She is UTD  with dentist and eye doctor.    Health maintenance:  Up-to-date on labs, due for mammogram.  Family history of diabetes, hypertension, pancreatic cancer, ovarian cancer, CAD, endometrial cancer, rectal cancer, osteoporosis.  Immunizations: Due for COVID and flu vaccines.    The following portions of the patient's history were reviewed and updated as appropriate: allergies, current medications, past family history, past medical history, past social history, past surgical history and problem list.     Review of Systems:     Review of Systems   Constitutional:  Negative for chills, fatigue and fever.   HENT:  Negative for ear discharge, ear pain, postnasal drip, rhinorrhea, sinus pressure, sinus pain, sore throat, tinnitus and trouble swallowing.    Eyes:  Negative for pain, discharge and itching.   Respiratory:  Negative for cough, shortness of breath and wheezing.    Cardiovascular:  Negative for chest pain, palpitations and leg swelling.   Gastrointestinal:  Negative for abdominal pain, constipation, diarrhea, nausea and vomiting.   Endocrine: Negative for polydipsia, polyphagia and polyuria.   Genitourinary:  Negative for difficulty urinating, frequency, hematuria and urgency.   Musculoskeletal:  Negative for arthralgias, joint swelling and myalgias.   Skin:  Negative for color change.   Allergic/Immunologic: Negative for environmental allergies.   Neurological:  Positive for tremors. Negative for dizziness, weakness, light-headedness, numbness and headaches.   Hematological:  Negative for adenopathy.   Psychiatric/Behavioral:  Negative for decreased concentration and sleep disturbance. The patient is not nervous/anxious.         Past Medical History:     Past Medical History:   Diagnosis Date    Abnormal fasting glucose 03/08/2017    Acute laryngitis 02/05/2016    Allergic rhinitis     Last Assessed: 6/9/2016    Black tarry stools     Bloating 03/01/2017    Chronic obstructive pulmonary disease (HCC) 04/17/2015     Common cold 10/02/2015    Concussion with prolonged loss of consciousness without return to pre-existing conscious level     COPD (chronic obstructive pulmonary disease) ([facility])     Last Assessed: 3/8/2017    Dermatitis 12/19/2017    Fall     slipping, tripping or stumbling    Generalized osteoarthritis     GERD (gastroesophageal reflux disease)     Herpes simplex infection     Hoarseness 11/20/2017    Hypertension     Increased frequency of urination 10/02/2015    Iron deficiency anemia 03/25/2014    Menopausal disorder     Morbid obesity due to excess calories ([facility])     Neuroma 1975    right foot    Overactive bladder 11/04/2024    Pain in joint involving lower leg 03/25/2014    Pharyngeal disorder     Pneumonia     Last Assessed: 5/15/2014    Post-menopausal bleeding     Last Assessed: 2/27/2015    Postmenopausal disorder     Unsatisfactory cervical Papanicolaou smear 04/03/2017    Urge incontinence of urine 11/04/2024        Current Medications:     Current Outpatient Medications:     albuterol (PROVENTIL HFA,VENTOLIN HFA) 90 mcg/act inhaler, INHALE 2 PUFFS EVERY 6 HOURS AS NEEDED FOR WHEEZING, Disp: 18 g, Rfl: 6    budesonide-formoterol (SYMBICORT) 80-4.5 MCG/ACT inhaler, Inhale 2 puffs 2 (two) times a day Rinse mouth after use., Disp: 10.2 g, Rfl: 1    fluticasone (FLONASE) 50 mcg/act nasal spray, SPRAY 1 SPRAY INTO EACH NOSTRIL 2 TIMES A DAY WITH MEALS, Disp: 48 mL, Rfl: 3    montelukast (SINGULAIR) 10 mg tablet, TAKE 1 TABLET BY MOUTH EVERY DAY, Disp: 90 tablet, Rfl: 1    propranolol (INDERAL LA) 120 mg 24 hr capsule, TAKE 1 CAPSULE BY MOUTH EVERY DAY, Disp: 90 capsule, Rfl: 2    rosuvastatin (CRESTOR) 5 mg tablet, TAKE 1 TABLET BY MOUTH EVERY DAY, Disp: 90 tablet, Rfl: 1    solifenacin (VESICARE) 5 mg tablet, Take 1 tablet (5 mg total) by mouth daily, Disp: 90 tablet, Rfl: 3    triamcinolone (KENALOG) 0.1 % ointment, Apply topically 2 (two) times a day On weekdays only, Disp: 453.6 g, Rfl: 0    valACYclovir  "(VALTREX) 1,000 mg tablet, Take 2 tablets (2,000 mg total) by mouth 2 (two) times a day, Disp: 4 tablet, Rfl: 5     Allergies:     Allergies   Allergen Reactions    Pravastatin Myalgia    Simvastatin Myalgia        Physical Exam:     /84 (BP Location: Left arm)   Pulse 64   Resp 16   Ht 5' 9\" (1.753 m)   Wt 73.9 kg (163 lb)   SpO2 98%   BMI 24.07 kg/m²     Physical Exam  Vitals and nursing note reviewed.   Constitutional:       General: She is awake. She is not in acute distress.     Appearance: Normal appearance. She is well-developed, well-groomed and normal weight.   HENT:      Head: Normocephalic and atraumatic.      Right Ear: Hearing and external ear normal.      Left Ear: Hearing and external ear normal.      Nose: Nose normal.      Mouth/Throat:      Lips: Pink.      Mouth: Mucous membranes are moist.   Eyes:      General: Lids are normal. Vision grossly intact. Gaze aligned appropriately.      Conjunctiva/sclera: Conjunctivae normal.   Neck:      Vascular: No carotid bruit.      Trachea: Trachea and phonation normal.   Cardiovascular:      Rate and Rhythm: Normal rate and regular rhythm.      Heart sounds: Normal heart sounds, S1 normal and S2 normal. No murmur heard.     No friction rub. No gallop.   Pulmonary:      Effort: Pulmonary effort is normal. No respiratory distress.      Breath sounds: Normal breath sounds and air entry. No decreased breath sounds, wheezing, rhonchi or rales.   Abdominal:      General: Abdomen is protuberant.   Musculoskeletal:         General: No swelling.      Cervical back: Neck supple.      Right lower leg: No edema.      Left lower leg: No edema.      Comments: R arm tremor.    Skin:     General: Skin is warm.      Capillary Refill: Capillary refill takes less than 2 seconds.   Neurological:      Mental Status: She is alert.   Psychiatric:         Attention and Perception: Attention and perception normal.         Mood and Affect: Mood and affect normal.         " Speech: Speech normal.         Behavior: Behavior normal. Behavior is cooperative.         Thought Content: Thought content normal.         Cognition and Memory: Cognition and memory normal.         Judgment: Judgment normal.           Data:     Laboratory Results: I have personally reviewed the pertinent laboratory results/reports   Radiology/Other Diagnostic Testing Results: Results Review Statement: No pertinent imaging studies reviewed.    Abbie Recio PA-C  [facility] INTERNAL MEDICINE [address]

## 2024-11-15 DIAGNOSIS — T78.40XS ALLERGIC DISORDER, SEQUELA: ICD-10-CM

## 2024-11-15 RX ORDER — MONTELUKAST SODIUM 10 MG/1
10 TABLET ORAL DAILY
Qty: 90 TABLET | Refills: 1 | Status: SHIPPED | OUTPATIENT
Start: 2024-11-15

## 2024-12-19 DIAGNOSIS — R25.1 TREMOR: ICD-10-CM

## 2024-12-19 DIAGNOSIS — I10 BENIGN ESSENTIAL HYPERTENSION: ICD-10-CM

## 2024-12-20 RX ORDER — PROPRANOLOL HYDROCHLORIDE 120 MG/1
120 CAPSULE, EXTENDED RELEASE ORAL DAILY
Qty: 90 CAPSULE | Refills: 1 | Status: SHIPPED | OUTPATIENT
Start: 2024-12-20

## 2025-01-21 ENCOUNTER — OFFICE VISIT (OUTPATIENT)
Dept: DERMATOLOGY | Facility: CLINIC | Age: 70
End: 2025-01-21
Payer: COMMERCIAL

## 2025-01-21 DIAGNOSIS — L82.1 SEBORRHEIC KERATOSIS: ICD-10-CM

## 2025-01-21 DIAGNOSIS — D22.9 MULTIPLE MELANOCYTIC NEVI: Primary | ICD-10-CM

## 2025-01-21 DIAGNOSIS — D18.01 CHERRY ANGIOMA: ICD-10-CM

## 2025-01-21 DIAGNOSIS — L81.4 LENTIGO: ICD-10-CM

## 2025-01-21 DIAGNOSIS — L85.3 XEROSIS OF SKIN: ICD-10-CM

## 2025-01-21 PROCEDURE — 99213 OFFICE O/P EST LOW 20 MIN: CPT | Performed by: DERMATOLOGY

## 2025-01-21 NOTE — PATIENT INSTRUCTIONS
"MELANOCYTIC NEVI (\"Moles\")        Additional History of Present Condition:      Assessment and Plan:  Based on a thorough discussion of this condition and the management approach to it (including a comprehensive discussion of the known risks, side effects and potential benefits of treatment), the patient (family) agrees to implement the following specific plan:  When outside we recommend using a wide brim hat, sunglasses, long sleeve and pants, sunscreen with SPF 30+ with reapplication every 2 hours, or SPF specific clothing   Benign, reassured  Annual skin check     Melanocytic Nevi  Melanocytic nevi (\"moles\") are tan or brown, raised or flat areas of the skin which have an increased number of melanocytes. Melanocytes are the cells in our body which make pigment and account for skin color.    Some moles are present at birth (I.e., \"congenital nevi\"), while others come up later in life (i.e., \"acquired nevi\").  The sun can stimulate the body to make more moles.  Sunburns are not the only thing that triggers more moles.  Chronic sun exposure can do it too.     Clinically distinguishing a healthy mole from melanoma may be difficult, even for experienced dermatologists. The \"ABCDE's\" of moles have been suggested as a means of helping to alert a person to a suspicious mole and the possible increased risk of melanoma.  The suggestions for raising alert are as follows:    Asymmetry: Healthy moles tend to be symmetric, while melanomas are often asymmetric.  Asymmetry means if you draw a line through the mole, the two halves do not match in color, size, shape, or surface texture. Asymmetry can be a result of rapid enlargement of a mole, the development of a raised area on a previously flat lesion, scaling, ulceration, bleeding or scabbing within the mole.  Any mole that starts to demonstrate \"asymmetry\" should be examined promptly by a board certified dermatologist.     Border: Healthy moles tend to have discrete, even " "borders.  The border of a melanoma often blends into the normal skin and does not sharply delineate the mole from normal skin.  Any mole that starts to demonstrate \"uneven borders\" should be examined promptly by a board certified dermatologist.     Color: Healthy moles tend to be one color throughout.  Melanomas tend to be made up of different colors ranging from dark black, blue, white, or red.  Any mole that demonstrates a color change should be examined promptly by a board certified dermatologist.     Diameter: Healthy moles tend to be smaller than 0.6 cm in size; an exception are \"congenital nevi\" that can be larger.  Melanomas tend to grow and can often be greater than 0.6 cm (1/4 of an inch, or the size of a pencil eraser). This is only a guideline, and many normal moles may be larger than 0.6 cm without being unhealthy.  Any mole that starts to change in size (small to bigger or bigger to smaller) should be examined promptly by a board certified dermatologist.     Evolving: Healthy moles tend to \"stay the same.\"  Melanomas may often show signs of change or evolution such as a change in size, shape, color, or elevation.  Any mole that starts to itch, bleed, crust, burn, hurt, or ulcerate or demonstrate a change or evolution should be examined promptly by a board certified dermatologist.        LENTIGO    Assessment and Plan:  Based on a thorough discussion of this condition and the management approach to it (including a comprehensive discussion of the known risks, side effects and potential benefits of treatment), the patient (family) agrees to implement the following specific plan:  When outside we recommend using a wide brim hat, sunglasses, long sleeve and pants, sunscreen with SPF 30+ with reapplication every 2 hours, or SPF specific clothing       What is a lentigo?  A lentigo is a pigmented flat or slightly raised lesion with a clearly defined edge. Unlike an ephelis (freckle), it does not fade in the " winter months. There are several kinds of lentigo.  The name lentigo originally referred to its appearance resembling a small lentil. The plural of lentigo is lentigines, although “lentigos” is also in common use.    Who gets lentigines?  Lentigines can affect males and females of all ages and races. Solar lentigines are especially prevalent in fair skinned adults. Lentigines associated with syndromes are present at birth or arise during childhood.    What causes lentigines?  Common forms of lentigo are due to exposure to ultraviolet radiation:  Sun damage including sunburn   Indoor tanning   Phototherapy, especially photochemotherapy (PUVA)    Ionizing radiation, eg radiation therapy, can also cause lentigines.  Several familial syndromes associated with widespread lentigines originate from mutations in Gibson-MAP kinase, mTOR signaling and PTEN pathways.    What is the treatment for lentigines?  Most lentigines are left alone. Attempts to lighten them may not be successful. The following approaches are used:  SPF 50+ broad-spectrum sunscreen   Hydroquinone bleaching cream   Alpha hydroxy acids   Vitamin C   Retinoids   Azelaic acid   Chemical peels  Individual lesions can be permanently removed using:  Cryotherapy   Intense pulsed light   Pigment lasers    How can lentigines be prevented?  Lentigines associated with exposure ultraviolet radiation can be prevented by very careful sun protection. Clothing is more successful at preventing new lentigines than are sunscreens.    What is the outlook for lentigines?  Lentigines usually persist. They may increase in number with age and sun exposure. Some in sun-protected sites may fade and disappear.    THOMPSON ANGIOMAS        Assessment and Plan:  Based on a thorough discussion of this condition and the management approach to it (including a comprehensive discussion of the known risks, side effects and potential benefits of treatment), the patient (family) agrees to  "implement the following specific plan:  Monitor for changes  Benign, reassured      Assessment and Plan:    Cherry angioma, also known as Ray de Hugh spots, are benign vascular skin lesions. A \"cherry angioma\" is a firm red, blue or purple papule, 0.1-1 cm in diameter. When thrombosed, they can appear black in colour until evaluated with a dermatoscope when the red or purple colour is more easily seen. Cherry angioma may develop on any part of the body but most often appear on the scalp, face, lips and trunk.  An angioma is due to proliferating endothelial cells; these are the cells that line the inside of a blood vessel.    Angiomas can arise in early life or later in life; the most common type of angioma is a cherry angioma.  Cherry angiomas are very common in males and females of any age or race. They are more noticeable in white skin than in skin of colour. They markedly increase in number from about the age of 40. There may be a family history of similar lesions. Eruptive cherry angiomas have been rarely reported to be associated with internal malignancy. The cause of angiomas is unknown. Genetic analysis of cherry angiomas has shown that they frequently carry specific somatic missense mutations in the GNAQ and GNA11 (Q209H) genes, which are involved in other vascular and melanocytic proliferations.      SEBORRHEIC KERATOSIS; NON-INFLAMED        Assessment and Plan:  Based on a thorough discussion of this condition and the management approach to it (including a comprehensive discussion of the known risks, side effects and potential benefits of treatment), the patient (family) agrees to implement the following specific plan:  Monitor for changes  Benign, reassured      Seborrheic Keratosis  A seborrheic keratosis is a harmless warty spot that appears during adult life as a common sign of skin aging.  Seborrheic keratoses can arise on any area of skin, covered or uncovered, with the usual exception of the " "palms and soles. They do not arise from mucous membranes. Seborrheic keratoses can have highly variable appearance.      Seborrheic keratoses are extremely common. It has been estimated that over 90% of adults over the age of 60 years have one or more of them. They occur in males and females of all races, typically beginning to erupt in the 30s or 40s. They are uncommon under the age of 20 years.  The precise cause of seborrhoeic keratoses is not known.  Seborrhoeic keratoses are considered degenerative in nature. As time goes by, seborrheic keratoses tend to become more numerous. Some people inherit a tendency to develop a very large number of them; some people may have hundreds of them.      There is no easy way to remove multiple lesions on a single occasion.  Unless a specific lesion is \"inflamed\" and is causing pain or stinging/burning or is bleeding, most insurance companies do not authorize treatment.    XEROSIS (\"DRY SKIN\")        Assessment and Plan:  Based on a thorough discussion of this condition and the management approach to it (including a comprehensive discussion of the known risks, side effects and potential benefits of treatment), the patient (family) agrees to implement the following specific plan:  Use moisturizer like Eucerin,Cerave or Aveeno Cream 3 times a day for the dry skin            Dry skin refers to skin that feels dry to touch. Dry skin has a dull surface with a rough, scaly quality. The skin is less pliable and cracked. When dryness is severe, the skin may become inflamed and fissured.  Although any body site can be dry, dry skin tends to affect the shins more than any other site.    Dry skin is lacking moisture in the outer horny cell layer (stratum corneum) and this results in cracks in the skin surface.  Dry skin is also called xerosis, xeroderma or asteatosis (lack of fat).  It can affect males and females of all ages. There is some racial variability in water and lipid content of " the skin.  Dry skin that starts in early childhood may be one of about 20 types of ichthyosis (fish-scale skin). There is often a family history of dry skin.   Dry skin is commonly seen in people with atopic dermatitis.  Nearly everyone > 60 years has dry skin.    Dry skin that begins later may be seen in people with certain diseases and conditions.  Postmenopausal women  Hypothyroidism  Chronic renal disease   Malnutrition and weight loss   Subclinical dermatitis   Treatment with certain drugs such as oral retinoids, diuretics and epidermal growth factor receptor inhibitors      What is the treatment for dry skin?  The mainstay of treatment of dry skin and ichthyosis is moisturisers/emollients. They should be applied liberally and often enough to:  Reduce itch   Improve the barrier function   Prevent entry of irritants, bacteria   Reduce transepidermal water loss.      How can dry skin be prevented?  Eliminate aggravating factors:  Reduce the frequency of bathing.   A humidifier in winter and air conditioner in summer   Compare having a short shower with a prolonged soak in a bath.   Use lukewarm, not hot, water.   Replace standard soap with a substitute such as a synthetic detergent cleanser, water-miscible emollient, bath oil, anti-pruritic tar oil, colloidal oatmeal etc.   Apply an emollient liberally and often, particularly shortly after bathing, and when itchy. The drier the skin, the thicker this should be, especially on the hands.    What is the outlook for dry skin?  A tendency to dry skin may persist life-long, or it may improve once contributing factors are controlled.

## 2025-01-21 NOTE — PROGRESS NOTES
"[facility] Dermatology Clinic Note     Patient Name: Maryann Milner  Encounter Date: 01/21/2025     Have you been cared for by a [facility] Dermatologist in the last 3 years and, if so, which description applies to you?    Yes.  I have been here within the last 3 years, and my medical history has NOT changed since that time.  I am FEMALE/of child-bearing potential.    REVIEW OF SYSTEMS:  Have you recently had or currently have any of the following? No changes in my recent health.   PAST MEDICAL HISTORY:  Have you personally ever had or currently have any of the following?  If \"YES,\" then please provide more detail. No changes in my medical history.   HISTORY OF IMMUNOSUPPRESSION: Do you have a history of any of the following:  Systemic Immunosuppression such as Diabetes, Biologic or Immunotherapy, Chemotherapy, Organ Transplantation, Bone Marrow Transplantation or Prednisone?  No     Answering \"YES\" requires the addition of the dotphrase \"IMMUNOSUPPRESSED\" as the first diagnosis of the patient's visit.   FAMILY HISTORY:  Any \"first degree relatives\" (parent, brother, sister, or child) with the following?    No changes in my family's known health.   PATIENT EXPERIENCE:    Do you want the Dermatologist to perform a COMPLETE skin exam today including a clinical examination under the \"bra and underwear\" areas?  Yes not under bra, not on feet, not under underwear   If necessary, do we have your permission to call and leave a detailed message on your Preferred Phone number that includes your specific medical information?  Yes      Allergies   Allergen Reactions    Pravastatin Myalgia    Simvastatin Myalgia      Current Outpatient Medications:     albuterol (PROVENTIL HFA,VENTOLIN HFA) 90 mcg/act inhaler, INHALE 2 PUFFS EVERY 6 HOURS AS NEEDED FOR WHEEZING, Disp: 18 g, Rfl: 6    budesonide-formoterol (SYMBICORT) 80-4.5 MCG/ACT inhaler, Inhale 2 puffs 2 (two) times a day Rinse mouth after use., Disp: 10.2 g, Rfl: 1    " "fluticasone (FLONASE) 50 mcg/act nasal spray, SPRAY 1 SPRAY INTO EACH NOSTRIL 2 TIMES A DAY WITH MEALS, Disp: 48 mL, Rfl: 3    LORazepam (ATIVAN) 0.5 mg tablet, Take 1 tablet (0.5 mg total) by mouth daily at bedtime, Disp: 30 tablet, Rfl: 0    montelukast (SINGULAIR) 10 mg tablet, TAKE 1 TABLET BY MOUTH EVERY DAY, Disp: 90 tablet, Rfl: 1    propranolol (INDERAL LA) 120 mg 24 hr capsule, TAKE 1 CAPSULE BY MOUTH EVERY DAY, Disp: 90 capsule, Rfl: 1    rosuvastatin (CRESTOR) 5 mg tablet, TAKE 1 TABLET BY MOUTH EVERY DAY, Disp: 90 tablet, Rfl: 1    solifenacin (VESICARE) 5 mg tablet, Take 1 tablet (5 mg total) by mouth daily, Disp: 90 tablet, Rfl: 3    triamcinolone (KENALOG) 0.1 % ointment, Apply topically 2 (two) times a day On weekdays only, Disp: 453.6 g, Rfl: 0    valACYclovir (VALTREX) 1,000 mg tablet, Take 2 tablets (2,000 mg total) by mouth 2 (two) times a day, Disp: 4 tablet, Rfl: 5          Whom besides the patient is providing clinical information about today's encounter?   NO ADDITIONAL HISTORIAN (patient alone provided history)    Physical Exam and Assessment/Plan by Diagnosis:    MELANOCYTIC NEVI (\"Moles\")    Physical Exam:  Anatomic Location Affected:   Mostly on sun-exposed areas of the trunk and extremities  Morphological Description:  Scattered, 1-4mm round to ovoid, symmetrical-appearing, even bordered, skin colored to dark brown macules/papules, mostly in sun-exposed areas  Pertinent Positives:  Pertinent Negatives:    Additional History of Present Condition:      Assessment and Plan:  Based on a thorough discussion of this condition and the management approach to it (including a comprehensive discussion of the known risks, side effects and potential benefits of treatment), the patient (family) agrees to implement the following specific plan:  When outside we recommend using a wide brim hat, sunglasses, long sleeve and pants, sunscreen with SPF 30+ with reapplication every 2 hours, or SPF specific " "clothing   Benign, reassured  Annual skin check     Melanocytic Nevi  Melanocytic nevi (\"moles\") are tan or brown, raised or flat areas of the skin which have an increased number of melanocytes. Melanocytes are the cells in our body which make pigment and account for skin color.    Some moles are present at birth (I.e., \"congenital nevi\"), while others come up later in life (i.e., \"acquired nevi\").  The sun can stimulate the body to make more moles.  Sunburns are not the only thing that triggers more moles.  Chronic sun exposure can do it too.     Clinically distinguishing a healthy mole from melanoma may be difficult, even for experienced dermatologists. The \"ABCDE's\" of moles have been suggested as a means of helping to alert a person to a suspicious mole and the possible increased risk of melanoma.  The suggestions for raising alert are as follows:    Asymmetry: Healthy moles tend to be symmetric, while melanomas are often asymmetric.  Asymmetry means if you draw a line through the mole, the two halves do not match in color, size, shape, or surface texture. Asymmetry can be a result of rapid enlargement of a mole, the development of a raised area on a previously flat lesion, scaling, ulceration, bleeding or scabbing within the mole.  Any mole that starts to demonstrate \"asymmetry\" should be examined promptly by a board certified dermatologist.     Border: Healthy moles tend to have discrete, even borders.  The border of a melanoma often blends into the normal skin and does not sharply delineate the mole from normal skin.  Any mole that starts to demonstrate \"uneven borders\" should be examined promptly by a board certified dermatologist.     Color: Healthy moles tend to be one color throughout.  Melanomas tend to be made up of different colors ranging from dark black, blue, white, or red.  Any mole that demonstrates a color change should be examined promptly by a board certified dermatologist.     Diameter: " "Healthy moles tend to be smaller than 0.6 cm in size; an exception are \"congenital nevi\" that can be larger.  Melanomas tend to grow and can often be greater than 0.6 cm (1/4 of an inch, or the size of a pencil eraser). This is only a guideline, and many normal moles may be larger than 0.6 cm without being unhealthy.  Any mole that starts to change in size (small to bigger or bigger to smaller) should be examined promptly by a board certified dermatologist.     Evolving: Healthy moles tend to \"stay the same.\"  Melanomas may often show signs of change or evolution such as a change in size, shape, color, or elevation.  Any mole that starts to itch, bleed, crust, burn, hurt, or ulcerate or demonstrate a change or evolution should be examined promptly by a board certified dermatologist.        LENTIGO    Physical Exam:  Anatomic Location Affected:  trunk, arms  Morphological Description:  Light brown macules  Pertinent Positives:  Pertinent Negatives:    Additional History of Present Condition:      Assessment and Plan:  Based on a thorough discussion of this condition and the management approach to it (including a comprehensive discussion of the known risks, side effects and potential benefits of treatment), the patient (family) agrees to implement the following specific plan:  When outside we recommend using a wide brim hat, sunglasses, long sleeve and pants, sunscreen with SPF 30+ with reapplication every 2 hours, or SPF specific clothing       What is a lentigo?  A lentigo is a pigmented flat or slightly raised lesion with a clearly defined edge. Unlike an ephelis (freckle), it does not fade in the winter months. There are several kinds of lentigo.  The name lentigo originally referred to its appearance resembling a small lentil. The plural of lentigo is lentigines, although “lentigos” is also in common use.    Who gets lentigines?  Lentigines can affect males and females of all ages and races. Solar lentigines are " especially prevalent in fair skinned adults. Lentigines associated with syndromes are present at birth or arise during childhood.    What causes lentigines?  Common forms of lentigo are due to exposure to ultraviolet radiation:  Sun damage including sunburn   Indoor tanning   Phototherapy, especially photochemotherapy (PUVA)    Ionizing radiation, eg radiation therapy, can also cause lentigines.  Several familial syndromes associated with widespread lentigines originate from mutations in Gibson-MAP kinase, mTOR signaling and PTEN pathways.    What is the treatment for lentigines?  Most lentigines are left alone. Attempts to lighten them may not be successful. The following approaches are used:  SPF 50+ broad-spectrum sunscreen   Hydroquinone bleaching cream   Alpha hydroxy acids   Vitamin C   Retinoids   Azelaic acid   Chemical peels  Individual lesions can be permanently removed using:  Cryotherapy   Intense pulsed light   Pigment lasers    How can lentigines be prevented?  Lentigines associated with exposure ultraviolet radiation can be prevented by very careful sun protection. Clothing is more successful at preventing new lentigines than are sunscreens.    What is the outlook for lentigines?  Lentigines usually persist. They may increase in number with age and sun exposure. Some in sun-protected sites may fade and disappear.    THOMPSON ANGIOMAS    Physical Exam:  Anatomic Location Affected:  trunk  Morphological Description:  Scattered cherry red, 1-4 mm papules.  Pertinent Positives:  Pertinent Negatives:    Additional History of Present Condition:      Assessment and Plan:  Based on a thorough discussion of this condition and the management approach to it (including a comprehensive discussion of the known risks, side effects and potential benefits of treatment), the patient (family) agrees to implement the following specific plan:  Monitor for changes  Benign, reassured      Assessment and Plan:    Cherry angioma,  "also known as Ray de Hugh spots, are benign vascular skin lesions. A \"cherry angioma\" is a firm red, blue or purple papule, 0.1-1 cm in diameter. When thrombosed, they can appear black in colour until evaluated with a dermatoscope when the red or purple colour is more easily seen. Cherry angioma may develop on any part of the body but most often appear on the scalp, face, lips and trunk.  An angioma is due to proliferating endothelial cells; these are the cells that line the inside of a blood vessel.    Angiomas can arise in early life or later in life; the most common type of angioma is a cherry angioma.  Cherry angiomas are very common in males and females of any age or race. They are more noticeable in white skin than in skin of colour. They markedly increase in number from about the age of 40. There may be a family history of similar lesions. Eruptive cherry angiomas have been rarely reported to be associated with internal malignancy. The cause of angiomas is unknown. Genetic analysis of cherry angiomas has shown that they frequently carry specific somatic missense mutations in the GNAQ and GNA11 (Q209H) genes, which are involved in other vascular and melanocytic proliferations.      SEBORRHEIC KERATOSIS; NON-INFLAMED    Physical Exam:  Anatomic Location Affected:  trunk  Morphological Description:  Flat and raised, waxy, smooth to warty textured, yellow to brownish-grey to dark brown to blackish, discrete, \"stuck-on\" appearing papules.  Pertinent Positives:  Pertinent Negatives:    Additional History of Present Condition:      Assessment and Plan:  Based on a thorough discussion of this condition and the management approach to it (including a comprehensive discussion of the known risks, side effects and potential benefits of treatment), the patient (family) agrees to implement the following specific plan:  Monitor for changes  Benign, reassured      Seborrheic Keratosis  A seborrheic keratosis is a " "harmless warty spot that appears during adult life as a common sign of skin aging.  Seborrheic keratoses can arise on any area of skin, covered or uncovered, with the usual exception of the palms and soles. They do not arise from mucous membranes. Seborrheic keratoses can have highly variable appearance.      Seborrheic keratoses are extremely common. It has been estimated that over 90% of adults over the age of 60 years have one or more of them. They occur in males and females of all races, typically beginning to erupt in the 30s or 40s. They are uncommon under the age of 20 years.  The precise cause of seborrhoeic keratoses is not known.  Seborrhoeic keratoses are considered degenerative in nature. As time goes by, seborrheic keratoses tend to become more numerous. Some people inherit a tendency to develop a very large number of them; some people may have hundreds of them.      There is no easy way to remove multiple lesions on a single occasion.  Unless a specific lesion is \"inflamed\" and is causing pain or stinging/burning or is bleeding, most insurance companies do not authorize treatment.    XEROSIS (\"DRY SKIN\")    Physical Exam:  Anatomic Location Affected:  diffuse  Morphological Description:  xerosis  Pertinent Positives:  Pertinent Negatives:    Additional History of Present Condition:      Assessment and Plan:  Based on a thorough discussion of this condition and the management approach to it (including a comprehensive discussion of the known risks, side effects and potential benefits of treatment), the patient (family) agrees to implement the following specific plan:  Use moisturizer like Eucerin,Cerave or Aveeno Cream 3 times a day for the dry skin            Dry skin refers to skin that feels dry to touch. Dry skin has a dull surface with a rough, scaly quality. The skin is less pliable and cracked. When dryness is severe, the skin may become inflamed and fissured.  Although any body site can be dry, " dry skin tends to affect the shins more than any other site.    Dry skin is lacking moisture in the outer horny cell layer (stratum corneum) and this results in cracks in the skin surface.  Dry skin is also called xerosis, xeroderma or asteatosis (lack of fat).  It can affect males and females of all ages. There is some racial variability in water and lipid content of the skin.  Dry skin that starts in early childhood may be one of about 20 types of ichthyosis (fish-scale skin). There is often a family history of dry skin.   Dry skin is commonly seen in people with atopic dermatitis.  Nearly everyone > 60 years has dry skin.    Dry skin that begins later may be seen in people with certain diseases and conditions.  Postmenopausal women  Hypothyroidism  Chronic renal disease   Malnutrition and weight loss   Subclinical dermatitis   Treatment with certain drugs such as oral retinoids, diuretics and epidermal growth factor receptor inhibitors      What is the treatment for dry skin?  The mainstay of treatment of dry skin and ichthyosis is moisturisers/emollients. They should be applied liberally and often enough to:  Reduce itch   Improve the barrier function   Prevent entry of irritants, bacteria   Reduce transepidermal water loss.      How can dry skin be prevented?  Eliminate aggravating factors:  Reduce the frequency of bathing.   A humidifier in winter and air conditioner in summer   Compare having a short shower with a prolonged soak in a bath.   Use lukewarm, not hot, water.   Replace standard soap with a substitute such as a synthetic detergent cleanser, water-miscible emollient, bath oil, anti-pruritic tar oil, colloidal oatmeal etc.   Apply an emollient liberally and often, particularly shortly after bathing, and when itchy. The drier the skin, the thicker this should be, especially on the hands.    What is the outlook for dry skin?  A tendency to dry skin may persist life-long, or it may improve once  contributing factors are controlled.       Scribe Attestation      I,:  Earline Myles am acting as a scribe while in the presence of the attending physician.:       I,:  Brielle Humphreys MD personally performed the services described in this documentation    as scribed in my presence.:

## 2025-02-05 DIAGNOSIS — L92.0 GRANULOMA ANNULARE: ICD-10-CM

## 2025-02-05 DIAGNOSIS — L40.9 PSORIASIS: ICD-10-CM

## 2025-02-07 RX ORDER — TRIAMCINOLONE ACETONIDE 1 MG/G
OINTMENT TOPICAL 2 TIMES DAILY
Qty: 454 G | OUTPATIENT
Start: 2025-02-07

## 2025-02-07 NOTE — TELEPHONE ENCOUNTER
Patient called the refill line stating that she does not need this medication. Medication was refused.

## 2025-02-09 DIAGNOSIS — N32.81 OAB (OVERACTIVE BLADDER): ICD-10-CM

## 2025-02-10 RX ORDER — SOLIFENACIN SUCCINATE 5 MG/1
5 TABLET, FILM COATED ORAL DAILY
Qty: 90 TABLET | Refills: 1 | Status: SHIPPED | OUTPATIENT
Start: 2025-02-10

## 2025-03-05 ENCOUNTER — RA CDI HCC (OUTPATIENT)
Dept: OTHER | Facility: HOSPITAL | Age: 70
End: 2025-03-05

## 2025-03-12 ENCOUNTER — OFFICE VISIT (OUTPATIENT)
Age: 70
End: 2025-03-12
Payer: COMMERCIAL

## 2025-03-12 VITALS
HEART RATE: 72 BPM | HEIGHT: 69 IN | BODY MASS INDEX: 24.73 KG/M2 | WEIGHT: 167 LBS | DIASTOLIC BLOOD PRESSURE: 78 MMHG | SYSTOLIC BLOOD PRESSURE: 122 MMHG | RESPIRATION RATE: 16 BRPM | OXYGEN SATURATION: 98 %

## 2025-03-12 DIAGNOSIS — Z78.0 POSTMENOPAUSAL: ICD-10-CM

## 2025-03-12 DIAGNOSIS — N32.81 OAB (OVERACTIVE BLADDER): ICD-10-CM

## 2025-03-12 DIAGNOSIS — G89.29 BILATERAL CHRONIC KNEE PAIN: ICD-10-CM

## 2025-03-12 DIAGNOSIS — K21.9 GASTROESOPHAGEAL REFLUX DISEASE WITHOUT ESOPHAGITIS: ICD-10-CM

## 2025-03-12 DIAGNOSIS — J45.40 MODERATE PERSISTENT ASTHMA WITHOUT COMPLICATION: ICD-10-CM

## 2025-03-12 DIAGNOSIS — Z12.31 ENCOUNTER FOR SCREENING MAMMOGRAM FOR BREAST CANCER: ICD-10-CM

## 2025-03-12 DIAGNOSIS — E78.2 MIXED HYPERLIPIDEMIA: ICD-10-CM

## 2025-03-12 DIAGNOSIS — M25.562 BILATERAL CHRONIC KNEE PAIN: ICD-10-CM

## 2025-03-12 DIAGNOSIS — Z00.00 MEDICARE ANNUAL WELLNESS VISIT, SUBSEQUENT: Primary | ICD-10-CM

## 2025-03-12 DIAGNOSIS — I10 BENIGN ESSENTIAL HYPERTENSION: ICD-10-CM

## 2025-03-12 DIAGNOSIS — E87.0 HYPERNATREMIA: ICD-10-CM

## 2025-03-12 DIAGNOSIS — M25.561 BILATERAL CHRONIC KNEE PAIN: ICD-10-CM

## 2025-03-12 DIAGNOSIS — F41.8 DEPRESSION WITH ANXIETY: ICD-10-CM

## 2025-03-12 DIAGNOSIS — R25.1 TREMOR: ICD-10-CM

## 2025-03-12 DIAGNOSIS — F51.01 PRIMARY INSOMNIA: ICD-10-CM

## 2025-03-12 PROCEDURE — G0439 PPPS, SUBSEQ VISIT: HCPCS

## 2025-03-12 RX ORDER — SOLIFENACIN SUCCINATE 5 MG/1
10 TABLET, FILM COATED ORAL DAILY
Qty: 180 TABLET | Refills: 1 | Status: SHIPPED | OUTPATIENT
Start: 2025-03-12

## 2025-03-12 NOTE — ASSESSMENT & PLAN NOTE
BP in office is 122/78. She is currently on propanolol 120 mg daily. No home BP's. Limit salt intake to <2,000 mg daily.

## 2025-03-12 NOTE — ASSESSMENT & PLAN NOTE
She denies any chest tightness, coughing, wheezing, or SOB. She uses symbicort daily and albuterol prn.

## 2025-03-12 NOTE — PROGRESS NOTES
Name: Maryann Milner      : 1955      MRN: [id number]  Encounter Provider: Abbie Recio PA-C  Encounter Date: 3/12/2025   Encounter department: [facility] INTERNAL MEDICINE [facility] [address]    Assessment & Plan  Medicare annual wellness visit, subsequent  Recommended eating a well-balanced diet of fruits, veggies, and lean meats, and increasing water intake. She does no formal exercise, recommended 30 mins 5x a week. She sleeps okay. She denies any tobacco, alcohol, or illicit drug use. She is UTD with dentist and eye doctor. She helps her daughter with childcare.       Benign essential hypertension  BP in office is 122/78. She is currently on propanolol 120 mg daily. No home BP's. Limit salt intake to <2,000 mg daily.        Moderate persistent asthma without complication  She denies any chest tightness, coughing, wheezing, or SOB. She uses symbicort daily and albuterol prn.       Depression with anxiety  She is using ativan prn.        Primary insomnia  She is using ativan prn.        Tremor  She is currently on propranolol 120 mg daily.        Mixed hyperlipidemia  Due for lipid panel. She is currently on rosuvastatin 5 mg daily.        Encounter for screening mammogram for breast cancer  Due for mammogram.   Orders:    Mammo screening bilateral w 3d and cad; Future    Postmenopausal  Due for DEXA scan.   Orders:    DXA bone density spine hip and pelvis; Future    Hypernatremia  Sodium in November was elevated, but she did not repeat BMP. Recommended repeating with upcoming bloodwork.        OAB (overactive bladder)  She admits to some symptoms despite the vesicare, recommended increasing to 10 mg daily.   Orders:    solifenacin (VESICARE) 5 mg tablet; Take 2 tablets (10 mg total) by mouth daily    Bilateral chronic knee pain  Recent L knee MRI revealed moderate arthritis, small meniscal tear, baker's cyst, and a potential MCL sprain. She had seen ortho who recommended PT, referral placed. Can  refer to ortho if symptoms worsen despite conservative therapy.   Orders:    Ambulatory Referral to Physical Therapy; Future      Depression Screening and Follow-up Plan: Patient was screened for depression during today's encounter. They screened negative with a PHQ-9 score of 0.      Urinary Incontinence Plan of Care: counseling topics discussed: practice Kegel (pelvic floor strengthening) exercises, limit alcohol, caffeine, spicy foods, and acidic foods and limiting fluid intake 3-4 hours before bed.       Preventive health issues were discussed with patient, and age appropriate screening tests were ordered as noted in patient's After Visit Summary. Personalized health advice and appropriate referrals for health education or preventive services given if needed, as noted in patient's After Visit Summary.    History of Present Illness     Patient is a 69-year-old female that presents today for her annual Medicare wellness visit.  She complains of worsening bilateral knee pain and urinary symptoms.     Health maintenance:  Due for labs and mammogram.  Family history of diabetes, hypertension, pancreatic cancer, ovarian cancer, CAD, endometrial cancer, rectal cancer, osteoporosis.  Immunizations: Due for COVID and flu vaccines.       Patient Care Team:  Dain Melchor MD as PCP - General  MD oLu Craig MD Amanda Whitewood, PA-C as Physician Assistant (Internal Medicine)    Review of Systems   Constitutional:  Negative for chills, fatigue and fever.   HENT:  Negative for ear discharge, ear pain, postnasal drip, rhinorrhea, sinus pressure, sinus pain, sore throat, tinnitus and trouble swallowing.    Eyes:  Negative for pain, discharge and itching.   Respiratory:  Negative for cough, shortness of breath and wheezing.    Cardiovascular:  Negative for chest pain, palpitations and leg swelling.   Gastrointestinal:  Negative for abdominal pain, constipation, diarrhea, nausea and vomiting.   Endocrine:  Negative for polydipsia, polyphagia and polyuria.   Genitourinary:  Positive for frequency. Negative for difficulty urinating, hematuria and urgency.   Musculoskeletal:  Positive for arthralgias. Negative for joint swelling and myalgias.   Skin:  Negative for color change.   Allergic/Immunologic: Negative for environmental allergies.   Neurological:  Negative for dizziness, weakness, light-headedness, numbness and headaches.   Hematological:  Negative for adenopathy.   Psychiatric/Behavioral:  Negative for decreased concentration and sleep disturbance. The patient is not nervous/anxious.      Medical History Reviewed by provider this encounter:  Tobacco  Allergies  Meds  Problems  Med Hx  Surg Hx  Fam Hx       Annual Wellness Visit Questionnaire   Maryann is here for her Subsequent Wellness visit. Last Medicare Wellness visit information reviewed, patient interviewed and updates made to the record today.      Health Risk Assessment:   Patient rates overall health as good. Patient feels that their physical health rating is slightly worse. Patient is satisfied with their life. Eyesight was rated as slightly worse. Hearing was rated as same. Patient feels that their emotional and mental health rating is same. Patients states they are sometimes angry. Patient states they are sometimes unusually tired/fatigued. Pain experienced in the last 7 days has been some. Patient's pain rating has been 5/10. Patient states that she has experienced weight loss or gain in last 6 months.     Depression Screening:   PHQ-9 Score: 0      Fall Risk Screening:   In the past year, patient has experienced: no history of falling in past year      Urinary Incontinence Screening:   Patient has leaked urine accidently in the last six months.     Home Safety:  Patient has trouble with stairs inside or outside of their home. Patient has working smoke alarms and has no working carbon monoxide detector. Home safety hazards include: none.      Nutrition:   Current diet is Regular.     Medications:   Patient is currently taking over-the-counter supplements. OTC medications include: see medication list. Patient is able to manage medications.     Activities of Daily Living (ADLs)/Instrumental Activities of Daily Living (IADLs):   Walk and transfer into and out of bed and chair?: Yes  Dress and groom yourself?: Yes    Bathe or shower yourself?: Yes    Feed yourself? Yes  Do your laundry/housekeeping?: Yes  Manage your money, pay your bills and track your expenses?: Yes  Make your own meals?: Yes    Do your own shopping?: Yes    Previous Hospitalizations:   Any hospitalizations or ED visits within the last 12 months?: No      Advance Care Planning:   Living will: No    Durable POA for healthcare: No    Advanced directive: No    Advanced directive counseling given: Yes    ACP document given: Yes      PREVENTIVE SCREENINGS      Cardiovascular Screening:    General: History Lipid Disorder and Risks and Benefits Discussed    Due for: Lipid Panel      Diabetes Screening:     General: Risks and Benefits Discussed    Due for: Blood Glucose      Colorectal Cancer Screening:     General: Screening Current      Breast Cancer Screening:     General: Risks and Benefits Discussed    Due for: Mammogram        Cervical Cancer Screening:    General: Screening Not Indicated      Osteoporosis Screening:    General: Risks and Benefits Discussed    Due for: Bone Density Ultrasound      Abdominal Aortic Aneurysm (AAA) Screening:        General: Screening Not Indicated      Lung Cancer Screening:     General: Screening Not Indicated      Hepatitis C Screening:    General: Screening Current    Screening, Brief Intervention, and Referral to Treatment (SBIRT)     Screening  Typical number of drinks in a day: 0  Typical number of drinks in a week: 1  Interpretation: Low risk drinking behavior.    AUDIT-C Screenin) How often did you have a drink containing alcohol in the past  year? 2 to 4 times a month  2) How many drinks did you have on a typical day when you were drinking in the past year? 1 to 2  3) How often did you have 6 or more drinks on one occasion in the past year? less than monthly    AUDIT-C Score: 3  Interpretation: Score 3-12 (female): POSITIVE screen for alcohol misuse    AUDIT Screenin) How often during the last year have you found that you were not able to stop drinking once you had started? 0 - never  5) How often during the last year have you failed to do what was normally expected from you because of drinking? 0 - never  6) How often during the last year have you needed a first drink in the morning to get yourself going after a heavy drinking session? 0 - never  7) How often during the last year have you had a feeling of guilt or remorse after drinking? 0 - never  8) How often during the last year have you been unable to remember what happened the night before because you had been drinking? 0 - never  9) Have you or someone else been injured as a result of your drinking? 0 - no  10) Has a relative or friend or a doctor or another health worker been concerned about your drinking or suggested you cut down? 0 - no    AUDIT Score: 3  Interpretation: Low risk alcohol consumption    Single Item Drug Screening:  How often have you used an illegal drug (including marijuana) or a prescription medication for non-medical reasons in the past year? never    Single Item Drug Screen Score: 0  Interpretation: Negative screen for possible drug use disorder    Social Drivers of Health     Financial Resource Strain: Medium Risk (3/3/2024)    Overall Financial Resource Strain (CARDIA)     Difficulty of Paying Living Expenses: Somewhat hard   Food Insecurity: No Food Insecurity (3/12/2025)    Hunger Vital Sign     Worried About Running Out of Food in the Last Year: Never true     Ran Out of Food in the Last Year: Never true   Transportation Needs: No Transportation Needs (3/12/2025)  "   PRAPARE - Transportation     Lack of Transportation (Medical): No     Lack of Transportation (Non-Medical): No   Housing Stability: Low Risk  (3/12/2025)    Housing Stability Vital Sign     Unable to Pay for Housing in the Last Year: No     Number of Times Moved in the Last Year: 0     Homeless in the Last Year: No   Utilities: Not At Risk (3/12/2025)    [facility] Utilities     Threatened with loss of utilities: No     No results found.    Objective   /78 (BP Location: Left arm, Patient Position: Sitting, Cuff Size: Standard)   Pulse 72   Resp 16   Ht 5' 9\" (1.753 m)   Wt 75.8 kg (167 lb)   SpO2 98%   BMI 24.66 kg/m²     Physical Exam  Vitals and nursing note reviewed.   Constitutional:       General: She is awake. She is not in acute distress.     Appearance: Normal appearance. She is well-developed, well-groomed and normal weight.   HENT:      Head: Normocephalic and atraumatic.      Right Ear: Hearing and external ear normal.      Left Ear: Hearing and external ear normal.      Nose: Nose normal.      Mouth/Throat:      Lips: Pink.      Mouth: Mucous membranes are moist.   Eyes:      General: Lids are normal. Vision grossly intact. Gaze aligned appropriately.      Conjunctiva/sclera: Conjunctivae normal.   Neck:      Vascular: No carotid bruit.      Trachea: Trachea and phonation normal.   Cardiovascular:      Rate and Rhythm: Normal rate and regular rhythm.      Heart sounds: Normal heart sounds, S1 normal and S2 normal. No murmur heard.     No friction rub. No gallop.   Pulmonary:      Effort: Pulmonary effort is normal. No respiratory distress.      Breath sounds: Normal breath sounds and air entry. No decreased breath sounds, wheezing, rhonchi or rales.   Abdominal:      General: Abdomen is flat. Bowel sounds are normal.      Palpations: Abdomen is soft.      Tenderness: There is no abdominal tenderness.   Musculoskeletal:         General: No swelling.      Cervical back: Neck supple.      Right " lower leg: No edema.      Left lower leg: No edema.   Skin:     General: Skin is warm.      Capillary Refill: Capillary refill takes less than 2 seconds.   Neurological:      Mental Status: She is alert.   Psychiatric:         Attention and Perception: Attention and perception normal.         Mood and Affect: Mood and affect normal.         Speech: Speech normal.         Behavior: Behavior normal. Behavior is cooperative.         Thought Content: Thought content normal.         Cognition and Memory: Cognition and memory normal.         Judgment: Judgment normal.

## 2025-04-01 DIAGNOSIS — J45.40 MODERATE PERSISTENT ASTHMA WITHOUT COMPLICATION: ICD-10-CM

## 2025-04-02 RX ORDER — BUDESONIDE AND FORMOTEROL FUMARATE DIHYDRATE 80; 4.5 UG/1; UG/1
2 AEROSOL RESPIRATORY (INHALATION) 2 TIMES DAILY
Qty: 10.2 G | Refills: 2 | Status: SHIPPED | OUTPATIENT
Start: 2025-04-02

## 2025-04-08 DIAGNOSIS — N32.81 OAB (OVERACTIVE BLADDER): ICD-10-CM

## 2025-04-08 NOTE — TELEPHONE ENCOUNTER
Reason for call:   [x] Refill   [] Prior Auth  [] Other:     Office:   [x] PCP/Provider -   [] Specialty/Provider -     Medication:   - Solifenacin 5 mg- take 2 tablets by mouth daily    Pharmacy: [facility] [place]    Local Pharmacy   Does the patient have enough for 3 days?   [x] Yes   [] No - Send as HP to POD    Mail Away Pharmacy   Does the patient have enough for 10 days?   [] Yes   [] No - Send as HP to POD

## 2025-04-09 DIAGNOSIS — E78.2 MIXED HYPERLIPIDEMIA: ICD-10-CM

## 2025-04-09 RX ORDER — ROSUVASTATIN CALCIUM 5 MG/1
5 TABLET, COATED ORAL DAILY
Qty: 90 TABLET | Refills: 1 | Status: SHIPPED | OUTPATIENT
Start: 2025-04-09

## 2025-04-09 RX ORDER — SOLIFENACIN SUCCINATE 5 MG/1
10 TABLET, FILM COATED ORAL DAILY
Qty: 180 TABLET | Refills: 1 | Status: SHIPPED | OUTPATIENT
Start: 2025-04-09

## 2025-05-05 ENCOUNTER — PATIENT MESSAGE (OUTPATIENT)
Age: 70
End: 2025-05-05

## 2025-05-05 DIAGNOSIS — F43.21 GRIEF: Primary | ICD-10-CM

## 2025-05-17 LAB
ALBUMIN SERPL-MCNC: 4.3 G/DL (ref 3.9–4.9)
ALP SERPL-CCNC: 81 IU/L (ref 44–121)
ALT SERPL-CCNC: 19 IU/L (ref 0–32)
AST SERPL-CCNC: 18 IU/L (ref 0–40)
BASOPHILS # BLD AUTO: 0.1 X10E3/UL (ref 0–0.2)
BASOPHILS NFR BLD AUTO: 1 %
BILIRUB SERPL-MCNC: 0.4 MG/DL (ref 0–1.2)
BUN SERPL-MCNC: 14 MG/DL (ref 8–27)
BUN/CREAT SERPL: 22 (ref 12–28)
CALCIUM SERPL-MCNC: 9.1 MG/DL (ref 8.7–10.3)
CHLORIDE SERPL-SCNC: 105 MMOL/L (ref 96–106)
CHOLEST SERPL-MCNC: 158 MG/DL (ref 100–199)
CO2 SERPL-SCNC: 22 MMOL/L (ref 20–29)
CREAT SERPL-MCNC: 0.65 MG/DL (ref 0.57–1)
EGFR: 96 ML/MIN/1.73
EOSINOPHIL # BLD AUTO: 0.2 X10E3/UL (ref 0–0.4)
EOSINOPHIL NFR BLD AUTO: 4 %
ERYTHROCYTE [DISTWIDTH] IN BLOOD BY AUTOMATED COUNT: 12.5 % (ref 11.7–15.4)
GLOBULIN SER-MCNC: 2 G/DL (ref 1.5–4.5)
GLUCOSE SERPL-MCNC: 94 MG/DL (ref 70–99)
HBA1C MFR BLD: 5.4 % (ref 4.8–5.6)
HCT VFR BLD AUTO: 43.2 % (ref 34–46.6)
HDLC SERPL-MCNC: 47 MG/DL
HGB BLD-MCNC: 13.7 G/DL (ref 11.1–15.9)
IMM GRANULOCYTES # BLD: 0 X10E3/UL (ref 0–0.1)
IMM GRANULOCYTES NFR BLD: 0 %
LDLC SERPL CALC-MCNC: 83 MG/DL (ref 0–99)
LYMPHOCYTES # BLD AUTO: 1.8 X10E3/UL (ref 0.7–3.1)
LYMPHOCYTES NFR BLD AUTO: 40 %
MCH RBC QN AUTO: 30.4 PG (ref 26.6–33)
MCHC RBC AUTO-ENTMCNC: 31.7 G/DL (ref 31.5–35.7)
MCV RBC AUTO: 96 FL (ref 79–97)
MONOCYTES # BLD AUTO: 0.4 X10E3/UL (ref 0.1–0.9)
MONOCYTES NFR BLD AUTO: 8 %
NEUTROPHILS # BLD AUTO: 2.1 X10E3/UL (ref 1.4–7)
NEUTROPHILS NFR BLD AUTO: 47 %
PLATELET # BLD AUTO: 206 X10E3/UL (ref 150–450)
POTASSIUM SERPL-SCNC: 4 MMOL/L (ref 3.5–5.2)
PROT SERPL-MCNC: 6.3 G/DL (ref 6–8.5)
RBC # BLD AUTO: 4.5 X10E6/UL (ref 3.77–5.28)
SL AMB VLDL CHOLESTEROL CALC: 28 MG/DL (ref 5–40)
SODIUM SERPL-SCNC: 142 MMOL/L (ref 134–144)
TRIGL SERPL-MCNC: 162 MG/DL (ref 0–149)
TSH SERPL DL<=0.005 MIU/L-ACNC: 3.71 UIU/ML (ref 0.45–4.5)
WBC # BLD AUTO: 4.5 X10E3/UL (ref 3.4–10.8)

## 2025-05-21 ENCOUNTER — TELEPHONE (OUTPATIENT)
Age: 70
End: 2025-05-21

## 2025-05-21 ENCOUNTER — OFFICE VISIT (OUTPATIENT)
Age: 70
End: 2025-05-21
Payer: COMMERCIAL

## 2025-05-21 VITALS
BODY MASS INDEX: 24.14 KG/M2 | OXYGEN SATURATION: 98 % | DIASTOLIC BLOOD PRESSURE: 100 MMHG | SYSTOLIC BLOOD PRESSURE: 166 MMHG | RESPIRATION RATE: 18 BRPM | HEIGHT: 69 IN | HEART RATE: 78 BPM | WEIGHT: 163 LBS

## 2025-05-21 DIAGNOSIS — F41.8 DEPRESSION WITH ANXIETY: ICD-10-CM

## 2025-05-21 DIAGNOSIS — R73.01 IFG (IMPAIRED FASTING GLUCOSE): ICD-10-CM

## 2025-05-21 DIAGNOSIS — I10 BENIGN ESSENTIAL HYPERTENSION: Primary | ICD-10-CM

## 2025-05-21 DIAGNOSIS — E78.2 MIXED HYPERLIPIDEMIA: ICD-10-CM

## 2025-05-21 DIAGNOSIS — K21.9 GASTROESOPHAGEAL REFLUX DISEASE, UNSPECIFIED WHETHER ESOPHAGITIS PRESENT: ICD-10-CM

## 2025-05-21 DIAGNOSIS — R53.83 OTHER FATIGUE: ICD-10-CM

## 2025-05-21 DIAGNOSIS — Z12.31 ENCOUNTER FOR SCREENING MAMMOGRAM FOR BREAST CANCER: ICD-10-CM

## 2025-05-21 PROCEDURE — 99214 OFFICE O/P EST MOD 30 MIN: CPT

## 2025-05-21 PROCEDURE — G2211 COMPLEX E/M VISIT ADD ON: HCPCS

## 2025-05-21 NOTE — ASSESSMENT & PLAN NOTE
She has had a tremendous amount of stress over the past few months due to difficult family circumstances. She is on the wait list for [facility] psychiatry. Recommended establishing with a therapist/grief counselor, mediation, journaling, exercise, sleep hygiene, and eating well. She uses lorazepam prn. She had been on zoloft in the past, but wants to refrain from starting a new medication at this time.

## 2025-05-21 NOTE — ASSESSMENT & PLAN NOTE
BP in office is 166/100, on recheck it was 150/90. She admits to a tremendous increase in stress. She is asymptomatic in office today. She takes her propanolol 120 mg prior to bed.

## 2025-05-21 NOTE — PROGRESS NOTES
Name: Maryann Milner      : 1955      MRN: [id number]  Encounter Provider: Abbie Recio PA-C  Encounter Date: 2025   Encounter department: [facility] INTERNAL MEDICINE [address]  :  Assessment & Plan  Benign essential hypertension  BP in office is 166/100, on recheck it was 150/90. She admits to a tremendous increase in stress. She is asymptomatic in office today. She takes her propanolol 120 mg prior to bed.        Mixed hyperlipidemia  Lipid panel is under excellent control, continue rosuvastatin 5 mg daily.   Orders:    Lipid panel; Future    Gastroesophageal reflux disease, unspecified whether esophagitis present  Recommended continuing to use pepcid or tums prn. Sit upright 30 mins after meals, avoid eating prior to bed, avoid foods that trigger it, and elevate head of bed.        Encounter for screening mammogram for breast cancer  Schedule mammogram.        IFG (impaired fasting glucose)    Orders:    Hemoglobin A1C; Future    Comprehensive metabolic panel; Future    Other fatigue    Orders:    CBC and differential; Future    TSH, 3rd generation with Free T4 reflex; Future    Depression with anxiety  She has had a tremendous amount of stress over the past few months due to difficult family circumstances. She is on the wait list for [facility] psychiatry. Recommended establishing with a therapist/grief counselor, mediation, journaling, exercise, sleep hygiene, and eating well. She uses lorazepam prn. She had been on zoloft in the past, but wants to refrain from starting a new medication at this time.               History of Present Illness   Patient is a 70 yo female that presents today to review her labs.       Review of Systems   Constitutional:  Negative for chills, fatigue and fever.   HENT:  Negative for ear discharge, ear pain, postnasal drip, rhinorrhea, sinus pressure, sinus pain, sore throat, tinnitus and trouble swallowing.    Eyes:  Negative for pain, discharge and  "itching.   Respiratory:  Negative for cough, shortness of breath and wheezing.    Cardiovascular:  Negative for chest pain, palpitations and leg swelling.   Gastrointestinal:  Negative for abdominal pain, constipation, diarrhea, nausea and vomiting.   Endocrine: Negative for polydipsia, polyphagia and polyuria.   Genitourinary:  Negative for difficulty urinating, frequency, hematuria and urgency.   Musculoskeletal:  Negative for arthralgias, joint swelling and myalgias.   Skin:  Negative for color change.   Allergic/Immunologic: Negative for environmental allergies.   Neurological:  Negative for dizziness, weakness, light-headedness, numbness and headaches.   Hematological:  Negative for adenopathy.   Psychiatric/Behavioral:  Negative for decreased concentration and sleep disturbance. The patient is nervous/anxious.        Objective   /100 (BP Location: Left arm)   Pulse 78   Resp 18   Ht 5' 9\" (1.753 m)   Wt 73.9 kg (163 lb)   SpO2 98%   BMI 24.07 kg/m²      Physical Exam  Vitals and nursing note reviewed.   Constitutional:       General: She is awake. She is not in acute distress.     Appearance: Normal appearance. She is well-developed, well-groomed and normal weight.   HENT:      Head: Normocephalic and atraumatic.      Right Ear: Hearing and external ear normal.      Left Ear: Hearing and external ear normal.      Nose: Nose normal.      Mouth/Throat:      Lips: Pink.      Mouth: Mucous membranes are moist.     Eyes:      General: Lids are normal. Vision grossly intact. Gaze aligned appropriately.      Conjunctiva/sclera: Conjunctivae normal.     Neck:      Vascular: No carotid bruit.      Trachea: Trachea and phonation normal.     Cardiovascular:      Rate and Rhythm: Normal rate and regular rhythm.      Heart sounds: Normal heart sounds, S1 normal and S2 normal. No murmur heard.     No friction rub. No gallop.   Pulmonary:      Effort: Pulmonary effort is normal. No respiratory distress.      " Breath sounds: Normal breath sounds and air entry. No decreased breath sounds, wheezing, rhonchi or rales.   Abdominal:      General: Abdomen is flat. Bowel sounds are normal.      Palpations: Abdomen is soft.      Tenderness: There is no abdominal tenderness.     Musculoskeletal:         General: No swelling.      Cervical back: Neck supple.      Right lower leg: No edema.      Left lower leg: No edema.     Skin:     General: Skin is warm.      Capillary Refill: Capillary refill takes less than 2 seconds.     Neurological:      Mental Status: She is alert.     Psychiatric:         Attention and Perception: Attention and perception normal.         Mood and Affect: Mood and affect normal.         Speech: Speech normal.         Behavior: Behavior normal. Behavior is cooperative.         Thought Content: Thought content normal.         Cognition and Memory: Cognition and memory normal.         Judgment: Judgment normal.

## 2025-05-21 NOTE — ASSESSMENT & PLAN NOTE
Lipid panel is under excellent control, continue rosuvastatin 5 mg daily.   Orders:    Lipid panel; Future

## 2025-06-03 DIAGNOSIS — F51.01 PRIMARY INSOMNIA: ICD-10-CM

## 2025-06-04 RX ORDER — LORAZEPAM 0.5 MG/1
0.5 TABLET ORAL
Qty: 30 TABLET | Refills: 0 | Status: SHIPPED | OUTPATIENT
Start: 2025-06-04

## 2025-06-18 ENCOUNTER — OFFICE VISIT (OUTPATIENT)
Age: 70
End: 2025-06-18
Payer: COMMERCIAL

## 2025-06-18 VITALS
HEIGHT: 69 IN | DIASTOLIC BLOOD PRESSURE: 80 MMHG | SYSTOLIC BLOOD PRESSURE: 130 MMHG | OXYGEN SATURATION: 100 % | HEART RATE: 84 BPM | BODY MASS INDEX: 24.14 KG/M2 | WEIGHT: 163 LBS

## 2025-06-18 DIAGNOSIS — F51.01 PRIMARY INSOMNIA: ICD-10-CM

## 2025-06-18 DIAGNOSIS — M25.562 ACUTE PAIN OF LEFT KNEE: Primary | ICD-10-CM

## 2025-06-18 PROCEDURE — G2211 COMPLEX E/M VISIT ADD ON: HCPCS

## 2025-06-18 PROCEDURE — 99213 OFFICE O/P EST LOW 20 MIN: CPT

## 2025-06-18 NOTE — PROGRESS NOTES
"Name: Maryann Milner      : 1955      MRN: [id number]  Encounter Provider: Abbie Recio PA-C  Encounter Date: 2025   Encounter department: [facility] INTERNAL MEDICINE [facility] [address]  :  Assessment & Plan  Acute pain of left knee  MRI of left knee completed in 2024 revealed arthritis, degenerative changes and tear of the meniscus, and grade 1 MCL sprain.  Discussed that this may be related to a meniscal tear.  Recommended rest, continuing to weight-bear as needed and using crutches, ice, compression, elevation, and over-the-counter analgesia.  She does have physical therapy scheduled for ongoing chronic bilateral knee pain, recommended rescheduling for next week.  Follow-up if symptoms worsen.              History of Present Illness   Patient is a 70 yo female that presents today for acute on chronic left knee pain.  She was walking to the bathroom yesterday when she pivoted wrong and felt a sharp pain in her knee.  She describes it as a deep medial sided pain.  She is struggling to put weight on it.  She denies any fevers, chills, open cuts, erythema, bruising.  She is using crutches to ambulate.  She has been taking ibuprofen and Tylenol with some relief.      Review of Systems   Constitutional:  Negative for chills and fever.   Musculoskeletal:  Positive for arthralgias and gait problem.       Objective   /80   Pulse 84   Ht 5' 9\" (1.753 m)   Wt 73.9 kg (163 lb)   SpO2 100%   BMI 24.07 kg/m²      Physical Exam  Vitals and nursing note reviewed.   Constitutional:       General: She is awake. She is not in acute distress.     Appearance: Normal appearance. She is well-developed, well-groomed and normal weight.   HENT:      Head: Normocephalic and atraumatic.      Right Ear: Hearing and external ear normal.      Left Ear: Hearing and external ear normal.      Nose: Nose normal.      Mouth/Throat:      Lips: Pink.      Mouth: Mucous membranes are moist.     Eyes:      General: " Lids are normal. Vision grossly intact. Gaze aligned appropriately.      Conjunctiva/sclera: Conjunctivae normal.     Neck:      Vascular: No carotid bruit.      Trachea: Trachea and phonation normal.   Pulmonary:      Effort: Pulmonary effort is normal. No respiratory distress.   Abdominal:      General: Abdomen is protuberant.     Musculoskeletal:         General: No swelling.      Cervical back: Neck supple.      Left knee: No swelling, erythema, ecchymosis or bony tenderness. Normal range of motion. No tenderness.      Comments: No tenderness to palpation of knee.  Mild pain with inversion and extension.     Skin:     General: Skin is warm.      Capillary Refill: Capillary refill takes less than 2 seconds.     Neurological:      Mental Status: She is alert.     Psychiatric:         Attention and Perception: Attention and perception normal.         Mood and Affect: Mood and affect normal.         Speech: Speech normal.         Behavior: Behavior normal. Behavior is cooperative.         Thought Content: Thought content normal.         Cognition and Memory: Cognition and memory normal.         Judgment: Judgment normal.

## 2025-06-19 RX ORDER — LORAZEPAM 0.5 MG/1
0.5 TABLET ORAL
Qty: 30 TABLET | Refills: 0 | Status: SHIPPED | OUTPATIENT
Start: 2025-06-19

## 2025-06-27 DIAGNOSIS — T78.40XS ALLERGIC DISORDER, SEQUELA: ICD-10-CM

## 2025-06-27 RX ORDER — MONTELUKAST SODIUM 10 MG/1
10 TABLET ORAL DAILY
Qty: 90 TABLET | Refills: 1 | Status: SHIPPED | OUTPATIENT
Start: 2025-06-27

## (undated) DEVICE — INTENDED FOR TISSUE SEPARATION, AND OTHER PROCEDURES THAT REQUIRE A SHARP SURGICAL BLADE TO PUNCTURE OR CUT.: Brand: BARD-PARKER SAFETY BLADES SIZE 10, STERILE

## (undated) DEVICE — GLOVE INDICATOR PI UNDERGLOVE SZ 8 BLUE

## (undated) DEVICE — ACE WRAP 4 IN UNSTERILE

## (undated) DEVICE — 1.1MM NON-THREADED GUIDE WIRE 150MM

## (undated) DEVICE — KERLIX BANDAGE ROLL: Brand: KERLIX

## (undated) DEVICE — PADDING CAST 4 IN  COTTON STRL

## (undated) DEVICE — SUT VICRYL PLUS 1 CTB-1 36 IN VCPB947H

## (undated) DEVICE — GAUZE SPONGES,16 PLY: Brand: CURITY

## (undated) DEVICE — PLUMEPEN PRO 10FT

## (undated) DEVICE — DRAPE SHEET X-LG

## (undated) DEVICE — DRAPE C-ARM X-RAY

## (undated) DEVICE — SPONGE PVP SCRUB WING STERILE

## (undated) DEVICE — DRESSING MEPILEX AG BORDER 4 X 8 IN

## (undated) DEVICE — 2.0MM CANNULATED DRILL BIT/QC 150MM

## (undated) DEVICE — REM POLYHESIVE ADULT PATIENT RETURN ELECTRODE: Brand: VALLEYLAB

## (undated) DEVICE — GLOVE SRG BIOGEL 7.5

## (undated) DEVICE — BLADE SAGITTAL 25.6 X 9.5MM

## (undated) DEVICE — STOCKINETTE REGULAR

## (undated) DEVICE — UNIVERSAL MAJOR EXTREMITY,KIT: Brand: CARDINAL HEALTH

## (undated) DEVICE — CUFF TOURNIQUET 18 X 4 IN QUICK CONNECT DISP 1 BLADDER

## (undated) DEVICE — SILVER-COATED ANTIMICROBIAL BARRIER DRESSING: Brand: ACTICOAT   4" X 8"

## (undated) DEVICE — CHLORAPREP HI-LITE 26ML ORANGE